# Patient Record
Sex: FEMALE | Race: WHITE | NOT HISPANIC OR LATINO | Employment: OTHER | URBAN - METROPOLITAN AREA
[De-identification: names, ages, dates, MRNs, and addresses within clinical notes are randomized per-mention and may not be internally consistent; named-entity substitution may affect disease eponyms.]

---

## 2021-03-08 ENCOUNTER — HOSPITAL ENCOUNTER (INPATIENT)
Facility: HOSPITAL | Age: 76
LOS: 4 days | Discharge: NURSING, REHAB OR LONG-TERM CARE | DRG: 813 | End: 2021-03-12
Attending: INTERNAL MEDICINE | Admitting: HOSPITALIST
Payer: MEDICARE

## 2021-03-08 ENCOUNTER — APPOINTMENT (EMERGENCY)
Dept: RADIOLOGY | Facility: HOSPITAL | Age: 76
End: 2021-03-08
Payer: MEDICARE

## 2021-03-08 ENCOUNTER — HOSPITAL ENCOUNTER (EMERGENCY)
Facility: HOSPITAL | Age: 76
End: 2021-03-08
Payer: MEDICARE

## 2021-03-08 VITALS
DIASTOLIC BLOOD PRESSURE: 58 MMHG | TEMPERATURE: 98.5 F | WEIGHT: 250 LBS | SYSTOLIC BLOOD PRESSURE: 98 MMHG | HEART RATE: 68 BPM | RESPIRATION RATE: 18 BRPM | OXYGEN SATURATION: 100 % | BODY MASS INDEX: 42.68 KG/M2 | HEIGHT: 64 IN

## 2021-03-08 DIAGNOSIS — K92.2 UPPER GI BLEED: Primary | ICD-10-CM

## 2021-03-08 DIAGNOSIS — K92.2 GI BLEED: Primary | ICD-10-CM

## 2021-03-08 DIAGNOSIS — D68.9 COAGULOPATHY (HCC): ICD-10-CM

## 2021-03-08 DIAGNOSIS — D64.9 ANEMIA: ICD-10-CM

## 2021-03-08 LAB
ABO GROUP BLD: NORMAL
ABO GROUP BLD: NORMAL
ALBUMIN SERPL BCP-MCNC: 2.9 G/DL (ref 3.4–4.8)
ALP SERPL-CCNC: 58.1 U/L (ref 35–140)
ALT SERPL W P-5'-P-CCNC: 6 U/L (ref 5–54)
ANION GAP SERPL CALCULATED.3IONS-SCNC: 7 MMOL/L (ref 4–13)
APTT PPP: 66 SECONDS (ref 23–31)
AST SERPL W P-5'-P-CCNC: 13 U/L (ref 15–41)
BASOPHILS # BLD AUTO: 0.02 THOUSANDS/ΜL (ref 0–0.1)
BASOPHILS NFR BLD AUTO: 0 % (ref 0–1)
BILIRUB SERPL-MCNC: 0.4 MG/DL (ref 0.3–1.2)
BLD GP AB SCN SERPL QL: NEGATIVE
BNP SERPL-MCNC: 428.9 PG/ML (ref 1–100)
BUN SERPL-MCNC: 49 MG/DL (ref 6–20)
CALCIUM ALBUM COR SERPL-MCNC: 9.6 MG/DL (ref 8.3–10.1)
CALCIUM SERPL-MCNC: 8.7 MG/DL (ref 8.4–10.2)
CHLORIDE SERPL-SCNC: 95 MMOL/L (ref 96–108)
CO2 SERPL-SCNC: 34 MMOL/L (ref 22–33)
CREAT SERPL-MCNC: 1.09 MG/DL (ref 0.4–1.1)
DIGOXIN SERPL-MCNC: 2 NG/ML (ref 0.8–2)
EOSINOPHIL # BLD AUTO: 0.11 THOUSAND/ΜL (ref 0–0.61)
EOSINOPHIL NFR BLD AUTO: 2 % (ref 0–6)
ERYTHROCYTE [DISTWIDTH] IN BLOOD BY AUTOMATED COUNT: 18.9 % (ref 11.6–15.1)
GFR SERPL CREATININE-BSD FRML MDRD: 50 ML/MIN/1.73SQ M
GLUCOSE SERPL-MCNC: 95 MG/DL (ref 65–140)
HCT VFR BLD AUTO: 22.5 % (ref 34.8–46.1)
HEMOCCULT STL QL IA: POSITIVE
HGB BLD-MCNC: 6.5 G/DL (ref 11.5–15.4)
IMM GRANULOCYTES # BLD AUTO: 0.02 THOUSAND/UL (ref 0–0.2)
IMM GRANULOCYTES NFR BLD AUTO: 0 % (ref 0–2)
INR PPP: 7.13 (ref 0.9–1.1)
LIPASE SERPL-CCNC: 17 U/L (ref 13–60)
LYMPHOCYTES # BLD AUTO: 1 THOUSANDS/ΜL (ref 0.6–4.47)
LYMPHOCYTES NFR BLD AUTO: 16 % (ref 14–44)
MCH RBC QN AUTO: 26.1 PG (ref 26.8–34.3)
MCHC RBC AUTO-ENTMCNC: 28.9 G/DL (ref 31.4–37.4)
MCV RBC AUTO: 90 FL (ref 82–98)
MONOCYTES # BLD AUTO: 0.42 THOUSAND/ΜL (ref 0.17–1.22)
MONOCYTES NFR BLD AUTO: 7 % (ref 4–12)
NEUTROPHILS # BLD AUTO: 4.68 THOUSANDS/ΜL (ref 1.85–7.62)
NEUTS SEG NFR BLD AUTO: 75 % (ref 43–75)
PLATELET # BLD AUTO: 405 THOUSANDS/UL (ref 149–390)
PMV BLD AUTO: 9.8 FL (ref 8.9–12.7)
POTASSIUM SERPL-SCNC: 3.4 MMOL/L (ref 3.5–5)
PROT SERPL-MCNC: 6 G/DL (ref 6.4–8.3)
PROTHROMBIN TIME: 73.4 SECONDS (ref 9.5–12.1)
RBC # BLD AUTO: 2.49 MILLION/UL (ref 3.81–5.12)
RH BLD: NEGATIVE
RH BLD: NEGATIVE
SODIUM SERPL-SCNC: 136 MMOL/L (ref 133–145)
SPECIMEN EXPIRATION DATE: NORMAL
TROPONIN I SERPL-MCNC: 0.03 NG/ML (ref 0–0.07)
WBC # BLD AUTO: 6.25 THOUSAND/UL (ref 4.31–10.16)

## 2021-03-08 PROCEDURE — 86900 BLOOD TYPING SEROLOGIC ABO: CPT

## 2021-03-08 PROCEDURE — 36430 TRANSFUSION BLD/BLD COMPNT: CPT

## 2021-03-08 PROCEDURE — 93005 ELECTROCARDIOGRAM TRACING: CPT

## 2021-03-08 PROCEDURE — 80162 ASSAY OF DIGOXIN TOTAL: CPT

## 2021-03-08 PROCEDURE — 86901 BLOOD TYPING SEROLOGIC RH(D): CPT

## 2021-03-08 PROCEDURE — 85025 COMPLETE CBC W/AUTO DIFF WBC: CPT

## 2021-03-08 PROCEDURE — 96365 THER/PROPH/DIAG IV INF INIT: CPT

## 2021-03-08 PROCEDURE — 96367 TX/PROPH/DG ADDL SEQ IV INF: CPT

## 2021-03-08 PROCEDURE — C9113 INJ PANTOPRAZOLE SODIUM, VIA: HCPCS | Performed by: EMERGENCY MEDICINE

## 2021-03-08 PROCEDURE — 96366 THER/PROPH/DIAG IV INF ADDON: CPT

## 2021-03-08 PROCEDURE — 36415 COLL VENOUS BLD VENIPUNCTURE: CPT

## 2021-03-08 PROCEDURE — 86920 COMPATIBILITY TEST SPIN: CPT

## 2021-03-08 PROCEDURE — 80053 COMPREHEN METABOLIC PANEL: CPT

## 2021-03-08 PROCEDURE — 86850 RBC ANTIBODY SCREEN: CPT

## 2021-03-08 PROCEDURE — 83880 ASSAY OF NATRIURETIC PEPTIDE: CPT

## 2021-03-08 PROCEDURE — 96375 TX/PRO/DX INJ NEW DRUG ADDON: CPT

## 2021-03-08 PROCEDURE — 99285 EMERGENCY DEPT VISIT HI MDM: CPT

## 2021-03-08 PROCEDURE — 71045 X-RAY EXAM CHEST 1 VIEW: CPT

## 2021-03-08 PROCEDURE — G0328 FECAL BLOOD SCRN IMMUNOASSAY: HCPCS

## 2021-03-08 PROCEDURE — 83690 ASSAY OF LIPASE: CPT

## 2021-03-08 PROCEDURE — 84484 ASSAY OF TROPONIN QUANT: CPT

## 2021-03-08 PROCEDURE — 85610 PROTHROMBIN TIME: CPT

## 2021-03-08 PROCEDURE — 96361 HYDRATE IV INFUSION ADD-ON: CPT

## 2021-03-08 PROCEDURE — 85730 THROMBOPLASTIN TIME PARTIAL: CPT

## 2021-03-08 PROCEDURE — P9016 RBC LEUKOCYTES REDUCED: HCPCS

## 2021-03-08 RX ORDER — FUROSEMIDE 10 MG/ML
20 INJECTION INTRAMUSCULAR; INTRAVENOUS ONCE
Status: COMPLETED | OUTPATIENT
Start: 2021-03-08 | End: 2021-03-08

## 2021-03-08 RX ORDER — FERROUS SULFATE 325(65) MG
TABLET ORAL
Status: ON HOLD | COMMUNITY
End: 2021-03-12 | Stop reason: SDUPTHER

## 2021-03-08 RX ORDER — DIGOXIN 125 MCG
0.12 TABLET ORAL DAILY
COMMUNITY
Start: 2021-02-24 | End: 2022-03-04

## 2021-03-08 RX ORDER — PANTOPRAZOLE SODIUM 40 MG/1
40 INJECTION, POWDER, FOR SOLUTION INTRAVENOUS EVERY 12 HOURS SCHEDULED
Status: DISCONTINUED | OUTPATIENT
Start: 2021-03-08 | End: 2021-03-08

## 2021-03-08 RX ORDER — ALBUTEROL SULFATE 90 UG/1
2 AEROSOL, METERED RESPIRATORY (INHALATION) EVERY 6 HOURS PRN
COMMUNITY
Start: 2020-10-01 | End: 2021-10-01

## 2021-03-08 RX ORDER — SODIUM CHLORIDE 9 MG/ML
250 INJECTION, SOLUTION INTRAVENOUS ONCE
Status: COMPLETED | OUTPATIENT
Start: 2021-03-08 | End: 2021-03-08

## 2021-03-08 RX ORDER — FUROSEMIDE 40 MG/1
40 TABLET ORAL 2 TIMES DAILY
COMMUNITY
End: 2021-07-05 | Stop reason: HOSPADM

## 2021-03-08 RX ORDER — MONTELUKAST SODIUM 10 MG/1
1 TABLET ORAL DAILY
COMMUNITY
Start: 2012-06-27

## 2021-03-08 RX ORDER — AMOXICILLIN 250 MG
1 CAPSULE ORAL DAILY PRN
COMMUNITY
Start: 2021-02-23 | End: 2022-02-23

## 2021-03-08 RX ORDER — LANOLIN ALCOHOL/MO/W.PET/CERES
3 CREAM (GRAM) TOPICAL
COMMUNITY
End: 2021-03-12 | Stop reason: HOSPADM

## 2021-03-08 RX ADMIN — SODIUM CHLORIDE 8 MG/HR: 9 INJECTION, SOLUTION INTRAVENOUS at 20:00

## 2021-03-08 RX ADMIN — PHYTONADIONE 10 MG: 10 INJECTION, EMULSION INTRAMUSCULAR; INTRAVENOUS; SUBCUTANEOUS at 18:23

## 2021-03-08 RX ADMIN — SODIUM CHLORIDE 250 ML/HR: 0.9 INJECTION, SOLUTION INTRAVENOUS at 17:20

## 2021-03-08 RX ADMIN — FUROSEMIDE 20 MG: 10 INJECTION, SOLUTION INTRAMUSCULAR; INTRAVENOUS at 19:03

## 2021-03-08 NOTE — ED PROVIDER NOTES
History  Chief Complaint   Patient presents with    Abnormal Lab     81-year-old female history of atrial fibrillation COPD diabetes hypertension comes from local nursing facility secondary to low hemoglobin  Patient apparently has a history of anemia she is on iron her stools have been black  Patient states she has had no appetite denies any nausea or vomiting  Patient denies any fever chills no cough or congestion patient states her in p o  Intake has not been great  She denies any localizing abdominal pain  One arrival patient's blood pressure was low however her heart rate was 60-70 she is awake alert oriented not diaphoretic and in no distress whatsoever  Prior to Admission Medications   Prescriptions Last Dose Informant Patient Reported? Taking?    albuterol (PROVENTIL HFA,VENTOLIN HFA) 90 mcg/act inhaler 3/8/2021 at Unknown time  Yes Yes   Sig: Inhale 2 puffs every 6 (six) hours as needed   digoxin (LANOXIN) 0 125 mg tablet 3/8/2021 at Unknown time  Yes Yes   Sig: Take 0 125 mg by mouth daily   ferrous sulfate (EQL Iron Supplement Therapy) 325 (65 Fe) mg tablet 3/8/2021 at Unknown time  Yes Yes   Sig: Take by mouth   fluticasone-salmeterol (Advair Diskus) 100-50 mcg/dose inhaler 3/8/2021 at Unknown time  Yes Yes   Sig: Inhale 1 puff 2 (two) times a day   furosemide (LASIX) 40 mg tablet   Yes Yes   Sig: Take 40 mg by mouth 2 (two) times a day   melatonin 3 mg 3/8/2021 at Unknown time  Yes Yes   Sig: Take 3 mg by mouth   montelukast (SINGULAIR) 10 mg tablet 3/8/2021 at Unknown time  Yes Yes   Sig: Take 1 tablet by mouth daily   senna-docusate sodium (Senokot S) 8 6-50 mg per tablet 3/8/2021 at Unknown time  Yes Yes   Sig: Take 1 tablet by mouth daily as needed      Facility-Administered Medications: None       Past Medical History:   Diagnosis Date    Atrial fibrillation (HCC)     [facility]     Diabetes mellitus ([facility] )     Hypertension     Respiratory failure [facility])        History reviewed  No pertinent surgical history  History reviewed  No pertinent family history  I have reviewed and agree with the history as documented  E-Cigarette/Vaping    E-Cigarette Use Former User      E-Cigarette/Vaping Substances     Social History     Tobacco Use    Smoking status: Former Smoker    Smokeless tobacco: Never Used   Substance Use Topics    Alcohol use: Not Currently    Drug use: Not Currently       Review of Systems   Constitutional: Positive for fatigue  Negative for chills and fever  HENT: Negative for congestion  Eyes: Negative for visual disturbance  Respiratory: Positive for shortness of breath  Cardiovascular: Negative for chest pain  Gastrointestinal: Negative for abdominal pain  Endocrine: Negative for cold intolerance  Genitourinary: Negative for frequency  Musculoskeletal: Negative for gait problem  Skin: Negative for rash  Neurological: Positive for weakness  Negative for dizziness  Psychiatric/Behavioral: Negative for behavioral problems and confusion  Physical Exam  Physical Exam  Vitals signs and nursing note reviewed  Constitutional:       Appearance: She is well-developed  HENT:      Head: Normocephalic and atraumatic  Nose: Nose normal       Mouth/Throat:      Mouth: Mucous membranes are moist    Eyes:      Conjunctiva/sclera: Conjunctivae normal       Pupils: Pupils are equal, round, and reactive to light  Neck:      Musculoskeletal: Normal range of motion and neck supple  Cardiovascular:      Rate and Rhythm: Normal rate  Rhythm irregular  Heart sounds: Normal heart sounds  Pulmonary:      Effort: Pulmonary effort is normal       Breath sounds: Normal breath sounds  Abdominal:      General: Bowel sounds are normal       Palpations: Abdomen is soft  Genitourinary:     Rectum: Normal       Comments: Stool is black no signs of bright red blood  Musculoskeletal: Normal range of motion  Skin:     General: Skin is warm and dry  Capillary Refill: Capillary refill takes less than 2 seconds  Neurological:      Mental Status: She is alert and oriented to person, place, and time     Psychiatric:         Behavior: Behavior normal          Vital Signs  ED Triage Vitals   Temperature Pulse Respirations Blood Pressure SpO2   03/08/21 1500 03/08/21 1500 03/08/21 1500 03/08/21 1655 03/08/21 1500   98 1 °F (36 7 °C) 79 18 (!) 78/45 96 %      Temp src Heart Rate Source Patient Position - Orthostatic VS BP Location FiO2 (%)   -- -- -- -- --             Pain Score       --                  Vitals:    03/08/21 1500 03/08/21 1655 03/08/21 1700 03/08/21 1754   BP:  (!) 78/45 (!) 81/51 95/61   Pulse: 79  77 74         Visual Acuity      ED Medications  Medications   pantoprazole (PROTONIX) injection 40 mg (has no administration in time range)   sodium chloride 0 9 % infusion (0 mL/hr Intravenous Stopped 3/8/21 1753)   phytonadione (AQUA-MEPHYTON) 10 mg/mL 10 mg in sodium chloride 0 9 % 50 mL IVPB (0 mg Intravenous Stopped 3/8/21 1853)   furosemide (LASIX) injection 20 mg (20 mg Intravenous Given 3/8/21 1903)       Diagnostic Studies  Results Reviewed     Procedure Component Value Units Date/Time    B-Type Natriuretic Peptide ([address]) [[id number]]  (Abnormal) Collected: 03/08/21 1719    Lab Status: Final result Specimen: Blood from Arm, Left Updated: 03/08/21 1809      9 pg/mL     Protime-INR [[id number]]  (Abnormal) Collected: 03/08/21 1719    Lab Status: Final result Specimen: Blood from Arm, Left Updated: 03/08/21 1800     Protime 73 4 seconds      INR 7 13    Narrative:      INR Reference Ranges:  No Anticoagulant, Normal:           0 9-1 1  Standard Dose, Oral Anticoagulant:  2 0-3 0  High Dose, Oral Anticoagulant:      2 5-3 5    APTT [[id number]]  (Abnormal) Collected: 03/08/21 1719    Lab Status: Final result Specimen: Blood from Arm, Left Updated: 03/08/21 1800     PTT 66 seconds Comprehensive metabolic panel [[id number]]  (Abnormal) Collected: 03/08/21 1719    Lab Status: Final result Specimen: Blood from Arm, Left Updated: 03/08/21 1756     Sodium 136 mmol/L      Potassium 3 4 mmol/L      Chloride 95 mmol/L      CO2 34 mmol/L      ANION GAP 7 mmol/L      BUN 49 mg/dL      Creatinine 1 09 mg/dL      Glucose 95 mg/dL      Calcium 8 7 mg/dL      Corrected Calcium 9 6 mg/dL      AST 13 U/L      ALT 6 U/L      Alkaline Phosphatase 58 1 U/L      Total Protein 6 0 g/dL      Albumin 2 9 g/dL      Total Bilirubin 0 40 mg/dL      eGFR 50 ml/min/1 73sq m     Narrative:      [place] guidelines for Chronic Kidney Disease (CKD):     Stage 1 with normal or high GFR (GFR > 90 mL/min/1 73 square meters)    Stage 2 Mild CKD (GFR = 60-89 mL/min/1 73 square meters)    Stage 3A Moderate CKD (GFR = 45-59 mL/min/1 73 square meters)    Stage 3B Moderate CKD (GFR = 30-44 mL/min/1 73 square meters)    Stage 4 Severe CKD (GFR = 15-29 mL/min/1 73 square meters)    Stage 5 End Stage CKD (GFR <15 mL/min/1 73 square meters)  Note: GFR calculation is accurate only with a steady state creatinine    Lipase [[id number]]  (Normal) Collected: 03/08/21 1719    Lab Status: Final result Specimen: Blood from Arm, Left Updated: 03/08/21 1756     Lipase 17 u/L     Digoxin level [[id number]]  (Normal) Collected: 03/08/21 1719    Lab Status: Final result Specimen: Blood from Arm, Left Updated: 03/08/21 1755     Digoxin Lvl 2 0 ng/mL     Troponin I [[id number]]  (Normal) Collected: 03/08/21 1719    Lab Status: Final result Specimen: Blood from Arm, Left Updated: 03/08/21 1746     Troponin I 0 03 ng/mL     CBC and differential [[id number]]  (Abnormal) Collected: 03/08/21 1719    Lab Status: Final result Specimen: Blood from Arm, Left Updated: 03/08/21 1746     WBC 6 25 Thousand/uL      RBC 2 49 Million/uL      Hemoglobin 6 5 g/dL      Hematocrit 22 5 %      MCV 90 fL      MCH 26 1 pg      MCHC 28 9 g/dL RDW 18 9 %      MPV 9 8 fL      Platelets 067 Thousands/uL      Neutrophils Relative 75 %      Immat GRANS % 0 %      Lymphocytes Relative 16 %      Monocytes Relative 7 %      Eosinophils Relative 2 %      Basophils Relative 0 %      Neutrophils Absolute 4 68 Thousands/µL      Immature Grans Absolute 0 02 Thousand/uL      Lymphocytes Absolute 1 00 Thousands/µL      Monocytes Absolute 0 42 Thousand/µL      Eosinophils Absolute 0 11 Thousand/µL      Basophils Absolute 0 02 Thousands/µL     Occult Bloood,Fecal Immunochemical [[id number]]  (Abnormal) Collected: 03/08/21 1719    Lab Status: Final result Specimen: Stool from Per Rectum Updated: 03/08/21 1729     OCCULT BLD, FECAL IMMUNOLOGICAL Positive    Narrative:        Performed by Fecal Immunochemical Test                  XR chest 1 view portable    (Results Pending)              Procedures  Procedures         ED Course                             SBIRT 20yo+      Most Recent Value   SBIRT (22 yo +)   In order to provide better care to our patients, we are screening all of our patients for alcohol and drug use  Would it be okay to ask you these screening questions? Yes Filed at: 03/08/2021 1656   Initial Alcohol Screen: US AUDIT-C    1  How often do you have a drink containing alcohol?  0 Filed at: 03/08/2021 1656   2  How many drinks containing alcohol do you have on a typical day you are drinking? 0 Filed at: 03/08/2021 1656   3a  Male UNDER 65: How often do you have five or more drinks on one occasion? 0 Filed at: 03/08/2021 1656   3b  FEMALE Any Age, or MALE 65+: How often do you have 4 or more drinks on one occassion? 0 Filed at: 03/08/2021 1656   Audit-C Score  0 Filed at: 03/08/2021 1656   FRANTZ: How many times in the past year have you    Used an illegal drug or used a prescription medication for non-medical reasons?   Never Filed at: 03/08/2021 1656                    MDM  Number of Diagnoses or Management Options  Diagnosis management comments: Patient had stool evaluation guaiac positive black stool patient is on iron  Patient's hemoglobin was at 6 5 which is the same as it was earlier today at the nursing facility and down from 6 92 days ago  Patient's INR is at 7 5 which is up from 5 72 days ago she is taking Coumadin  Patient had EKG demonstrating a a rate of 65 atrial fib rhythm patient had chest x-ray demonstrated no acute findings  Patient's blood pressure initially was between 80 and 90 systolic patient however with 250 cc bolus normal saline came up to [phone] systolic plan is to transfuse patient with 2 units packed red blood cells patient is also received vitamin K 10 units IV case is discussed with hospitalist here who consult to GI on-call who felt that patient was at higher risk for potential need for acute intervention and felt uncomfortable because that would not be possible here at [facility] due to lack of capability to do endoscopies on a stat basis  Plan will be to transfer patient to other facility in the system where acute GI evaluation and consultation is possible  Patient access center has been notified and is doing a bed search  Disposition  Final diagnoses:   GI bleed   [facility]agulopathy ([facility] )   Anemia     Time reflects when diagnosis was documented in both MDM as applicable and the Disposition within this note     Time User Action Codes Description Comment    3/8/2021  7:07 PM Andria Petties Add [K92 2] GI bleed     3/8/2021  7:07 PM Andria Petties Add [D68 9] Coagulopathy ([facility] )     3/8/2021  7:07 PM Andria Petties Add [D64 9] Anemia       ED Disposition     None      Follow-up Information    None         Patient's Medications   Discharge Prescriptions    No medications on file     No discharge procedures on file      PDMP Review     None          ED Provider  Electronically Signed by           Vida Fung MD  03/08/21 1807       Vida Fung MD  03/08/21 Blue Mcdonald

## 2021-03-08 NOTE — ED NOTES
BP low  MD made aware  Pt aaox3  states she normally runs low  Aaox3        Rachel Davidson, RN  03/08/21 7126

## 2021-03-08 NOTE — ED NOTES
Patient provided water from Dr Angi Hutchins per request  EKG being performed at bedside        Suzanna Alvarez RN  03/08/21 1330

## 2021-03-09 ENCOUNTER — ANESTHESIA EVENT (INPATIENT)
Dept: GASTROENTEROLOGY | Facility: HOSPITAL | Age: 76
DRG: 813 | End: 2021-03-09
Payer: MEDICARE

## 2021-03-09 PROBLEM — I48.20 CHRONIC ATRIAL FIBRILLATION (HCC): Status: ACTIVE | Noted: 2021-03-09

## 2021-03-09 PROBLEM — I50.32 CHRONIC DIASTOLIC CONGESTIVE HEART FAILURE (HCC): Status: ACTIVE | Noted: 2021-03-09

## 2021-03-09 PROBLEM — K92.2 GI BLEED: Status: ACTIVE | Noted: 2021-03-09

## 2021-03-09 PROBLEM — E11.9 TYPE 2 DIABETES MELLITUS (HCC): Status: ACTIVE | Noted: 2021-03-09

## 2021-03-09 PROBLEM — E66.01 MORBID OBESITY (HCC): Status: ACTIVE | Noted: 2021-03-09

## 2021-03-09 PROBLEM — J44.9 COPD (CHRONIC OBSTRUCTIVE PULMONARY DISEASE) (HCC): Status: ACTIVE | Noted: 2021-03-09

## 2021-03-09 LAB
ABO GROUP BLD: NORMAL
ANION GAP SERPL CALCULATED.3IONS-SCNC: 9 MMOL/L (ref 4–13)
ATRIAL RATE: 0 BPM
BASOPHILS # BLD AUTO: 0.04 THOUSANDS/ΜL (ref 0–0.1)
BASOPHILS NFR BLD AUTO: 1 % (ref 0–1)
BLD GP AB SCN SERPL QL: NEGATIVE
BUN SERPL-MCNC: 40 MG/DL (ref 5–25)
CALCIUM SERPL-MCNC: 8.5 MG/DL (ref 8.3–10.1)
CHLORIDE SERPL-SCNC: 103 MMOL/L (ref 100–108)
CO2 SERPL-SCNC: 30 MMOL/L (ref 21–32)
CREAT SERPL-MCNC: 1.02 MG/DL (ref 0.6–1.3)
EOSINOPHIL # BLD AUTO: 0.16 THOUSAND/ΜL (ref 0–0.61)
EOSINOPHIL NFR BLD AUTO: 2 % (ref 0–6)
ERYTHROCYTE [DISTWIDTH] IN BLOOD BY AUTOMATED COUNT: 19.1 % (ref 11.6–15.1)
FERRITIN SERPL-MCNC: 144 NG/ML (ref 8–388)
GFR SERPL CREATININE-BSD FRML MDRD: 55 ML/MIN/1.73SQ M
GLUCOSE SERPL-MCNC: 75 MG/DL (ref 65–140)
GLUCOSE SERPL-MCNC: 78 MG/DL (ref 65–140)
GLUCOSE SERPL-MCNC: 80 MG/DL (ref 65–140)
GLUCOSE SERPL-MCNC: 82 MG/DL (ref 65–140)
GLUCOSE SERPL-MCNC: 84 MG/DL (ref 65–140)
HCT VFR BLD AUTO: 21.5 % (ref 34.8–46.1)
HGB BLD-MCNC: 6.4 G/DL (ref 11.5–15.4)
HGB BLD-MCNC: 6.6 G/DL (ref 11.5–15.4)
HGB BLD-MCNC: 6.6 G/DL (ref 11.5–15.4)
IMM GRANULOCYTES # BLD AUTO: 0.04 THOUSAND/UL (ref 0–0.2)
IMM GRANULOCYTES NFR BLD AUTO: 1 % (ref 0–2)
INR PPP: 1.9 (ref 0.84–1.19)
IRON SATN MFR SERPL: 24 %
IRON SERPL-MCNC: 55 UG/DL (ref 50–170)
LYMPHOCYTES # BLD AUTO: 1.21 THOUSANDS/ΜL (ref 0.6–4.47)
LYMPHOCYTES NFR BLD AUTO: 18 % (ref 14–44)
MCH RBC QN AUTO: 27.1 PG (ref 26.8–34.3)
MCHC RBC AUTO-ENTMCNC: 29.8 G/DL (ref 31.4–37.4)
MCV RBC AUTO: 91 FL (ref 82–98)
MONOCYTES # BLD AUTO: 0.59 THOUSAND/ΜL (ref 0.17–1.22)
MONOCYTES NFR BLD AUTO: 9 % (ref 4–12)
NEUTROPHILS # BLD AUTO: 4.56 THOUSANDS/ΜL (ref 1.85–7.62)
NEUTS SEG NFR BLD AUTO: 69 % (ref 43–75)
NRBC BLD AUTO-RTO: 1 /100 WBCS
PLATELET # BLD AUTO: 353 THOUSANDS/UL (ref 149–390)
PMV BLD AUTO: 9.6 FL (ref 8.9–12.7)
POTASSIUM SERPL-SCNC: 3.1 MMOL/L (ref 3.5–5.3)
PR INTERVAL: 158 MS
PROTHROMBIN TIME: 21.4 SECONDS (ref 11.6–14.5)
QRS AXIS: 34 DEGREES
QRSD INTERVAL: 96 MS
QT INTERVAL: 403 MS
QTC INTERVAL: 419 MS
RBC # BLD AUTO: 2.36 MILLION/UL (ref 3.81–5.12)
RH BLD: NEGATIVE
SODIUM SERPL-SCNC: 142 MMOL/L (ref 136–145)
SPECIMEN EXPIRATION DATE: NORMAL
T WAVE AXIS: 215 DEGREES
TIBC SERPL-MCNC: 230 UG/DL (ref 250–450)
VENTRICULAR RATE: 65 BPM
WBC # BLD AUTO: 6.6 THOUSAND/UL (ref 4.31–10.16)

## 2021-03-09 PROCEDURE — P9040 RBC LEUKOREDUCED IRRADIATED: HCPCS

## 2021-03-09 PROCEDURE — 83540 ASSAY OF IRON: CPT | Performed by: PHYSICIAN ASSISTANT

## 2021-03-09 PROCEDURE — 86920 COMPATIBILITY TEST SPIN: CPT

## 2021-03-09 PROCEDURE — P9016 RBC LEUKOCYTES REDUCED: HCPCS

## 2021-03-09 PROCEDURE — 85025 COMPLETE CBC W/AUTO DIFF WBC: CPT | Performed by: PHYSICIAN ASSISTANT

## 2021-03-09 PROCEDURE — 99222 1ST HOSP IP/OBS MODERATE 55: CPT | Performed by: PHYSICIAN ASSISTANT

## 2021-03-09 PROCEDURE — 85610 PROTHROMBIN TIME: CPT | Performed by: PHYSICIAN ASSISTANT

## 2021-03-09 PROCEDURE — 86901 BLOOD TYPING SEROLOGIC RH(D): CPT | Performed by: PHYSICIAN ASSISTANT

## 2021-03-09 PROCEDURE — 85018 HEMOGLOBIN: CPT | Performed by: PHYSICIAN ASSISTANT

## 2021-03-09 PROCEDURE — 86850 RBC ANTIBODY SCREEN: CPT | Performed by: PHYSICIAN ASSISTANT

## 2021-03-09 PROCEDURE — 99223 1ST HOSP IP/OBS HIGH 75: CPT | Performed by: HOSPITALIST

## 2021-03-09 PROCEDURE — 82728 ASSAY OF FERRITIN: CPT | Performed by: PHYSICIAN ASSISTANT

## 2021-03-09 PROCEDURE — 82948 REAGENT STRIP/BLOOD GLUCOSE: CPT

## 2021-03-09 PROCEDURE — 93010 ELECTROCARDIOGRAM REPORT: CPT | Performed by: INTERNAL MEDICINE

## 2021-03-09 PROCEDURE — 83550 IRON BINDING TEST: CPT | Performed by: PHYSICIAN ASSISTANT

## 2021-03-09 PROCEDURE — 30233N1 TRANSFUSION OF NONAUTOLOGOUS RED BLOOD CELLS INTO PERIPHERAL VEIN, PERCUTANEOUS APPROACH: ICD-10-PCS | Performed by: HOSPITALIST

## 2021-03-09 PROCEDURE — C9113 INJ PANTOPRAZOLE SODIUM, VIA: HCPCS | Performed by: PHYSICIAN ASSISTANT

## 2021-03-09 PROCEDURE — 80048 BASIC METABOLIC PNL TOTAL CA: CPT | Performed by: PHYSICIAN ASSISTANT

## 2021-03-09 PROCEDURE — 86900 BLOOD TYPING SEROLOGIC ABO: CPT | Performed by: PHYSICIAN ASSISTANT

## 2021-03-09 RX ORDER — MAGNESIUM HYDROXIDE/ALUMINUM HYDROXICE/SIMETHICONE 120; 1200; 1200 MG/30ML; MG/30ML; MG/30ML
30 SUSPENSION ORAL EVERY 6 HOURS PRN
Status: DISCONTINUED | OUTPATIENT
Start: 2021-03-09 | End: 2021-03-12 | Stop reason: HOSPADM

## 2021-03-09 RX ORDER — SODIUM CHLORIDE 9 MG/ML
125 INJECTION, SOLUTION INTRAVENOUS CONTINUOUS
Status: DISCONTINUED | OUTPATIENT
Start: 2021-03-09 | End: 2021-03-09

## 2021-03-09 RX ORDER — ALBUTEROL SULFATE 90 UG/1
2 AEROSOL, METERED RESPIRATORY (INHALATION) EVERY 6 HOURS PRN
Status: DISCONTINUED | OUTPATIENT
Start: 2021-03-09 | End: 2021-03-12 | Stop reason: HOSPADM

## 2021-03-09 RX ORDER — OXYMETAZOLINE HYDROCHLORIDE 0.05 G/100ML
1 SPRAY NASAL EVERY 12 HOURS PRN
Status: DISPENSED | OUTPATIENT
Start: 2021-03-09 | End: 2021-03-10

## 2021-03-09 RX ORDER — MONTELUKAST SODIUM 10 MG/1
10 TABLET ORAL DAILY
Status: DISCONTINUED | OUTPATIENT
Start: 2021-03-09 | End: 2021-03-12 | Stop reason: HOSPADM

## 2021-03-09 RX ORDER — DIGOXIN 125 MCG
125 TABLET ORAL DAILY
Status: DISCONTINUED | OUTPATIENT
Start: 2021-03-09 | End: 2021-03-12 | Stop reason: HOSPADM

## 2021-03-09 RX ORDER — FERROUS SULFATE 325(65) MG
325 TABLET ORAL
Status: DISCONTINUED | OUTPATIENT
Start: 2021-03-09 | End: 2021-03-12 | Stop reason: HOSPADM

## 2021-03-09 RX ORDER — PANTOPRAZOLE SODIUM 40 MG/1
40 INJECTION, POWDER, FOR SOLUTION INTRAVENOUS EVERY 12 HOURS SCHEDULED
Status: DISCONTINUED | OUTPATIENT
Start: 2021-03-09 | End: 2021-03-10

## 2021-03-09 RX ORDER — DEXTROSE AND SODIUM CHLORIDE 5; .9 G/100ML; G/100ML
125 INJECTION, SOLUTION INTRAVENOUS CONTINUOUS
Status: DISPENSED | OUTPATIENT
Start: 2021-03-09 | End: 2021-03-09

## 2021-03-09 RX ORDER — FLUTICASONE PROPIONATE 50 MCG
1 SPRAY, SUSPENSION (ML) NASAL 2 TIMES DAILY PRN
Status: DISCONTINUED | OUTPATIENT
Start: 2021-03-09 | End: 2021-03-12 | Stop reason: HOSPADM

## 2021-03-09 RX ORDER — FLUTICASONE FUROATE AND VILANTEROL 100; 25 UG/1; UG/1
1 POWDER RESPIRATORY (INHALATION)
Status: DISCONTINUED | OUTPATIENT
Start: 2021-03-09 | End: 2021-03-12 | Stop reason: HOSPADM

## 2021-03-09 RX ORDER — LANOLIN ALCOHOL/MO/W.PET/CERES
3 CREAM (GRAM) TOPICAL
Status: DISCONTINUED | OUTPATIENT
Start: 2021-03-09 | End: 2021-03-12 | Stop reason: HOSPADM

## 2021-03-09 RX ORDER — INSULIN GLARGINE 100 [IU]/ML
5 INJECTION, SOLUTION SUBCUTANEOUS
Status: DISCONTINUED | OUTPATIENT
Start: 2021-03-09 | End: 2021-03-12 | Stop reason: HOSPADM

## 2021-03-09 RX ORDER — ONDANSETRON 2 MG/ML
4 INJECTION INTRAMUSCULAR; INTRAVENOUS EVERY 6 HOURS PRN
Status: DISCONTINUED | OUTPATIENT
Start: 2021-03-09 | End: 2021-03-12 | Stop reason: HOSPADM

## 2021-03-09 RX ADMIN — DEXTROSE AND SODIUM CHLORIDE 125 ML/HR: 5; .9 INJECTION, SOLUTION INTRAVENOUS at 05:18

## 2021-03-09 RX ADMIN — OXYMETAZOLINE HYDROCHLORIDE 1 SPRAY: 0.05 SPRAY NASAL at 21:22

## 2021-03-09 RX ADMIN — OXYMETAZOLINE HYDROCHLORIDE 1 SPRAY: 0.05 SPRAY NASAL at 06:11

## 2021-03-09 RX ADMIN — SODIUM CHLORIDE 500 ML: 0.9 INJECTION, SOLUTION INTRAVENOUS at 14:06

## 2021-03-09 RX ADMIN — PANTOPRAZOLE SODIUM 40 MG: 40 INJECTION, POWDER, FOR SOLUTION INTRAVENOUS at 21:17

## 2021-03-09 RX ADMIN — FLUTICASONE FUROATE AND VILANTEROL TRIFENATATE 1 PUFF: 100; 25 POWDER RESPIRATORY (INHALATION) at 09:25

## 2021-03-09 RX ADMIN — PANTOPRAZOLE SODIUM 40 MG: 40 INJECTION, POWDER, FOR SOLUTION INTRAVENOUS at 09:25

## 2021-03-09 RX ADMIN — FLUTICASONE PROPIONATE 1 SPRAY: 50 SPRAY, METERED NASAL at 03:59

## 2021-03-09 RX ADMIN — DIGOXIN 125 MCG: 125 TABLET ORAL at 09:25

## 2021-03-09 RX ADMIN — MELATONIN 3 MG: at 21:17

## 2021-03-09 NOTE — H&P
H&P- Shannon Dang 1945, 70 y o  female MRN: [id number]    Unit/Bed#: E5 -01 Encounter: [id number]    Primary Care Provider: No primary care provider on file  Date and time admitted to hospital: 3/8/2021 10:40 PM        * GI bleed  Assessment & Plan  Patient sent to [facility] ED from the [place] at least in for hemoglobin of 6 2  Patient likely had melena, staff reported dark tarry stools although patient is on iron supplementation  Repeat hemoglobin 6 5  Patient asymptomatic  Receive 1 unit of blood at [facility]:   - Protonix 40mg IV BID   - type and screen   - H/H 2hrs after blood tx, then q6h  Will transfuse hgb < 7 0   - NPO   - GI consult  Appreciate input  Chronic diastolic congestive heart failure [facility])  Assessment & Plan  Wt Readings from Last 3 Encounters:   03/08/21 113 kg (250 lb)   07/30/13 (!) 155 kg (342 lb 2 oz)   09/04/12 (!) 153 kg (336 lb 8 1 oz)     Not in acute exacerbation  On PO Lasix 40 mg b i d  Will hold setting of hypotension  Echocardiogram (2020) - EF 50-55%  Diastolic dysfunction with inability to calculate filling pressures due to bioprosthetic MVR  Severe tricuspid regurgitation  At least moderate pulmonary hypertension assuming an RA pressure of 10mmHg  Daily weights        Type 2 diabetes [facility] )  Assessment & Plan  Lab Results   Component Value Date    HGBA1C 4 6 03/04/2021       No results for input(s): POCGLU in the last 72 hours  Blood Sugar Average: Last 72 hrs:   on metformin 500 b i d     Will hold while inpatient  Accu-Cheks and SSI q  6h while patient is NPO  Lantus 5 units q h s  Chronic atrial fibrillation (HCC)  Assessment & Plan  On digoxin 125 mcg daily  Current still in AFib  Rate controlled  Telemetry    COPD (chronic obstructive pulmonary disease) ([facility] )  Assessment & Plan  Not in active exacerbation  No wheezing  Continue Breo Ellipta and Singulair  Albuterol p r n        VTE Prophylaxis: Pharmacologic VTE Prophylaxis contraindicated due to GI bleed  / sequential compression device   Code Status:  Level 1-full code  Discussion with family:  Discussed with patient    Anticipated Length of Stay:  Patient will be admitted on an Inpatient basis with an anticipated length of stay of  greater than 2 midnights  Justification for Hospital Stay:  GI bleed requiring GI workup    Total Time for Visit, including Counseling / Coordination of Care: 30 minutes  Greater than 50% of this total time spent on direct patient counseling and coordination of care  Chief Complaint:  Low hemoglobin    History of Present Illness:    Lamine Finney is a 70 y o  female with PMH chronic Afib on warfarin, COPD, diastolic CHF, GERD, [id number] who presented to [facility] in the ED for low hemoglobin of 6 2 at rehab facility  Patient states that she has had dark tarry bowel movements, although she is on iron supplements and was told that was normal   She states she feels well  Asymptomatic without shortness of breath, lightheadedness, fatigue, tachycardia  She denies fevers, chills, nausea, vomiting, constipation, numbness, chest pain, shortness of breath  Review of Systems:    Review of Systems   Constitutional: Negative for appetite change, chills, fatigue and fever  HENT: Negative for ear pain, sore throat and trouble swallowing  Eyes: Negative for visual disturbance  Respiratory: Negative for cough, chest tightness, shortness of breath and wheezing  Cardiovascular: Negative for chest pain, palpitations and leg swelling  Gastrointestinal: Negative for abdominal distention, abdominal pain, diarrhea, nausea and vomiting  Endocrine: Negative  Genitourinary: Negative for dysuria  Musculoskeletal: Negative for gait problem and myalgias  Skin: Negative for pallor  Allergic/Immunologic: Negative for immunocompromised state  Neurological: Negative for dizziness, syncope, light-headedness, numbness and headaches         Past Medical and Surgical History:     Past Medical History:   Diagnosis Date    Atrial fibrillation ([facility] )     COPD (chronic obstructive pulmonary disease) ([facility] )     Diabetes mellitus ([facility] )     Hypertension     Respiratory failure ([place] [address] )        History reviewed  No pertinent surgical history  Meds/Allergies:    Prior to Admission medications    Medication Sig Start Date End Date Taking? Authorizing Provider   albuterol (PROVENTIL HFA,VENTOLIN HFA) 90 mcg/act inhaler Inhale 2 puffs every 6 (six) hours as needed 10/1/20 10/1/21 Yes Historical Provider, MD   digoxin (LANOXIN) 0 125 mg tablet Take 0 125 mg by mouth daily 2/24/21 2/24/22 Yes Historical Provider, MD   fluticasone-salmeterol (Advair Diskus) 100-50 mcg/dose inhaler Inhale 1 puff 2 (two) times a day 4/19/12  Yes Historical Provider, MD   furosemide (LASIX) 40 mg tablet Take 40 mg by mouth 2 (two) times a day   Yes Historical Provider, MD   senna-docusate sodium (Senokot S) 8 6-50 mg per tablet Take 1 tablet by mouth daily as needed 2/23/21 2/23/22 Yes Historical Provider, MD   ferrous sulfate (EQL Iron Supplement Therapy) 325 (65 Fe) mg tablet Take by mouth    Historical Provider, MD   melatonin 3 mg Take 3 mg by mouth    Historical Provider, MD   montelukast (SINGULAIR) 10 mg tablet Take 1 tablet by mouth daily 6/27/12   Historical Provider, MD     I have reveiwed home medications using records provided by [facility]  Allergies:    Allergies   Allergen Reactions    Augmentin [Amoxicillin-Pot Clavulanate] Anaphylaxis    Levaquin [Levofloxacin] Anaphylaxis       Social History:     Marital Status: /Civil Union   Occupation:  Noncontributory  Patient Pre-hospital Living Situation:  Currently at the [facility]  Patient Pre-hospital Level of Mobility:  Bedbound  Patient Pre-hospital Diet Restrictions:  Cardiac diabetic  Substance Use History:   Social History     Substance and Sexual Activity   Alcohol Use Not Currently     Social History     Tobacco Use Smoking Status Former Smoker    Quit date: 2010    Years since quittin 1   Smokeless Tobacco Never Used     Social History     Substance and Sexual Activity   Drug Use Not Currently       Family History:    non-contributory    Physical Exam:     Vitals:   Blood Pressure: (!) 94/49 (21)  Pulse: 72 (21)  Temperature: 98 2 °F (36 8 °C) (21)  Temp Source: Temporal (21)  Respirations: 16 (21)  SpO2: 94 % (21)    Physical Exam  Vitals signs and nursing note reviewed  Constitutional:       Appearance: Normal appearance  HENT:      Head: Normocephalic and atraumatic  Mouth/Throat:      Mouth: Mucous membranes are moist       Pharynx: Oropharynx is clear  No oropharyngeal exudate  Eyes:      Extraocular Movements: Extraocular movements intact  Cardiovascular:      Rate and Rhythm: Normal rate  Rhythm irregular  Pulses: Normal pulses  Heart sounds: Normal heart sounds  No murmur  No friction rub  No gallop  Comments: afib  Pulmonary:      Effort: Pulmonary effort is normal  No respiratory distress  Breath sounds: Normal breath sounds  No stridor  No wheezing or rales  Abdominal:      General: Abdomen is flat  Bowel sounds are normal  There is no distension  Palpations: Abdomen is soft  Tenderness: There is no abdominal tenderness  Musculoskeletal:      Right lower leg: No edema  Left lower leg: No edema  Skin:     General: Skin is warm and dry  Neurological:      General: No focal deficit present  Mental Status: She is alert and oriented to person, place, and time  Additional Data:     Lab Results: I have personally reviewed pertinent reports        Results from last 7 days   Lab Units 21  1719   WBC Thousand/uL 6 25   HEMOGLOBIN g/dL 6 5*   HEMATOCRIT % 22 5*   PLATELETS Thousands/uL 405*   NEUTROS PCT % 75   LYMPHS PCT % 16   MONOS PCT % 7   EOS PCT % 2     Results from last 7 days   Lab Units 03/08/21  1719   SODIUM mmol/L 136   POTASSIUM mmol/L 3 4*   CHLORIDE mmol/L 95*   CO2 mmol/L 34*   BUN mg/dL 49*   CREATININE mg/dL 1 09   ANION GAP mmol/L 7   CALCIUM mg/dL 8 7   ALBUMIN g/dL 2 9*   TOTAL BILIRUBIN mg/dL 0 40   ALK PHOS U/L 58 1   ALT U/L 6   AST U/L 13*   GLUCOSE RANDOM mg/dL 95     Results from last 7 days   Lab Units 03/08/21  1719   INR  7 13*         Results from last 7 days   Lab Units 03/04/21  0405   HEMOGLOBIN A1C % 4 6           Imaging: I have personally reviewed pertinent reports  No orders to display       EKG, Pathology, and Other Studies Reviewed on Admission:   · EKG  · Chest x-ray    Allscripts / Epic Records Reviewed: Yes     ** Please Note: This note has been constructed using a voice recognition system   **

## 2021-03-09 NOTE — ASSESSMENT & PLAN NOTE
On digoxin 125 mcg daily  Anticoagulated on Warfarin  Hold warfarin in setting of GI bleed  Current still in AFib  Rate controlled    Telemetry

## 2021-03-09 NOTE — ED NOTES
Pt AA&Ox3,  Blood consent signed  Aware of being transferred to [facility]    Patient VSS resting quietly     Karl Piper RN  03/08/21 2014

## 2021-03-09 NOTE — EMTALA/ACUTE CARE TRANSFER
[facility] EMERGENCY DEPARTMENT  [address] [facility] [address]  Dept: [phone]      EMTALA TRANSFER CONSENT    NAME Darrell Faust                                         1945                              MRN [id number]    I have been informed of my rights regarding examination, treatment, and transfer   by Dr Claria Boast, DO    Benefits: Specialized equipment and/or services available at the receiving facility (Include comment)________________________    Risks: Potential for delay in receiving treatment, Potential deterioration of medical condition, Loss of IV      Consent for Transfer:  I acknowledge that my medical condition has been evaluated and explained to me by the emergency department physician or other qualified medical person and/or my attending physician, who has recommended that I be transferred to the service of  Accepting Physician: Dr Светлана Ferreira at [address] [place] [address] Name, [address] : [address]  The above potential benefits of such transfer, the potential risks associated with such transfer, and the probable risks of not being transferred have been explained to me, and I fully understand them  The doctor has explained that, in my case, the benefits of transfer outweigh the risks  I agree to be transferred  I authorize the performance of emergency medical procedures and treatments upon me in both transit and upon arrival at the receiving facility  Additionally, I authorize the release of any and all medical records to the receiving facility and request they be transported with me, if possible  I understand that the safest mode of transportation during a medical emergency is an ambulance and that the Hospital advocates the use of this mode of transport   Risks of traveling to the receiving facility by car, including absence of medical control, life sustaining equipment, such as oxygen, and medical personnel has been explained to me and I fully understand them  (SHEBA CORRECT BOX BELOW)  [  ]  I consent to the stated transfer and to be transported by ambulance/helicopter  [  ]  I consent to the stated transfer, but refuse transportation by ambulance and accept full responsibility for my transportation by car  I understand the risks of non-ambulance transfers and I exonerate the Hospital and its staff from any deterioration in my condition that results from this refusal     X___________________________________________    DATE  21  TIME________  Signature of patient or legally responsible individual signing on patient behalf           RELATIONSHIP TO PATIENT_________________________          Provider Certification    NAME Danisha Bailey                                         1945                              MRN [id number]    A medical screening exam was performed on the above named patient  Based on the examination:    Condition Necessitating Transfer The primary encounter diagnosis was Upper GI bleed  Diagnoses of Coagulopathy ([facility] [address] ) and Anemia were also pertinent to this visit      Patient Condition: The patient has been stabilized such that within reasonable medical probability, no material deterioration of the patient condition or the condition of the unborn child(taurus) is likely to result from the transfer, An emergency transfer is being made prior to stabilization due to the need for definitive care and the benefit of transfer outweighs the risk    Reason for Transfer: Level of Care needed not available at this facility, Patient/Family request    Transfer Requirements: [address] [facility]   · Space available and qualified personnel available for treatment as acknowledged by    · Agreed to accept transfer and to provide appropriate medical treatment as acknowledged by       Dr Aron Gilbert  · Appropriate medical records of the examination and treatment of the patient are provided at the time of transfer STAFF INITIAL WHEN COMPLETED _______  · Transfer will be performed by qualified personnel from    and appropriate transfer equipment as required, including the use of necessary and appropriate life support measures  Provider Certification: I have examined the patient and explained the following risks and benefits of being transferred/refusing transfer to the patient/family:  General risk, such as traffic hazards, adverse weather conditions, rough terrain or turbulence, possible failure of equipment (including vehicle or aircraft), or consequences of actions of persons outside the control of the transport personnel      Based on these reasonable risks and benefits to the patient and/or the unborn child(taurus), and based upon the information available at the time of the patients examination, I certify that the medical benefits reasonably to be expected from the provision of appropriate medical treatments at another medical facility outweigh the increasing risks, if any, to the individuals medical condition, and in the case of labor to the unborn child, from effecting the transfer      X____________________________________________ DATE 03/08/21        TIME_______      ORIGINAL - SEND TO MEDICAL RECORDS   COPY - SEND WITH PATIENT DURING TRANSFER

## 2021-03-09 NOTE — ASSESSMENT & PLAN NOTE
Lab Results   Component Value Date    HGBA1C 4 6 03/04/2021       No results for input(s): POCGLU in the last 72 hours  Blood Sugar Average: Last 72 hrs:   on metformin 500 b i d     Will hold while inpatient  Accu-Cheks and SSI q  6h while patient is NPO  Lantus 5 units q h s

## 2021-03-09 NOTE — PLAN OF CARE
Problem: Potential for Falls  Goal: Patient will remain free of falls  Description: INTERVENTIONS:  - Assess patient frequently for physical needs  -  Identify cognitive and physical deficits and behaviors that affect risk of falls    -  [place] fall precautions as indicated by assessment   - Educate patient/family on patient safety including physical limitations  - Instruct patient to call for assistance with activity based on assessment  - Modify environment to reduce risk of injury  - Consider OT/PT consult to assist with strengthening/mobility  Outcome: Progressing

## 2021-03-09 NOTE — ED CARE HANDOFF
Emergency Department Sign Out Note        Sign out and transfer of care from Dr Jessica Ervin  See Separate Emergency Department note  The patient, Ramesh Schroeder, was evaluated by the previous provider for anemia, Gi bleed  Workup Completed:  Labs, xray    ED Course / Workup Pending (followup):  Pt found to be anemic due to slow upper Gi bleed, having melena  Anemic at 6 5  2 units of PRBCs ordered, vit K already given  Pending transfer out  protonix bolus given, will add protonix drip  ED Course as of Mar 09 0018   Kristin Heerik Mar 08, 2021   2651 Patient signed out to me, blood transfusions ordered, pending callback from [facility] from transfer out for GI consult and endoscopy  2023 D/w MARION Reese accepts for transfer to [facility] under Dr Cherie Camarillo  Procedures  MDM    Disposition  Final diagnoses:   Coagulopathy ([facility] [address] )   Anemia   Upper GI bleed     Time reflects when diagnosis was documented in both MDM as applicable and the Disposition within this note     Time User Action Codes Description Comment    3/8/2021  7:07 PM Jeaneen Gums Add [K92 2] GI bleed     3/8/2021  7:07 PM Jeaneen Gums Add [D68 9] Coagulopathy ([facility] [address] )     3/8/2021  7:07 PM Delcie Darryl [D64 9] Anemia     3/8/2021  8:21 PM Antonio Gallardo Add [K92 2] Upper GI bleed     3/8/2021  8:21 PM Audrea Issa Modify [D68 9] Coagulopathy ([facility] [address] )     3/8/2021  8:21 PM Audrea Issa Remove [K92 2] GI bleed     3/8/2021  8:21 PM Audrea Issa Modify [D68 9] Coagulopathy ([facility] [address] )     3/8/2021  8:21 PM Audrea Issa Modify [K92 2] Upper GI bleed       ED Disposition     ED Disposition Condition Date/Time Comment    Transfer to Another Facility-In Network  Mon Mar 8, 2021  8:21 PM Ramesh Schroeder should be transferred out to Leonard Rudd MD Documentation      Most Recent Value   Patient Condition  The patient has been stabilized such that within reasonable medical probability, no material deterioration of the patient condition or the condition of the unborn child(taurus) is likely to result from the transfer, An emergency transfer is being made prior to stabilization due to the need for definitive care and the benefit of transfer outweighs the risk   Reason for Transfer  Level of Care needed not available at this facility, Patient/Family request   Benefits of Transfer  Specialized equipment and/or services available at the receiving facility (Include comment)________________________   Risks of Transfer  Potential for delay in receiving treatment, Potential deterioration of medical condition, Loss of IV   Accepting Physician  Dr Uche Brito Name, [facility]   Sending MD Dr Tracey Burris   Provider Certification  General risk, such as traffic hazards, adverse weather conditions, rough terrain or turbulence, possible failure of equipment (including vehicle or aircraft), or consequences of actions of persons outside the control of the transport personnel      RN Documentation      Most Recent Value   Accepting Facility Name, [facility]      Follow-up Information    None       Discharge Medication List as of 3/8/2021 10:37 PM      CONTINUE these medications which have NOT CHANGED    Details   albuterol (PROVENTIL HFA,VENTOLIN HFA) 90 mcg/act inhaler Inhale 2 puffs every 6 (six) hours as needed, Starting Thu 10/1/2020, Until Fri 10/1/2021, Historical Med      digoxin (LANOXIN) 0 125 mg tablet Take 0 125 mg by mouth daily, Starting Wed 2/24/2021, Until Thu 2/24/2022, Historical Med      fluticasone-salmeterol (Advair Diskus) 100-50 mcg/dose inhaler Inhale 1 puff 2 (two) times a day, Starting Thu 4/19/2012, Historical Med      furosemide (LASIX) 40 mg tablet Take 40 mg by mouth 2 (two) times a day, Historical Med      senna-docusate sodium (Senokot S) 8 6-50 mg per tablet Take 1 tablet by mouth daily as needed, Starting Tue 2/23/2021, Until Wed 2/23/2022, Historical Med      ferrous sulfate (EQL Iron Supplement Therapy) 325 (65 Fe) mg tablet Take by mouth, Historical Med      melatonin 3 mg Take 3 mg by mouth, Historical Med      montelukast (SINGULAIR) 10 mg tablet Take 1 tablet by mouth daily, Starting Wed 6/27/2012, Historical Med           No discharge procedures on file         ED Provider  Electronically Signed by     Domi Muniz DO  03/09/21 7486

## 2021-03-09 NOTE — ED NOTES
Please let patient know that her labs came out fairly acceptable except for potassium came out elevated at 5.8.   However I think this is more of a test tube problem rather than what is going on in her body since the same thing happened in 2014 and 2015 whe TRANSFER INFORMATION      TIME:   2115       TRANSFER DESTINATION:  E5 Room 536       TRANSFER CREW:   ROSENDA       ACCEPTING DOCTOR:   Edmond Elliott       PHONE NUMBER TO CALL REPORT:  [phone] [address], RN  03/08/21 0163

## 2021-03-09 NOTE — ASSESSMENT & PLAN NOTE
Wt Readings from Last 3 Encounters:   03/08/21 113 kg (250 lb)   07/30/13 (!) 155 kg (342 lb 2 oz)   09/04/12 (!) 153 kg (336 lb 8 1 oz)     Not in acute exacerbation  On PO Lasix 40 mg b i d  Will hold setting of hypotension  Echocardiogram (2020) - EF 50-55%  Diastolic dysfunction with inability to calculate filling pressures due to bioprosthetic MVR  Severe tricuspid regurgitation  At least moderate pulmonary hypertension assuming an RA pressure of 10mmHg     CXR - Cardiomegaly with very mild pulmonary congestion, otherwise unremarkable  Daily weights

## 2021-03-09 NOTE — ED NOTES
Patient offers no complaints,  Blood infusing without any difficulty or reactions    Patient aware of being transferred to [facility] and aware of the plan of care     Kaleigh Langston RN  03/08/21 2031

## 2021-03-09 NOTE — CONSULTS
Patient MRN: [id number]  Date of Service: 3/9/2021  Referring Physician: Karina Hernandez PA-C  Provider Creating Note: Manolo Seo PA-C  PCP: No primary care provider on file  Reason for Consult: Anemia with melena  MAKAYLA Loza is a 70 y o  female who was admitted with GI bleed  She has a history of atrial fibrillation, on chronic warfarin, CHF and bovine mitral valve replacement 2012  She has a history of anemia with a hemoglobin that fluctuated between 6 8 and 9 8 at least since September  She has been on oral iron and her stools have been dark  She had routine blood work on 03/04, her hemoglobin was 7 6  Repeat on 03/08 shows a hemoglobin of 6 2 and she was referred for admission  She was transfused as an outpatient at  [facility] in mid February  Her hemoglobin was 6 2 at that time  After transfusion it was 8 2 on 02/21  She denies any abdominal pain, heartburn, nausea, hematochezia or change in bowels  She had a colonoscopy in 2010 and an upper endoscopy at that time also  She reports a significant weight loss that she attributes to being on Lasix for her congestive heart failure  Past Medical History:   Diagnosis Date    Atrial fibrillation ([facility] )     COPD (chronic obstructive pulmonary disease) ([facility] )     Diabetes mellitus ([facility] )     Hypertension     Respiratory failure ([facility] )      Past Surgical History:   Procedure Laterality Date    MITRAL VALVE REPLACEMENT  2012    Bovine     Medications  Home Medications:   Prior to Admission medications    Medication Sig Start Date End Date Taking?  Authorizing Provider   albuterol (PROVENTIL HFA,VENTOLIN HFA) 90 mcg/act inhaler Inhale 2 puffs every 6 (six) hours as needed 10/1/20 10/1/21 Yes Historical Provider, MD   digoxin (LANOXIN) 0 125 mg tablet Take 0 125 mg by mouth daily 2/24/21 2/24/22 Yes Historical Provider, MD   fluticasone-salmeterol (Advair Diskus) 100-50 mcg/dose inhaler Inhale 1 puff 2 (two) times a day 4/19/12 Yes Historical Provider, MD   furosemide (LASIX) 40 mg tablet Take 40 mg by mouth 2 (two) times a day   Yes Historical Provider, MD   senna-docusate sodium (Senokot S) 8 6-50 mg per tablet Take 1 tablet by mouth daily as needed 2/23/21 2/23/22 Yes Historical Provider, MD   ferrous sulfate (EQL Iron Supplement Therapy) 325 (65 Fe) mg tablet Take by mouth    Historical Provider, MD   melatonin 3 mg Take 3 mg by mouth    Historical Provider, MD   montelukast (SINGULAIR) 10 mg tablet Take 1 tablet by mouth daily 6/27/12   Historical Provider, MD       Inhouse Medications    Current Facility-Administered Medications:     albuterol (PROVENTIL HFA,VENTOLIN HFA) inhaler 2 puff, 2 puff, Inhalation, Q6H PRN    aluminum-magnesium hydroxide-simethicone (MYLANTA) oral suspension 30 mL, 30 mL, Oral, Q6H PRN    digoxin (LANOXIN) tablet 125 mcg, 125 mcg, Oral, Daily    ferrous sulfate tablet 325 mg, 325 mg, Oral, Daily With Breakfast    fluticasone (FLONASE) 50 mcg/act nasal spray 1 spray, 1 spray, Each Nare, BID PRN, 1 spray at 03/09/21 0359    fluticasone-vilanterol (BREO ELLIPTA) 100-25 mcg/inh inhaler 1 puff, 1 puff, Inhalation, Daily    insulin glargine (LANTUS) subcutaneous injection 5 Units 0 05 mL, 5 Units, Subcutaneous, HS, Stopped at 03/09/21 0229    insulin lispro (HumaLOG) 100 units/mL subcutaneous injection 2-12 Units, 2-12 Units, Subcutaneous, Q6H [address], Stopped at 03/09/21 0229 **AND** Fingerstick Glucose (POCT), , , Q6H    melatonin tablet 3 mg, 3 mg, Oral, HS, Stopped at 03/09/21 0215    montelukast (SINGULAIR) tablet 10 mg, 10 mg, Oral, Daily    ondansetron (ZOFRAN) injection 4 mg, 4 mg, Intravenous, Q6H PRN    oxymetazoline (AFRIN) 0 05 % nasal spray 1 spray, 1 spray, Each Nare, Q12H PRN, 1 spray at 03/09/21 0611    pantoprazole (PROTONIX) injection 40 mg, 40 mg, Intravenous, Q12H [address], Stopped at 03/09/21 0214    Allergies  Allergies   Allergen Reactions    Augmentin [Amoxicillin-Pot Clavulanate] Anaphylaxis    Levaquin [Levofloxacin] Anaphylaxis     Social History   reports that she quit smoking about 11 years ago  She has never used smokeless tobacco  She reports previous alcohol use  She reports previous drug use  Family History  History reviewed  No pertinent family history  Family history of breast cancer  No family history of GI malignancy    ROS  ROS: Reports:  Black stools, weight loss  Denies chest pain, dyspnea, abdominal pain  All others negative except as noted in HPI  Objective   Vitals  /59 (BP Location: Right arm)   Pulse 65   Temp (!) 97 4 °F (36 3 °C) (Temporal)   Resp 16   SpO2 99%   General: Alert, no apparent distress  Eyes: No scleral icterus  ENT:  Mucous membranes dry  Card:  Irregular  Lungs:  Decreased bilaterally  Abdomen:  Soft  Obese  Nontender  Bowel sounds normoactive  Skin:  No jaundice  Extremities: 1+ edema b/l LE  Neuro: Alert and oriented x3    Laboratory Studies  Lab Results   Component Value Date    WBC 6 60 03/09/2021    HGB 6 4 (LL) 03/09/2021    HCT 21 5 (L) 03/09/2021     03/09/2021    MCV 91 03/09/2021     Lab Results   Component Value Date    HGB 6 4 (LL) 03/09/2021    HGB 6 6 (LL) 03/09/2021    HGB 6 5 (LL) 03/08/2021       Lab Results   Component Value Date    CREATININE 1 02 03/09/2021    BUN 40 (H) 03/09/2021    SODIUM 142 03/09/2021    K 3 1 (L) 03/09/2021     03/09/2021    CO2 30 03/09/2021    GLUC 82 03/09/2021    CALCIUM 8 5 03/09/2021    ALKPHOS 58 1 03/08/2021    ALB 2 9 (L) 03/08/2021    TBILI 0 40 03/08/2021    AST 13 (L) 03/08/2021    ALT 6 03/08/2021     Lab Results   Component Value Date    PROTIME 21 4 (H) 03/09/2021    INR 1 90 (H) 03/09/2021         Assessment and Plan:  1  Anemia with melenic stool on warfarin  INR 7 13 at admission, 1 90 today  With elevated BUN, upper GI bleed is possible  Continue pantoprazole 40 mg b i d  Will discuss timing of  EGD with attending     2  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

## 2021-03-09 NOTE — PLAN OF CARE
Problem: Potential for Falls  Goal: Patient will remain free of falls  Description: INTERVENTIONS:  - Assess patient frequently for physical needs  -  Identify cognitive and physical deficits and behaviors that affect risk of falls  -  [place] fall precautions as indicated by assessment   - Educate patient/family on patient safety including physical limitations  - Instruct patient to call for assistance with activity based on assessment  - Modify environment to reduce risk of injury  - Consider OT/PT consult to assist with strengthening/mobility  3/9/2021 1019 by Ned Walden RN  Outcome: Progressing  3/9/2021 1018 by Ned Walden RN  Outcome: Progressing     Problem: Prexisting or High Potential for Compromised Skin Integrity  Goal: Skin integrity is maintained or improved  Description: INTERVENTIONS:  - Identify patients at risk for skin breakdown  - Assess and monitor skin integrity  - Assess and monitor nutrition and hydration status  - Monitor labs   - Assess for incontinence   - Turn and reposition patient  - Assist with mobility/ambulation  - Relieve pressure over bony prominences  - Avoid friction and shearing  - Provide appropriate hygiene as needed including keeping skin clean and dry  - Evaluate need for skin moisturizer/barrier cream  - Collaborate with interdisciplinary team   - Patient/family teaching  - Consider wound care consult   3/9/2021 1019 by Ned Walden RN  Outcome: Progressing  3/9/2021 1018 by Ned Walden RN  Outcome: Progressing     Problem: Nutrition/Hydration-ADULT  Goal: Nutrient/Hydration intake appropriate for improving, restoring or maintaining nutritional needs  Description: Monitor and assess patient's nutrition/hydration status for malnutrition  Collaborate with interdisciplinary team and initiate plan and interventions as ordered  Monitor patient's weight and dietary intake as ordered or per policy  Utilize nutrition screening tool and intervene as necessary  Determine patient's food preferences and provide high-protein, high-caloric foods as appropriate       INTERVENTIONS:  - Monitor oral intake, urinary output, labs, and treatment plans  - Assess nutrition and hydration status and recommend course of action  - Evaluate amount of meals eaten  - Assist patient with eating if necessary   - Allow adequate time for meals  - Recommend/ encourage appropriate diets, oral nutritional supplements, and vitamin/mineral supplements  - Order, calculate, and assess calorie counts as needed  - Recommend, monitor, and adjust tube feedings and TPN/PPN based on assessed needs  - Assess need for intravenous fluids  - Provide specific nutrition/hydration education as appropriate  - Include patient/family/caregiver in decisions related to nutrition  3/9/2021 1019 by Yousuf Sher RN  Outcome: Progressing  3/9/2021 1018 by Yousuf Sher RN  Outcome: Progressing     Problem: GASTROINTESTINAL - ADULT  Goal: Minimal or absence of nausea and/or vomiting  Description: INTERVENTIONS:  - Administer IV fluids if ordered to ensure adequate hydration  - Maintain NPO status until nausea and vomiting are resolved  - Nasogastric tube if ordered  - Administer ordered antiemetic medications as needed  - Provide nonpharmacologic comfort measures as appropriate  - Advance diet as tolerated, if ordered  - Consider nutrition services referral to assist patient with adequate nutrition and appropriate food choices  Outcome: Progressing     Problem: METABOLIC, FLUID AND ELECTROLYTES - ADULT  Goal: Electrolytes maintained within normal limits  Description: INTERVENTIONS:  - Monitor labs and assess patient for signs and symptoms of electrolyte imbalances  - Administer electrolyte replacement as ordered  - Monitor response to electrolyte replacements, including repeat lab results as appropriate  - Instruct patient on fluid and nutrition as appropriate  Outcome: Progressing  Goal: Fluid balance maintained  Description: INTERVENTIONS:  - Monitor labs   - Monitor I/O and WT  - Instruct patient on fluid and nutrition as appropriate  - Assess for signs & symptoms of volume excess or deficit  Outcome: Progressing     Problem: SKIN/TISSUE INTEGRITY - ADULT  Goal: Incision(s), wounds(s) or drain site(s) healing without S/S of infection  Description: INTERVENTIONS  - Assess and document risk factors for skin impairment   - Assess and document dressing, incision, wound bed, drain sites and surrounding tissue  - Consider nutrition services referral as needed  - Oral mucous membranes remain intact  - Provide patient/ family education  Outcome: Progressing     Problem: HEMATOLOGIC - ADULT  Goal: Maintains hematologic stability  Description: INTERVENTIONS  - Assess for signs and symptoms of bleeding or hemorrhage  - Monitor labs  - Administer supportive blood products/factors as ordered and appropriate  Outcome: Progressing     Problem: MUSCULOSKELETAL - ADULT  Goal: Maintain or return mobility to safest level of function  Description: INTERVENTIONS:  - Assess patient's ability to carry out ADLs; assess patient's baseline for ADL function and identify physical deficits which impact ability to perform ADLs (bathing, care of mouth/teeth, toileting, grooming, dressing, etc )  - Assess/evaluate cause of self-care deficits   - Assess range of motion  - Assess patient's mobility  - Assess patient's need for assistive devices and provide as appropriate  - Encourage maximum independence but intervene and supervise when necessary  - Involve family in performance of ADLs  - Assess for home care needs following discharge   - Consider OT consult to assist with ADL evaluation and planning for discharge  - Provide patient education as appropriate  Outcome: Progressing

## 2021-03-09 NOTE — ASSESSMENT & PLAN NOTE
Patient sent to [facility] ED from the [place] at least in for hemoglobin of 6 2  Patient likely had melena, staff reported dark tarry stools although patient is on iron supplementation  Repeat hemoglobin 6 5  Patient asymptomatic  Receive 1 unit of blood at [facility]  INR 7, received Vit K at [facility]:   - Protonix 40mg IV BID   - type and screen   - H/H 2hrs after blood tx, then q6h  Will transfuse hgb < 7 0   - Repeat INR, will consider FFP if pt has active bleeding   - NPO   - GI consult  Appreciate input

## 2021-03-10 ENCOUNTER — APPOINTMENT (OUTPATIENT)
Dept: GASTROENTEROLOGY | Facility: HOSPITAL | Age: 76
DRG: 813 | End: 2021-03-10
Payer: MEDICARE

## 2021-03-10 ENCOUNTER — ANESTHESIA (INPATIENT)
Dept: GASTROENTEROLOGY | Facility: HOSPITAL | Age: 76
DRG: 813 | End: 2021-03-10
Payer: MEDICARE

## 2021-03-10 LAB
ABO GROUP BLD BPU: NORMAL
ANION GAP SERPL CALCULATED.3IONS-SCNC: 8 MMOL/L (ref 4–13)
ATRIAL RATE: 56 BPM
BPU ID: NORMAL
BUN SERPL-MCNC: 28 MG/DL (ref 5–25)
CALCIUM SERPL-MCNC: 8.5 MG/DL (ref 8.3–10.1)
CHLORIDE SERPL-SCNC: 103 MMOL/L (ref 100–108)
CO2 SERPL-SCNC: 30 MMOL/L (ref 21–32)
CREAT SERPL-MCNC: 0.93 MG/DL (ref 0.6–1.3)
CROSSMATCH: NORMAL
ERYTHROCYTE [DISTWIDTH] IN BLOOD BY AUTOMATED COUNT: 18 % (ref 11.6–15.1)
GFR SERPL CREATININE-BSD FRML MDRD: 61 ML/MIN/1.73SQ M
GLUCOSE SERPL-MCNC: 114 MG/DL (ref 65–140)
GLUCOSE SERPL-MCNC: 76 MG/DL (ref 65–140)
GLUCOSE SERPL-MCNC: 78 MG/DL (ref 65–140)
GLUCOSE SERPL-MCNC: 80 MG/DL (ref 65–140)
GLUCOSE SERPL-MCNC: 94 MG/DL (ref 65–140)
GLUCOSE SERPL-MCNC: 98 MG/DL (ref 65–140)
HCT VFR BLD AUTO: 25.4 % (ref 34.8–46.1)
HGB BLD-MCNC: 7.8 G/DL (ref 11.5–15.4)
HGB BLD-MCNC: 7.9 G/DL (ref 11.5–15.4)
HGB BLD-MCNC: 8.2 G/DL (ref 11.5–15.4)
HGB BLD-MCNC: 8.6 G/DL (ref 11.5–15.4)
INR PPP: 1.32 (ref 0.84–1.19)
INR PPP: 1.35 (ref 0.84–1.19)
MCH RBC QN AUTO: 27.9 PG (ref 26.8–34.3)
MCHC RBC AUTO-ENTMCNC: 30.7 G/DL (ref 31.4–37.4)
MCV RBC AUTO: 91 FL (ref 82–98)
PLATELET # BLD AUTO: 340 THOUSANDS/UL (ref 149–390)
PMV BLD AUTO: 9.6 FL (ref 8.9–12.7)
POTASSIUM SERPL-SCNC: 2.8 MMOL/L (ref 3.5–5.3)
PROTHROMBIN TIME: 16.1 SECONDS (ref 11.6–14.5)
PROTHROMBIN TIME: 16.4 SECONDS (ref 11.6–14.5)
QRS AXIS: 63 DEGREES
QRSD INTERVAL: 94 MS
QT INTERVAL: 358 MS
QTC INTERVAL: 348 MS
RBC # BLD AUTO: 2.8 MILLION/UL (ref 3.81–5.12)
SODIUM SERPL-SCNC: 141 MMOL/L (ref 136–145)
T WAVE AXIS: 164 DEGREES
UNIT DISPENSE STATUS: NORMAL
UNIT PRODUCT CODE: NORMAL
UNIT RH: NORMAL
VENTRICULAR RATE: 57 BPM
WBC # BLD AUTO: 5.09 THOUSAND/UL (ref 4.31–10.16)

## 2021-03-10 PROCEDURE — 85018 HEMOGLOBIN: CPT | Performed by: PHYSICIAN ASSISTANT

## 2021-03-10 PROCEDURE — 88305 TISSUE EXAM BY PATHOLOGIST: CPT | Performed by: PATHOLOGY

## 2021-03-10 PROCEDURE — 0DB68ZX EXCISION OF STOMACH, VIA NATURAL OR ARTIFICIAL OPENING ENDOSCOPIC, DIAGNOSTIC: ICD-10-PCS | Performed by: INTERNAL MEDICINE

## 2021-03-10 PROCEDURE — C9113 INJ PANTOPRAZOLE SODIUM, VIA: HCPCS | Performed by: PHYSICIAN ASSISTANT

## 2021-03-10 PROCEDURE — 85610 PROTHROMBIN TIME: CPT | Performed by: HOSPITALIST

## 2021-03-10 PROCEDURE — 85027 COMPLETE CBC AUTOMATED: CPT | Performed by: HOSPITALIST

## 2021-03-10 PROCEDURE — 82948 REAGENT STRIP/BLOOD GLUCOSE: CPT

## 2021-03-10 PROCEDURE — 99232 SBSQ HOSP IP/OBS MODERATE 35: CPT | Performed by: HOSPITALIST

## 2021-03-10 PROCEDURE — 80048 BASIC METABOLIC PNL TOTAL CA: CPT | Performed by: HOSPITALIST

## 2021-03-10 PROCEDURE — 93010 ELECTROCARDIOGRAM REPORT: CPT | Performed by: INTERNAL MEDICINE

## 2021-03-10 PROCEDURE — 88313 SPECIAL STAINS GROUP 2: CPT | Performed by: PATHOLOGY

## 2021-03-10 PROCEDURE — 43239 EGD BIOPSY SINGLE/MULTIPLE: CPT | Performed by: INTERNAL MEDICINE

## 2021-03-10 PROCEDURE — 93005 ELECTROCARDIOGRAM TRACING: CPT

## 2021-03-10 PROCEDURE — 85610 PROTHROMBIN TIME: CPT | Performed by: PHYSICIAN ASSISTANT

## 2021-03-10 RX ORDER — DEXTROSE AND SODIUM CHLORIDE 5; .45 G/100ML; G/100ML
75 INJECTION, SOLUTION INTRAVENOUS CONTINUOUS
Status: DISCONTINUED | OUTPATIENT
Start: 2021-03-10 | End: 2021-03-10

## 2021-03-10 RX ORDER — POTASSIUM CHLORIDE 20 MEQ/1
40 TABLET, EXTENDED RELEASE ORAL 2 TIMES DAILY
Status: COMPLETED | OUTPATIENT
Start: 2021-03-10 | End: 2021-03-11

## 2021-03-10 RX ORDER — LIDOCAINE HYDROCHLORIDE 20 MG/ML
INJECTION, SOLUTION EPIDURAL; INFILTRATION; INTRACAUDAL; PERINEURAL AS NEEDED
Status: DISCONTINUED | OUTPATIENT
Start: 2021-03-10 | End: 2021-03-10

## 2021-03-10 RX ORDER — POTASSIUM CHLORIDE 14.9 MG/ML
20 INJECTION INTRAVENOUS
Status: DISCONTINUED | OUTPATIENT
Start: 2021-03-10 | End: 2021-03-10

## 2021-03-10 RX ORDER — SODIUM CHLORIDE 9 MG/ML
125 INJECTION, SOLUTION INTRAVENOUS CONTINUOUS
Status: DISCONTINUED | OUTPATIENT
Start: 2021-03-10 | End: 2021-03-11

## 2021-03-10 RX ORDER — PANTOPRAZOLE SODIUM 40 MG/1
40 TABLET, DELAYED RELEASE ORAL
Status: DISCONTINUED | OUTPATIENT
Start: 2021-03-11 | End: 2021-03-11

## 2021-03-10 RX ORDER — PROPOFOL 10 MG/ML
INJECTION, EMULSION INTRAVENOUS AS NEEDED
Status: DISCONTINUED | OUTPATIENT
Start: 2021-03-10 | End: 2021-03-10

## 2021-03-10 RX ORDER — NYSTATIN 100000 [USP'U]/G
POWDER TOPICAL 2 TIMES DAILY
Status: DISCONTINUED | OUTPATIENT
Start: 2021-03-10 | End: 2021-03-12 | Stop reason: HOSPADM

## 2021-03-10 RX ADMIN — FERROUS SULFATE TAB 325 MG (65 MG ELEMENTAL FE) 325 MG: 325 (65 FE) TAB at 08:24

## 2021-03-10 RX ADMIN — FLUTICASONE FUROATE AND VILANTEROL TRIFENATATE 1 PUFF: 100; 25 POWDER RESPIRATORY (INHALATION) at 08:32

## 2021-03-10 RX ADMIN — DIGOXIN 125 MCG: 125 TABLET ORAL at 08:24

## 2021-03-10 RX ADMIN — NYSTATIN: 100000 POWDER TOPICAL at 10:51

## 2021-03-10 RX ADMIN — DEXTROSE AND SODIUM CHLORIDE 75 ML/HR: 5; .45 INJECTION, SOLUTION INTRAVENOUS at 08:23

## 2021-03-10 RX ADMIN — POTASSIUM CHLORIDE 40 MEQ: 1500 TABLET, EXTENDED RELEASE ORAL at 18:15

## 2021-03-10 RX ADMIN — SODIUM CHLORIDE 125 ML/HR: 0.9 INJECTION, SOLUTION INTRAVENOUS at 12:30

## 2021-03-10 RX ADMIN — POTASSIUM CHLORIDE 20 MEQ: 14.9 INJECTION, SOLUTION INTRAVENOUS at 15:11

## 2021-03-10 RX ADMIN — LIDOCAINE HYDROCHLORIDE 50 MG: 20 INJECTION, SOLUTION EPIDURAL; INFILTRATION; INTRACAUDAL; PERINEURAL at 13:06

## 2021-03-10 RX ADMIN — MONTELUKAST 10 MG: 10 TABLET, FILM COATED ORAL at 08:24

## 2021-03-10 RX ADMIN — PANTOPRAZOLE SODIUM 40 MG: 40 INJECTION, POWDER, FOR SOLUTION INTRAVENOUS at 08:24

## 2021-03-10 RX ADMIN — MELATONIN 3 MG: at 22:00

## 2021-03-10 RX ADMIN — FLUTICASONE PROPIONATE 1 SPRAY: 50 SPRAY, METERED NASAL at 06:04

## 2021-03-10 RX ADMIN — NYSTATIN: 100000 POWDER TOPICAL at 17:09

## 2021-03-10 RX ADMIN — PROPOFOL 100 MG: 10 INJECTION, EMULSION INTRAVENOUS at 13:06

## 2021-03-10 NOTE — WOUND OSTOMY CARE
Consult Note - Wound   Nila Cheney 70 y o  female MRN: [id number]  Unit/Bed#: E5 -01 Encounter: [id number]      History and Present Illness:  70year old female presented to the hospital with low hemoglobin and dark stools from [facility]  Patient's history significant for DM, past smoker, COPD, mitral valve replacement  Assessment Findings:   Patient agreeable to assessment and photography  She requires assist x 2 to turn in bed  She is incontinent of bladder and occasionally bowel  Her skin is generally pale and fragile  She has hyperpigmentation and blanchable erythema to buttocks and sacrum with dry desquamation  Bilateral heels are intact  1  MASD/intertriginous dermatitis to breast, abdomen, and groin folds with mild candidiasis--no open areas at this time  Moist, pink satellite lesions, maceration  2   Present on admission pressure injury to coccyx (presenting as stage 3 at this time due to depth)--very small wound opening, the small amount of wound bed that is visible is pink  There is no drainage, fluctuance, or induration  3   Present on admission partial thickness open areas to right buttock (mixed etiology of pressure and moisture)--pink, round areas measured together as one wound  No induration or drainage  Patient reports pain to medial thighs--skin appears within normal limits (no redness or open areas)  However, there are some areas of firm, indurated tissue  See flowsheet and media for wound details  Wound Care Plan:   1-Apply Allevyn Life foam dressing to bilateral heels for prevention  Nicholas with P   Peel back at least daily for skin assessment and re-apply  Change dressing every 3 days and PRN  2-Elevate heels off of bed/chair surface to offload pressure  3-Bariatric offloading air cushion in chair when out of bed    4-Moisturize skin daily with skin nourishing cream   5-Turn/reposition every 2 hours while in bed, or when medically stable, using positioning wedges; and weight shift frequently while in chair for pressure re-distribution on skin  6-Nystatin powder to breast, abdominal, groin folds BID  7-Apply Calazime paste to buttocks and sacrum TID and PRN with incontinence care  8-Low air-loss mattress  Wound care team to follow  Plan of care reviewed with primary RN  Dr Vishal Yu made aware of present on admission wounds and patient's complaint of medial thigh pain and firm areas on palpation        Wound 03/09/21 Pressure Injury Coccyx (Active)   Wound Image   03/10/21 0847   Wound Description Pink 03/10/21 0847   Pressure Injury Stage 3 03/10/21 0847   Nancy-wound Assessment Hyperpigmented;Fragile 03/10/21 0847   Wound Length (cm) 0 2 cm 03/10/21 0847   Wound Width (cm) 0 3 cm 03/10/21 0847   Wound Depth (cm) 0 4 cm 03/10/21 0847   Wound Surface Area (cm^2) 0 06 cm^2 03/10/21 0847   Wound Volume (cm^3) 0 02 cm^3 03/10/21 0847   Calculated Wound Volume (cm^3) 0 02 cm^3 03/10/21 0847   Wound Site Closure Open to air 03/09/21 2028   Drainage Amount None 03/10/21 0847   Non-staged Wound Description Full thickness 03/10/21 0847   Treatments Cleansed 03/10/21 0847   Dressing Protective barrier 03/10/21 0847   Patient Tolerance Tolerated well 03/10/21 0847       Wound 03/09/21 Pressure Injury Buttocks Right (Active)   Wound Image   03/10/21 0847   Wound Description Pink 03/10/21 0847   Pressure Injury Stage 2 03/10/21 0847   Nancy-wound Assessment Hyperpigmented;Fragile 03/10/21 0847   Wound Length (cm) 2 cm 03/10/21 0847   Wound Width (cm) 2 7 cm 03/10/21 0847   Wound Depth (cm) 0 1 cm 03/10/21 0847   Wound Surface Area (cm^2) 5 4 cm^2 03/10/21 0847   Wound Volume (cm^3) 0 54 cm^3 03/10/21 0847   Calculated Wound Volume (cm^3) 0 54 cm^3 03/10/21 0847   Drainage Amount None 03/10/21 0847   Non-staged Wound Description Partial thickness 03/10/21 0847   Treatments Cleansed 03/10/21 0847   Dressing Protective barrier 03/10/21 0847   Patient Tolerance Tolerated well 03/10/21 0847   Dressing Status Dry; Intact 03/09/21 2028     Chris MENDOZAN, RN, [employer]

## 2021-03-10 NOTE — CASE MANAGEMENT
Patient is here with GI bleed pending EGD today  Patient is a bed hold at [address]  CM will continue to follow

## 2021-03-10 NOTE — QUICK NOTE
Pt to get potassium  egd done and showing erosive gastritis; ok to resume diet  Iv PPI changed to PO  Ok to resume coumadin if desired

## 2021-03-10 NOTE — PROGRESS NOTES
[address] [facility]  Progress Note - Danisha Mediate 1945, 70 y o  female MRN: [id number]  Unit/Bed#: E5 -01 Encounter: [id number]  Primary Care Provider: No primary care provider on file  Date and time admitted to hospital: 3/8/2021 10:40 PM    * GI bleed  Assessment & Plan  EGD today  Continue PPI  GI is ok with restarting coumadin  Will restart at discharge    Morbid obesity ([address] )  Assessment & Plan  Encouraged weight loss    Chronic atrial fibrillation (HCC)  Assessment & Plan  Rate controlled  Holding coumadin with GI bleed          Subjective:   Complains that her IV is burning with potassium  No bloody nor black stools  Objective:     Vitals:   Temp (24hrs), Av 1 °F (36 7 °C), Min:97 7 °F (36 5 °C), Max:98 4 °F (36 9 °C)    Temp:  [97 7 °F (36 5 °C)-98 4 °F (36 9 °C)] 98 2 °F (36 8 °C)  HR:  [51-71] 65  Resp:  [16-19] 18  BP: ()/(50-72) 93/50  SpO2:  [96 %-100 %] 96 %  There is no height or weight on file to calculate BMI  Input and Output Summary (last 24 hours): Intake/Output Summary (Last 24 hours) at 3/10/2021 1720  Last data filed at 3/10/2021 1335  Gross per 24 hour   Intake 650 ml   Output --   Net 650 ml       Physical Exam:     Physical Exam  Vitals signs and nursing note reviewed  HENT:      Head: Normocephalic and atraumatic  Eyes:      Pupils: Pupils are equal, round, and reactive to light  Cardiovascular:      Rate and Rhythm: Normal rate and regular rhythm  Heart sounds: No murmur  No friction rub  No gallop  Pulmonary:      Effort: Pulmonary effort is normal       Breath sounds: Normal breath sounds  No wheezing or rales  Abdominal:      General: Bowel sounds are normal       Palpations: Abdomen is soft  Tenderness: There is no abdominal tenderness  Musculoskeletal:      Right lower leg: No edema  Left lower leg: No edema                 Additional Data:     Labs:    Results from last 7 days   Lab Units 03/10/21  1134 03/10/21  0439  03/09/21  0522   WBC Thousand/uL  --  5 09  --  6 60   HEMOGLOBIN g/dL 8 2* 7 8*   < > 6 4*   HEMATOCRIT %  --  25 4*  --  21 5*   PLATELETS Thousands/uL  --  340  --  353   NEUTROS PCT %  --   --   --  69   LYMPHS PCT %  --   --   --  18   MONOS PCT %  --   --   --  9   EOS PCT %  --   --   --  2    < > = values in this interval not displayed  Results from last 7 days   Lab Units 03/10/21  0439  03/08/21  1719   POTASSIUM mmol/L 2 8*   < > 3 4*   CHLORIDE mmol/L 103   < > 95*   CO2 mmol/L 30   < > 34*   BUN mg/dL 28*   < > 49*   CREATININE mg/dL 0 93   < > 1 09   CALCIUM mg/dL 8 5   < > 8 7   ALK PHOS U/L  --   --  58 1   ALT U/L  --   --  6   AST U/L  --   --  13*    < > = values in this interval not displayed       Results from last 7 days   Lab Units 03/10/21  0439   INR  1 32*     Results from last 7 days   Lab Units 03/10/21  1143 03/10/21  0600 03/10/21  0120 03/09/21  2059 03/09/21  1626 03/09/21  1146 03/09/21  0225   POC GLUCOSE mg/dl 94 80 78 84 75 80 78     Results from last 7 days   Lab Units 03/04/21  0405   HEMOGLOBIN A1C % 4 6           * I Have Reviewed All Lab Data     Recent Cultures (last 7 days):             Last 24 Hours Medication List:   Current Facility-Administered Medications   Medication Dose Route Frequency Provider Last Rate    albuterol  2 puff Inhalation Q6H PRN Arti Come, PA-C      aluminum-magnesium hydroxide-simethicone  30 mL Oral Q6H PRN Arti Come, PA-C      dextrose 5 % and sodium chloride 0 45 %  75 mL/hr Intravenous Continuous Fabio Garcia DO Stopped (03/10/21 1708)    digoxin  125 mcg Oral Daily Arti Come, PA-C      ferrous sulfate  325 mg Oral Daily With Breakfast Arti Come, PA-C      fluticasone  1 spray Each Nare BID PRN Arti Come, PA-C      fluticasone-vilanterol  1 puff Inhalation Daily Ricky Jose      insulin glargine  5 Units Subcutaneous HS Arti Parikh PA-C      insulin lispro  2-12 Units Subcutaneous Q6H [address] [place], [place]      melatonin  3 mg Oral HS Jose Weldon PA-C      montelukast  10 mg Oral Daily [facility] [place]      nystatin   Topical BID Jt Garcia DO      ondansetron  4 mg Intravenous Q6H PRN [place], [place]      [START ON 3/11/2021] pantoprazole  40 mg Oral Early Morning Luke Ashley MD      potassium chloride  40 mEq Oral BID Jt Garcia DO      sodium chloride  125 mL/hr Intravenous Continuous Valaria DO Kg Stopped (03/10/21 1335)         VTE Pharmacologic Prophylaxis:   Pharmacologic: none with GI bleed      Current Length of Stay: 2 day(s)    Current Patient Status: Inpatient       Discharge Plan:   Code Status: Level 1 - Full Code           Today, Patient Was Seen By: Amauri Downey DO    ** Please Note: Dictation voice to text software may have been used in the creation of this document   **

## 2021-03-10 NOTE — DISCHARGE INSTR - OTHER ORDERS
Wound Care Plan:   1-Apply Hydraguard lotion to bilateral heels twice daily for skin protection  2-Elevate heels off of bed/chair surface to offload pressure  3-Offloading air cushion in chair when out of bed  4-Moisturize skin daily with skin nourishing cream   5-Turn/reposition every 2 hours while in bed, and weight shift frequently while in chair for pressure re-distribution on skin  6-Nystatin powder to breast, abdominal, groin folds twice daily  7-Apply Calazime paste to buttocks and sacrum three times daily and as needed with incontinence care

## 2021-03-10 NOTE — ANESTHESIA PREPROCEDURE EVALUATION
Procedure:  EGD    Relevant Problems   ANESTHESIA (within normal limits)      CARDIO   (+) Chronic atrial fibrillation (HCC)   (+) Chronic diastolic congestive heart failure (HCC)      ENDO   (+) Type 2 diabetes mellitus (HCC)      GI/HEPATIC   (+) GI bleed      PULMONARY   (+) COPD (chronic obstructive pulmonary disease) (HCC)      Other   (+) Morbid obesity (HCC)        Physical Exam    Airway    Mallampati score: II  TM Distance: >3 FB  Neck ROM: full     Dental   upper dentures and lower dentures,     Cardiovascular  Cardiovascular exam normal    Pulmonary  Pulmonary exam normal     Other Findings        Anesthesia Plan  ASA Score- 4     Anesthesia Type- IV sedation with anesthesia with ASA Monitors  Additional Monitors:   Airway Plan:     Comment: INR 7 on presentation, improving  Plan Factors-    Chart reviewed  Induction-     Postoperative Plan-     Informed Consent- Anesthetic plan and risks discussed with patient

## 2021-03-10 NOTE — PROGRESS NOTES
Patient Name: Chante Holley  Patient MRN: [id number]  Date: 03/10/21  Service: [facility]    Subjective   Chante Holley is a 70 y o  female who was admitted with anemia   She is sleeping soundly but is easily arousable  No bowel movements today  Patient admits:  Fatigue  Denies:  Abdominal pain, heartburn, nausea  All others negative except as noted in HPI  Objective     Vitals  BP 99/54 (BP Location: Right arm)   Pulse 58   Temp 98 1 °F (36 7 °C) (Temporal)   Resp 18   SpO2 99%   General:  Sleeping but arousable  Eyes:  No scleral icterus  ENT:  Mucous membranes moist  Card:  Irregular  Lungs: Clear to ascultation b/l  No wheezes, rales, rhonchi  Abdomen:  Obese  Nontender    Bowel sounds normoactive  Skin:  No jaundice  Extremities:  No edema  Neuro: Alert and oriented x3    Laboratory Studies  Lab Results   Component Value Date    CREATININE 0 93 03/10/2021    BUN 28 (H) 03/10/2021    SODIUM 141 03/10/2021    K 2 8 (L) 03/10/2021     03/10/2021    CO2 30 03/10/2021    CALCIUM 8 5 03/10/2021    ALKPHOS 58 1 03/08/2021    ALB 2 9 (L) 03/08/2021    TBILI 0 40 03/08/2021    AST 13 (L) 03/08/2021    ALT 6 03/08/2021     Lab Results   Component Value Date    WBC 5 09 03/10/2021    HGB 7 8 (L) 03/10/2021    HCT 25 4 (L) 03/10/2021     03/10/2021    MCV 91 03/10/2021     Lab Results   Component Value Date    HGB 7 8 (L) 03/10/2021    HGB 7 9 (L) 03/10/2021    HGB 6 6 (LL) 03/09/2021       Lab Results   Component Value Date    PROTIME 16 1 (H) 03/10/2021    INR 1 32 (H) 03/10/2021       Inhouse Medications       Current Facility-Administered Medications:     albuterol (PROVENTIL HFA,VENTOLIN HFA) inhaler 2 puff, 2 puff, Inhalation, Q6H PRN    aluminum-magnesium hydroxide-simethicone (MYLANTA) oral suspension 30 mL, 30 mL, Oral, Q6H PRN    dextrose 5 % and sodium chloride 0 45 % infusion, 75 mL/hr, Intravenous, Continuous, 75 mL/hr at 03/10/21 0823    digoxin (LANOXIN) tablet 125 mcg, 125 mcg, Oral, Daily, 125 mcg at 03/10/21 0126    ferrous sulfate tablet 325 mg, 325 mg, Oral, Daily With Breakfast, 325 mg at 03/10/21 0824    fluticasone (FLONASE) 50 mcg/act nasal spray 1 spray, 1 spray, Each Nare, BID PRN, 1 spray at 03/10/21 0604    fluticasone-vilanterol (BREO ELLIPTA) 100-25 mcg/inh inhaler 1 puff, 1 puff, Inhalation, Daily, 1 puff at 03/10/21 0832    insulin glargine (LANTUS) subcutaneous injection 5 Units 0 05 mL, 5 Units, Subcutaneous, HS, Stopped at 03/09/21 0229    insulin lispro (HumaLOG) 100 units/mL subcutaneous injection 2-12 Units, 2-12 Units, Subcutaneous, Q6H [address], Stopped at 03/09/21 0229 **AND** Fingerstick Glucose (POCT), , , Q6H    melatonin tablet 3 mg, 3 mg, Oral, HS, 3 mg at 03/09/21 2117    montelukast (SINGULAIR) tablet 10 mg, 10 mg, Oral, Daily, 10 mg at 03/10/21 0824    nystatin (MYCOSTATIN) powder, , Topical, BID    ondansetron (ZOFRAN) injection 4 mg, 4 mg, Intravenous, Q6H PRN    pantoprazole (PROTONIX) injection 40 mg, 40 mg, Intravenous, Q12H ZOHAIB, 40 mg at 03/10/21 0824      Assessment/Plan:  1  Anemia with melenic stools in the setting of supratherapeutic INR  Plan EGD today  Continue Protonix  INR 1 32 today  Hemoglobin 7 8 after 3 units  2  Atrial fibrillation, on warfarin  3   Status post mitral valve replacement with bovine valve    Principal Problem:    GI bleed  Active Problems:    COPD (chronic obstructive pulmonary disease) (HCC)    Chronic atrial fibrillation (HCC)    Type 2 diabetes mellitus (HCC)    Chronic diastolic congestive heart failure (HCC)    Morbid obesity (HCC)    Ghislaine Seo PA-C

## 2021-03-11 LAB
ANION GAP SERPL CALCULATED.3IONS-SCNC: 8 MMOL/L (ref 4–13)
BUN SERPL-MCNC: 24 MG/DL (ref 5–25)
CALCIUM SERPL-MCNC: 8.7 MG/DL (ref 8.3–10.1)
CHLORIDE SERPL-SCNC: 104 MMOL/L (ref 100–108)
CO2 SERPL-SCNC: 28 MMOL/L (ref 21–32)
CREAT SERPL-MCNC: 0.88 MG/DL (ref 0.6–1.3)
ERYTHROCYTE [DISTWIDTH] IN BLOOD BY AUTOMATED COUNT: 18.8 % (ref 11.6–15.1)
GFR SERPL CREATININE-BSD FRML MDRD: 65 ML/MIN/1.73SQ M
GLUCOSE SERPL-MCNC: 100 MG/DL (ref 65–140)
GLUCOSE SERPL-MCNC: 105 MG/DL (ref 65–140)
GLUCOSE SERPL-MCNC: 111 MG/DL (ref 65–140)
GLUCOSE SERPL-MCNC: 111 MG/DL (ref 65–140)
GLUCOSE SERPL-MCNC: 77 MG/DL (ref 65–140)
GLUCOSE SERPL-MCNC: 85 MG/DL (ref 65–140)
GLUCOSE SERPL-MCNC: 92 MG/DL (ref 65–140)
HCT VFR BLD AUTO: 26.9 % (ref 34.8–46.1)
HGB BLD-MCNC: 8.1 G/DL (ref 11.5–15.4)
HGB BLD-MCNC: 8.3 G/DL (ref 11.5–15.4)
HGB BLD-MCNC: 8.3 G/DL (ref 11.5–15.4)
HGB BLD-MCNC: 8.4 G/DL (ref 11.5–15.4)
MAGNESIUM SERPL-MCNC: 1.4 MG/DL (ref 1.6–2.6)
MCH RBC QN AUTO: 27.6 PG (ref 26.8–34.3)
MCHC RBC AUTO-ENTMCNC: 30.1 G/DL (ref 31.4–37.4)
MCV RBC AUTO: 92 FL (ref 82–98)
PLATELET # BLD AUTO: 370 THOUSANDS/UL (ref 149–390)
PMV BLD AUTO: 9.2 FL (ref 8.9–12.7)
POTASSIUM SERPL-SCNC: 3.6 MMOL/L (ref 3.5–5.3)
RBC # BLD AUTO: 2.93 MILLION/UL (ref 3.81–5.12)
SODIUM SERPL-SCNC: 140 MMOL/L (ref 136–145)
WBC # BLD AUTO: 6.48 THOUSAND/UL (ref 4.31–10.16)

## 2021-03-11 PROCEDURE — 83735 ASSAY OF MAGNESIUM: CPT | Performed by: HOSPITALIST

## 2021-03-11 PROCEDURE — 85018 HEMOGLOBIN: CPT | Performed by: PHYSICIAN ASSISTANT

## 2021-03-11 PROCEDURE — 82948 REAGENT STRIP/BLOOD GLUCOSE: CPT

## 2021-03-11 PROCEDURE — 99232 SBSQ HOSP IP/OBS MODERATE 35: CPT | Performed by: HOSPITALIST

## 2021-03-11 PROCEDURE — 99231 SBSQ HOSP IP/OBS SF/LOW 25: CPT | Performed by: NURSE PRACTITIONER

## 2021-03-11 PROCEDURE — 85027 COMPLETE CBC AUTOMATED: CPT | Performed by: HOSPITALIST

## 2021-03-11 PROCEDURE — 80048 BASIC METABOLIC PNL TOTAL CA: CPT | Performed by: HOSPITALIST

## 2021-03-11 RX ORDER — LIDOCAINE 40 MG/G
CREAM TOPICAL 3 TIMES DAILY PRN
Status: DISCONTINUED | OUTPATIENT
Start: 2021-03-11 | End: 2021-03-12 | Stop reason: HOSPADM

## 2021-03-11 RX ORDER — ECHINACEA PURPUREA EXTRACT 125 MG
1 TABLET ORAL
Status: DISCONTINUED | OUTPATIENT
Start: 2021-03-11 | End: 2021-03-12 | Stop reason: HOSPADM

## 2021-03-11 RX ORDER — PANTOPRAZOLE SODIUM 40 MG/1
40 TABLET, DELAYED RELEASE ORAL
Status: DISCONTINUED | OUTPATIENT
Start: 2021-03-11 | End: 2021-03-12 | Stop reason: HOSPADM

## 2021-03-11 RX ORDER — ACETAMINOPHEN 325 MG/1
650 TABLET ORAL EVERY 6 HOURS PRN
Status: DISCONTINUED | OUTPATIENT
Start: 2021-03-11 | End: 2021-03-12 | Stop reason: HOSPADM

## 2021-03-11 RX ORDER — LIDOCAINE 40 MG/G
CREAM TOPICAL 3 TIMES DAILY PRN
Status: DISCONTINUED | OUTPATIENT
Start: 2021-03-11 | End: 2021-03-11

## 2021-03-11 RX ORDER — ACETAMINOPHEN 325 MG/1
975 TABLET ORAL ONCE
Status: COMPLETED | OUTPATIENT
Start: 2021-03-11 | End: 2021-03-11

## 2021-03-11 RX ORDER — TRAMADOL HYDROCHLORIDE 50 MG/1
25 TABLET ORAL ONCE
Status: COMPLETED | OUTPATIENT
Start: 2021-03-11 | End: 2021-03-11

## 2021-03-11 RX ADMIN — SALINE NASAL SPRAY 1 SPRAY: 1.5 SOLUTION NASAL at 21:20

## 2021-03-11 RX ADMIN — DIGOXIN 125 MCG: 125 TABLET ORAL at 08:47

## 2021-03-11 RX ADMIN — POTASSIUM CHLORIDE 40 MEQ: 1500 TABLET, EXTENDED RELEASE ORAL at 08:48

## 2021-03-11 RX ADMIN — FLUTICASONE PROPIONATE 1 SPRAY: 50 SPRAY, METERED NASAL at 08:50

## 2021-03-11 RX ADMIN — TRAMADOL HYDROCHLORIDE 25 MG: 50 TABLET, FILM COATED ORAL at 23:45

## 2021-03-11 RX ADMIN — NYSTATIN: 100000 POWDER TOPICAL at 08:48

## 2021-03-11 RX ADMIN — MELATONIN 3 MG: at 21:21

## 2021-03-11 RX ADMIN — POTASSIUM CHLORIDE 40 MEQ: 1500 TABLET, EXTENDED RELEASE ORAL at 17:26

## 2021-03-11 RX ADMIN — NYSTATIN: 100000 POWDER TOPICAL at 17:27

## 2021-03-11 RX ADMIN — FERROUS SULFATE TAB 325 MG (65 MG ELEMENTAL FE) 325 MG: 325 (65 FE) TAB at 08:47

## 2021-03-11 RX ADMIN — PANTOPRAZOLE SODIUM 40 MG: 40 TABLET, DELAYED RELEASE ORAL at 05:45

## 2021-03-11 RX ADMIN — FLUTICASONE PROPIONATE 1 SPRAY: 50 SPRAY, METERED NASAL at 02:12

## 2021-03-11 RX ADMIN — LIDOCAINE 4% 1 APPLICATION: 4 CREAM TOPICAL at 23:47

## 2021-03-11 RX ADMIN — ACETAMINOPHEN 650 MG: 325 TABLET, COATED ORAL at 08:48

## 2021-03-11 RX ADMIN — ACETAMINOPHEN 975 MG: 325 TABLET, COATED ORAL at 02:10

## 2021-03-11 RX ADMIN — PANTOPRAZOLE SODIUM 40 MG: 40 TABLET, DELAYED RELEASE ORAL at 17:27

## 2021-03-11 RX ADMIN — ACETAMINOPHEN 650 MG: 325 TABLET, COATED ORAL at 20:45

## 2021-03-11 RX ADMIN — MONTELUKAST 10 MG: 10 TABLET, FILM COATED ORAL at 08:48

## 2021-03-11 RX ADMIN — FLUTICASONE FUROATE AND VILANTEROL TRIFENATATE 1 PUFF: 100; 25 POWDER RESPIRATORY (INHALATION) at 08:49

## 2021-03-11 NOTE — PROGRESS NOTES
Patient Name: Benny Loza  Patient MRN: [id number]  Date: 03/11/21  Service: [facility]    Subjective   Benny Loza is a 70 y o  female who was admitted with GI bleed  She had an EGD yesterday which showed erosive gastritis with no active bleeding  The area was very erythematous  Patient has no complaints of any epigastric pain presently  Hemoglobin is currently 8 1  She has had no bowel movements  She is tolerating her diet well  Objective     Vitals  Blood pressure 98/64, pulse 70, temperature 98 2 °F (36 8 °C), temperature source Temporal, resp  rate 18, SpO2 97 %  General: Alert, no apparent distress  Eyes: No scleral icterus  ENT: MMM  Card:  Irregular irregular  Lungs: Clear to ascultation b/l  No wheezes, rales, rhonchi  Abdomen: Soft  Nontender  Nondistended  Bowel sounds present and normoactive     Skin: No jaundice  Neuro: Alert and oriented x3        Laboratory Studies  Lab Results   Component Value Date    CREATININE 0 88 03/11/2021    BUN 24 03/11/2021    SODIUM 140 03/11/2021    K 3 6 03/11/2021     03/11/2021    CO2 28 03/11/2021    CALCIUM 8 7 03/11/2021    ALKPHOS 58 1 03/08/2021    AST 13 (L) 03/08/2021    ALT 6 03/08/2021     Lab Results   Component Value Date    WBC 6 48 03/11/2021    HGB 8 1 (L) 03/11/2021    HCT 26 9 (L) 03/11/2021     03/11/2021    MCV 92 03/11/2021     Lab Results   Component Value Date    PROTIME 16 1 (H) 03/10/2021    INR 1 32 (H) 03/10/2021       Imaging and Other Studies    Inhouse Medications       Current Facility-Administered Medications:     acetaminophen (TYLENOL) tablet 650 mg, 650 mg, Oral, Q6H PRN    albuterol (PROVENTIL HFA,VENTOLIN HFA) inhaler 2 puff, 2 puff, Inhalation, Q6H PRN    aluminum-magnesium hydroxide-simethicone (MYLANTA) oral suspension 30 mL, 30 mL, Oral, Q6H PRN    digoxin (LANOXIN) tablet 125 mcg, 125 mcg, Oral, Daily, 125 mcg at 03/10/21 0824    ferrous sulfate tablet 325 mg, 325 mg, Oral, Daily With Breakfast, 325 mg at 03/10/21 0824    fluticasone (FLONASE) 50 mcg/act nasal spray 1 spray, 1 spray, Each Nare, BID PRN, 1 spray at 03/11/21 0212    fluticasone-vilanterol (BREO ELLIPTA) 100-25 mcg/inh inhaler 1 puff, 1 puff, Inhalation, Daily, 1 puff at 03/10/21 0832    insulin glargine (LANTUS) subcutaneous injection 5 Units 0 05 mL, 5 Units, Subcutaneous, HS, Stopped at 03/09/21 0229    insulin lispro (HumaLOG) 100 units/mL subcutaneous injection 2-12 Units, 2-12 Units, Subcutaneous, Q6H [address], Stopped at 03/09/21 0229 **AND** Fingerstick Glucose (POCT), , , Q6H    lidocaine (LMX) 4 % cream, , Topical, TID PRN    melatonin tablet 3 mg, 3 mg, Oral, HS, 3 mg at 03/10/21 2200    montelukast (SINGULAIR) tablet 10 mg, 10 mg, Oral, Daily, 10 mg at 03/10/21 0824    nystatin (MYCOSTATIN) powder, , Topical, BID, Given at 03/10/21 1709    ondansetron (ZOFRAN) injection 4 mg, 4 mg, Intravenous, Q6H PRN    pantoprazole (PROTONIX) EC tablet 40 mg, 40 mg, Oral, Early Morning, 40 mg at 03/11/21 0545    potassium chloride (K-DUR,KLOR-CON) CR tablet 40 mEq, 40 mEq, Oral, BID, 40 mEq at 03/10/21 1815    sodium chloride 0 9 % infusion, 125 mL/hr, Intravenous, Continuous, Stopped at 03/10/21 1335      Assessment/Plan:  1  Anemia with melena in the setting of supratherapeutic INR  EGD showed erosive gastritis  Patient will continue with PPI b i d   Okay to resume Coumadin  Keep INR at therapeutic levels  Continue to monitor hemoglobin  If anemia should worsen may need colonoscopy      DAVID Vazquez

## 2021-03-11 NOTE — CASE MANAGEMENT
Patient is here with GI bleed s/p EGD, monitoring H&H then restart coumadin  Patient is a bed hold at [address]  Patient will need a COVID test prior to discharge  CM will continue to follow

## 2021-03-11 NOTE — ASSESSMENT & PLAN NOTE
This was exacerbated by the coumadin coagulopathy  EGD done  Continue PPI  GI is ok with restarting coumadin    Will restart at discharge

## 2021-03-11 NOTE — PROGRESS NOTES
[address] [place] [address]  Progress Note - John Ndiaye 1945, 70 y o  female MRN: [id number]  Unit/Bed#: E5 -01 Encounter: [id number]  Primary Care Provider: No primary care provider on file  Date and time admitted to hospital: 3/8/2021 10:40 PM    * GI bleed  Assessment & Plan  This was exacerbated by the coumadin coagulopathy  EGD done  Continue PPI  GI is ok with restarting coumadin  Will restart at discharge    Chronic atrial fibrillation [facility])  Assessment & Plan  Rate controlled  Holding coumadin with GI bleed          Subjective:   Feels better  No epigastric pain  No bloody nor black stools  Objective:     Vitals:   Temp (24hrs), Av 1 °F (36 7 °C), Min:98 °F (36 7 °C), Max:98 2 °F (36 8 °C)    Temp:  [98 °F (36 7 °C)-98 2 °F (36 8 °C)] 98 2 °F (36 8 °C)  HR:  [50-70] 70  Resp:  [18] 18  BP: ()/(50-64) 98/64  SpO2:  [96 %-100 %] 97 %  There is no height or weight on file to calculate BMI  Input and Output Summary (last 24 hours): Intake/Output Summary (Last 24 hours) at 3/11/2021 1530  Last data filed at 3/10/2021 1801  Gross per 24 hour   Intake 900 ml   Output --   Net 900 ml       Physical Exam:     Physical Exam  Vitals signs and nursing note reviewed  HENT:      Head: Normocephalic and atraumatic  Eyes:      Pupils: Pupils are equal, round, and reactive to light  Cardiovascular:      Rate and Rhythm: Normal rate and regular rhythm  Heart sounds: No murmur  No friction rub  No gallop  Pulmonary:      Effort: Pulmonary effort is normal       Breath sounds: Normal breath sounds  No wheezing or rales  Abdominal:      General: Bowel sounds are normal       Palpations: Abdomen is soft  Tenderness: There is no abdominal tenderness  Musculoskeletal:      Right lower leg: No edema  Left lower leg: No edema                 Additional Data:     Labs:    Results from last 7 days   Lab Units 21  1155 21  0534  21  0522 WBC Thousand/uL  --  6 48   < > 6 60   HEMOGLOBIN g/dL 8 3* 8 1*   < > 6 4*   HEMATOCRIT %  --  26 9*   < > 21 5*   PLATELETS Thousands/uL  --  370   < > 353   NEUTROS PCT %  --   --   --  69   LYMPHS PCT %  --   --   --  18   MONOS PCT %  --   --   --  9   EOS PCT %  --   --   --  2    < > = values in this interval not displayed  Results from last 7 days   Lab Units 03/11/21  0508  03/08/21  1719   POTASSIUM mmol/L 3 6   < > 3 4*   CHLORIDE mmol/L 104   < > 95*   CO2 mmol/L 28   < > 34*   BUN mg/dL 24   < > 49*   CREATININE mg/dL 0 88   < > 1 09   CALCIUM mg/dL 8 7   < > 8 7   ALK PHOS U/L  --   --  58 1   ALT U/L  --   --  6   AST U/L  --   --  13*    < > = values in this interval not displayed       Results from last 7 days   Lab Units 03/10/21  0439   INR  1 32*     Results from last 7 days   Lab Units 03/11/21  1115 03/11/21  0544 03/11/21  0046 03/10/21  2153 03/10/21  1648 03/10/21  1143 03/10/21  0600 03/10/21  0120 03/09/21  2059 03/09/21  1626 03/09/21  1146 03/09/21  0440   POC GLUCOSE mg/dl 100 85 105 114 98 94 80 78 84 75 80 77               * I Have Reviewed All Lab Data     Recent Cultures (last 7 days):             Last 24 Hours Medication List:   Current Facility-Administered Medications   Medication Dose Route Frequency Provider Last Rate    acetaminophen  650 mg Oral Q6H PRN Yossi Karimi PA-C      albuterol  2 puff Inhalation Q6H PRN Regine Richardson PA-C      aluminum-magnesium hydroxide-simethicone  30 mL Oral Q6H PRN Regine Richardson PA-C      digoxin  125 mcg Oral Daily Ricky Vegas      ferrous sulfate  325 mg Oral Daily With [address] [place] [address]Z sanchez      fluticasone  1 spray Each Nare BID PRN Regine Richardson PA-C      fluticasone-vilanterol  1 puff Inhalation Daily Ricky Vegas      insulin glargine  5 Units Subcutaneous HS Regine Richardson, [place]      insulin lispro  2-12 Units Subcutaneous Q6H [address] Regine Richardson [place]      lidocaine Topical TID PRN Gennaro Garcia, DO      melatonin  3 mg Oral HS Sonia Olsen PA-C      montelukast  10 mg Oral Daily Ricky Guerra      nystatin   Topical BID Gennaro Garcia, DO      ondansetron  4 mg Intravenous Q6H PRN Sonia Olsen PA-C      pantoprazole  40 mg Oral BID AC DAVID Stephens      potassium chloride  40 mEq Oral BID Gennaro Garcia, DO      sodium chloride  125 mL/hr Intravenous Continuous Queenie DO Todd Stopped (03/10/21 1335)         VTE Pharmacologic Prophylaxis:   Pharmacologic: none with GI bleed      Current Length of Stay: 3 day(s)    Current Patient Status: Inpatient       Discharge Plan: SNF tomorrow    Code Status: Level 1 - Full Code           Today, Patient Was Seen By: Leeanna Godinez DO    ** Please Note: Dictation voice to text software may have been used in the creation of this document   **

## 2021-03-12 VITALS
RESPIRATION RATE: 20 BRPM | SYSTOLIC BLOOD PRESSURE: 108 MMHG | OXYGEN SATURATION: 99 % | DIASTOLIC BLOOD PRESSURE: 64 MMHG | TEMPERATURE: 97 F | HEART RATE: 64 BPM

## 2021-03-12 LAB
ERYTHROCYTE [DISTWIDTH] IN BLOOD BY AUTOMATED COUNT: 19.2 % (ref 11.6–15.1)
GLUCOSE SERPL-MCNC: 104 MG/DL (ref 65–140)
GLUCOSE SERPL-MCNC: 89 MG/DL (ref 65–140)
GLUCOSE SERPL-MCNC: 90 MG/DL (ref 65–140)
HCT VFR BLD AUTO: 27.5 % (ref 34.8–46.1)
HGB BLD-MCNC: 8.3 G/DL (ref 11.5–15.4)
HGB BLD-MCNC: 8.3 G/DL (ref 11.5–15.4)
MCH RBC QN AUTO: 28.3 PG (ref 26.8–34.3)
MCHC RBC AUTO-ENTMCNC: 30.2 G/DL (ref 31.4–37.4)
MCV RBC AUTO: 94 FL (ref 82–98)
PLATELET # BLD AUTO: 407 THOUSANDS/UL (ref 149–390)
PMV BLD AUTO: 9.6 FL (ref 8.9–12.7)
RBC # BLD AUTO: 2.93 MILLION/UL (ref 3.81–5.12)
WBC # BLD AUTO: 6.12 THOUSAND/UL (ref 4.31–10.16)

## 2021-03-12 PROCEDURE — 99239 HOSP IP/OBS DSCHRG MGMT >30: CPT | Performed by: HOSPITALIST

## 2021-03-12 PROCEDURE — 85027 COMPLETE CBC AUTOMATED: CPT | Performed by: HOSPITALIST

## 2021-03-12 PROCEDURE — 85018 HEMOGLOBIN: CPT | Performed by: PHYSICIAN ASSISTANT

## 2021-03-12 PROCEDURE — 82948 REAGENT STRIP/BLOOD GLUCOSE: CPT

## 2021-03-12 RX ORDER — FERROUS SULFATE 325(65) MG
325 TABLET ORAL
Qty: 30 TABLET | Refills: 0
Start: 2021-03-12

## 2021-03-12 RX ORDER — LANOLIN ALCOHOL/MO/W.PET/CERES
3 CREAM (GRAM) TOPICAL
Qty: 30 TABLET | Refills: 0
Start: 2021-03-12

## 2021-03-12 RX ORDER — HYDROCODONE BITARTRATE AND ACETAMINOPHEN 5; 325 MG/1; MG/1
1 TABLET ORAL ONCE
Status: COMPLETED | OUTPATIENT
Start: 2021-03-12 | End: 2021-03-12

## 2021-03-12 RX ORDER — NYSTATIN 100000 [USP'U]/G
POWDER TOPICAL 2 TIMES DAILY
Qty: 15 G | Refills: 0
Start: 2021-03-12

## 2021-03-12 RX ORDER — PANTOPRAZOLE SODIUM 40 MG/1
40 TABLET, DELAYED RELEASE ORAL
Qty: 60 TABLET | Refills: 1
Start: 2021-03-12 | End: 2021-04-16 | Stop reason: ALTCHOICE

## 2021-03-12 RX ADMIN — DIGOXIN 125 MCG: 125 TABLET ORAL at 08:44

## 2021-03-12 RX ADMIN — FLUTICASONE FUROATE AND VILANTEROL TRIFENATATE 1 PUFF: 100; 25 POWDER RESPIRATORY (INHALATION) at 08:45

## 2021-03-12 RX ADMIN — SALINE NASAL SPRAY 1 SPRAY: 1.5 SOLUTION NASAL at 06:14

## 2021-03-12 RX ADMIN — PANTOPRAZOLE SODIUM 40 MG: 40 TABLET, DELAYED RELEASE ORAL at 15:59

## 2021-03-12 RX ADMIN — NYSTATIN: 100000 POWDER TOPICAL at 17:22

## 2021-03-12 RX ADMIN — HYDROCODONE BITARTRATE AND ACETAMINOPHEN 1 TABLET: 5; 325 TABLET ORAL at 11:29

## 2021-03-12 RX ADMIN — NYSTATIN: 100000 POWDER TOPICAL at 08:45

## 2021-03-12 RX ADMIN — FERROUS SULFATE TAB 325 MG (65 MG ELEMENTAL FE) 325 MG: 325 (65 FE) TAB at 08:44

## 2021-03-12 RX ADMIN — MONTELUKAST 10 MG: 10 TABLET, FILM COATED ORAL at 08:44

## 2021-03-12 RX ADMIN — PANTOPRAZOLE SODIUM 40 MG: 40 TABLET, DELAYED RELEASE ORAL at 06:11

## 2021-03-12 NOTE — CASE MANAGEMENT
Patient is discharging back to [address] [place] [address]  No Covid test required per admissions  Facility contacts entered in Last  CMN transport sheet in patient's chart  [facility] ext [phone] will call RN at bedside or charge phone w/ pickup time  Pickup time: 8:30 pm w/ [address] [place] [address] Kwasi    IMM reviewed with patient   patient agree with discharge determination

## 2021-03-12 NOTE — DISCHARGE SUMMARY
[address]  Discharge- Benny Loza 1945, 70 y o  female MRN: [id number]  Unit/Bed#: E5 -01 Encounter: [id number]  Primary Care Provider: No primary care provider on file  Date and time admitted to hospital: 3/8/2021 10:40 PM    * GI bleed  Assessment & Plan  EGD showed erosive gastritis  GI believes the bleeding was due to coumadin coagulopathy  Discharge on BID PPI  I will have her PCP see her in a week and decide if patient can be restarted on coumadin  It is ok with GI  I would like to see her hemoglobin a little higher    Type 2 diabetes mellitus [facility])  Assessment & Plan  Lab Results   Component Value Date    HGBA1C 4 6 03/04/2021       Recent Labs     03/11/21  2146 03/12/21  0744 03/12/21  1129 03/12/21  1603   POCGLU 111 89 90 104       Blood Sugar Average: Last 72 hrs:  (P) 39 [id number] on metformin 500 b i d     Will hold while inpatient  Resume home medications    Chronic atrial fibrillation [facility])  Assessment & Plan  Rate controlled  Holding coumadin at discharge  May be able to restart in a week  Will have the patient see her PCP      Discharging Physician / Practitioner: William Paget, DO  PCP: No primary care provider on file  Admission Date:   Admission Orders (From admission, onward)     Ordered        03/08/21 2346  Inpatient Admission  Once                   Discharge Date: 03/12/21    Resolved Problems  Date Reviewed: 3/9/2021    None            Consultations During Hospital Stay:  · GI      Procedures Performed:     · EGD      Reason for Admission: black stools  Hospital Course:     Benny Loza is a 70 y o  female patient who originally presented to the hospital on 3/8/2021 due to blacks stools  She was transferred from [facility] for GI bleed  She required blood transfusion and vitamin K reversal of a supratherapeutic INR  EGD showed erosive gastritis  She is going home on BID PPI     GI is ok with restarting coumadin  I would recommend holding the coumadin until her hemoglobin is above 10  I will have her see her PCP in one week and see if coumadin can be restarted at that time  Please see above list of diagnoses and related plan for additional information  Condition at Discharge: fair       Discharge Day Visit / Exam:     Subjective:  Feels well  No black stools  Eating well  Vitals: Blood Pressure: 108/64 (03/12/21 1531)  Pulse: 64 (03/12/21 1531)  Temperature: (!) 97 °F (36 1 °C) (03/12/21 1531)  Temp Source: Temporal (03/12/21 1531)  Respirations: 20 (03/12/21 1531)  SpO2: 99 % (03/12/21 1531)    Exam:     Physical Exam  Vitals signs and nursing note reviewed  HENT:      Head: Normocephalic and atraumatic  Eyes:      Pupils: Pupils are equal, round, and reactive to light  Cardiovascular:      Rate and Rhythm: Normal rate and regular rhythm  Heart sounds: No murmur  No friction rub  No gallop  Pulmonary:      Effort: Pulmonary effort is normal       Breath sounds: Normal breath sounds  No wheezing or rales  Abdominal:      General: Bowel sounds are normal       Palpations: Abdomen is soft  Tenderness: There is no abdominal tenderness  Comments: obese   Musculoskeletal:      Right lower leg: No edema  Left lower leg: No edema            Discharge instructions/Information to patient and family:   See after visit summary for information provided to patient and family  Provisions for Follow-Up Care:  See after visit summary for information related to follow-up care and any pertinent home health orders  Disposition:     Other: [address]       Discharge Statement:  I spent 41 minutes discharging the patient  This time was spent on the day of discharge  I had direct contact with the patient on the day of discharge   Greater than 50% of the total time was spent examining patient, answering all patient questions, arranging and discussing plan of care with patient as well as directly providing post-discharge instructions  Additional time then spent on discharge activities  Discharge Medications:  See after visit summary for reconciled discharge medications provided to patient and family        ** Please Note: This note has been constructed using a voice recognition system **

## 2021-03-12 NOTE — ASSESSMENT & PLAN NOTE
EGD showed erosive gastritis  GI believes the bleeding was due to coumadin coagulopathy  Discharge on BID PPI  I will have her PCP see her in a week and decide if patient can be restarted on coumadin  It is ok with GI      I would like to see her hemoglobin a little higher

## 2021-03-12 NOTE — ASSESSMENT & PLAN NOTE
Rate controlled  Holding coumadin at discharge  May be able to restart in a week    Will have the patient see her PCP

## 2021-03-12 NOTE — NURSING NOTE
Report called to receiving facility  Receiving nurse verbalized understanding  PIV removed  Pt and facility aware of  time of 2030

## 2021-03-12 NOTE — TRANSPORTATION MEDICAL NECESSITY
Section I - General Information    Name of Patient: Kelsey Escalante                 : 1945    Medicare #: [id number]  Transport Date: 21 (PCS is valid for round trips on this date and for all repetitive trips in the 60-day range as noted below )  Origin: [address]                                                         Destination: [address]   Is the pt's stay covered under Medicare Part A (PPS/DRG)   [x]     Closest appropriate facility? If no, why is transport to more distant facility required? Yes  If hospice pt, is this transport related to pt's terminal illness? NA       Section II - Medical Necessity Questionnaire  Ambulance transportation is medically necessary only if other means of transport are contraindicated or would be potentially harmful to the patient  To meet this requirement, the patient must either be "bed confined" or suffer from a condition such that transport by means other than ambulance is contraindicated by the patient's condition  The following questions must be answered by the medical professional signing below for this form to be valid:    1)  Describe the MEDICAL CONDITION (physical and/or mental) of this patient AT [address] [place] [address] that requires the patient to be transported in an ambulance and why transport by other means is contraindicated by the patient's condition: Oxygen 4-6 liters, pain, stage II pressure injuries    2) Is the patient "bed confined" as defined below? Yes  To be "be confined" the patient must satisfy all three of the following conditions: (1) unable to get up from bed without Assistance; AND (2) unable to ambulate; AND (3) unable to sit in a chair or wheelchair  3) Can this patient safely be transported by car or wheelchair van (i e , seated during transport without a medical attendant or monitoring)?    No    4) In addition to completing questions 1-3 above, please check any of the following conditions that apply*:   *Note: supporting documentation for any boxes checked must be maintained in the patient's medical records  If hosp-hosp transfer, describe services needed at 2nd facility not available at 1st facility? Moderate/severe pain on movement   Medical attendant required   Requires oxygen-unable to self administer  Unable to tolerate seated position for time needed to transport   Unable to sit in a chair or wheelchair due to decubitus ulcers or other wounds       Section III - Signature of Physician or Healthcare Professional  I certify that the above information is true and correct based on my evaluation of this patient, and represent that the patient requires transport by ambulance and that other forms of transport are contraindicated  I understand that this information will be used by the Centers for Medicare and Medicaid Services (CMS) to support the determination of medical necessity for ambulance services, and I represent that I have personal knowledge of the patient's condition at time of transport  []  If this box is checked, I also certify that the patient is physically or mentally incapable of signing the ambulance service's claim and that the institution with which I am affiliated has furnished care, services, or assistance to the patient  My signature below is made on behalf of the patient pursuant to 42 CFR §424 36(b)(4)  In accordance with 42 CFR §424 37, the specific reason(s) that the patient is physically or mentally incapable of signing the claim form is as follows:  Sal Estrada Physician* or [address] MURPHY _____________________________________________  Signature Date 03/12/21 (For scheduled repetitive transports, this form is not valid for transports performed more than 60 days after this date)    Printed Name & Credentials of Physician or Healthcare Professional (MD, DO, RN, etc )_Micyennifer [address] Love MURPHY CM___________________________  *Form must be signed by patient's attending physician for scheduled, repetitive transports   For non-repetitive, unscheduled ambulance transports, if unable to obtain the signature of the attending physician, any of the following may sign (choose appropriate option below)  [] Physician Assistant []  Clinical Nurse Specialist [x]  Registered Nurse  []  Nurse Practitioner  [x] Discharge Planner

## 2021-03-12 NOTE — ASSESSMENT & PLAN NOTE
Lab Results   Component Value Date    HGBA1C 4 6 03/04/2021       Recent Labs     03/11/21  2146 03/12/21  0744 03/12/21  1129 03/12/21  1603   POCGLU 111 89 90 104       Blood Sugar Average: Last 72 hrs:  (P) 60 [id number] on metformin 500 b i d     Will hold while inpatient    Resume home medications

## 2021-03-13 NOTE — NURSING NOTE
Pt d/c'd to the [facility] via stretcher w/ transporter team  All IVF stopped, & peripheral IV removed

## 2021-04-15 ENCOUNTER — TRANSCRIBE ORDERS (OUTPATIENT)
Dept: ADMINISTRATIVE | Facility: HOSPITAL | Age: 76
End: 2021-04-15

## 2021-04-15 DIAGNOSIS — Z87.898: Primary | ICD-10-CM

## 2021-04-16 ENCOUNTER — NURSING HOME VISIT (OUTPATIENT)
Dept: GERIATRICS | Facility: OTHER | Age: 76
End: 2021-04-16
Payer: MEDICARE

## 2021-04-16 VITALS
TEMPERATURE: 97.6 F | WEIGHT: 255 LBS | SYSTOLIC BLOOD PRESSURE: 122 MMHG | HEIGHT: 64 IN | HEART RATE: 74 BPM | DIASTOLIC BLOOD PRESSURE: 68 MMHG | BODY MASS INDEX: 43.54 KG/M2

## 2021-04-16 DIAGNOSIS — I87.303 STASIS EDEMA OF BOTH LOWER EXTREMITIES: Primary | ICD-10-CM

## 2021-04-16 PROCEDURE — 99305 1ST NF CARE MODERATE MDM 35: CPT | Performed by: SURGERY

## 2021-04-16 RX ORDER — WARFARIN SODIUM 3 MG/1
3 TABLET ORAL
Status: ON HOLD | COMMUNITY
End: 2021-07-05 | Stop reason: SDUPTHER

## 2021-04-16 RX ORDER — OMEPRAZOLE 20 MG/1
20 CAPSULE, DELAYED RELEASE ORAL DAILY
COMMUNITY

## 2021-04-16 RX ORDER — POTASSIUM CHLORIDE 750 MG/1
20 TABLET, EXTENDED RELEASE ORAL 2 TIMES DAILY
COMMUNITY
End: 2021-08-10 | Stop reason: SDUPTHER

## 2021-04-16 RX ORDER — BUDESONIDE AND FORMOTEROL FUMARATE DIHYDRATE 80; 4.5 UG/1; UG/1
2 AEROSOL RESPIRATORY (INHALATION) 2 TIMES DAILY
COMMUNITY

## 2021-04-16 NOTE — PROGRESS NOTES
Assessment/Plan:     Diagnoses and all orders for this visit:    Stasis edema of both lower extremities    Other orders  -     warfarin (COUMADIN) 3 mg tablet; Take 3 mg by mouth daily  -     omeprazole (PriLOSEC) 20 mg delayed release capsule; Take 20 mg by mouth daily  -     potassium chloride (K-DUR,KLOR-CON) 10 mEq tablet; Take 20 mEq by mouth 2 (two) times a day  -     budesonide-formoterol (SYMBICORT) 80-4 5 MCG/ACT inhaler; Inhale 2 puffs 2 (two) times a day Rinse mouth after use  The bilateral thighs swellings are due to stasis edema of the skin folds of the thigh and are not surgically treatable conditions  Subjective:      Patient ID: Ramírez Garcia is a 70 y o  female  Resident of [facility]  HPI   Patient is a 79-year-old female who is a nursing home resident and is bed-bound  I was asked to see her for bilateral painful thighs swellings  The following portions of the patient's history were reviewed and updated as appropriate: allergies, current medications, past family history, past medical history, past social history, past surgical history and problem list     Review of Systems    Patient suffers with mitral valve disease and is status post a mitral valve replacement  She has atrial fibrillation and is on Coumadin  Patient has congestive heart failure  Patient has COPD with respiratory failure  Patient has diabetes mellitus    Objective:      /68   Pulse 74   Temp 97 6 °F (36 4 °C)   Ht 5' 4" (1 626 m)   Wt 116 kg (255 lb)   BMI 43 77 kg/m²      Physical Exam      Patient is bed-bound due to severe respiratory failure and she is on oxygen  Patient has obesity with large swollen thighs and loose skin folds  The medial skin folds on both the thighs have dependent edema which is the cause of the swelling  This is not a surgically treatable condition

## 2021-05-07 ENCOUNTER — HOSPITAL ENCOUNTER (OUTPATIENT)
Dept: CT IMAGING | Facility: HOSPITAL | Age: 76
Discharge: HOME | End: 2021-05-07
Payer: MEDICARE

## 2021-05-07 DIAGNOSIS — Z87.898: ICD-10-CM

## 2021-05-07 PROCEDURE — 73700 CT LOWER EXTREMITY W/O DYE: CPT

## 2021-06-17 ENCOUNTER — APPOINTMENT (OUTPATIENT)
Dept: CT IMAGING | Facility: HOSPITAL | Age: 76
DRG: 291 | End: 2021-06-17
Payer: MEDICARE

## 2021-06-17 ENCOUNTER — HOSPITAL ENCOUNTER (INPATIENT)
Facility: HOSPITAL | Age: 76
LOS: 17 days | Discharge: NURSING, REHAB OR LONG-TERM CARE | DRG: 291 | End: 2021-07-05
Attending: EMERGENCY MEDICINE | Admitting: INTERNAL MEDICINE
Payer: MEDICARE

## 2021-06-17 ENCOUNTER — APPOINTMENT (EMERGENCY)
Dept: CT IMAGING | Facility: HOSPITAL | Age: 76
DRG: 291 | End: 2021-06-17
Payer: MEDICARE

## 2021-06-17 DIAGNOSIS — R06.00 DYSPNEA, UNSPECIFIED TYPE: ICD-10-CM

## 2021-06-17 DIAGNOSIS — R06.02 SHORTNESS OF BREATH: Primary | ICD-10-CM

## 2021-06-17 DIAGNOSIS — E66.01 MORBID OBESITY (HCC): ICD-10-CM

## 2021-06-17 DIAGNOSIS — R91.8 MASS OF MIDDLE LOBE OF RIGHT LUNG: ICD-10-CM

## 2021-06-17 DIAGNOSIS — I48.20 CHRONIC ATRIAL FIBRILLATION (HCC): ICD-10-CM

## 2021-06-17 DIAGNOSIS — J90 PLEURAL EFFUSION ON RIGHT: ICD-10-CM

## 2021-06-17 LAB
ALBUMIN SERPL BCP-MCNC: 2.7 G/DL (ref 3.5–5)
ALP SERPL-CCNC: 73 U/L (ref 46–116)
ALT SERPL W P-5'-P-CCNC: 17 U/L (ref 12–78)
ANION GAP SERPL CALCULATED.3IONS-SCNC: 9 MMOL/L (ref 4–13)
AST SERPL W P-5'-P-CCNC: 21 U/L (ref 5–45)
BASE EX.OXY STD BLDV CALC-SCNC: 54.5 % (ref 60–80)
BASE EXCESS BLDV CALC-SCNC: 6.9 MMOL/L
BASOPHILS # BLD AUTO: 0.06 THOUSANDS/ΜL (ref 0–0.1)
BASOPHILS NFR BLD AUTO: 1 % (ref 0–1)
BILIRUB SERPL-MCNC: 0.48 MG/DL (ref 0.2–1)
BUN SERPL-MCNC: 22 MG/DL (ref 5–25)
CALCIUM ALBUM COR SERPL-MCNC: 10 MG/DL (ref 8.3–10.1)
CALCIUM SERPL-MCNC: 9 MG/DL (ref 8.3–10.1)
CHLORIDE SERPL-SCNC: 101 MMOL/L (ref 100–108)
CO2 SERPL-SCNC: 32 MMOL/L (ref 21–32)
CREAT SERPL-MCNC: 1.03 MG/DL (ref 0.6–1.3)
DIGOXIN SERPL-MCNC: 1.4 NG/ML (ref 0.8–2)
EOSINOPHIL # BLD AUTO: 0.18 THOUSAND/ΜL (ref 0–0.61)
EOSINOPHIL NFR BLD AUTO: 3 % (ref 0–6)
ERYTHROCYTE [DISTWIDTH] IN BLOOD BY AUTOMATED COUNT: 17.6 % (ref 11.6–15.1)
GFR SERPL CREATININE-BSD FRML MDRD: 54 ML/MIN/1.73SQ M
GLUCOSE SERPL-MCNC: 94 MG/DL (ref 65–140)
GLUCOSE SERPL-MCNC: 94 MG/DL (ref 65–140)
HCO3 BLDV-SCNC: 31.8 MMOL/L (ref 24–30)
HCT VFR BLD AUTO: 29.7 % (ref 34.8–46.1)
HGB BLD-MCNC: 8.8 G/DL (ref 11.5–15.4)
IMM GRANULOCYTES # BLD AUTO: 0.05 THOUSAND/UL (ref 0–0.2)
IMM GRANULOCYTES NFR BLD AUTO: 1 % (ref 0–2)
INR PPP: 2.6 (ref 0.84–1.19)
LYMPHOCYTES # BLD AUTO: 1.28 THOUSANDS/ΜL (ref 0.6–4.47)
LYMPHOCYTES NFR BLD AUTO: 18 % (ref 14–44)
MCH RBC QN AUTO: 26 PG (ref 26.8–34.3)
MCHC RBC AUTO-ENTMCNC: 29.6 G/DL (ref 31.4–37.4)
MCV RBC AUTO: 88 FL (ref 82–98)
MONOCYTES # BLD AUTO: 0.58 THOUSAND/ΜL (ref 0.17–1.22)
MONOCYTES NFR BLD AUTO: 8 % (ref 4–12)
NEUTROPHILS # BLD AUTO: 5.05 THOUSANDS/ΜL (ref 1.85–7.62)
NEUTS SEG NFR BLD AUTO: 69 % (ref 43–75)
NRBC BLD AUTO-RTO: 0 /100 WBCS
NT-PROBNP SERPL-MCNC: 4339 PG/ML
NT-PROBNP SERPL-MCNC: 4538 PG/ML
O2 CT BLDV-SCNC: 7.4 ML/DL
PCO2 BLDV: 47.5 MM HG (ref 42–50)
PH BLDV: 7.44 [PH] (ref 7.3–7.4)
PLATELET # BLD AUTO: 428 THOUSANDS/UL (ref 149–390)
PMV BLD AUTO: 9.8 FL (ref 8.9–12.7)
PO2 BLDV: 32.3 MM HG (ref 35–45)
POTASSIUM SERPL-SCNC: 3.8 MMOL/L (ref 3.5–5.3)
PROT SERPL-MCNC: 7.2 G/DL (ref 6.4–8.2)
PROTHROMBIN TIME: 27.9 SECONDS (ref 11.6–14.5)
RBC # BLD AUTO: 3.39 MILLION/UL (ref 3.81–5.12)
SODIUM SERPL-SCNC: 142 MMOL/L (ref 136–145)
TROPONIN I SERPL-MCNC: <0.02 NG/ML
WBC # BLD AUTO: 7.2 THOUSAND/UL (ref 4.31–10.16)

## 2021-06-17 PROCEDURE — 85610 PROTHROMBIN TIME: CPT | Performed by: EMERGENCY MEDICINE

## 2021-06-17 PROCEDURE — 82948 REAGENT STRIP/BLOOD GLUCOSE: CPT

## 2021-06-17 PROCEDURE — 99220 PR INITIAL OBSERVATION CARE/DAY 70 MINUTES: CPT | Performed by: PHYSICIAN ASSISTANT

## 2021-06-17 PROCEDURE — 36415 COLL VENOUS BLD VENIPUNCTURE: CPT | Performed by: EMERGENCY MEDICINE

## 2021-06-17 PROCEDURE — 84145 PROCALCITONIN (PCT): CPT | Performed by: PHYSICIAN ASSISTANT

## 2021-06-17 PROCEDURE — 83880 ASSAY OF NATRIURETIC PEPTIDE: CPT | Performed by: PHYSICIAN ASSISTANT

## 2021-06-17 PROCEDURE — 85025 COMPLETE CBC W/AUTO DIFF WBC: CPT | Performed by: EMERGENCY MEDICINE

## 2021-06-17 PROCEDURE — 80162 ASSAY OF DIGOXIN TOTAL: CPT | Performed by: EMERGENCY MEDICINE

## 2021-06-17 PROCEDURE — 84484 ASSAY OF TROPONIN QUANT: CPT | Performed by: EMERGENCY MEDICINE

## 2021-06-17 PROCEDURE — G1004 CDSM NDSC: HCPCS

## 2021-06-17 PROCEDURE — 99285 EMERGENCY DEPT VISIT HI MDM: CPT

## 2021-06-17 PROCEDURE — 99285 EMERGENCY DEPT VISIT HI MDM: CPT | Performed by: EMERGENCY MEDICINE

## 2021-06-17 PROCEDURE — 93005 ELECTROCARDIOGRAM TRACING: CPT

## 2021-06-17 PROCEDURE — 80053 COMPREHEN METABOLIC PANEL: CPT | Performed by: EMERGENCY MEDICINE

## 2021-06-17 PROCEDURE — 82805 BLOOD GASES W/O2 SATURATION: CPT | Performed by: EMERGENCY MEDICINE

## 2021-06-17 PROCEDURE — 71250 CT THORAX DX C-: CPT

## 2021-06-17 RX ORDER — DIGOXIN 125 MCG
125 TABLET ORAL DAILY
Status: DISCONTINUED | OUTPATIENT
Start: 2021-06-18 | End: 2021-07-05 | Stop reason: HOSPADM

## 2021-06-17 RX ORDER — ACETAMINOPHEN 325 MG/1
650 TABLET ORAL EVERY 6 HOURS PRN
Status: DISCONTINUED | OUTPATIENT
Start: 2021-06-17 | End: 2021-06-21

## 2021-06-17 RX ORDER — FUROSEMIDE 10 MG/ML
40 INJECTION INTRAMUSCULAR; INTRAVENOUS
Status: DISCONTINUED | OUTPATIENT
Start: 2021-06-18 | End: 2021-06-18

## 2021-06-17 RX ORDER — WARFARIN SODIUM 4 MG/1
4 TABLET ORAL
Status: DISCONTINUED | OUTPATIENT
Start: 2021-06-17 | End: 2021-06-18

## 2021-06-17 RX ORDER — BUDESONIDE AND FORMOTEROL FUMARATE DIHYDRATE 80; 4.5 UG/1; UG/1
2 AEROSOL RESPIRATORY (INHALATION) 2 TIMES DAILY
Status: DISCONTINUED | OUTPATIENT
Start: 2021-06-17 | End: 2021-06-18

## 2021-06-17 RX ORDER — FUROSEMIDE 10 MG/ML
40 INJECTION INTRAMUSCULAR; INTRAVENOUS ONCE
Status: CANCELLED | OUTPATIENT
Start: 2021-06-17

## 2021-06-17 RX ORDER — FERROUS SULFATE 325(65) MG
325 TABLET ORAL
Status: DISCONTINUED | OUTPATIENT
Start: 2021-06-18 | End: 2021-07-05 | Stop reason: HOSPADM

## 2021-06-17 RX ORDER — PANTOPRAZOLE SODIUM 40 MG/1
40 TABLET, DELAYED RELEASE ORAL
Status: DISCONTINUED | OUTPATIENT
Start: 2021-06-18 | End: 2021-07-05 | Stop reason: HOSPADM

## 2021-06-17 RX ORDER — AMOXICILLIN 250 MG
1 CAPSULE ORAL DAILY PRN
Status: DISCONTINUED | OUTPATIENT
Start: 2021-06-17 | End: 2021-06-28

## 2021-06-17 RX ORDER — MONTELUKAST SODIUM 10 MG/1
10 TABLET ORAL DAILY
Status: DISCONTINUED | OUTPATIENT
Start: 2021-06-18 | End: 2021-07-05 | Stop reason: HOSPADM

## 2021-06-17 RX ORDER — ALBUTEROL SULFATE 90 UG/1
2 AEROSOL, METERED RESPIRATORY (INHALATION) EVERY 6 HOURS PRN
Status: DISCONTINUED | OUTPATIENT
Start: 2021-06-17 | End: 2021-07-05 | Stop reason: HOSPADM

## 2021-06-17 RX ORDER — LORAZEPAM 0.5 MG/1
0.5 TABLET ORAL ONCE
Status: COMPLETED | OUTPATIENT
Start: 2021-06-17 | End: 2021-06-17

## 2021-06-17 RX ORDER — POTASSIUM CHLORIDE 20 MEQ/1
20 TABLET, EXTENDED RELEASE ORAL 2 TIMES DAILY
Status: DISCONTINUED | OUTPATIENT
Start: 2021-06-17 | End: 2021-07-05 | Stop reason: HOSPADM

## 2021-06-17 RX ORDER — FUROSEMIDE 40 MG/1
40 TABLET ORAL 2 TIMES DAILY
Status: CANCELLED | OUTPATIENT
Start: 2021-06-17

## 2021-06-17 RX ADMIN — BUDESONIDE AND FORMOTEROL FUMARATE DIHYDRATE 2 PUFF: 80; 4.5 AEROSOL RESPIRATORY (INHALATION) at 23:34

## 2021-06-17 RX ADMIN — WARFARIN SODIUM 4 MG: 4 TABLET ORAL at 23:34

## 2021-06-17 RX ADMIN — POTASSIUM CHLORIDE 20 MEQ: 1500 TABLET, EXTENDED RELEASE ORAL at 23:34

## 2021-06-17 RX ADMIN — LORAZEPAM 0.5 MG: 0.5 TABLET ORAL at 23:34

## 2021-06-18 ENCOUNTER — APPOINTMENT (OUTPATIENT)
Dept: NON INVASIVE DIAGNOSTICS | Facility: HOSPITAL | Age: 76
DRG: 291 | End: 2021-06-18
Payer: MEDICARE

## 2021-06-18 ENCOUNTER — APPOINTMENT (OUTPATIENT)
Dept: CT IMAGING | Facility: HOSPITAL | Age: 76
DRG: 291 | End: 2021-06-18
Payer: MEDICARE

## 2021-06-18 LAB
ANION GAP SERPL CALCULATED.3IONS-SCNC: 8 MMOL/L (ref 4–13)
ATRIAL RATE: 340 BPM
BASOPHILS # BLD AUTO: 0.06 THOUSANDS/ΜL (ref 0–0.1)
BASOPHILS NFR BLD AUTO: 1 % (ref 0–1)
BUN SERPL-MCNC: 21 MG/DL (ref 5–25)
CALCIUM SERPL-MCNC: 9.3 MG/DL (ref 8.3–10.1)
CHLORIDE SERPL-SCNC: 101 MMOL/L (ref 100–108)
CO2 SERPL-SCNC: 32 MMOL/L (ref 21–32)
CREAT SERPL-MCNC: 1.01 MG/DL (ref 0.6–1.3)
EOSINOPHIL # BLD AUTO: 0.28 THOUSAND/ΜL (ref 0–0.61)
EOSINOPHIL NFR BLD AUTO: 4 % (ref 0–6)
ERYTHROCYTE [DISTWIDTH] IN BLOOD BY AUTOMATED COUNT: 18 % (ref 11.6–15.1)
GFR SERPL CREATININE-BSD FRML MDRD: 55 ML/MIN/1.73SQ M
GLUCOSE P FAST SERPL-MCNC: 79 MG/DL (ref 65–99)
GLUCOSE SERPL-MCNC: 100 MG/DL (ref 65–140)
GLUCOSE SERPL-MCNC: 102 MG/DL (ref 65–140)
GLUCOSE SERPL-MCNC: 129 MG/DL (ref 65–140)
GLUCOSE SERPL-MCNC: 144 MG/DL (ref 65–140)
GLUCOSE SERPL-MCNC: 79 MG/DL (ref 65–140)
HCT VFR BLD AUTO: 30.9 % (ref 34.8–46.1)
HGB BLD-MCNC: 8.9 G/DL (ref 11.5–15.4)
IMM GRANULOCYTES # BLD AUTO: 0.05 THOUSAND/UL (ref 0–0.2)
IMM GRANULOCYTES NFR BLD AUTO: 1 % (ref 0–2)
INR PPP: 2.69 (ref 0.84–1.19)
LYMPHOCYTES # BLD AUTO: 1.34 THOUSANDS/ΜL (ref 0.6–4.47)
LYMPHOCYTES NFR BLD AUTO: 18 % (ref 14–44)
MCH RBC QN AUTO: 26.3 PG (ref 26.8–34.3)
MCHC RBC AUTO-ENTMCNC: 28.8 G/DL (ref 31.4–37.4)
MCV RBC AUTO: 91 FL (ref 82–98)
MONOCYTES # BLD AUTO: 0.53 THOUSAND/ΜL (ref 0.17–1.22)
MONOCYTES NFR BLD AUTO: 7 % (ref 4–12)
NEUTROPHILS # BLD AUTO: 5.04 THOUSANDS/ΜL (ref 1.85–7.62)
NEUTS SEG NFR BLD AUTO: 69 % (ref 43–75)
NRBC BLD AUTO-RTO: 0 /100 WBCS
PLATELET # BLD AUTO: 412 THOUSANDS/UL (ref 149–390)
PMV BLD AUTO: 10.2 FL (ref 8.9–12.7)
POTASSIUM SERPL-SCNC: 3.5 MMOL/L (ref 3.5–5.3)
PROCALCITONIN SERPL-MCNC: <0.05 NG/ML
PROTHROMBIN TIME: 28.7 SECONDS (ref 11.6–14.5)
QRS AXIS: 87 DEGREES
QRSD INTERVAL: 84 MS
QT INTERVAL: 394 MS
QTC INTERVAL: 428 MS
RBC # BLD AUTO: 3.38 MILLION/UL (ref 3.81–5.12)
SODIUM SERPL-SCNC: 141 MMOL/L (ref 136–145)
T WAVE AXIS: 124 DEGREES
VENTRICULAR RATE: 71 BPM
WBC # BLD AUTO: 7.3 THOUSAND/UL (ref 4.31–10.16)

## 2021-06-18 PROCEDURE — 99222 1ST HOSP IP/OBS MODERATE 55: CPT | Performed by: INTERNAL MEDICINE

## 2021-06-18 PROCEDURE — 82948 REAGENT STRIP/BLOOD GLUCOSE: CPT

## 2021-06-18 PROCEDURE — 80048 BASIC METABOLIC PNL TOTAL CA: CPT | Performed by: PHYSICIAN ASSISTANT

## 2021-06-18 PROCEDURE — 99223 1ST HOSP IP/OBS HIGH 75: CPT | Performed by: INTERNAL MEDICINE

## 2021-06-18 PROCEDURE — 99232 SBSQ HOSP IP/OBS MODERATE 35: CPT | Performed by: INTERNAL MEDICINE

## 2021-06-18 PROCEDURE — 85025 COMPLETE CBC W/AUTO DIFF WBC: CPT | Performed by: PHYSICIAN ASSISTANT

## 2021-06-18 PROCEDURE — 71260 CT THORAX DX C+: CPT

## 2021-06-18 PROCEDURE — G1004 CDSM NDSC: HCPCS

## 2021-06-18 PROCEDURE — 85610 PROTHROMBIN TIME: CPT | Performed by: PHYSICIAN ASSISTANT

## 2021-06-18 PROCEDURE — 93306 TTE W/DOPPLER COMPLETE: CPT

## 2021-06-18 PROCEDURE — 93010 ELECTROCARDIOGRAM REPORT: CPT | Performed by: INTERNAL MEDICINE

## 2021-06-18 RX ORDER — BUDESONIDE AND FORMOTEROL FUMARATE DIHYDRATE 80; 4.5 UG/1; UG/1
2 AEROSOL RESPIRATORY (INHALATION) 2 TIMES DAILY
Status: COMPLETED | OUTPATIENT
Start: 2021-06-18 | End: 2021-06-18

## 2021-06-18 RX ORDER — LIDOCAINE 50 MG/G
1 PATCH TOPICAL DAILY
Status: DISCONTINUED | OUTPATIENT
Start: 2021-06-19 | End: 2021-06-18

## 2021-06-18 RX ORDER — FLUTICASONE FUROATE AND VILANTEROL 100; 25 UG/1; UG/1
1 POWDER RESPIRATORY (INHALATION) DAILY
Status: DISCONTINUED | OUTPATIENT
Start: 2021-06-19 | End: 2021-07-05 | Stop reason: HOSPADM

## 2021-06-18 RX ORDER — BUMETANIDE 0.25 MG/ML
2 INJECTION, SOLUTION INTRAMUSCULAR; INTRAVENOUS
Status: DISCONTINUED | OUTPATIENT
Start: 2021-06-19 | End: 2021-06-20

## 2021-06-18 RX ORDER — LIDOCAINE 50 MG/G
1 PATCH TOPICAL DAILY
Status: DISCONTINUED | OUTPATIENT
Start: 2021-06-18 | End: 2021-07-05 | Stop reason: HOSPADM

## 2021-06-18 RX ORDER — IPRATROPIUM BROMIDE AND ALBUTEROL SULFATE 2.5; .5 MG/3ML; MG/3ML
3 SOLUTION RESPIRATORY (INHALATION) 4 TIMES DAILY PRN
Status: DISCONTINUED | OUTPATIENT
Start: 2021-06-18 | End: 2021-07-05 | Stop reason: HOSPADM

## 2021-06-18 RX ADMIN — FERROUS SULFATE TAB 325 MG (65 MG ELEMENTAL FE) 325 MG: 325 (65 FE) TAB at 08:45

## 2021-06-18 RX ADMIN — FUROSEMIDE 40 MG: 10 INJECTION, SOLUTION INTRAMUSCULAR; INTRAVENOUS at 13:00

## 2021-06-18 RX ADMIN — PANTOPRAZOLE SODIUM 40 MG: 40 TABLET, DELAYED RELEASE ORAL at 06:32

## 2021-06-18 RX ADMIN — ACETAMINOPHEN 650 MG: 325 TABLET, FILM COATED ORAL at 17:57

## 2021-06-18 RX ADMIN — DICLOFENAC SODIUM 2 G: 10 GEL TOPICAL at 17:58

## 2021-06-18 RX ADMIN — FUROSEMIDE 40 MG: 10 INJECTION, SOLUTION INTRAMUSCULAR; INTRAVENOUS at 06:32

## 2021-06-18 RX ADMIN — BUDESONIDE AND FORMOTEROL FUMARATE DIHYDRATE 2 PUFF: 80; 4.5 AEROSOL RESPIRATORY (INHALATION) at 17:57

## 2021-06-18 RX ADMIN — IOHEXOL 85 ML: 350 INJECTION, SOLUTION INTRAVENOUS at 05:05

## 2021-06-18 RX ADMIN — DICLOFENAC SODIUM 2 G: 10 GEL TOPICAL at 21:28

## 2021-06-18 RX ADMIN — POTASSIUM CHLORIDE 20 MEQ: 1500 TABLET, EXTENDED RELEASE ORAL at 17:57

## 2021-06-18 RX ADMIN — POTASSIUM CHLORIDE 20 MEQ: 1500 TABLET, EXTENDED RELEASE ORAL at 08:42

## 2021-06-18 RX ADMIN — BUDESONIDE AND FORMOTEROL FUMARATE DIHYDRATE 2 PUFF: 80; 4.5 AEROSOL RESPIRATORY (INHALATION) at 08:46

## 2021-06-18 RX ADMIN — DICLOFENAC SODIUM 2 G: 10 GEL TOPICAL at 08:45

## 2021-06-18 RX ADMIN — LIDOCAINE 5% 1 PATCH: 700 PATCH TOPICAL at 19:51

## 2021-06-18 RX ADMIN — DICLOFENAC SODIUM 2 G: 10 GEL TOPICAL at 06:32

## 2021-06-18 RX ADMIN — MONTELUKAST 10 MG: 10 TABLET, FILM COATED ORAL at 08:42

## 2021-06-18 RX ADMIN — DIGOXIN 125 MCG: 125 TABLET ORAL at 08:42

## 2021-06-18 RX ADMIN — FUROSEMIDE 40 MG: 10 INJECTION, SOLUTION INTRAMUSCULAR; INTRAVENOUS at 17:57

## 2021-06-18 NOTE — CASE MANAGEMENT
LOS: 1  Readmission: none   Bundle: none  Unplanned Readmission Risk: 15 (green)    Patient admitted due to SOB  Met with patient at bedside to complete assessment  Patient presents as alert, oriented x4  Introduced self and explained role of case management  Patient states that she lives in an apartment with her friend, Denver Colander, but she's currently been at the [facility] [place] for [facility]  Relays that she has been in rehab for "a few months" but is not able to ambulate at all  Freedom of choice discussed and patient requesting to return to the [facility] [place] once medically stable  In agreement for a referral to be made to the facility in anticipation of her return  Patient reports that her friend/emergency contact, Nina Pinon, has POA  States that secondary contact would be her nephew, Rosa Mathew  Patient reports that she's on o2 at baseline; has o2 arrangements at the [facility] and also at her apartment  Patient states that she has received one of her vaccines while at [facility], but does not believe she ever got the second shot  States that [facility] will have her vaccination records  Referral sent in [place] to [facility] [place]; also spoke with liaison, Tierra Crespo, who relayed that patient is a bedhold with them  Will need COVID test prior to return  Anticipate patient to return to the [facility] [place] once medically stable  Will continue to follow to assist with same       BRANDIN Orr  6/18/2021  4:40 PM

## 2021-06-18 NOTE — CONSULTS
Consultation - Pulmonary Medicine   Jordyn Price 70 y o  female MRN: [id number]  Unit/Bed#: S -01 Encounter: [id number]      Assessment & Plan:  · Shortness of breath  · Chronic respiratory failure with hypoxia  · Acute CHF exacerbation  · COPD without acute exacerbation  · Abnormal CT of the chest  · Morbid obesity  · Former smoker, 50 + pack year history  · History of COVID-19    Patient is saturating well on her baseline oxygen requirement of 4 L by nasal cannula  Reviewed CT chest in PACS with 3 cm right upper lobe noncalcified lung nodule, diffuse interstitial prominence, scattered ground-glass infiltrates bilaterally likely related to CHF, stable moderate-sized right pleural effusion  Acute symptoms likely related to CHF  Patient receiving IV Lasix  Cardiology following  Echo pending  Will replace Symbicort w patient's home inhaler Breo Ellipta  PATO PRN  Patient's lung nodule is highly suspicious for malignancy given her significant smoking history  Bedside ultrasound does not demonstrate significant fluid present for thoracentesis  Would need tissue sampling for definitive diagnosis  EBUS would be ideal if thora unable to be performed as this would also assist with staging  Warfarin needs to be held, discussed with SLIM  Discussed with Dr Tucker Gerardo & Dr Kelly Moran    History of Present Illness   Physician Requesting Consult: Charity Jay MD  Reason for Consult / Principal Problem: lung mass  Hx and PE limited by: none  HPI: Jordyn Price is a 70y o  year old female with past medical history including COPD unknown severity, CHF, COVID-19 infection, lung nodule, morbid obesity, diabetes mellitus, AFib on Coumadin, chronic respiratory failure on 4 L of oxygen at baseline who presents with shortness of breath  This started a few days prior to admission, associated with lower extremity edema, cough, and orthopnea    Patient is visibly volume overloaded and seems to be feeling better following diuresis  She has not been having any fevers, chills, infectious symptoms  CT of the chest demonstrates 3 cm lung nodule, right-sided pleural effusion, among other findings as described in more detail above  She is aware that the lung nodule is suspicious for lung cancer given her significant smoking history  She would like a diagnosis but states that she would be probably be agreeable to radiation but does not want any chemotherapy  From a pulmonary standpoint, she is not regularly following with pulmonologist   She is currently residing in a rehab facility since she had COVID-19 in December 2020  She has been on 4 L of oxygen for her COPD for approximately 10 years and typically uses Breo Ellipta and Combivent  She quit smoking about 9 years ago and has at least a 50 pack year history  Inpatient consult to Pulmonology  Consult performed by: Josselyn Block PA-C  Consult ordered by: Patience Fink PA-C          Review of Systems   Constitutional: Negative for chills and fever  Respiratory: Positive for cough and shortness of breath  Cardiovascular: Positive for leg swelling  All other systems reviewed and are negative        Historical Information   Past Medical History:   Diagnosis Date    [facility]     [facility]     Diabetes mellitus [facility]     Hypertension     Respiratory failure ([facility] )      Past Surgical History:   Procedure Laterality Date    COLONOSCOPY      MITRAL VALVE REPLACEMENT  2012    Bovine     Social History   Social History     Substance and Sexual Activity   Alcohol Use Not Currently     Social History     Substance and Sexual Activity   Drug Use Not Currently     E-Cigarette/Vaping    E-Cigarette Use Former User      E-Cigarette/Vaping Substances    Nicotine No     THC No     CBD No     Flavoring No     Other No     Unknown No      Social History     Tobacco Use   Smoking Status Former Smoker    Quit date: 2010    Years since quittin 4   Smokeless Tobacco Never Used     Occupational History:     Family History: History reviewed  No pertinent family history  Meds/Allergies   all current active meds have been reviewed    Allergies   Allergen Reactions    Augmentin [Amoxicillin-Pot Clavulanate] Anaphylaxis    Levaquin [Levofloxacin] Anaphylaxis       Objective   Vitals: Blood pressure 98/52, pulse 96, temperature 97 7 °F (36 5 °C), temperature source Oral, resp  rate 15, height 5' 3" (1 6 m), weight 112 kg (246 lb 14 6 oz), SpO2 97 %  ,Body mass index is 43 74 kg/m²  Intake/Output Summary (Last 24 hours) at 2021 1324  Last data filed at 2021 0650  Gross per 24 hour   Intake 0 ml   Output 600 ml   Net -600 ml     Invasive Devices     Peripheral Intravenous Line            Peripheral IV 21 Left Antecubital <1 day                Physical Exam  Vitals and nursing note reviewed  Constitutional:       General: She is not in acute distress  Appearance: She is well-developed  She is not diaphoretic  HENT:      Head: Normocephalic and atraumatic  Right Ear: External ear normal       Left Ear: External ear normal       Nose: Nose normal    Eyes:      General: No scleral icterus  Right eye: No discharge  Left eye: No discharge  Conjunctiva/sclera: Conjunctivae normal    Neck:      Trachea: No tracheal deviation  Cardiovascular:      Rate and Rhythm: Normal rate and regular rhythm  Heart sounds: Normal heart sounds  No murmur heard  No friction rub  No gallop  Pulmonary:      Effort: Pulmonary effort is normal  No respiratory distress  Breath sounds: No stridor  Rales present  No wheezing  Abdominal:      General: There is no distension  Tenderness: There is no guarding  Musculoskeletal:         General: No tenderness or deformity  Normal range of motion  Cervical back: Normal range of motion and neck supple        Right lower leg: Edema present  Left lower leg: Edema present  Skin:     General: Skin is warm and dry  Coloration: Skin is not pale  Findings: No erythema or rash  Neurological:      Mental Status: She is alert and oriented to person, place, and time  Cranial Nerves: No cranial nerve deficit  Psychiatric:         Behavior: Behavior normal          Thought Content: Thought content normal          Judgment: Judgment normal          Lab Results: I have personally reviewed pertinent lab results  Imaging Studies: I have personally reviewed pertinent reports  and I have personally reviewed pertinent films in PACS  EKG, Pathology, and Other Studies: I have personally reviewed pertinent reports      VTE Prophylaxis: Warfarin (Coumadin) - to be held    Code Status: Level 1 - Full Code  Advance Directive and Living Will:      Power of :    POLST:

## 2021-06-18 NOTE — ASSESSMENT & PLAN NOTE
· Does not appear to be in acute exacerbation at this time  · Continue home inhalers    · This could be contributing to patient's shortness of breath

## 2021-06-18 NOTE — ED PROCEDURE NOTE
PROCEDURE  ECG 12 Lead Documentation Only    Date/Time: 6/17/2021 8:28 PM  Performed by: Ta Grossman MD  Authorized by: Ta Grossman MD     Indications / Diagnosis:  SOB  ECG reviewed by me, the ED Provider: yes    Patient location:  ED and bedside  Previous ECG:     Previous ECG:  Compared to current    Comparison ECG info:  3/10/21    Similarity:  No change    Comparison to cardiac monitor: Yes    Rate:     ECG rate:  71    ECG rate assessment: normal    Rhythm:     Rhythm: atrial fibrillation    Ectopy:     Ectopy: none    QRS:     QRS axis:  Normal    QRS intervals:  Normal  Conduction:     Conduction: normal    ST segments:     ST segments:  Normal  T waves:     T waves: flattening      Flattening:  I, aVL, III, aVF, V1, V4, V5 and V6  Q waves:     Q waves:  V1  Other findings:     Other findings: poor R wave progression    Comments:      LOW VOLTAGE / ? DIG EFFECT          Ta Grossman MD  06/17/21 2032

## 2021-06-18 NOTE — ASSESSMENT & PLAN NOTE
· Diet lifestyle modifications  · Counseled on weight loss  · Noted pulmonary hypertension on prior echo    · This is likely contributing to patient's symptomatology

## 2021-06-18 NOTE — RESPIRATORY THERAPY NOTE
RT Protocol Note  Sarah Beth Greenwood 70 y o  female MRN: [id number]  Unit/Bed#: S -01 Encounter: [id number]    Assessment    Principal Problem:    Shortness of breath  Active Problems:    COPD (chronic obstructive pulmonary disease) (HCC)    Chronic atrial fibrillation (HCC)    Type 2 diabetes mellitus (HCC)    Morbid obesity (HCC)      Home Pulmonary Medications:  Breo/ Combivent  Home Devices/Therapy: (P) BiPAP/CPAP    Past Medical History:   Diagnosis Date    Atrial fibrillation (HCC)     COPD (chronic obstructive pulmonary disease) (HCC)     Diabetes mellitus ([address] )     Hypertension     Respiratory failure (HCC)      Social History     Socioeconomic History    Marital status: Single     Spouse name: None    Number of children: None    Years of education: None    Highest education level: None   Occupational History    None   Tobacco Use    Smoking status: Former Smoker     Quit date: 2010     Years since quittin 4    Smokeless tobacco: Never Used   Vaping Use    Vaping Use: Former   Substance and Sexual Activity    Alcohol use: Not Currently    Drug use: Not Currently    Sexual activity: Not Currently   Other Topics Concern    None   Social History Narrative    None     Social Determinants of Health     Financial Resource Strain:     Difficulty of Paying Living Expenses:    Food Insecurity:     Worried About Running Out of Food in the Last Year:     Ran Out of Food in the Last Year:    Transportation Needs:     Lack of Transportation (Medical):      Lack of Transportation (Non-Medical):    Physical Activity:     Days of Exercise per Week:     Minutes of Exercise per Session:    Stress:     Feeling of Stress :    Social Connections:     Frequency of Communication with Friends and Family:     Frequency of Social Gatherings with Friends and Family:     Attends Restoration Services:     Active Member of Clubs or Organizations:     Attends Club or Organization Meetings:    Telma Estrada Marital Status:    Intimate Partner Violence:     Fear of Current or Ex-Partner:     Emotionally Abused:     Physically Abused:     Sexually Abused:        Subjective         Objective    Physical Exam:   Assessment Type: (P) Assess only  General Appearance: (P) Alert, Awake  Respiratory Pattern: (P) Normal  Chest Assessment: (P) Chest expansion symmetrical    Vitals:  Blood pressure 113/61, pulse (P) 61, temperature 98 4 °F (36 9 °C), temperature source Oral, resp  rate (P) 18, height 5' 3" (1 6 m), weight 112 kg (246 lb 14 6 oz), SpO2 (P) 96 %  Imaging and other studies: I have personally reviewed pertinent reports              Plan    Respiratory Plan: (P) Mild Distress pathway

## 2021-06-18 NOTE — ASSESSMENT & PLAN NOTE
Lab Results   Component Value Date    HGBA1C 4 5 03/15/2021       No results for input(s): POCGLU in the last 72 hours  Blood Sugar Average: Last 72 hrs:     · Hold oral antihyperglycemics    · Start sliding scale insulin  · accuchecks

## 2021-06-18 NOTE — CONSULTS
Consultation - Cardiology Team One  Ton Ovalle 70 y o  female MRN: [id number]  Unit/Bed#: S -41 Encounter: [id number]    Inpatient consult to Cardiology  Consult performed by: DAVID Moss  Consult ordered by: Mariusz Acuna PA-C      Physician Requesting Consult: Cory Rousseau MD  Reason for Consult / Principal Problem:  CHF      Assessment/ Plan    1  SOB multifactorial: acute on chronic diastolic heart failure, valvular heart disease, COPD without exacerbation, ? Lung mass   Echocardiogram pending   Pro BNP 4,339  CT of chest showed moderate sized right pleural effusion and trace left effusion  On furosemide 40 mg IV t i d  Patient takes furosemide 40 mg p o  B i d  At home  Monitor I&Os  Daily weights    2  Chronic atrial fibrillation   On Coumadin for anticoagulation  INR 2 69  On digoxin 25 mcg p o  Daily    3  S/p MVR    Echocardiogram pending    4  Hypertension  Average blood pressure 116/62    5  COPD without acute exacerbation  On Symbicort    6  Hypokalemia  K 3 5  Replete      History of Present Illness   HPI: Ton Ovalle is a 70y o  year old female who has chronic diastolic heart failure, bioprosthetic MVR 2012, severe TR, COPD, former tobacco abuse, type II diabetes, hypertension and chronic atrial fibrillation  She follows with cardiologist Dr Malcolm Greene from [address] [place] [address]  She presents to [facility] ER from [facility] via EMS 06/17/2021 with complaint of shortness of breath and lower extremity edema  Patient reports for the past week having shortness of breath that has been worsening over the past couple days and increased lower extremity edema  She denies chest pain or palpitations    CT of chest on admission showed scattered ground-glass infiltrates throughout the lungs bilaterally, a 3 2 x 2 1 cm and noncalcified soft tissue mass in the upper right lobe, 6 mm noncalcified pleural based nodule left upper lobe, 5 mm nodule right upper lobe, moderate size right pleural effusion and trace left pleural effusion  Cardiology was consulted due to symptoms of shortness of breath  Patient reports history of chronic diastolic heart failure and takes furosemide 40 mg p o  B i d  At nursing facility  She reports she does not follow a low-salt diet  She was started on furosemide 40 mg IV t i d  Echocardiogram 09/21/2020 showed EF 55-60%, possible basal to mid inferior lateral wall hypokinesis, left and right atrium dilated, bioprosthetic mitral valve, mild AI and moderate pulmonary hypertension  Repeat echocardiogram pending    EKG reviewed personally:  Atrial fibrillation  Ventricular rate 71 beats per minute  QRS D 84 milliseconds  QT interval 394 milliseconds  QTC interval 428 milliseconds    Telemetry reviewed personally:   No telemetry    Review of Systems   Constitutional: Negative for chills, fever and malaise/fatigue  HENT: Negative for congestion  Cardiovascular: Positive for dyspnea on exertion and leg swelling  Negative for chest pain, orthopnea and palpitations  Respiratory: Positive for shortness of breath  Musculoskeletal: Negative for falls  Gastrointestinal: Positive for bloating  Negative for nausea and vomiting  Neurological: Negative for dizziness and light-headedness  Psychiatric/Behavioral: Negative for altered mental status  All other systems reviewed and are negative      Historical Information   Past Medical History:   Diagnosis Date    Atrial fibrillation ([facility] )     [facility]ch[facility]     Diabetes mellitus ([facility] )     Hypertension     Respiratory failure ([facility] )      Past Surgical History:   Procedure Laterality Date    COLONOSCOPY      MITRAL VALVE REPLACEMENT  2012    Bovine     Social History     Substance and Sexual Activity   Alcohol Use Not Currently     Social History     Substance and Sexual Activity   Drug Use Not Currently     Social History     Tobacco Use   Smoking Status Former Smoker    Quit date: 2010    Years since quittin 4   Smokeless Tobacco Never Used     Family History: History reviewed  No pertinent family history  Meds/Allergies   all current active meds have been reviewed and current meds:   Current Facility-Administered Medications   Medication Dose Route Frequency    acetaminophen (TYLENOL) tablet 650 mg  650 mg Oral Q6H PRN    albuterol (PROVENTIL HFA,VENTOLIN HFA) inhaler 2 puff  2 puff Inhalation Q6H PRN    budesonide-formoterol (SYMBICORT) 80-4 5 MCG/ACT inhaler 2 puff  2 puff Inhalation BID    Diclofenac Sodium (VOLTAREN) 1 % topical gel 2 g  2 g Topical 4x Daily    digoxin (LANOXIN) tablet 125 mcg  125 mcg Oral Daily    ferrous sulfate tablet 325 mg  325 mg Oral Daily With Breakfast    furosemide (LASIX) injection 40 mg  40 mg Intravenous TID (diuretic)    insulin lispro (HumaLOG) 100 units/mL subcutaneous injection 1-5 Units  1-5 Units Subcutaneous TID AC    insulin lispro (HumaLOG) 100 units/mL subcutaneous injection 1-5 Units  1-5 Units Subcutaneous HS    montelukast (SINGULAIR) tablet 10 mg  10 mg Oral Daily    pantoprazole (PROTONIX) EC tablet 40 mg  40 mg Oral Early Morning    potassium chloride (K-DUR,KLOR-CON) CR tablet 20 mEq  20 mEq Oral BID    senna-docusate sodium (SENOKOT S) 8 6-50 mg per tablet 1 tablet  1 tablet Oral Daily PRN    warfarin (COUMADIN) tablet 4 mg  4 mg Oral Daily (warfarin)          Allergies   Allergen Reactions    Augmentin [Amoxicillin-Pot Clavulanate] Anaphylaxis    Levaquin [Levofloxacin] Anaphylaxis       Objective   Vitals: Blood pressure 98/52, pulse 96, temperature 97 7 °F (36 5 °C), temperature source Oral, resp  rate 15, height 5' 3" (1 6 m), weight 112 kg (246 lb 14 6 oz), SpO2 97 %  ,     Body mass index is 43 74 kg/m²  ,     Systolic (86SLI), MYU:457 , Min:98 , GEENA:869     Diastolic (41ZUB), TBT:91, Min:52, Max:68      Intake/Output Summary (Last 24 hours) at 2021 1006  Last data filed at 6/18/2021 0650  Gross per 24 hour   Intake 0 ml   Output 600 ml   Net -600 ml     Weight (last 2 days)     Date/Time   Weight    06/18/21 0543   112 (246 92)    06/17/21 2229   112 (246 92)    06/17/21 1803   112 (246 03)            Invasive Devices     Peripheral Intravenous Line            Peripheral IV 06/17/21 Left Antecubital <1 day              Physical Exam  Constitutional:       General: She is not in acute distress  Appearance: She is obese  HENT:      Head: Normocephalic  Mouth/Throat:      Mouth: Mucous membranes are moist    Cardiovascular:      Rate and Rhythm: Normal rate  Rhythm irregular  Pulses: Normal pulses  Heart sounds: No murmur heard  Pulmonary:      Effort: Pulmonary effort is normal  No respiratory distress  Comments: Decreased in bases  4 L NC (chronic)   Abdominal:      General: Bowel sounds are normal       Palpations: Abdomen is soft  Musculoskeletal:         General: Swelling present  Normal range of motion  Cervical back: Neck supple  Comments: + 1 edema bilateral LE    Skin:     General: Skin is warm and dry  Capillary Refill: Capillary refill takes less than 2 seconds  Neurological:      General: No focal deficit present  Mental Status: She is alert and oriented to person, place, and time     Psychiatric:         Mood and Affect: Mood normal            LABORATORY RESULTS:  Results from last 7 days   Lab Units 06/17/21  1842   TROPONIN I ng/mL <0 02     CBC with diff:   Results from last 7 days   Lab Units 06/18/21  0539 06/17/21  1842   WBC Thousand/uL 7 30 7 20   HEMOGLOBIN g/dL 8 9* 8 8*   HEMATOCRIT % 30 9* 29 7*   MCV fL 91 88   PLATELETS Thousands/uL 412* 428*   MCH pg 26 3* 26 0*   MCHC g/dL 28 8* 29 6*   RDW % 18 0* 17 6*   MPV fL 10 2 9 8   NRBC AUTO /100 WBCs 0 0       CMP:  Results from last 7 days   Lab Units 06/18/21  0539 06/17/21  1842   POTASSIUM mmol/L 3 5 3 8   CHLORIDE mmol/L 101 101   CO2 mmol/L 32 32   BUN mg/dL 21 22   CREATININE mg/dL 1 01 1 03   CALCIUM mg/dL 9 3 9 0   AST U/L  --  21   ALT U/L  --  17   ALK PHOS U/L  --  73   EGFR ml/min/1 73sq m 55 54       BMP:  Results from last 7 days   Lab Units 06/18/21  0539 06/17/21  1842   POTASSIUM mmol/L 3 5 3 8   CHLORIDE mmol/L 101 101   CO2 mmol/L 32 32   BUN mg/dL 21 22   CREATININE mg/dL 1 01 1 03   CALCIUM mg/dL 9 3 9 0          Lab Results   Component Value Date    NTBNP 4,339 (H) 06/17/2021    NTBNP 4,538 (H) 06/17/2021       Results from last 7 days   Lab Units 06/18/21  0539 06/17/21  1842   INR  2 69* 2 60*     Lipid Profile:   No results found for: CHOL  No results found for: HDL  No results found for: LDLCALC  No results found for: TRIG      Cardiac testing:   No results found for this or any previous visit  No results found for this or any previous visit  No valid procedures specified  No results found for this or any previous visit  Imaging:   CT chest without contrast    Result Date: 6/17/2021  Narrative: CT CHEST WITHOUT IV CONTRAST INDICATION:   Pneumonia versus CHF COMPARISON:  Chest radiograph 3/8/2021 TECHNIQUE: CT examination of the chest was performed without intravenous contrast   Axial, sagittal, and coronal 2D reformatted images were created from the source data and submitted for interpretation  Radiation dose length product (DLP) for this visit:  354 mGy-cm   This examination, like all CT scans performed in the [facility], was performed utilizing techniques to minimize radiation dose exposure, including the use of iterative reconstruction and automated exposure control  FINDINGS: LUNGS, PLEURA:  Mild upper lobe predominant centrilobular emphysema  Juxtapleural masslike opacity in the right middle lobe measuring 2 6 x 4 0 cm (series 3 image 51)  9 mm irregular nodular opacity in the right lower lobe abutting the major fissure  Moderate right pleural effusion    Adjacent consolidative opacity in the right lower lobe at the lung base, may be due to atelectasis and/or pneumonia  Upper lobe septal thickening may be related to pulmonary edema  Multifocal bilateral groundglass opacities with considerations including pulmonary edema, infectious, or inflammatory etiology  Peripheral groundglass opacities noted particularly in the right lower lobe, cannot exclude COVID-19 pneumonia  Central airways are patent  HEART, GREAT VESSELS:  Cardiomegaly with marked left atrial dilatation  Prosthetic mitral valve  Dense mitral annular calcifications  Atherosclerotic changes including moderate coronary artery calcifications  Dilated pulmonary artery measuring 4 3 cm, which may be due to pulmonary hypertension  Consider correlation with echocardiogram  MEDIASTINUM, STEPHAN:  Mediastinal and subcarinal lymphadenopathy, for example precarinal lymph ambrocio/ambrocio conglomerate measuring 3 6 x 1 8 cm (series 2 image 20)  Possible hilar lymphadenopathy although evaluation is limited due to lack of intravenous contrast  CHEST WALL, VISUALIZED LOWER NECK:  Prior median sternotomy  VISUALIZED UPPER ABDOMEN:  Bilateral adrenal nodules measuring 4 5 cm on the right and 2 8 x 1 9 cm on the left measuring less than 10 Hounsfield units in attenuation in keeping with adrenal adenomas  OSSEOUS STRUCTURES:  No acute fracture or destructive osseous lesion  Impression: 1  Masslike opacity in the right middle lobe measuring 2 6 x 4 0 cm   9 mm irregular nodular opacity in the right lower lobe abutting the major fissure  Mediastinal /subcarinal lymphadenopathy  Recommend contrast-enhanced chest CT for further evaluation, and consider PET/CT and/or tissue sampling  2   Moderate right pleural effusion  Adjacent consolidative opacity in the right lower lobe at the lung base, may be due to atelectasis and/or pneumonia  3   Bilateral upper lobe septal thickening may be related to pulmonary edema    Multifocal bilateral groundglass opacities with considerations including pulmonary edema, infectious, or inflammatory etiology  Peripheral groundglass opacities noted particularly in the right lower lobe, cannot exclude COVID-19 pneumonia  4   Cardiomegaly with marked left atrial dilatation  Consider correlation with echocardiogram  I personally discussed this study with Edi Cantu on 6/17/2021 at 8:28 PM  Workstation performed: [id number]     CT chest w contrast    Result Date: 6/18/2021  Narrative: CT CHEST WITH IV CONTRAST INDICATION:   Shortness of breath;  mass in lung  Patient initially presented from nursing facility with subjective worsening of shortness of breath  History of COPD and atrial fibrillation  No additional history was afforded in Epic at time of dictation  Entire past medical history is obtained from the admitting history and physical  COMPARISON:  Noncontrast CT chest abdomen pelvis June 17, 2021  TECHNIQUE: CT examination of the chest was performed  Axial, sagittal, and coronal 2D reformatted images were created from the source data and submitted for interpretation  Radiation dose length product (DLP) for this visit:  500 mGy-cm   This examination, like all CT scans performed in the [facility], was performed utilizing techniques to minimize radiation dose exposure, including the use of iterative reconstruction and automated exposure control  IV Contrast:  85 mL of iohexol (OMNIPAQUE) FINDINGS: LUNGS:  The lungs are fairly well aerated  Mild emphysematous changes  There is diffuse interstitial prominence throughout the lungs bilaterally, most pronounced in the upper lobes, right side larger than left  Mild honeycombing in the upper lobes bilaterally, right side greater than left  Scattered groundglass infiltrates throughout the lungs bilaterally  No significant change in 3 2 x 2 1 cm noncalcified soft tissue mass in the posterolateral aspect of the right upper lobe (series 2, image 27)   No significant change in 6 mm noncalcified pleural-based nodule left upper lobe (series 3, image 20)  No significant change in 5 mm pleural-based nodule along the posterior aspect of the right upper lobe (series 3, image 31)  Compressive atelectasis in the lower lobes bilaterally, right side greater than left  PLEURA:  No significant change in moderate-sized right pleural effusion or trace left pleural effusion  HEART/GREAT VESSELS:  The heart is enlarged  Left atrium severely dilated  Coronary artery calcifications  Mitral valve replacement  Median sternotomy  Pulmonary arteries are prominent, suggesting underlying pulmonary artery hypertension  No large emboli in the 1st and 2nd order branches  MEDIASTINUM AND STEPHAN:  Enlarged mediastinal, paratracheal, subcarinal and bilateral hilar lymph nodes  CHEST WALL AND LOWER NECK: Slight heterogeneous enhancement of the thyroid gland  7 mm noncalcified nodule left lower lobe (series 2, image 6)  Incidental discovery of one or more thyroid nodule(s) measuring less than 1 5 cm and without suspicious features is noted in this patient who is above 28years old; according to guidelines published in the February 2015 white paper on incidental thyroid nodules in the Journal of the [employer] of Radiology [place]), no further evaluation is recommended  VISUALIZED STRUCTURES IN THE UPPER ABDOMEN:  Bilateral adrenal lesions, most compatible with adenomas based on the noncontrast CT chest  Fatty infiltrative changes in the liver  OSSEOUS STRUCTURES:  Spinal degenerative changes are noted  No acute fracture or destructive osseous lesion  Impression: 1  No significant change in 3 cm noncalcified nodule posterolateral aspect right upper lobe  Based on current Fleischner Society 2017 Guidelines on incidental pulmonary nodule, either PET/CT scan evaluation, tissue sampling or short term interval followup non-contrast CT followup (initially in 3 months) may be considered appropriate   2   Diffuse interstitial prominence in the upper lobes bilaterally, right side greater than left  Although the findings may be cardiogenic in nature, lymphangitic spread of neoplasm not excluded, given the presence of right upper lobe pulmonary nodule  3   Additional noncalcified pleural-based bilateral upper lobe soft tissue masses as described above  4   Scattered groundglass infiltrates throughout the lungs bilaterally, likely cardiogenic in nature  These do not have the typical appearance for Covid type pneumonia  5   Cardiomegaly with severe left atrial enlargement  Recommend follow-up cardiology consultation  The study was marked in [facility] for immediate notification  Workstation performed: [id number]     Thank you for allowing us to participate in this patient's care  Counseling / Coordination of Care  Total floor / unit time spent today 45 minutes  Greater than 50% of total time was spent with the patient and / or family counseling and / or coordination of care  A description of the counseling / coordination of care: Review of history, current assessment, development of a plan  Code Status: Level 1 - Full Code    ** Please Note: Dragon 360 Dictation voice to text software may have been used in the creation of this document   **

## 2021-06-18 NOTE — ASSESSMENT & PLAN NOTE
Diet lifestyle modifications  Counseled on weight loss  Noted pulmonary hypertension on prior echo    This is likely contributing to patient's symptomatology

## 2021-06-18 NOTE — ASSESSMENT & PLAN NOTE
Does not appear to be in acute exacerbation at this time  Continue home inhalers    This could be contributing to patient's shortness of breath

## 2021-06-18 NOTE — ASSESSMENT & PLAN NOTE
Presents from nursing facility with subjective worsening of shortness of breath  CT scans finding significant for potential mass verses volume overload versus infection  Chronically wears 4L   Echo in 2020 - EF 50-55%  Diastolic dysfunction with inability to calculate filling pressures due to bioprosthetic MVR  Severe tricuspid regurgitation  At least moderate pulmonary hypertension assuming an RA pressure of 10mmHg  Unclear etiology at this time but likely secondary to volume overload as patient is having orthopnea and she does states she is having increased lower extremity swelling  On lasix 40 mg IV TID - cardiology are consulted  Monitor I/O  Also lung nodule noted  Will discuss with IR if biopsy is feasible

## 2021-06-18 NOTE — ASSESSMENT & PLAN NOTE
· Anticoagulated on warfarin  · INR therapeutic at 2 6  · Monitor PT INR daily    · Continue digoxin

## 2021-06-18 NOTE — ASSESSMENT & PLAN NOTE
Lab Results   Component Value Date    HGBA1C 4 5 03/15/2021       Recent Labs     06/17/21  2324 06/18/21  0738 06/18/21  1150   POCGLU 94 100 129       Blood Sugar Average: Last 72 hrs:  (P) 672 [id number]   · Hold oral antihyperglycemics    · Start sliding scale insulin  · accuchecks

## 2021-06-18 NOTE — PLAN OF CARE
Problem: PAIN - ADULT  Goal: Verbalizes/displays adequate comfort level or baseline comfort level  Description: Interventions:  - Encourage patient to monitor pain and request assistance  - Assess pain using appropriate pain scale  - Administer analgesics based on type and severity of pain and evaluate response  - Implement non-pharmacological measures as appropriate and evaluate response  - Consider cultural and social influences on pain and pain management  - Notify physician/advanced practitioner if interventions unsuccessful or patient reports new pain  Outcome: Progressing     Problem: INFECTION - ADULT  Goal: Absence or prevention of progression during hospitalization  Description: INTERVENTIONS:  - Assess and monitor for signs and symptoms of infection  - Monitor lab/diagnostic results  - Monitor all insertion sites, i e  indwelling lines, tubes, and drains  - Monitor endotracheal if appropriate and nasal secretions for changes in amount and color  - [place] appropriate cooling/warming therapies per order  - Administer medications as ordered  - Instruct and encourage patient and family to use good hand hygiene technique  - Identify and instruct in appropriate isolation precautions for identified infection/condition  Outcome: Progressing  Goal: Absence of fever/infection during neutropenic period  Description: INTERVENTIONS:  - Monitor WBC    Outcome: Progressing     Problem: SAFETY ADULT  Goal: Patient will remain free of falls  Description: INTERVENTIONS:  - Educate patient/family on patient safety including physical limitations  - Instruct patient to call for assistance with activity   - Consult OT/PT to assist with strengthening/mobility   - Keep Call bell within reach  - Keep bed low and locked with side rails adjusted as appropriate  - Keep care items and personal belongings within reach  - Initiate and maintain comfort rounds  - Make Fall Risk Sign visible to staff  -Apply yellow socks and bracelet for high fall risk patients  - Consider moving patient to room near nurses station  Outcome: Progressing  Goal: Maintain or return to baseline ADL function  Description: INTERVENTIONS:  -  Assess patient's ability to carry out ADLs; assess patient's baseline for ADL function and identify physical deficits which impact ability to perform ADLs (bathing, care of mouth/teeth, toileting, grooming, dressing, etc )  - Assess/evaluate cause of self-care deficits   - Assess range of motion  - Assess patient's mobility; develop plan if impaired  - Assess patient's need for assistive devices and provide as appropriate  - Encourage maximum independence but intervene and supervise when necessary  - Involve family in performance of ADLs  - Assess for home care needs following discharge   - Consider OT consult to assist with ADL evaluation and planning for discharge  - Provide patient education as appropriate  Outcome: Progressing  Goal: Maintains/Returns to pre admission functional level  Description: INTERVENTIONS:  - Perform BMAT or MOVE assessment daily    - Set and communicate daily mobility goal to care team and patient/family/caregiver     - Collaborate with rehabilitation services on mobility goals if consulted  -Out of bed for toileting  - Record patient progress and toleration of activity level   Outcome: Progressing     Problem: DISCHARGE PLANNING  Goal: Discharge to home or other facility with appropriate resources  Description: INTERVENTIONS:  - Identify barriers to discharge w/patient and caregiver  - Arrange for needed discharge resources and transportation as appropriate  - Identify discharge learning needs (meds, wound care, etc )  - Arrange for interpretive services to assist at discharge as needed  - Refer to Case Management Department for coordinating discharge planning if the patient needs post-hospital services based on physician/advanced practitioner order or complex needs related to functional status, cognitive ability, or social support system  Outcome: Progressing     Problem: Knowledge Deficit  Goal: Patient/family/caregiver demonstrates understanding of disease process, treatment plan, medications, and discharge instructions  Description: Complete learning assessment and assess knowledge base    Interventions:  - Provide teaching at level of understanding  - Provide teaching via preferred learning methods  Outcome: Progressing     Problem: RESPIRATORY - ADULT  Goal: Achieves optimal ventilation and oxygenation  Description: INTERVENTIONS:  - Assess for changes in respiratory status  - Assess for changes in mentation and behavior  - Position to facilitate oxygenation and minimize respiratory effort  - Oxygen administered by appropriate delivery if ordered  - Initiate smoking cessation education as indicated  - Encourage broncho-pulmonary hygiene including cough, deep breathe, Incentive Spirometry  - Assess the need for suctioning and aspirate as needed  - Assess and instruct to report SOB or any respiratory difficulty  - Respiratory Therapy support as indicated  Outcome: Progressing     Problem: METABOLIC, FLUID AND ELECTROLYTES - ADULT  Goal: Fluid balance maintained  Description: INTERVENTIONS:  - Monitor labs   - Monitor I/O and WT  - Instruct patient on fluid and nutrition as appropriate  - Assess for signs & symptoms of volume excess or deficit  Outcome: Progressing  Goal: Glucose maintained within target range  Description: INTERVENTIONS:  - Monitor Blood Glucose as ordered  - Assess for signs and symptoms of hyperglycemia and hypoglycemia  - Administer ordered medications to maintain glucose within target range  - Assess nutritional intake and initiate nutrition service referral as needed  Outcome: Progressing     Problem: SKIN/TISSUE INTEGRITY - ADULT  Goal: Skin Integrity remains intact(Skin Breakdown Prevention)  Description: Assess:  -inspect skin when repositioning, toileting, and assisting with ADLS  -Assess extremities for adequate circulation and sensation     Bed Management:  -Have minimal linens on bed & keep smooth, unwrinkled  -Change linens as needed when moist or perspiring  -      Toileting:  -Offer bedside commode      Activity:  -Encourage activity and walks on unit  -Encourage or provide ROM exercises   -Use appropriate equipment to lift or move patient in bed    Skin Care:  -Avoid use of baby powder, tape, friction and shearing, hot water or constrictive clothing  -Do not massage red bony areas    Outcome: Progressing  Goal: Incision(s), wounds(s) or drain site(s) healing without S/S of infection  Description: INTERVENTIONS  - Assess and document dressing, incision, wound bed, drain sites and surrounding tissue  - Provide patient and family education    Outcome: Progressing  Goal: Pressure injury heals and does not worsen  Description: Interventions:  - Implement low air loss mattress or specialty surface (Criteria met)  - Apply silicone foam dressing  -Apply fecal or urinary incontinence containment device   -Utilize friction reducing device or surface for transfers   -Consider nutrition services referral as needed  Outcome: Progressing

## 2021-06-18 NOTE — PROGRESS NOTES
[facility]  Progress Note - Charly Filler 1945, 70 y o  female MRN: [id number]  Unit/Bed#: S -01 Encounter: [id number]  Primary Care Provider: Dayana Townsend MD   Date and time admitted to hospital: 6/17/2021  5:52 PM    Morbid obesity ([facility] )  Assessment & Plan  Diet lifestyle modifications  Counseled on weight loss  Noted pulmonary hypertension on prior echo  This is likely contributing to patient's symptomatology    Type 2 diabetes mellitus [facility])  Assessment & Plan  Lab Results   Component Value Date    HGBA1C 4 5 03/15/2021       Recent Labs     06/17/21  2324 06/18/21  0738 06/18/21  1150   POCGLU 94 100 129       Blood Sugar Average: Last 72 hrs:  (P) 152 [id number]   · Hold oral antihyperglycemics  · Start sliding scale insulin  · accuchecks     Chronic atrial fibrillation ([place] [address] )  Assessment & Plan  Anticoagulated on warfarin  INR therapeutic at 2 6  Monitor PT INR daily  Continue digoxin    COPD (chronic obstructive pulmonary disease) ([place] [address] )  Assessment & Plan  Does not appear to be in acute exacerbation at this time  Continue home inhalers  This could be contributing to patient's shortness of breath    * Shortness of breath  Assessment & Plan  Presents from nursing facility with subjective worsening of shortness of breath  CT scans finding significant for potential mass verses volume overload versus infection  Chronically wears 4L   Echo in 2020 - EF 50-55%  Diastolic dysfunction with inability to calculate filling pressures due to bioprosthetic MVR  Severe tricuspid regurgitation  At least moderate pulmonary hypertension assuming an RA pressure of 10mmHg  Unclear etiology at this time but likely secondary to volume overload as patient is having orthopnea and she does states she is having increased lower extremity swelling  On lasix 40 mg IV TID - cardiology are consulted  Monitor I/O  Also lung nodule noted   Will discuss with IR if biopsy is feasible  VTE Pharmacologic Prophylaxis:   Pharmacologic: Heparin  Mechanical VTE Prophylaxis in Place: Yes    Patient Centered Rounds: I have performed bedside rounds with nursing staff today  Discussions with Specialists or Other Care Team Provider: Discussed with patient called nephew - left message  Education and Discussions with Family / Patient: Discussed with patient     Time Spent for Care: 30 minutes  More than 50% of total time spent on counseling and coordination of care as described above  Current Length of Stay: 0 day(s)    Current Patient Status: Observation   Certification Statement: The patient will continue to require additional inpatient hospital stay due to IV diuresis  work up on lung mass  Discharge Plan: Not medically stable for DC  Code Status: Level 1 - Full Code      Subjective:   Patient seen examined  Feeling weak, tired  Objective:     Vitals:   Temp (24hrs), Av °F (36 7 °C), Min:97 7 °F (36 5 °C), Max:98 4 °F (36 9 °C)    Temp:  [97 7 °F (36 5 °C)-98 4 °F (36 9 °C)] 97 7 °F (36 5 °C)  HR:  [58-96] 96  Resp:  [15-18] 15  BP: ()/(52-68) 98/52  SpO2:  [96 %-98 %] 97 %  Body mass index is 43 74 kg/m²  Input and Output Summary (last 24 hours): Intake/Output Summary (Last 24 hours) at 2021 1244  Last data filed at 2021 0650  Gross per 24 hour   Intake 0 ml   Output 600 ml   Net -600 ml       Physical Exam:     Physical Exam  Constitutional:       General: She is not in acute distress  Appearance: She is not ill-appearing, toxic-appearing or diaphoretic  HENT:      Head: Normocephalic  Nose: Nose normal       Mouth/Throat:      Mouth: Mucous membranes are moist    Eyes:      General: No scleral icterus  Right eye: No discharge  Left eye: No discharge  Pupils: Pupils are equal, round, and reactive to light  Cardiovascular:      Rate and Rhythm: Normal rate  Heart sounds: No murmur heard     No friction rub  No gallop  Pulmonary:      Effort: No respiratory distress  Breath sounds: No stridor  No wheezing, rhonchi or rales  Chest:      Chest wall: No tenderness  Abdominal:      General: There is no distension  Palpations: There is no mass  Tenderness: There is no abdominal tenderness  There is no right CVA tenderness, left CVA tenderness, guarding or rebound  Hernia: No hernia is present  Musculoskeletal:         General: No swelling, tenderness, deformity or signs of injury  Cervical back: Normal range of motion  Right lower leg: No edema  Skin:     Capillary Refill: Capillary refill takes less than 2 seconds  Coloration: Skin is pale  Skin is not jaundiced  Findings: No bruising, erythema, lesion or rash  Neurological:      General: No focal deficit present  Mental Status: She is alert  Cranial Nerves: No cranial nerve deficit  Sensory: No sensory deficit  Motor: No weakness        Coordination: Coordination normal       Gait: Gait normal    Psychiatric:         Mood and Affect: Mood normal            Additional Data:     Labs:    Results from last 7 days   Lab Units 06/18/21  0539   WBC Thousand/uL 7 30   HEMOGLOBIN g/dL 8 9*   HEMATOCRIT % 30 9*   PLATELETS Thousands/uL 412*   NEUTROS PCT % 69   LYMPHS PCT % 18   MONOS PCT % 7   EOS PCT % 4     Results from last 7 days   Lab Units 06/18/21  0539 06/17/21  1842   SODIUM mmol/L 141 142   POTASSIUM mmol/L 3 5 3 8   CHLORIDE mmol/L 101 101   CO2 mmol/L 32 32   BUN mg/dL 21 22   CREATININE mg/dL 1 01 1 03   ANION GAP mmol/L 8 9   CALCIUM mg/dL 9 3 9 0   ALBUMIN g/dL  --  2 7*   TOTAL BILIRUBIN mg/dL  --  0 48   ALK PHOS U/L  --  73   ALT U/L  --  17   AST U/L  --  21   GLUCOSE RANDOM mg/dL 79 94     Results from last 7 days   Lab Units 06/18/21  0539   INR  2 69*     Results from last 7 days   Lab Units 06/18/21  1150 06/18/21  0738 06/17/21  2324   POC GLUCOSE mg/dl 129 100 94 Results from last 7 days   Lab Units 06/17/21  2327   PROCALCITONIN ng/ml <0 05           * I Have Reviewed All Lab Data Listed Above  * Additional Pertinent Lab Tests Reviewed: [place] 68 Admission Reviewed    Imaging:    Imaging Reports Reviewed Today Include:   1   No significant change in 3 cm noncalcified nodule posterolateral aspect right upper lobe  Based on current Fleischner Society 2017 Guidelines on incidental pulmonary nodule, either PET/CT scan evaluation, tissue sampling or short term interval   followup non-contrast CT followup (initially in 3 months) may be considered appropriate  2   Diffuse interstitial prominence in the upper lobes bilaterally, right side greater than left   Although the findings may be cardiogenic in nature, lymphangitic spread of neoplasm not excluded, given the presence of right upper lobe pulmonary nodule  3   Additional noncalcified pleural-based bilateral upper lobe soft tissue masses as described above  4   Scattered groundglass infiltrates throughout the lungs bilaterally, likely cardiogenic in nature   These do not have the typical appearance for Covid type pneumonia  5   Cardiomegaly with severe left atrial enlargement   Recommend follow-up cardiology consultation  Imaging Personally Reviewed by Myself Includes:  As above      Recent Cultures (last 7 days):           Last 24 Hours Medication List:   Current Facility-Administered Medications   Medication Dose Route Frequency Provider Last Rate    acetaminophen  650 mg Oral Q6H PRN Gabo Bauman PA-C      albuterol  2 puff Inhalation Q6H PRN Manolo Navarrete PA-C      budesonide-formoterol  2 puff Inhalation BID Valentine Castro PA-C      Diclofenac Sodium  2 g Topical 4x Daily Hemalatha Puls, CRNP      digoxin  125 mcg Oral Daily Manolo Navarrete PA-C      ferrous sulfate  325 mg Oral Daily With Breakfast Manolo Navarrete PA-C      [START ON 6/19/2021] fluticasone-vilanterol 1 puff Inhalation Daily Justin Barfield PA-C      furosemide  40 mg Intravenous TID (diuretic) Manolo Navarrete PA-C      insulin lispro  1-5 Units Subcutaneous TID AC Manolo Navarrete PA-C      insulin lispro  1-5 Units Subcutaneous HS Manolo Navarrete PA-C      ipratropium-albuterol  3 mL Nebulization 4x Daily PRN Justin Barfield PA-C      montelukast  10 mg Oral Daily Manolo Navarrete PA-C      pantoprazole  40 mg Oral Early Morning Manolo Navarrete PA-C      potassium chloride  20 mEq Oral BID Manolo Navarrete PA-C      senna-docusate sodium  1 tablet Oral Daily PRN Manolo Navarrete PA-C      warfarin  4 mg Oral Daily (warfarin) Lamberto Ash PA-C          Today, Patient Was Seen By: Marin Chávez MD    ** Please Note: Dictation voice to text software may have been used in the creation of this document   **

## 2021-06-18 NOTE — ASSESSMENT & PLAN NOTE
Wt Readings from Last 3 Encounters:   06/17/21 112 kg (246 lb 0 5 oz)   04/16/21 116 kg (255 lb)   03/08/21 113 kg (250 lb)     · Noted with last echocardiogram showing severe mitral regurgitation with EF of 50-55%  · Patient does not examine out rate fluid overloaded with mild lower extremity edema  · Currently taking Lasix b i d  Federico Glass · Cardiac diet  · Daily weights, I's and o's   · Patient's weight in the emergency department 246 which was her discharge weight from prior admission  · This is bed weight so is unreliable, will wait patient when she reaches the floor    · Will give 1 time dose of 40 mg IV Lasix to assess response to diuresis

## 2021-06-18 NOTE — H&P
[facility]  H&P- Ashley Burciaga 1945, 70 y o  female MRN: [id number]  Unit/Bed#: ED 14 Encounter: [id number]  Primary Care Provider: Belinda Bain MD   Date and time admitted to hospital: 6/17/2021  5:52 PM    * Shortness of breath  Assessment & Plan  · Presents from nursing facility with subjective worsening of shortness of breath  CT scans finding significant for potential mass verses volume overload versus infection  Chronically wears 4L   · Echo in 2020 - EF 50-55%  Diastolic dysfunction with inability to calculate filling pressures due to bioprosthetic MVR  Severe tricuspid regurgitation  At least moderate pulmonary hypertension assuming an RA pressure of 10mmHg  · Unclear etiology at this time but likely secondary to volume overload as patient is having orthopnea and she does states she is having increased lower extremity swelling  · Increased dose of Lasix to 40 mg t i d   · Consult cardiology for help with diuretic   · Patient did have recent echo approximately 6 months ago, will hold off on new echo  · Hold on antibiotics for now and check procalcitonin  · Will obtain CT of the chest with contrast to better quantify mass  · Hold on palm consultation until CT of the chest has been completed  · Respiratory protocol with incentive spirometry  · Monitor respiratory status    COPD (chronic obstructive pulmonary disease) ([facility] [address] )  Assessment & Plan  · Does not appear to be in acute exacerbation at this time  · Continue home inhalers  · This could be contributing to patient's shortness of breath    Chronic atrial fibrillation (HCC)  Assessment & Plan  · Anticoagulated on warfarin  · INR therapeutic at 2 6  · Monitor PT INR daily  · Continue digoxin      Type 2 diabetes mellitus [facility])  Assessment & Plan  Lab Results   Component Value Date    HGBA1C 4 5 03/15/2021       No results for input(s): POCGLU in the last 72 hours      Blood Sugar Average: Last 72 hrs:     · Hold oral antihyperglycemics  · Start sliding scale insulin  · accuchecks     Morbid obesity ([address] )  Assessment & Plan  · Diet lifestyle modifications  · Counseled on weight loss  · Noted pulmonary hypertension on prior echo  · This is likely contributing to patient's symptomatology  · She does have a small sacral ulcer stage one which was being treated at her nursing facility  VTE Pharmacologic Prophylaxis: VTE Score: 4 Moderate Risk (Score 3-4) - Pharmacological DVT Prophylaxis Ordered: warfarin (Coumadin)  Code Status: Prior full   Discussion with family: Patient declined call to   Anticipated Length of Stay: Patient will be admitted on an observation basis with an anticipated length of stay of less than 2 midnights secondary to shortness of breath   Total Time for Visit, including Counseling / Coordination of Care: 70 minutes Greater than 50% of this total time spent on direct patient counseling and coordination of care  Chief Complaint: shortness of breath     History of Present Illness:  Mackenzie Loya is a 70 y o  female with a PMH of COPD, type 2 diabetes, hypertension, chronic atrial fibrillation anticoagulated on Coumadin who presents with shortness of breath  Patient has had increasing shortness of breath over the last couple of days according to her  She has had associated symptoms such as orthopnea  Patient does state that she has had some increased lower extremity edema as well which is above her baseline  She does wear 4 L chronically at home for hypoxia  Currently she is saturating well on her home oxygen  CT scan notes masslike object with associated pleural effusion  She denies any cough, congestion, fevers, chills, or other infectious symptoms at this time  Patient does have noted stage one sacral ulcer which was present on admission     Review of Systems:  Review of Systems   Constitutional: Positive for fatigue   Negative for chills, fever and unexpected weight change  Respiratory: Positive for shortness of breath  Negative for cough, chest tightness and wheezing  Cardiovascular: Positive for leg swelling  Negative for chest pain and palpitations  Gastrointestinal: Negative for abdominal pain, diarrhea, nausea and vomiting  Genitourinary: Negative for decreased urine volume, dysuria, frequency and urgency  Musculoskeletal: Negative for arthralgias  Skin: Positive for rash  Neurological: Negative for dizziness, syncope, light-headedness and headaches  All other systems reviewed and are negative  Past Medical and Surgical History:   Past Medical History:   Diagnosis Date    Atrial fibrillation ([place] [address] )     COPD (chronic obstructive pulmonary disease) ([place] [address] )     Diabetes mellitus ([place] [address] )     Hypertension     Respiratory failure ([place] [address] )        Past Surgical History:   Procedure Laterality Date    COLONOSCOPY      MITRAL VALVE REPLACEMENT  2012    Bovine       Meds/Allergies:  Prior to Admission medications    Medication Sig Start Date End Date Taking? Authorizing Provider   albuterol (PROVENTIL HFA,VENTOLIN HFA) 90 mcg/act inhaler Inhale 2 puffs every 6 (six) hours as needed 10/1/20 10/1/21  Historical Provider, MD   budesonide-formoterol (SYMBICORT) 80-4 5 MCG/ACT inhaler Inhale 2 puffs 2 (two) times a day Rinse mouth after use      Historical Provider, MD   digoxin (LANOXIN) 0 125 mg tablet Take 0 125 mg by mouth daily 2/24/21 2/24/22  Historical Provider, MD   ferrous sulfate (EQL Iron Supplement Therapy) 325 (65 Fe) mg tablet Take 1 tablet (325 mg total) by mouth daily with breakfast 3/12/21   Barrie Garcia DO   furosemide (LASIX) 40 mg tablet Take 40 mg by mouth 2 (two) times a day    Historical Provider, MD   melatonin 3 mg Take 1 tablet (3 mg total) by mouth daily at bedtime 3/12/21   Barrie Garcia DO   montelukast (SINGULAIR) 10 mg tablet Take 1 tablet by mouth daily 6/27/12   Historical Provider, MD   nystatin (MYCOSTATIN) powder Apply topically 2 (two) times a day Apply to skin folds 3/12/21   Anitra Garcia,    omeprazole (PriLOSEC) 20 mg delayed release capsule Take 20 mg by mouth daily    Historical Provider, MD   potassium chloride (K-DUR,KLOR-CON) 10 mEq tablet Take 20 mEq by mouth 2 (two) times a day    Historical Provider, MD   senna-docusate sodium (Senokot S) 8 6-50 mg per tablet Take 1 tablet by mouth daily as needed 21  Historical Provider, MD   warfarin (COUMADIN) 3 mg tablet Take 3 mg by mouth daily    Historical Provider, MD     I have reveiwed home medications using records provided by [facility]  Allergies: Allergies   Allergen Reactions    Augmentin [Amoxicillin-Pot Clavulanate] Anaphylaxis    Levaquin [Levofloxacin] Anaphylaxis       Social History:  Marital Status: Single   Occupation: unknown   Patient Pre-hospital Living Situation: Home  Patient Pre-hospital Level of Mobility: walks with walker  Patient Pre-hospital Diet Restrictions: cardiac   Substance Use History:   Social History     Substance and Sexual Activity   Alcohol Use Not Currently     Social History     Tobacco Use   Smoking Status Former Smoker    Quit date: 2010    Years since quittin 4   Smokeless Tobacco Never Used     Social History     Substance and Sexual Activity   Drug Use Not Currently       Family History:  History reviewed  No pertinent family history  Physical Exam:     Vitals:   Blood Pressure: 118/65 (21)  Pulse: 58 (21)  Respirations: 18 (21)  Height: 5' 3" (160 cm) (21)  Weight - Scale: 112 kg (246 lb 0 5 oz) (21)  SpO2: 98 % (21)    Physical Exam  Constitutional:       General: She is not in acute distress  Appearance: She is obese  She is not diaphoretic  HENT:      Head: Normocephalic and atraumatic  Mouth/Throat:      Mouth: Mucous membranes are moist       Pharynx: Oropharynx is clear  No oropharyngeal exudate     Eyes:      General: Right eye: No discharge  Left eye: No discharge  Conjunctiva/sclera: Conjunctivae normal       Pupils: Pupils are equal, round, and reactive to light  Cardiovascular:      Rate and Rhythm: Normal rate  Rhythm irregular  Pulses: Normal pulses  Heart sounds: Murmur heard  Pulmonary:      Effort: Pulmonary effort is normal  No respiratory distress  Breath sounds: Rales (bibasilar rales worse on left ) present  No wheezing  Abdominal:      General: Abdomen is flat  There is no distension  Palpations: Abdomen is soft  Tenderness: There is no abdominal tenderness  There is no right CVA tenderness, left CVA tenderness, guarding or rebound  Musculoskeletal:         General: Swelling (trace) present  Normal range of motion  Cervical back: Neck supple  No muscular tenderness  Right lower leg: Edema present  Left lower leg: Edema present  Skin:     General: Skin is warm and dry  Capillary Refill: Capillary refill takes less than 2 seconds  Coloration: Skin is not jaundiced  Findings: Rash present  No erythema  Comments: small stage one sacral ulcer  Obvious signs of infection noted    Neurological:      General: No focal deficit present  Mental Status: She is alert and oriented to person, place, and time  Cranial Nerves: No cranial nerve deficit     Psychiatric:         Mood and Affect: Mood normal           Additional Data:     Lab Results:  Results from last 7 days   Lab Units 06/17/21  1842   WBC Thousand/uL 7 20   HEMOGLOBIN g/dL 8 8*   HEMATOCRIT % 29 7*   PLATELETS Thousands/uL 428*   NEUTROS PCT % 69   LYMPHS PCT % 18   MONOS PCT % 8   EOS PCT % 3     Results from last 7 days   Lab Units 06/17/21  1842   SODIUM mmol/L 142   POTASSIUM mmol/L 3 8   CHLORIDE mmol/L 101   CO2 mmol/L 32   BUN mg/dL 22   CREATININE mg/dL 1 03   ANION GAP mmol/L 9   CALCIUM mg/dL 9 0   ALBUMIN g/dL 2 7*   TOTAL BILIRUBIN mg/dL 0 48 ALK PHOS U/L 73   ALT U/L 17   AST U/L 21   GLUCOSE RANDOM mg/dL 94     Results from last 7 days   Lab Units 06/17/21  1842   INR  2 60*                   Imaging: Reviewed radiology reports from this admission including: chest CT scan  CT chest without contrast   Final Result by Nyla Christie MD (06/17 2033)      1  Masslike opacity in the right middle lobe measuring 2 6 x 4 0 cm   9 mm irregular nodular opacity in the right lower lobe abutting the major fissure  Mediastinal /subcarinal lymphadenopathy  Recommend contrast-enhanced chest CT for further    evaluation, and consider PET/CT and/or tissue sampling  2   Moderate right pleural effusion  Adjacent consolidative opacity in the right lower lobe at the lung base, may be due to atelectasis and/or pneumonia  3   Bilateral upper lobe septal thickening may be related to pulmonary edema  Multifocal bilateral groundglass opacities with considerations including pulmonary edema, infectious, or inflammatory etiology  Peripheral groundglass opacities noted    particularly in the right lower lobe, cannot exclude COVID-19 pneumonia  4   Cardiomegaly with marked left atrial dilatation  Consider correlation with echocardiogram          I personally discussed this study with Tobin Holland on 6/17/2021 at 8:28 PM                Workstation performed: [id number]             EKG and Other Studies Reviewed on Admission:   · EKG: Atrial fibrillation  HR 70's  ** Please Note: This note has been constructed using a voice recognition system   **

## 2021-06-19 PROBLEM — N18.9 CKD (CHRONIC KIDNEY DISEASE): Status: ACTIVE | Noted: 2021-06-19

## 2021-06-19 LAB
ANION GAP SERPL CALCULATED.3IONS-SCNC: 7 MMOL/L (ref 4–13)
BUN SERPL-MCNC: 22 MG/DL (ref 5–25)
CALCIUM SERPL-MCNC: 8.8 MG/DL (ref 8.3–10.1)
CHLORIDE SERPL-SCNC: 101 MMOL/L (ref 100–108)
CO2 SERPL-SCNC: 32 MMOL/L (ref 21–32)
CREAT SERPL-MCNC: 1.18 MG/DL (ref 0.6–1.3)
GFR SERPL CREATININE-BSD FRML MDRD: 46 ML/MIN/1.73SQ M
GLUCOSE SERPL-MCNC: 127 MG/DL (ref 65–140)
GLUCOSE SERPL-MCNC: 131 MG/DL (ref 65–140)
GLUCOSE SERPL-MCNC: 175 MG/DL (ref 65–140)
GLUCOSE SERPL-MCNC: 88 MG/DL (ref 65–140)
GLUCOSE SERPL-MCNC: 91 MG/DL (ref 65–140)
POTASSIUM SERPL-SCNC: 3.9 MMOL/L (ref 3.5–5.3)
PROCALCITONIN SERPL-MCNC: <0.05 NG/ML
SODIUM SERPL-SCNC: 140 MMOL/L (ref 136–145)

## 2021-06-19 PROCEDURE — 99232 SBSQ HOSP IP/OBS MODERATE 35: CPT | Performed by: INTERNAL MEDICINE

## 2021-06-19 PROCEDURE — NC001 PR NO CHARGE: Performed by: RADIOLOGY

## 2021-06-19 PROCEDURE — 84145 PROCALCITONIN (PCT): CPT | Performed by: PHYSICIAN ASSISTANT

## 2021-06-19 PROCEDURE — 93306 TTE W/DOPPLER COMPLETE: CPT | Performed by: INTERNAL MEDICINE

## 2021-06-19 PROCEDURE — 80048 BASIC METABOLIC PNL TOTAL CA: CPT | Performed by: NURSE PRACTITIONER

## 2021-06-19 PROCEDURE — 82948 REAGENT STRIP/BLOOD GLUCOSE: CPT

## 2021-06-19 PROCEDURE — 99233 SBSQ HOSP IP/OBS HIGH 50: CPT | Performed by: INTERNAL MEDICINE

## 2021-06-19 RX ORDER — ECHINACEA PURPUREA EXTRACT 125 MG
1 TABLET ORAL
Status: DISCONTINUED | OUTPATIENT
Start: 2021-06-19 | End: 2021-07-05 | Stop reason: HOSPADM

## 2021-06-19 RX ADMIN — FERROUS SULFATE TAB 325 MG (65 MG ELEMENTAL FE) 325 MG: 325 (65 FE) TAB at 07:30

## 2021-06-19 RX ADMIN — FLUTICASONE FUROATE AND VILANTEROL TRIFENATATE 1 PUFF: 100; 25 POWDER RESPIRATORY (INHALATION) at 09:22

## 2021-06-19 RX ADMIN — DICLOFENAC SODIUM 2 G: 10 GEL TOPICAL at 21:19

## 2021-06-19 RX ADMIN — LIDOCAINE 5% 1 PATCH: 700 PATCH TOPICAL at 09:22

## 2021-06-19 RX ADMIN — INSULIN LISPRO 1 UNITS: 100 INJECTION, SOLUTION INTRAVENOUS; SUBCUTANEOUS at 18:32

## 2021-06-19 RX ADMIN — DICLOFENAC SODIUM 2 G: 10 GEL TOPICAL at 18:32

## 2021-06-19 RX ADMIN — ACETAMINOPHEN 650 MG: 325 TABLET, FILM COATED ORAL at 21:49

## 2021-06-19 RX ADMIN — BUMETANIDE 2 MG: 0.25 INJECTION INTRAMUSCULAR; INTRAVENOUS at 06:02

## 2021-06-19 RX ADMIN — DIGOXIN 125 MCG: 125 TABLET ORAL at 09:21

## 2021-06-19 RX ADMIN — POTASSIUM CHLORIDE 20 MEQ: 1500 TABLET, EXTENDED RELEASE ORAL at 09:21

## 2021-06-19 RX ADMIN — MONTELUKAST 10 MG: 10 TABLET, FILM COATED ORAL at 09:21

## 2021-06-19 RX ADMIN — DICLOFENAC SODIUM 2 G: 10 GEL TOPICAL at 11:40

## 2021-06-19 RX ADMIN — SALINE NASAL SPRAY 1 SPRAY: 1.5 SOLUTION NASAL at 21:49

## 2021-06-19 RX ADMIN — DICLOFENAC SODIUM 2 G: 10 GEL TOPICAL at 09:24

## 2021-06-19 RX ADMIN — PANTOPRAZOLE SODIUM 40 MG: 40 TABLET, DELAYED RELEASE ORAL at 06:02

## 2021-06-19 NOTE — PROGRESS NOTES
Progress Note - Pulmonary   [address]ley [address] 70 y o  female MRN: [id number]  Unit/Bed#: S -01 Encounter: [id number]    Assessment:  1  Shortness of breath  2  Chronic hypoxemic respiratory failure  3  Acute CHF  4  COPD without acute exacerbation  5  Abnormal CT scan of the chest  6  Morbid obesity    Plan:  Patient with pleural based soft tissue mass as well as mediastinal/hilar lymphadenopathy and right pleural effusion concerning for primary lung malignancy given extensive smoking history  She remains on her baseline 4 L nasal cannula  IR does not recommend CT-guided biopsy at this time given the mediastinal/hilar lymphadenopathy, would be more beneficial to undergo EBUS  Also given her acute respiratory complaints, IR biopsy would be too risky  Cardiology is following for acute CHF, diuretics were switched from Lasix to Bumex this morning  They recommend evaluating her response to the change in diuretics, otherwise they would recommend palliative care  Will follow her response over the weekend  Would ultimately require EBUS if staging were required  Given tenuous cardiac status per cardiology note today, would hold off on any immediate plan for EBUS, would be high risk for anesthesia at this point  Would favor IR guided biopsy for tissue diagnosis once cardiac/respiratory status improves  If cardiac status does not improve/unable to be corrected, patient unsure if she would even want to pursue any treatment for underlying malignancy  Will continue to follow  Subjective:   Patient resting in bed  Reports slight improvement in her breathing today  She is feeling discouraged after her conversation with the cardiologist      Objective:     Vitals: Blood pressure 109/60, pulse (!) 53, temperature 98 °F (36 7 °C), temperature source Oral, resp  rate 18, height 5' 3" (1 6 m), weight 107 kg (236 lb 1 8 oz), SpO2 100 %  ,Body mass index is 41 83 kg/m²        Intake/Output Summary (Last 24 hours) at 6/19/2021 1305  Last data filed at 6/18/2021 2201  Gross per 24 hour   Intake 220 ml   Output 200 ml   Net 20 ml       Invasive Devices     Peripheral Intravenous Line            Peripheral IV 06/17/21 Left Antecubital 1 day                Physical Exam: /60 (BP Location: Left arm)   Pulse (!) 53   Temp 98 °F (36 7 °C) (Oral)   Resp 18   Ht 5' 3" (1 6 m)   Wt 107 kg (236 lb 1 8 oz)   SpO2 100%   BMI 41 83 kg/m²   General appearance: alert and oriented, in no acute distress  Head: Normocephalic, without obvious abnormality, atraumatic  Eyes: negative findings: conjunctivae and sclerae normal  Lungs: Bilateral crackles  Heart: irregularly irregular rhythm  Abdomen: normal findings: soft, non-tender  Extremities: Trace bilateral LE edema  Skin: Warm and dry  Neurologic: Mental status: Alert, oriented, thought content appropriate     Labs: I have personally reviewed pertinent lab results  , CBC: No results found for: WBC, HGB, HCT, MCV, PLT, ADJUSTEDWBC, MCH, MCHC, RDW, MPV, NRBC, CMP:   Lab Results   Component Value Date    SODIUM 140 06/19/2021    K 3 9 06/19/2021     06/19/2021    CO2 32 06/19/2021    BUN 22 06/19/2021    CREATININE 1 18 06/19/2021    CALCIUM 8 8 06/19/2021    EGFR 46 06/19/2021     Imaging and other studies: I have personally reviewed pertinent reports     and I have personally reviewed pertinent films in PACS

## 2021-06-19 NOTE — PLAN OF CARE
Problem: PAIN - ADULT  Goal: Verbalizes/displays adequate comfort level or baseline comfort level  Description: Interventions:  - Encourage patient to monitor pain and request assistance  - Assess pain using appropriate pain scale  - Administer analgesics based on type and severity of pain and evaluate response  - Implement non-pharmacological measures as appropriate and evaluate response  - Consider cultural and social influences on pain and pain management  - Notify physician/advanced practitioner if interventions unsuccessful or patient reports new pain  Outcome: Progressing     Problem: INFECTION - ADULT  Goal: Absence or prevention of progression during hospitalization  Description: INTERVENTIONS:  - Assess and monitor for signs and symptoms of infection  - Monitor lab/diagnostic results  - Monitor all insertion sites, i e  indwelling lines, tubes, and drains  - Monitor endotracheal if appropriate and nasal secretions for changes in amount and color  - Magnet appropriate cooling/warming therapies per order  - Administer medications as ordered  - Instruct and encourage patient and family to use good hand hygiene technique  - Identify and instruct in appropriate isolation precautions for identified infection/condition  Outcome: Progressing  Goal: Absence of fever/infection during neutropenic period  Description: INTERVENTIONS:  - Monitor WBC    Outcome: Progressing     Problem: SAFETY ADULT  Goal: Patient will remain free of falls  Description: INTERVENTIONS:  - Educate patient/family on patient safety including physical limitations  - Instruct patient to call for assistance with activity   - Consult OT/PT to assist with strengthening/mobility   - Keep Call bell within reach  - Keep bed low and locked with side rails adjusted as appropriate  - Keep care items and personal belongings within reach  - Initiate and maintain comfort rounds  - Make Fall Risk Sign visible to staff  - - Initiate/Maintain bed alarm    - Apply yellow socks and bracelet for high fall risk patients  - Consider moving patient to room near nurses station  Outcome: Progressing  Goal: Maintain or return to baseline ADL function  Description: INTERVENTIONS:  -  Assess patient's ability to carry out ADLs; assess patient's baseline for ADL function and identify physical deficits which impact ability to perform ADLs (bathing, care of mouth/teeth, toileting, grooming, dressing, etc )  - Assess/evaluate cause of self-care deficits   - Assess range of motion  - Assess patient's mobility; develop plan if impaired  - Assess patient's need for assistive devices and provide as appropriate  - Encourage maximum independence but intervene and supervise when necessary  - Involve family in performance of ADLs  - Assess for home care needs following discharge   - Consider OT consult to assist with ADL evaluation and planning for discharge  - Provide patient education as appropriate  Outcome: Progressing  Goal: Maintains/Returns to pre admission functional level  Description: INTERVENTIONS:  - Perform BMAT or MOVE assessment daily    - Set and communicate daily mobility goal to care team and patient/family/caregiver     -   Problem: DISCHARGE PLANNING  Goal: Discharge to home or other facility with appropriate resources  Description: INTERVENTIONS:  - Identify barriers to discharge w/patient and caregiver  - Arrange for needed discharge resources and transportation as appropriate  - Identify discharge learning needs (meds, wound care, etc )  - Arrange for interpretive services to assist at discharge as needed  - Refer to Case Management Department for coordinating discharge planning if the patient needs post-hospital services based on physician/advanced practitioner order or complex needs related to functional status, cognitive ability, or social support system  Outcome: Progressing     Problem: Knowledge Deficit  Goal: Patient/family/caregiver demonstrates understanding of disease process, treatment plan, medications, and discharge instructions  Description: Complete learning assessment and assess knowledge base    Interventions:  - Provide teaching at level of understanding  - Provide teaching via preferred learning methods  Outcome: Progressing     Problem: RESPIRATORY - ADULT  Goal: Achieves optimal ventilation and oxygenation  Description: INTERVENTIONS:  - Assess for changes in respiratory status  - Assess for changes in mentation and behavior  - Position to facilitate oxygenation and minimize respiratory effort  - Oxygen administered by appropriate delivery if ordered  - Initiate smoking cessation education as indicated  - Encourage broncho-pulmonary hygiene including cough, deep breathe, Incentive Spirometry  - Assess the need for suctioning and aspirate as needed  - Assess and instruct to report SOB or any respiratory difficulty  - Respiratory Therapy support as indicated  Outcome: Progressing     Problem: METABOLIC, FLUID AND ELECTROLYTES - ADULT  Goal: Fluid balance maintained  Description: INTERVENTIONS:  - Monitor labs   - Monitor I/O and WT  - Instruct patient on fluid and nutrition as appropriate  - Assess for signs & symptoms of volume excess or deficit  Outcome: Progressing  Goal: Glucose maintained within target range  Description: INTERVENTIONS:  - Monitor Blood Glucose as ordered  - Assess for signs and symptoms of hyperglycemia and hypoglycemia  - Administer ordered medications to maintain glucose within target range  - Assess nutritional intake and initiate nutrition service referral as needed  Outcome: Progressing     Problem: SKIN/TISSUE INTEGRITY - ADULT  Goal: Skin Integrity remains intact(Skin Breakdown Prevention)  Description: Assess:  -Bed Management:  -Have minimal linens on bed & keep smooth, unwrinkled  -Change linens as needed when moist or perspiring  -Avoid sitting or lying in one position for more than 2 hours while in bed  -Keep [facility] at 30 degrees     Toileting:  -Offer bedside commode  -Assess for incontinence every 2      Activity:  --Encourage or provide ROM exercises   -Turn and reposition patient every 2 Hours  -Use appropriate equipment to lift or move patient in bed  -  -Avoid use of baby powder, tape, friction and shearing, hot water or constrictive clothing  -Relieve pressure over bony prominences using foam   -Do not massage red bony areas    Next Steps:    Outcome: Progressing  Goal: Incision(s), wounds(s) or drain site(s) healing without S/S of infection  Description: INTERVENTIONS  - Assess and document dressing, incision, wound bed, drain sites and surrounding tissue    Outcome: Progressing  Goal: Pressure injury heals and does not worsen  Description: Interventions:  - Implement low air loss mattress or specialty surface (Criteria met)  - Apply silicone foam dressing    - Apply fecal or urinary incontinence containment device     - Turn and reposition patient & offload bony prominences every 2 hours   - Utilize friction reducing device or surface for transfers   -Outcome: Progressing     Problem: MOBILITY - ADULT  Goal: Maintain or return to baseline ADL function  Description: INTERVENTIONS:  -  Assess patient's ability to carry out ADLs; assess patient's baseline for ADL function and identify physical deficits which impact ability to perform ADLs (bathing, care of mouth/teeth, toileting, grooming, dressing, etc )  - Assess/evaluate cause of self-care deficits   - Assess range of motion  - Assess patient's mobility; develop plan if impaired  - Assess patient's need for assistive devices and provide as appropriate  - Encourage maximum independence but intervene and supervise when necessary  - Involve family in performance of ADLs  - Assess for home care needs following discharge   - Consider OT consult to assist with ADL evaluation and planning for discharge  - Provide patient education as appropriate  Outcome: Progressing  Goal: Maintains/Returns to pre admission functional level  Description: INTERVENTIONS:  - Perform BMAT or MOVE assessment daily    - Set and communicate daily mobility goal to care team and patient/family/caregiver  - Collaborate with rehabilitation services on mobility goals if consulted  - Perform Range of Motion 2 times a day  - Reposition patient every 2 hours  - Record patient progress and toleration of activity level   Outcome: Progressing     Problem: Nutrition/Hydration-ADULT  Goal: Nutrient/Hydration intake appropriate for improving, restoring or maintaining nutritional needs  Description: Monitor and assess patient's nutrition/hydration status for malnutrition  Collaborate with interdisciplinary team and initiate plan and interventions as ordered  Monitor patient's weight and dietary intake as ordered or per policy  Utilize nutrition screening tool and intervene as necessary  Determine patient's food preferences and provide high-protein, high-caloric foods as appropriate       INTERVENTIONS:  - Monitor oral intake, urinary output, labs, and treatment plans  - Assess nutrition and hydration status and recommend course of action  - Evaluate amount of meals eaten  - Assist patient with eating if necessary   - Allow adequate time for meals  - Recommend/ encourage appropriate diets, oral nutritional supplements, and vitamin/mineral supplements  - Order, calculate, and assess calorie counts as needed  - Recommend, monitor, and adjust tube feedings and TPN/PPN based on assessed needs  - Assess need for intravenous fluids  - Provide specific nutrition/hydration education as appropriate  - Include patient/family/caregiver in decisions related to nutrition  Outcome: Progressing     Problem: Prexisting or High Potential for Compromised Skin Integrity  Goal: Skin integrity is maintained or improved  Description: INTERVENTIONS:  - Identify patients at risk for skin breakdown  - Assess and monitor skin integrity  - Assess and monitor nutrition and hydration status  - Monitor labs   - Assess for incontinence   - Turn and reposition patient  - Assist with mobility/ambulation  - Relieve pressure over bony prominences  - Avoid friction and shearing  - Provide appropriate hygiene as needed including keeping skin clean and dry  - Evaluate need for skin moisturizer/barrier cream  - Collaborate with interdisciplinary team   - Patient/family teaching  - Consider wound care consult   Outcome: Progressing

## 2021-06-19 NOTE — ASSESSMENT & PLAN NOTE
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

## 2021-06-19 NOTE — TELEMEDICINE
e-Consult (IPC)  - Interventional Radiology  Itzel Oliveira 70 y o  female MRN: [id number]  Unit/Bed#: S -01 Encounter: [id number]    IR has been consulted to evaluate the patient, determine the appropriate procedure, and whether or not a procedure can and should be performed regarding the care of Itzel Oliveira  We were consulted by SLIM concerning a right lung mass, and to possibly perform a lung biopsy if medically appropriate for the patient  The procedure was requested for this admission  IP Consult to IR  Consult performed by: Mana Zapata MD  Consult ordered by: Abimael Calzada MD        06/19/21      Assessment/Recommendation:   68-year-old female admitted to the hospital with worsening shortness of breath  Patient has a history of COPD and atrial fibrillation  CT scan demonstrates a peripheral lung mass measuring 3 cm in the right upper lobe  The patient also has mediastinal, paratracheal, subcarinal, and bilateral hilar lymphadenopathy  Biopsy of this lesion will not aid in staging since it appears that there is likely metastasis to the hilar and mediastinal lymph nodes  A mediastinoscopy with biopsy of 1 of these lymph nodes would be more definitive for staging purposes  Additionally, biopsy of a peripheral lung mass in a patient who is having acute exacerbation of shortness of breath is contraindicated, as it may cause complications of pneumothorax or pulmonary hemorrhage which may make her shortness of breath further exacerbated  If biopsy of this lesion becomes necessary, it should wait until the patient is stable relative to her underlying lung issues  Total time spent in review of data, discussion with requesting provider and rendering advice was 15 minutes  Patient or appropriate family member was verbally informed by SLIM of this consultative service on their behalf to provide more timely access to specialty care in lieu of an in person consultation   Verbal consent was obtained  Thank you for allowing Interventional Radiology to participate in the care of Lorri Cooley  Please don't hesitate to call or TigerText us with any questions       Lakeshia Westbrook MD

## 2021-06-19 NOTE — PROGRESS NOTES
Progress Note - Cardiology   Ton Logan 70 y o  female MRN: [id number]  Unit/Bed#: S -01 Encounter: [id number]    Assessment:  Complicated scenario  The patient has multifactorial hypoxic respiratory failure  Certainly has a component of acute on chronic diastolic heart failure secondary to her bioprosthetic mitral valve stenosis  She is also found to have lung mass with evidence for metastasis  She has prior documentation of left atrial appendage thrombus for which she is on Coumadin  She has chronic atrial fibrillation  [facility] which [facility] she also had further evaluation by Cardiology and then was referred to Cardiac surgery but that admission was deferred by the surgeon until she is out of rehab  I was able to review the initial operative note by Dr Belgica Cloud  She had a complicated surgery even at that time  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 making the surgery difficult  He was unable to do a Maze procedure  Additionally, her left atrial appendage thrombus makes any percutaneous procedure more complex and given her current functional status that I am seeing, I am not sure she would ever really be a candidate for a redo sternotomy  While she may be overwhelmed, she tells me that she would necessarily want chemotherapy even if she had malignancy identified because of poor experiences the family members had  Plan:  Ultimately, the 1st step here is continued diuresis  She was changed from Lasix to Bumex today and we can see her response to this  She has rate controlled atrial fibrillation  Her warfarin is currently held with plans to initiate anticoagulation when INR is below 2 5 (corrected 6/20 to avoid confusion)  I would watch this very carefully as she does have history of left atrial appendage thrombus  This is being done in case procedure needs to be done with biopsy for her lung mass      As noted above, I am not sure what her overall likelihood would be for definitive treatment of the mitral valve bioprosthetic stenosis which is repeat surgery  I am not optimistic  I do not think there is utility in transferring her acutely to [facility] for a CT surgical evaluation during this admission  Previously, this was planned as an outpatient but she has not been out of rehab to meet the surgeons at [facility]     We can give her chance with diuresis see her symptoms improve, otherwise would recommend palliative care evaluation  Subjective/Objective     Subjective:  Patient still reports symptoms of dyspnea  I reviewed prior history with her extensively  A review of the documentation and prior hospitalizations  She was admitted to [facility] in December with COVID  At that time, she was also seen by cardiology consultation there  She has not followed regularly with her primary cardiologist Dr Chery Foster at the [address] [place] [address] Drive  There was a referral made to Cardiac surgery after her most recent hospitalization  However, review of that documentation shows that the surgeon recommended she be discharged from rehab and also have a repeat echo before scheduling the visit  She is now found to have lung mass with evidence concerning for metastasis  She told me quite frankly that she is not sure even if this was malignancy if she would want chemotherapy  She was a bit overwhelmed with a lot of our discussion, was tearful  However she did say that if she was not going to be able to have issues fixed, she would prefer to be home to die naturally      Objective:    Vitals: /60 (BP Location: Left arm)   Pulse (!) 53   Temp 98 °F (36 7 °C) (Oral)   Resp 18   Ht 5' 3" (1 6 m)   Wt 107 kg (236 lb 1 8 oz)   SpO2 100%   BMI 41 83 kg/m²   Vitals:    06/18/21 1341 06/19/21 0600   Weight: 112 kg (246 lb 14 6 oz) 107 kg (236 lb 1 8 oz)     Orthostatic Blood Pressures      Most Recent Value   Blood Pressure  109/60 filed at 06/19/2021 0700   Patient Position - Orthostatic VS  Lying filed at 06/19/2021 0700          Intake/Output Summary (Last 24 hours) at 6/19/2021 1159  Last data filed at 6/18/2021 2201  Gross per 24 hour   Intake 220 ml   Output 200 ml   Net 20 ml     Physical Exam:   General appearance: chronically ill appearing woman  Sitting in bed  Supplemental O2  Head: Normocephalic, without obvious abnormality, atraumatic  Neck: elevated JVP  Lungs: rales heard  Heart: irregular  + systolic and diastolic murmur  Abdomen: Obese ,soft, non-tender; bowel sounds normal; no masses,  no organomegaly  Extremities: Mild LE edema  Pulses: symmetric bilaterally  Skin: Skin color, texture, turgor normal  No rashes or lesions  Neurologic: Grossly normal  Alert and oriented      Medications:    Current Facility-Administered Medications:     acetaminophen (TYLENOL) tablet 650 mg, 650 mg, Oral, Q6H PRN, Manolo Navarrete PA-C, 650 mg at 06/18/21 1757    albuterol (PROVENTIL HFA,VENTOLIN HFA) inhaler 2 puff, 2 puff, Inhalation, Q6H PRN, Manolo Navarrete PA-C    bumetanide (BUMEX) injection 2 mg, 2 mg, Intravenous, TID (diuretic), Charlie Crowley MD, 2 mg at 06/19/21 0602    Diclofenac Sodium (VOLTAREN) 1 % topical gel 2 g, 2 g, Topical, 4x Daily, DAVID Bland, 2 g at 06/19/21 1140    digoxin (LANOXIN) tablet 125 mcg, 125 mcg, Oral, Daily, Manolo Navarrete PA-C, 125 mcg at 06/19/21 0276    ferrous sulfate tablet 325 mg, 325 mg, Oral, Daily With Breakfast, Manolo Navarrete PA-C, 325 mg at 06/19/21 0730    fluticasone-vilanterol (BREO ELLIPTA) 100-25 mcg/inh inhaler 1 puff, 1 puff, Inhalation, Daily, Alexa King PA-C, 1 puff at 06/19/21 0922    insulin lispro (HumaLOG) 100 units/mL subcutaneous injection 1-5 Units, 1-5 Units, Subcutaneous, TID AC **AND** Fingerstick Glucose (POCT), , , HILL GIRARD, Manolo Navarrete PA-C    insulin lispro (HumaLOG) 100 units/mL subcutaneous injection 1-5 Units, 1-5 Units, Subcutaneous, HS, Manolo Navarrete PA-C    ipratropium-albuterol (DUO-NEB) 0 5-2 5 mg/3 mL inhalation solution 3 mL, 3 mL, Nebulization, 4x Daily PRN, Rosalba Lim PA-C    lidocaine (LIDODERM) 5 % patch 1 patch, 1 patch, Topical, Daily, Esdras De Guzman PA-C, 1 patch at 06/19/21 0922    montelukast (SINGULAIR) tablet 10 mg, 10 mg, Oral, Daily, Manolo Navarrete PA-C, 10 mg at 06/19/21 3648    pantoprazole (PROTONIX) EC tablet 40 mg, 40 mg, Oral, Early Morning, Manolo Navarrete PA-C, 40 mg at 06/19/21 0602    potassium chloride (K-DUR,KLOR-CON) CR tablet 20 mEq, 20 mEq, Oral, BID, Manolo Navarrete PA-C, 20 mEq at 06/19/21 7556    senna-docusate sodium (SENOKOT S) 8 6-50 mg per tablet 1 tablet, 1 tablet, Oral, Daily PRN, Roque Funes PA-C    Lab Results:  Results from last 7 days   Lab Units 06/17/21  1842   TROPONIN I ng/mL <0 02     Results from last 7 days   Lab Units 06/18/21  0539 06/17/21  1842   WBC Thousand/uL 7 30 7 20   HEMOGLOBIN g/dL 8 9* 8 8*   HEMATOCRIT % 30 9* 29 7*   PLATELETS Thousands/uL 412* 428*         Results from last 7 days   Lab Units 06/19/21  0551 06/18/21  0539 06/17/21  1842   SODIUM mmol/L 140 141 142   POTASSIUM mmol/L 3 9 3 5 3 8   CHLORIDE mmol/L 101 101 101   CO2 mmol/L 32 32 32   BUN mg/dL 22 21 22   CREATININE mg/dL 1 18 1 01 1 03   CALCIUM mg/dL 8 8 9 3 9 0   ALK PHOS U/L  --   --  73   ALT U/L  --   --  17   AST U/L  --   --  21     Results from last 7 days   Lab Units 06/18/21  0539 06/17/21  1842   INR  2 69* 2 60*     Telemetry: not on telemetry  Echo: 6/18/21:  Personally reviewed  LV function overall preserved  LA massively dilated, evidence of appendage thrombus  Bioprosthetic MVR which appeared calcified, increased gradient across the valve which is similar to prior reports  Orientation of the valve is directed anteriorly in the LVOT  Moderate TR, pulmonary hypertension present

## 2021-06-19 NOTE — PLAN OF CARE
Problem: PAIN - ADULT  Goal: Verbalizes/displays adequate comfort level or baseline comfort level  Description: Interventions:  - Encourage patient to monitor pain and request assistance  - Assess pain using appropriate pain scale  - Administer analgesics based on type and severity of pain and evaluate response  - Implement non-pharmacological measures as appropriate and evaluate response  - Consider cultural and social influences on pain and pain management  - Notify physician/advanced practitioner if interventions unsuccessful or patient reports new pain  Outcome: Progressing     Problem: INFECTION - ADULT  Goal: Absence or prevention of progression during hospitalization  Description: INTERVENTIONS:  - Assess and monitor for signs and symptoms of infection  - Monitor lab/diagnostic results  - Monitor all insertion sites, i e  indwelling lines, tubes, and drains  - Monitor endotracheal if appropriate and nasal secretions for changes in amount and color  - [place] appropriate cooling/warming therapies per order  - Administer medications as ordered  - Instruct and encourage patient and family to use good hand hygiene technique  - Identify and instruct in appropriate isolation precautions for identified infection/condition  Outcome: Progressing  Goal: Absence of fever/infection during neutropenic period  Description: INTERVENTIONS:  - Monitor WBC    Outcome: Progressing     Problem: SAFETY ADULT  Goal: Patient will remain free of falls  Description: INTERVENTIONS:  - Educate patient/family on patient safety including physical limitations  - Instruct patient to call for assistance with activity   - Consult OT/PT to assist with strengthening/mobility   - Keep Call bell within reach  - Keep bed low and locked with side rails adjusted as appropriate  - Keep care items and personal belongings within reach  - Initiate and maintain comfort rounds  - Make Fall Risk Sign visible to staff  - Offer Toileting every  Hours, in advance of need  - Initiate/Maintain alarm  - Obtain necessary fall risk management equipment:   - Apply yellow socks and bracelet for high fall risk patients  - Consider moving patient to room near nurses station  Outcome: Progressing  Goal: Maintain or return to baseline ADL function  Description: INTERVENTIONS:  -  Assess patient's ability to carry out ADLs; assess patient's baseline for ADL function and identify physical deficits which impact ability to perform ADLs (bathing, care of mouth/teeth, toileting, grooming, dressing, etc )  - Assess/evaluate cause of self-care deficits   - Assess range of motion  - Assess patient's mobility; develop plan if impaired  - Assess patient's need for assistive devices and provide as appropriate  - Encourage maximum independence but intervene and supervise when necessary  - Involve family in performance of ADLs  - Assess for home care needs following discharge   - Consider OT consult to assist with ADL evaluation and planning for discharge  - Provide patient education as appropriate  Outcome: Progressing  Goal: Maintains/Returns to pre admission functional level  Description: INTERVENTIONS:  - Perform BMAT or MOVE assessment daily    - Set and communicate daily mobility goal to care team and patient/family/caregiver  - Collaborate with rehabilitation services on mobility goals if consulted  - Perform Range of Motion  times a day  - Reposition patient every  hours    - Dangle patient  times a day  - Stand patient  times a day  - Ambulate patient  times a day  - Out of bed to chair  times a day   - Out of bed for meals  times a day  - Out of bed for toileting  - Record patient progress and toleration of activity level   Outcome: Progressing     Problem: DISCHARGE PLANNING  Goal: Discharge to home or other facility with appropriate resources  Description: INTERVENTIONS:  - Identify barriers to discharge w/patient and caregiver  - Arrange for needed discharge resources and transportation as appropriate  - Identify discharge learning needs (meds, wound care, etc )  - Arrange for interpretive services to assist at discharge as needed  - Refer to Case Management Department for coordinating discharge planning if the patient needs post-hospital services based on physician/advanced practitioner order or complex needs related to functional status, cognitive ability, or social support system  Outcome: Progressing     Problem: Knowledge Deficit  Goal: Patient/family/caregiver demonstrates understanding of disease process, treatment plan, medications, and discharge instructions  Description: Complete learning assessment and assess knowledge base    Interventions:  - Provide teaching at level of understanding  - Provide teaching via preferred learning methods  Outcome: Progressing     Problem: RESPIRATORY - ADULT  Goal: Achieves optimal ventilation and oxygenation  Description: INTERVENTIONS:  - Assess for changes in respiratory status  - Assess for changes in mentation and behavior  - Position to facilitate oxygenation and minimize respiratory effort  - Oxygen administered by appropriate delivery if ordered  - Initiate smoking cessation education as indicated  - Encourage broncho-pulmonary hygiene including cough, deep breathe, Incentive Spirometry  - Assess the need for suctioning and aspirate as needed  - Assess and instruct to report SOB or any respiratory difficulty  - Respiratory Therapy support as indicated  Outcome: Progressing     Problem: METABOLIC, FLUID AND ELECTROLYTES - ADULT  Goal: Fluid balance maintained  Description: INTERVENTIONS:  - Monitor labs   - Monitor I/O and WT  - Instruct patient on fluid and nutrition as appropriate  - Assess for signs & symptoms of volume excess or deficit  Outcome: Progressing  Goal: Glucose maintained within target range  Description: INTERVENTIONS:  - Monitor Blood Glucose as ordered  - Assess for signs and symptoms of hyperglycemia and hypoglycemia  - Administer ordered medications to maintain glucose within target range  - Assess nutritional intake and initiate nutrition service referral as needed  Outcome: Progressing     Problem: SKIN/TISSUE INTEGRITY - ADULT  Goal: Skin Integrity remains intact(Skin Breakdown Prevention)  Description: Assess:  -Perform Jevon assessment every   -Clean and moisturize skin every   -Inspect skin when repositioning, toileting, and assisting with ADLS  -Assess under medical devices such as  every   -Assess extremities for adequate circulation and sensation     Bed Management:  -Have minimal linens on bed & keep smooth, unwrinkled  -Change linens as needed when moist or perspiring  -Avoid sitting or lying in one position for more than  hours while in bed  -Keep HOB at degrees     Toileting:  -Offer bedside commode  -Assess for incontinence every   -Use incontinent care products after each incontinent episode such as     Activity:  -Mobilize patient  times a day  -Encourage activity and walks on unit  -Encourage or provide ROM exercises   -Turn and reposition patient every  Hours  -Use appropriate equipment to lift or move patient in bed  -Instruct/ Assist with weight shifting every  when out of bed in chair  -Consider limitation of chair time  hour intervals    Skin Care:  -Avoid use of baby powder, tape, friction and shearing, hot water or constrictive clothing  -Relieve pressure over bony prominences using   -Do not massage red bony areas    Next Steps:  -Teach patient strategies to minimize risks such as    -Consider consults to  interdisciplinary teams such as   Outcome: Progressing  Goal: Incision(s), wounds(s) or drain site(s) healing without S/S of infection  Description: INTERVENTIONS  - Assess and document dressing, incision, wound bed, drain sites and surrounding tissue  - Provide patient and family education  - Perform skin care/dressing changes every   Outcome: Progressing  Goal: Pressure injury heals and does not worsen  Description: Interventions:  - Implement low air loss mattress or specialty surface (Criteria met)  - Apply silicone foam dressing  - Instruct/assist with weight shifting every  minutes when in chair   - Limit chair time to  hour intervals  - Use special pressure reducing interventions such as  when in chair   - Apply fecal or urinary incontinence containment device   - Perform passive or active ROM every   - Turn and reposition patient & offload bony prominences every  hours   - Utilize friction reducing device or surface for transfers   - Consider consults to  interdisciplinary teams such as   - Use incontinent care products after each incontinent episode such as   - Consider nutrition services referral as needed  Outcome: Progressing     Problem: MOBILITY - ADULT  Goal: Maintain or return to baseline ADL function  Description: INTERVENTIONS:  -  Assess patient's ability to carry out ADLs; assess patient's baseline for ADL function and identify physical deficits which impact ability to perform ADLs (bathing, care of mouth/teeth, toileting, grooming, dressing, etc )  - Assess/evaluate cause of self-care deficits   - Assess range of motion  - Assess patient's mobility; develop plan if impaired  - Assess patient's need for assistive devices and provide as appropriate  - Encourage maximum independence but intervene and supervise when necessary  - Involve family in performance of ADLs  - Assess for home care needs following discharge   - Consider OT consult to assist with ADL evaluation and planning for discharge  - Provide patient education as appropriate  Outcome: Progressing  Goal: Maintains/Returns to pre admission functional level  Description: INTERVENTIONS:  - Perform BMAT or MOVE assessment daily    - Set and communicate daily mobility goal to care team and patient/family/caregiver     - Collaborate with rehabilitation services on mobility goals if consulted  - Perform Range of Motion  times a day   - Reposition patient every  hours  - Dangle patient  times a day  - Stand patient  times a day  - Ambulate patient  times a day  - Out of bed to chair  times a day   - Out of bed for meals  times a day  - Out of bed for toileting  - Record patient progress and toleration of activity level   Outcome: Progressing     Problem: Nutrition/Hydration-ADULT  Goal: Nutrient/Hydration intake appropriate for improving, restoring or maintaining nutritional needs  Description: Monitor and assess patient's nutrition/hydration status for malnutrition  Collaborate with interdisciplinary team and initiate plan and interventions as ordered  Monitor patient's weight and dietary intake as ordered or per policy  Utilize nutrition screening tool and intervene as necessary  Determine patient's food preferences and provide high-protein, high-caloric foods as appropriate       INTERVENTIONS:  - Monitor oral intake, urinary output, labs, and treatment plans  - Assess nutrition and hydration status and recommend course of action  - Evaluate amount of meals eaten  - Assist patient with eating if necessary   - Allow adequate time for meals  - Recommend/ encourage appropriate diets, oral nutritional supplements, and vitamin/mineral supplements  - Order, calculate, and assess calorie counts as needed  - Recommend, monitor, and adjust tube feedings and TPN/PPN based on assessed needs  - Assess need for intravenous fluids  - Provide specific nutrition/hydration education as appropriate  - Include patient/family/caregiver in decisions related to nutrition  Outcome: Progressing

## 2021-06-19 NOTE — ASSESSMENT & PLAN NOTE
Lab Results   Component Value Date    HGBA1C 4 5 03/15/2021       Recent Labs     06/18/21  2109 06/19/21  0821 06/19/21  1122 06/19/21  1533   POCGLU 144* 88 127 175*       Blood Sugar Average: Last 72 hrs:  (P) 119 875   · Hold oral antihyperglycemics    · Start sliding scale insulin  · accuchecks

## 2021-06-19 NOTE — PROGRESS NOTES
[facility]  Progress Note - Tin Mace 1945, 70 y o  female MRN: [id number]  Unit/Bed#: S -01 Encounter: [id number]  Primary Care Provider: Valente Buchanan MD   Date and time admitted to hospital: 6/17/2021  5:52 PM    CKD (chronic kidney disease)  Assessment & Plan  Lab Results   Component Value Date    EGFR 46 06/19/2021    EGFR 55 06/18/2021    EGFR 54 06/17/2021    CREATININE 1 18 06/19/2021    CREATININE 1 01 06/18/2021    CREATININE 1 03 06/17/2021   CKD stage 2-3 in the setting of HTN and T2DM requiring frequent bmp checks  Morbid obesity ([facility] )  Assessment & Plan  Diet lifestyle modifications  Counseled on weight loss  Noted pulmonary hypertension on prior echo  This is likely contributing to patient's symptomatology    Type 2 diabetes mellitus [facility])  Assessment & Plan  Lab Results   Component Value Date    HGBA1C 4 5 03/15/2021       Recent Labs     06/18/21  2109 06/19/21  0821 06/19/21  1122 06/19/21  1533   POCGLU 144* 88 127 175*       Blood Sugar Average: Last 72 hrs:  (P) 119 875   · Hold oral antihyperglycemics  · Start sliding scale insulin  · accuchecks     Chronic [facility] fibrillation ([facility] )  Assessment & Plan  Anticoagulated on warfarin  INR therapeutic at 2 69  Monitor PT INR daily  Continue digoxin  Will hold warfarin       COPD (chronic obstructive pulmonary disease) ([facility] )  Assessment & Plan  Does not appear to be in acute exacerbation at this time  Continue home inhalers  This could be contributing to patient's shortness of breath    * Shortness of breath  Assessment & Plan  Presents from nursing facility with subjective worsening of shortness of breath  CT scans finding significant for potential mass verses volume overload versus infection  Chronically wears 4L   Echo in 2020 - EF 50-55%  Diastolic dysfunction with inability to calculate filling pressures due to bioprosthetic MVR  Severe tricuspid regurgitation   At least moderate pulmonary hypertension assuming an RA pressure of 10mmHg  Unclear etiology at this time but likely secondary to volume overload as patient is having orthopnea and she does states she is having increased lower extremity swelling  Lasix 40 mg BID   Monitor I/O  Also lung nodule noted  Will discuss with IR if biopsy is feasible  VTE Pharmacologic Prophylaxis:   Pharmacologic: Heparin  Mechanical VTE Prophylaxis in Place: Yes    Patient Centered Rounds: I have performed bedside rounds with nursing staff today  Discussions with Specialists or Other Care Team Provider: Discussed with Pulmonology  Education and Discussions with Family / Patient: Discussed with patient  Pulmonology discussed with family  Time Spent for Care: 30 minutes  More than 50% of total time spent on counseling and coordination of care as described above  Current Length of Stay: 1 day(s)    Current Patient Status: Inpatient   Certification Statement: The patient will continue to require additional inpatient hospital stay due to awaiting further work up of abnormalities seen on CT scan     Discharge Plan: Unclear  Code Status: Level 1 - Full Code      Subjective:   Patient seen and examined  Feeling "good"    Objective:     Vitals:   Temp (24hrs), Av 9 °F (36 6 °C), Min:97 9 °F (36 6 °C), Max:98 °F (36 7 °C)    Temp:  [97 9 °F (36 6 °C)-98 °F (36 7 °C)] 97 9 °F (36 6 °C)  HR:  [53-69] 69  Resp:  [16-18] 18  BP: ()/(56-61) 94/61  SpO2:  [97 %-100 %] 100 %  Body mass index is 41 83 kg/m²  Input and Output Summary (last 24 hours): Intake/Output Summary (Last 24 hours) at 2021 1638  Last data filed at 2021 1325  Gross per 24 hour   Intake 700 ml   Output 800 ml   Net -100 ml       Physical Exam:     Physical Exam  Constitutional:       General: She is not in acute distress  Appearance: She is not ill-appearing, toxic-appearing or diaphoretic  Comments: Elderly, frail        HENT:      Head: Normocephalic  Nose: Nose normal       Mouth/Throat:      Mouth: Mucous membranes are moist    Eyes:      General: No scleral icterus  Right eye: No discharge  Left eye: No discharge  Pupils: Pupils are equal, round, and reactive to light  Cardiovascular:      Rate and Rhythm: Normal rate  Pulmonary:      Effort: No respiratory distress  Breath sounds: No stridor  No wheezing, rhonchi or rales  Chest:      Chest wall: No tenderness  Abdominal:      General: Abdomen is flat  There is no distension  Palpations: There is no mass  Tenderness: There is no abdominal tenderness  There is no right CVA tenderness, left CVA tenderness, guarding or rebound  Hernia: No hernia is present  Skin:     Capillary Refill: Capillary refill takes less than 2 seconds  Coloration: Skin is pale  Skin is not jaundiced  Findings: No bruising or lesion  Neurological:      General: No focal deficit present  Mental Status: She is alert  Cranial Nerves: No cranial nerve deficit  Sensory: No sensory deficit  Motor: No weakness        Coordination: Coordination normal       Gait: Gait normal            Additional Data:     Labs:    Results from last 7 days   Lab Units 06/18/21  0539   WBC Thousand/uL 7 30   HEMOGLOBIN g/dL 8 9*   HEMATOCRIT % 30 9*   PLATELETS Thousands/uL 412*   NEUTROS PCT % 69   LYMPHS PCT % 18   MONOS PCT % 7   EOS PCT % 4     Results from last 7 days   Lab Units 06/19/21  0551 06/17/21  1842   SODIUM mmol/L 140 142   POTASSIUM mmol/L 3 9 3 8   CHLORIDE mmol/L 101 101   CO2 mmol/L 32 32   BUN mg/dL 22 22   CREATININE mg/dL 1 18 1 03   ANION GAP mmol/L 7 9   CALCIUM mg/dL 8 8 9 0   ALBUMIN g/dL  --  2 7*   TOTAL BILIRUBIN mg/dL  --  0 48   ALK PHOS U/L  --  73   ALT U/L  --  17   AST U/L  --  21   GLUCOSE RANDOM mg/dL 91 94     Results from last 7 days   Lab Units 06/18/21  0539   INR  2 69*     Results from last 7 days   Lab Units 06/19/21  1533 06/19/21  1122 06/19/21  0821 06/18/21  2109 06/18/21  1559 06/18/21  1150 06/18/21  0738 06/17/21  2324   POC GLUCOSE mg/dl 175* 127 88 144* 102 129 100 94         Results from last 7 days   Lab Units 06/17/21  2327   PROCALCITONIN ng/ml <0 05           * I Have Reviewed All Lab Data Listed Above  * Additional Pertinent Lab Tests Reviewed: All Labs For Current Hospital Admission Reviewed    Imaging:    Imaging Reports Reviewed Today Include:   None pertinent to this encounter   Imaging Personally Reviewed by Myself Includes:   As above  Recent Cultures (last 7 days):           Last 24 Hours Medication List:   Current Facility-Administered Medications   Medication Dose Route Frequency Provider Last Rate    acetaminophen  650 mg Oral Q6H PRN Chika Cross PA-C      albuterol  2 puff Inhalation Q6H PRN Manolo Navarrete PA-C      bumetanide  2 mg Intravenous TID (diuretic) Hai Virk MD      Diclofenac Sodium  2 g Topical 4x Daily DAVID Pinzon      digoxin  125 mcg Oral Daily Manolo Navarrete PA-C      ferrous sulfate  325 mg Oral Daily With Breakfast Manolo Navarrete PA-C      fluticasone-vilanterol  1 puff Inhalation Daily [place], [place]      insulin lispro  1-5 Units Subcutaneous TID AC Manolo Navarrete PA-C      insulin lispro  1-5 Units Subcutaneous HS Manolo Navarrete PA-C      ipratropium-albuterol  3 mL Nebulization 4x Daily PRN LOPEZ Jo      lidocaine  1 patch Topical Daily Esdras Humphrey PA-C      montelukast  10 mg Oral Daily Manolo Navarrete PA-C      pantoprazole  40 mg Oral Early Morning Manolo Navarrete PA-C      potassium chloride  20 mEq Oral BID Chika Cross PA-C      senna-docusate sodium  1 tablet Oral Daily PRN Chika Cross PA-C          Today, Patient Was Seen By: Natalie Doshi MD    ** Please Note: Dictation voice to text software may have been used in the creation of this document   **

## 2021-06-19 NOTE — ASSESSMENT & PLAN NOTE
Anticoagulated on warfarin  INR therapeutic at 2 69  Monitor PT INR daily    Continue digoxin  Will hold warfarin

## 2021-06-20 LAB
ALBUMIN SERPL BCP-MCNC: 2.6 G/DL (ref 3.5–5)
ALP SERPL-CCNC: 64 U/L (ref 46–116)
ALT SERPL W P-5'-P-CCNC: 13 U/L (ref 12–78)
ANION GAP SERPL CALCULATED.3IONS-SCNC: 5 MMOL/L (ref 4–13)
APTT PPP: 41 SECONDS (ref 23–37)
AST SERPL W P-5'-P-CCNC: 16 U/L (ref 5–45)
BASOPHILS # BLD AUTO: 0.06 THOUSANDS/ΜL (ref 0–0.1)
BASOPHILS NFR BLD AUTO: 1 % (ref 0–1)
BILIRUB SERPL-MCNC: 0.38 MG/DL (ref 0.2–1)
BUN SERPL-MCNC: 27 MG/DL (ref 5–25)
CALCIUM ALBUM COR SERPL-MCNC: 10 MG/DL (ref 8.3–10.1)
CALCIUM SERPL-MCNC: 8.9 MG/DL (ref 8.3–10.1)
CHLORIDE SERPL-SCNC: 101 MMOL/L (ref 100–108)
CO2 SERPL-SCNC: 33 MMOL/L (ref 21–32)
CREAT SERPL-MCNC: 1.28 MG/DL (ref 0.6–1.3)
EOSINOPHIL # BLD AUTO: 0.38 THOUSAND/ΜL (ref 0–0.61)
EOSINOPHIL NFR BLD AUTO: 6 % (ref 0–6)
ERYTHROCYTE [DISTWIDTH] IN BLOOD BY AUTOMATED COUNT: 18 % (ref 11.6–15.1)
GFR SERPL CREATININE-BSD FRML MDRD: 42 ML/MIN/1.73SQ M
GLUCOSE SERPL-MCNC: 103 MG/DL (ref 65–140)
GLUCOSE SERPL-MCNC: 119 MG/DL (ref 65–140)
GLUCOSE SERPL-MCNC: 133 MG/DL (ref 65–140)
GLUCOSE SERPL-MCNC: 96 MG/DL (ref 65–140)
HCT VFR BLD AUTO: 28.6 % (ref 34.8–46.1)
HGB BLD-MCNC: 8.5 G/DL (ref 11.5–15.4)
IMM GRANULOCYTES # BLD AUTO: 0.04 THOUSAND/UL (ref 0–0.2)
IMM GRANULOCYTES NFR BLD AUTO: 1 % (ref 0–2)
INR PPP: 2.91 (ref 0.84–1.19)
LYMPHOCYTES # BLD AUTO: 0.99 THOUSANDS/ΜL (ref 0.6–4.47)
LYMPHOCYTES NFR BLD AUTO: 14 % (ref 14–44)
MCH RBC QN AUTO: 26.5 PG (ref 26.8–34.3)
MCHC RBC AUTO-ENTMCNC: 29.7 G/DL (ref 31.4–37.4)
MCV RBC AUTO: 89 FL (ref 82–98)
MONOCYTES # BLD AUTO: 0.52 THOUSAND/ΜL (ref 0.17–1.22)
MONOCYTES NFR BLD AUTO: 8 % (ref 4–12)
NEUTROPHILS # BLD AUTO: 4.91 THOUSANDS/ΜL (ref 1.85–7.62)
NEUTS SEG NFR BLD AUTO: 70 % (ref 43–75)
NRBC BLD AUTO-RTO: 0 /100 WBCS
PLATELET # BLD AUTO: 393 THOUSANDS/UL (ref 149–390)
PMV BLD AUTO: 9.7 FL (ref 8.9–12.7)
POTASSIUM SERPL-SCNC: 4 MMOL/L (ref 3.5–5.3)
PROT SERPL-MCNC: 6.8 G/DL (ref 6.4–8.2)
PROTHROMBIN TIME: 30.4 SECONDS (ref 11.6–14.5)
RBC # BLD AUTO: 3.21 MILLION/UL (ref 3.81–5.12)
SODIUM SERPL-SCNC: 139 MMOL/L (ref 136–145)
WBC # BLD AUTO: 6.9 THOUSAND/UL (ref 4.31–10.16)

## 2021-06-20 PROCEDURE — 80053 COMPREHEN METABOLIC PANEL: CPT | Performed by: INTERNAL MEDICINE

## 2021-06-20 PROCEDURE — 82948 REAGENT STRIP/BLOOD GLUCOSE: CPT

## 2021-06-20 PROCEDURE — 85730 THROMBOPLASTIN TIME PARTIAL: CPT | Performed by: INTERNAL MEDICINE

## 2021-06-20 PROCEDURE — 99232 SBSQ HOSP IP/OBS MODERATE 35: CPT | Performed by: INTERNAL MEDICINE

## 2021-06-20 PROCEDURE — 85610 PROTHROMBIN TIME: CPT | Performed by: INTERNAL MEDICINE

## 2021-06-20 PROCEDURE — 85025 COMPLETE CBC W/AUTO DIFF WBC: CPT | Performed by: INTERNAL MEDICINE

## 2021-06-20 RX ORDER — HEPARIN SODIUM 10000 [USP'U]/100ML
3-30 INJECTION, SOLUTION INTRAVENOUS
Status: DISCONTINUED | OUTPATIENT
Start: 2021-06-20 | End: 2021-06-20

## 2021-06-20 RX ORDER — BUMETANIDE 0.25 MG/ML
2 INJECTION, SOLUTION INTRAMUSCULAR; INTRAVENOUS
Status: DISCONTINUED | OUTPATIENT
Start: 2021-06-20 | End: 2021-06-22

## 2021-06-20 RX ORDER — HEPARIN SODIUM 1000 [USP'U]/ML
8400 INJECTION, SOLUTION INTRAVENOUS; SUBCUTANEOUS
Status: DISCONTINUED | OUTPATIENT
Start: 2021-06-20 | End: 2021-06-20

## 2021-06-20 RX ORDER — HEPARIN SODIUM 1000 [USP'U]/ML
4200 INJECTION, SOLUTION INTRAVENOUS; SUBCUTANEOUS
Status: DISCONTINUED | OUTPATIENT
Start: 2021-06-20 | End: 2021-06-20

## 2021-06-20 RX ORDER — BUMETANIDE 0.25 MG/ML
2 INJECTION, SOLUTION INTRAMUSCULAR; INTRAVENOUS
Status: DISCONTINUED | OUTPATIENT
Start: 2021-06-20 | End: 2021-06-20

## 2021-06-20 RX ADMIN — FERROUS SULFATE TAB 325 MG (65 MG ELEMENTAL FE) 325 MG: 325 (65 FE) TAB at 08:56

## 2021-06-20 RX ADMIN — POTASSIUM CHLORIDE 20 MEQ: 1500 TABLET, EXTENDED RELEASE ORAL at 08:53

## 2021-06-20 RX ADMIN — DIGOXIN 125 MCG: 125 TABLET ORAL at 08:53

## 2021-06-20 RX ADMIN — DICLOFENAC SODIUM 2 G: 10 GEL TOPICAL at 08:54

## 2021-06-20 RX ADMIN — POTASSIUM CHLORIDE 20 MEQ: 1500 TABLET, EXTENDED RELEASE ORAL at 18:14

## 2021-06-20 RX ADMIN — MONTELUKAST 10 MG: 10 TABLET, FILM COATED ORAL at 08:53

## 2021-06-20 RX ADMIN — ACETAMINOPHEN 650 MG: 325 TABLET, FILM COATED ORAL at 23:41

## 2021-06-20 RX ADMIN — DICLOFENAC SODIUM 2 G: 10 GEL TOPICAL at 13:15

## 2021-06-20 RX ADMIN — LIDOCAINE 5% 1 PATCH: 700 PATCH TOPICAL at 08:55

## 2021-06-20 RX ADMIN — PANTOPRAZOLE SODIUM 40 MG: 40 TABLET, DELAYED RELEASE ORAL at 05:41

## 2021-06-20 RX ADMIN — DICLOFENAC SODIUM 2 G: 10 GEL TOPICAL at 18:16

## 2021-06-20 RX ADMIN — FLUTICASONE FUROATE AND VILANTEROL TRIFENATATE 1 PUFF: 100; 25 POWDER RESPIRATORY (INHALATION) at 08:55

## 2021-06-20 RX ADMIN — DICLOFENAC SODIUM 2 G: 10 GEL TOPICAL at 23:41

## 2021-06-20 NOTE — ASSESSMENT & PLAN NOTE
Lab Results   Component Value Date    HGBA1C 4 5 03/15/2021       Recent Labs     06/19/21  1533 06/19/21  2111 06/20/21  0751 06/20/21  1109   POCGLU 175* 131 96 119       Blood Sugar Average: Last 72 hrs:  (P) 670 [id number]   · Hold oral antihyperglycemics    · Start sliding scale insulin  · accuchecks

## 2021-06-20 NOTE — PROGRESS NOTES
[facility]  Progress Note - Lannie Mortimer 1945, 70 y o  female MRN: [id number]  Unit/Bed#: S -01 Encounter: [id number]  Primary Care Provider: Dayron Quevedo MD   Date and time admitted to hospital: 6/17/2021  5:52 PM    CKD (chronic kidney disease)  Assessment & Plan  Lab Results   Component Value Date    EGFR 42 06/20/2021    EGFR 46 06/19/2021    EGFR 55 06/18/2021    CREATININE 1 28 06/20/2021    CREATININE 1 18 06/19/2021    CREATININE 1 01 06/18/2021   CKD stage 2-3 in the setting of HTN and T2DM requiring frequent bmp checks  Morbid obesity ([facility] )  Assessment & Plan  Diet lifestyle modifications  Counseled on weight loss  Noted pulmonary hypertension on prior echo  This is likely contributing to patient's symptomatology    Type 2 diabetes mellitus [facility])  Assessment & Plan  Lab Results   Component Value Date    HGBA1C 4 5 03/15/2021       Recent Labs     06/19/21  1533 06/19/21  2111 06/20/21  0751 06/20/21  1109   POCGLU 175* 131 96 119       Blood Sugar Average: Last 72 hrs:  (P) 477 [id number]   · Hold oral antihyperglycemics  · Start sliding scale insulin  · accuchecks     Chronic atrial fibrillation ([facility] )  Assessment & Plan  Anticoagulated on warfarin  INR is pending - start heparin gtt  Monitor PT INR daily  Continue digoxin    Given no planned procedure - will initiate gtt         COPD (chronic obstructive pulmonary disease) ([facility] )  Assessment & Plan  Does not appear to be in acute exacerbation at this time  Continue home inhalers  This could be contributing to patient's shortness of breath    * Shortness of breath  Assessment & Plan  Presents from nursing facility with subjective worsening of shortness of breath  CT scans finding significant for potential mass verses volume overload versus infection  Chronically wears 4L   Echo in 2020 - EF 50-55%  Diastolic dysfunction with inability to calculate filling pressures due to bioprosthetic MVR  Severe tricuspid regurgitation  At least moderate pulmonary hypertension assuming an RA pressure of 10mmHg  Unclear etiology at this time but likely secondary to volume overload as patient is having orthopnea and she does states she is having increased lower extremity swelling  bumex 2 mg TID - change hold parameteres given that doses were held yesterday  Monitor I/O              VTE Pharmacologic Prophylaxis:   Pharmacologic: Heparin  Mechanical VTE Prophylaxis in Place: Yes    Patient Centered Rounds: I have performed bedside rounds with nursing staff today  Discussions with Specialists or Other Care Team Provider: Discussed with cardiology and with pulmonology  Education and Discussions with Family / Patient: Discussed with POA  Time Spent for Care: 30 minutes  More than 50% of total time spent on counseling and coordination of care as described above  Current Length of Stay: 2 day(s)    Current Patient Status: Inpatient   Certification Statement: The patient will continue to require additional inpatient hospital stay due to heparin gtt  Discharge Plan: Not medically stable for DC  Code Status: Level 1 - Full Code      Subjective:   Patient seen and examined  Feeling "okay"      Objective:     Vitals:   Temp (24hrs), Av °F (36 7 °C), Min:97 9 °F (36 6 °C), Max:98 1 °F (36 7 °C)    Temp:  [97 9 °F (36 6 °C)-98 1 °F (36 7 °C)] 98 1 °F (36 7 °C)  HR:  [55-69] 57  Resp:  [18] 18  BP: ()/(53-70) 120/70  SpO2:  [97 %-100 %] 97 %  Body mass index is 41 83 kg/m²  Input and Output Summary (last 24 hours): Intake/Output Summary (Last 24 hours) at 2021 1249  Last data filed at 2021 0900  Gross per 24 hour   Intake 890 ml   Output 1100 ml   Net -210 ml       Physical Exam:     Physical Exam  Constitutional:       General: She is not in acute distress  Appearance: She is not ill-appearing, toxic-appearing or diaphoretic  Comments: Elderly, frail        HENT: Head: Normocephalic  Nose: Nose normal       Mouth/Throat:      Mouth: Mucous membranes are moist    Eyes:      General:         Right eye: No discharge  Left eye: No discharge  Pupils: Pupils are equal, round, and reactive to light  Cardiovascular:      Rate and Rhythm: Normal rate  Heart sounds: No murmur heard  No friction rub  No gallop  Pulmonary:      Effort: Pulmonary effort is normal  No respiratory distress  Breath sounds: No stridor  No wheezing, rhonchi or rales  Chest:      Chest wall: No tenderness  Abdominal:      General: There is no distension  Palpations: There is no mass  Tenderness: There is no abdominal tenderness  There is no right CVA tenderness, left CVA tenderness, guarding or rebound  Musculoskeletal:         General: No swelling, tenderness or deformity  Right lower leg: No edema  Left lower leg: No edema  Skin:     Capillary Refill: Capillary refill takes less than 2 seconds  Coloration: Skin is pale  Skin is not jaundiced  Findings: No bruising, erythema, lesion or rash  Neurological:      General: No focal deficit present  Mental Status: She is alert  Cranial Nerves: No cranial nerve deficit  Sensory: No sensory deficit  Motor: No weakness        Coordination: Coordination normal       Gait: Gait normal    Psychiatric:         Mood and Affect: Mood normal            Additional Data:     Labs:    Results from last 7 days   Lab Units 06/20/21  1013   WBC Thousand/uL 6 90   HEMOGLOBIN g/dL 8 5*   HEMATOCRIT % 28 6*   PLATELETS Thousands/uL 393*   NEUTROS PCT % 70   LYMPHS PCT % 14   MONOS PCT % 8   EOS PCT % 6     Results from last 7 days   Lab Units 06/20/21  1013   SODIUM mmol/L 139   POTASSIUM mmol/L 4 0   CHLORIDE mmol/L 101   CO2 mmol/L 33*   BUN mg/dL 27*   CREATININE mg/dL 1 28   ANION GAP mmol/L 5   CALCIUM mg/dL 8 9   ALBUMIN g/dL 2 6*   TOTAL BILIRUBIN mg/dL 0 38   ALK PHOS U/L 64 ALT U/L 13   AST U/L 16   GLUCOSE RANDOM mg/dL 103     Results from last 7 days   Lab Units 06/20/21  1013   INR  2 91*     Results from last 7 days   Lab Units 06/20/21  1109 06/20/21  0751 06/19/21  2111 06/19/21  1533 06/19/21  1122 06/19/21  0821 06/18/21  2109 06/18/21  1559 06/18/21  1150 06/18/21  0738 06/17/21  2324   POC GLUCOSE mg/dl 119 96 131 175* 127 88 144* 102 129 100 94         Results from last 7 days   Lab Units 06/19/21  0551 06/17/21  2327   PROCALCITONIN ng/ml <0 05 <0 05           * I Have Reviewed All Lab Data Listed Above  * Additional Pertinent Lab Tests Reviewed: All Labs For Current Hospital Admission Reviewed    Imaging:    Imaging Reports Reviewed Today Include: none pertinent to this encounter       Imaging Personally Reviewed by Myself Includes:   As above    Recent Cultures (last 7 days):           Last 24 Hours Medication List:   Current Facility-Administered Medications   Medication Dose Route Frequency Provider Last Rate    acetaminophen  650 mg Oral Q6H PRN Chesttigre Adams PA-C      albuterol  2 puff Inhalation Q6H PRN Manolo Navarrete PA-C      bumetanide  2 mg Intravenous TID (diuretic) Vasile Rea MD      Diclofenac Sodium  2 g Topical 4x Daily DAVID Bright      digoxin  125 mcg Oral Daily Manolo Navarrete PA-C      ferrous sulfate  325 mg Oral Daily With Breakfast Manolo Navarrete PA-C      fluticasone-vilanterol  1 puff Inhalation Daily [place], [place]      insulin lispro  1-5 Units Subcutaneous TID AC Manolo Navarrete PA-C      insulin lispro  1-5 Units Subcutaneous HS Manolo Navarrete PA-C      ipratropium-albuterol  3 mL Nebulization 4x Daily PRN LOPEZ Jo      lidocaine  1 patch Topical Daily Esdras Cruz PA-C      montelukast  10 mg Oral Daily Manolo Navarrete PA-C      pantoprazole  40 mg Oral Early Morning Manolo Navarrete PA-C      potassium chloride  20 mEq Oral BID Manolo Navarrete PA-C      senna-docusate sodium  1 tablet Oral Daily PRN Roberto Win PA-C      sodium chloride  1 spray Each Nare Q1H PRN DAVID Earl          Today, Patient Was Seen By: Reyna Oates MD    ** Please Note: Dictation voice to text software may have been used in the creation of this document   **

## 2021-06-20 NOTE — PROGRESS NOTES
Progress Note - Cardiology   Stuart Hoang 70 y o  female MRN: [id number]  Unit/Bed#: S -01 Encounter: [id number]    Assessment:  Complicated scenario  The patient has multifactorial hypoxic respiratory failure  Certainly has a component of acute on chronic diastolic heart failure secondary to her bioprosthetic mitral valve stenosis  She is also found to have lung mass with concern for metastasis  She has prior documentation of left atrial appendage thrombus for which she is on Coumadin  She has chronic atrial fibrillation  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 making the surgery difficult  He was unable to do a Maze procedure  Additionally, her left atrial appendage thrombus makes any percutaneous procedure more complex and given her current functional status that I am seeing, I am not sure she would ever really be a candidate for a redo sternotomy  Today, it appears she is amenable to proceeding with biopsy and evaluation for malignancy  Plan:  I adjusted the hold parameters of the IV bumex so she gets this more regularly  Atrial fibrillation is rate controlled with digoxin  On warfarin  Goal INR is 2 5-3 5 given AAMIR thrombus, permanent afib  Valvular MS, not candidate for DOAC  Was initially thought to need heparin drip because INR was 2 69 yesterday, but this has actually gone up despite holding the warfarin, and she remains therapeutic at 2 9   Does not need heparin today, start when INR < 2 5    I am not sure what her overall likelihood would be for definitive treatment of the mitral valve bioprosthetic stenosis which is repeat surgery  I am not optimistic  I do not think there is utility in transferring her acutely to [place] for a CT surgical evaluation during this admission  Previously, this was planned as an outpatient but she has not been out of rehab to meet the surgeons at [facility]     We can give her chance with diuresis see her symptoms improve, otherwise would recommend palliative care evaluation  I see that code status was confirmed as level 1 with patient and POA today  Subjective/Objective     Subjective:  Reports no significant changes  Because BP was below the parameters, she only got 1 dose of Bumex yesterday  BP stable this AM       Objective:    Vitals: /70 (BP Location: Left arm)   Pulse 57   Temp 98 1 °F (36 7 °C) (Oral)   Resp 18   Ht 5' 3" (1 6 m)   Wt 107 kg (236 lb 1 8 oz)   SpO2 97%   BMI 41 83 kg/m²   Vitals:    06/18/21 1341 06/19/21 0600   Weight: 112 kg (246 lb 14 6 oz) 107 kg (236 lb 1 8 oz)     Orthostatic Blood Pressures      Most Recent Value   Blood Pressure  120/70 filed at 06/20/2021 0700   Patient Position - Orthostatic VS  Lying filed at 06/20/2021 0700          Intake/Output Summary (Last 24 hours) at 6/20/2021 1056  Last data filed at 6/20/2021 0501  Gross per 24 hour   Intake 650 ml   Output 1100 ml   Net -450 ml     Physical Exam:  GEN: Gris Greenwood appears well, alert and oriented x 3, pleasant and cooperative   HEENT: pupils equal, round, and reactive to light; extraocular muscles intact  NECK: supple, no carotid bruits   HEART: irregularly irregular  Systolic and diastolic murmur  LUNGS: decreased   Rales  ABDOMEN: Morbidly obese, normal bowel sounds, soft, no tenderness, no distention  EXTREMITIES: no pitting edema  NEURO: no focal findings   SKIN: normal without suspicious lesions on exposed skin    Medications:    Current Facility-Administered Medications:     acetaminophen (TYLENOL) tablet 650 mg, 650 mg, Oral, Q6H PRN, Kaitlynn Carrero, LOPEZ, 650 mg at 06/19/21 2149    albuterol (PROVENTIL HFA,VENTOLIN HFA) inhaler 2 puff, 2 puff, Inhalation, Q6H PRN, Manolo Navarrete PA-C    bumetanide (BUMEX) injection 2 mg, 2 mg, Intravenous, TID (diuretic), Ayan Morales MD    Diclofenac Sodium (VOLTAREN) 1 % topical gel 2 g, 2 g, Topical, 4x Daily, DAVID Bland, 2 g at 06/20/21 0854    digoxin (LANOXIN) tablet 125 mcg, 125 mcg, Oral, Daily, Manolo Navarrete PA-C, 125 mcg at 06/20/21 1352    ferrous sulfate tablet 325 mg, 325 mg, Oral, Daily With Breakfast, Manolo Navarrete PA-C, 325 mg at 06/20/21 0856    fluticasone-vilanterol (BREO ELLIPTA) 100-25 mcg/inh inhaler 1 puff, 1 puff, Inhalation, Daily, Arden Sue PA-C, 1 puff at 06/20/21 0855    insulin lispro (HumaLOG) 100 units/mL subcutaneous injection 1-5 Units, 1-5 Units, Subcutaneous, TID AC, 1 Units at 06/19/21 1832 **AND** Fingerstick Glucose (POCT), , , TID AC, Manolo Navarrete PA-C    insulin lispro (HumaLOG) 100 units/mL subcutaneous injection 1-5 Units, 1-5 Units, Subcutaneous, HS, Manolo Navarrete PA-C    ipratropium-albuterol (DUO-NEB) 0 5-2 5 mg/3 mL inhalation solution 3 mL, 3 mL, Nebulization, 4x Daily PRN, Arden Sue PA-C    lidocaine (LIDODERM) 5 % patch 1 patch, 1 patch, Topical, Daily, Esdras De Guzman PA-C, 1 patch at 06/20/21 0855    montelukast (SINGULAIR) tablet 10 mg, 10 mg, Oral, Daily, Manolo Navarrete PA-C, 10 mg at 06/20/21 0853    pantoprazole (PROTONIX) EC tablet 40 mg, 40 mg, Oral, Early Morning, Manolo Navarrete PA-C, 40 mg at 06/20/21 0541    potassium chloride (K-DUR,KLOR-CON) CR tablet 20 mEq, 20 mEq, Oral, BID, Manolo Navarrete PA-C, 20 mEq at 06/20/21 0853    senna-docusate sodium (SENOKOT S) 8 6-50 mg per tablet 1 tablet, 1 tablet, Oral, Daily PRN, Manolo Navarrete PA-C    sodium chloride (OCEAN) 0 65 % nasal spray 1 spray, 1 spray, Each Nare, Q1H PRN, DAVID Tavarez, 1 spray at 06/19/21 1450    Lab Results:  Results from last 7 days   Lab Units 06/17/21  1842   TROPONIN I ng/mL <0 02     Results from last 7 days   Lab Units 06/20/21  1013 06/18/21  0539 06/17/21  1842   WBC Thousand/uL 6 90 7 30 7 20   HEMOGLOBIN g/dL 8 5* 8 9* 8 8*   HEMATOCRIT % 28 6* 30 9* 29 7*   PLATELETS Thousands/uL 393* 412* 428*         Results from last 7 days   Lab Units 06/20/21  1013 06/19/21  0551 06/18/21  0539 06/17/21  1842   SODIUM mmol/L 139 140 141 142   POTASSIUM mmol/L 4 0 3 9 3 5 3 8   CHLORIDE mmol/L 101 101 101 101   CO2 mmol/L 33* 32 32 32   BUN mg/dL 27* 22 21 22   CREATININE mg/dL 1 28 1 18 1 01 1 03   CALCIUM mg/dL 8 9 8 8 9 3 9 0   ALK PHOS U/L 64  --   --  73   ALT U/L 13  --   --  17   AST U/L 16  --   --  21     Results from last 7 days   Lab Units 06/20/21  1013 06/18/21  0539 06/17/21  1842   INR  2 91* 2 69* 2 60*   PTT seconds 41*  --   --      Telemetry: not on telemetry  Echo: 6/18/21:  LEFT VENTRICLE:  The ventricle was moderately dilated  Systolic function was normal  Ejection fraction was estimated to be 60 %  There were no regional wall motion abnormalities      VENTRICULAR SEPTUM:  There was dyssynergic motion  These changes are consistent with RV volume and pressure overload      LEFT ATRIUM:  The atrium was massively dilated      MITRAL VALVE:  A 27 mm (Gordon Bovine) pericardial bioprosthesis was present  There was possible sewing ring dehiscence, with mitral inflow being directed towards the LVOT  Transmitral velocity and gradient were increased due to stenosis, as well as concomitant increased transaortic flow  There was moderate stenosis  There was trace regurgitation      AORTIC VALVE:  Transaortic velocity was increased due to valvular stenosis  There was mild stenosis  There was mild regurgitation      TRICUSPID VALVE:  There was moderate to severe regurgitation  The findings suggest severe pulmonary hypertension      IVC, HEPATIC VEINS:  The inferior vena cava was markedly dilated    Respirophasic changes were blunted (less than 50% variation)

## 2021-06-20 NOTE — PROGRESS NOTES
Progress Note - Pulmonary   Eduardo Litten 70 y o  female MRN: [id number]  Unit/Bed#: S -01 Encounter: [id number]    Assessment:  1  Shortness of breath  2  Chronic hypoxemic respiratory failure  3  Acute CHF  4  COPD without acute exacerbation  5  Abnormal CT scan of the chest  6  Morbid obesity    Plan:  Patient with pleural based soft tissue mass as well as mediastinal/hilar lymphadenopathy and right pleural effusion concerning for primary lung cancer given extensive smoking history  She remains on her baseline 4 L nasal cannula with sats in the high 90s  Cardiology is following for diuresis, was changed to Bumex yesterday and will evaluate her response to the change in diuretics  She does have stenosis of her bioprosthetic mitral valve, on clear if this will be able to be surgically corrected  Patient would like to pursue a tissue diagnosis even if she would not proceed with any aggressive treatment for a malignancy  Would plan on an IR guided biopsy once stable from a respiratory/cardiac standpoint  EBUS would be more appropriate for a staging though she would be high risk for any anesthesia given her mitral valve stenosis  May benefit from palliative care consult  Will continue to follow  Subjective:   Patient resting in bed, overall feeling well  Has noted improvement overall since admission  Objective:     Vitals: Blood pressure 120/70, pulse 57, temperature 98 1 °F (36 7 °C), temperature source Oral, resp  rate 18, height 5' 3" (1 6 m), weight 107 kg (236 lb 1 8 oz), SpO2 97 %  ,Body mass index is 41 83 kg/m²        Intake/Output Summary (Last 24 hours) at 6/20/2021 1239  Last data filed at 6/20/2021 0900  Gross per 24 hour   Intake 890 ml   Output 1100 ml   Net -210 ml       Invasive Devices     Peripheral Intravenous Line            Peripheral IV 06/17/21 Left Antecubital 2 days                Physical Exam: /70 (BP Location: Left arm)   Pulse 57   Temp 98 1 °F (36 7 °C) (Oral)   Resp 18   Ht 5' 3" (1 6 m)   Wt 107 kg (236 lb 1 8 oz)   SpO2 97%   BMI 41 83 kg/m²   General appearance: alert and oriented, in no acute distress  Head: Normocephalic, without obvious abnormality, atraumatic  Eyes: negative findings: conjunctivae and sclerae normal  Lungs: clear to auscultation bilaterally  Heart: regular rate and rhythm and S1, S2 normal  Abdomen: normal findings: soft, non-tender  Extremities: No edema  Skin: Warm and dry  Neurologic: Mental status: Alert, oriented, thought content appropriate     Labs: I have personally reviewed pertinent lab results  , CBC:   Lab Results   Component Value Date    WBC 6 90 06/20/2021    HGB 8 5 (L) 06/20/2021    HCT 28 6 (L) 06/20/2021    MCV 89 06/20/2021     (H) 06/20/2021    MCH 26 5 (L) 06/20/2021    MCHC 29 7 (L) 06/20/2021    RDW 18 0 (H) 06/20/2021    MPV 9 7 06/20/2021    NRBC 0 06/20/2021   , CMP:   Lab Results   Component Value Date    SODIUM 139 06/20/2021    K 4 0 06/20/2021     06/20/2021    CO2 33 (H) 06/20/2021    BUN 27 (H) 06/20/2021    CREATININE 1 28 06/20/2021    CALCIUM 8 9 06/20/2021    AST 16 06/20/2021    ALT 13 06/20/2021    ALKPHOS 64 06/20/2021    EGFR 42 06/20/2021     Imaging and other studies: I have personally reviewed pertinent reports     and I have personally reviewed pertinent films in PACS

## 2021-06-20 NOTE — QUICK NOTE
Discussed with POA- patient to remain full code  Discussed with patient as well  In addition discussed with both patient and POA, and she would like to discuss with Pulmonology regarding getting a biopsy

## 2021-06-20 NOTE — ASSESSMENT & PLAN NOTE
Presents from nursing facility with subjective worsening of shortness of breath  CT scans finding significant for potential mass verses volume overload versus infection  Chronically wears 4L   Echo in 2020 - EF 50-55%  Diastolic dysfunction with inability to calculate filling pressures due to bioprosthetic MVR  Severe tricuspid regurgitation  At least moderate pulmonary hypertension assuming an RA pressure of 10mmHg  Unclear etiology at this time but likely secondary to volume overload as patient is having orthopnea and she does states she is having increased lower extremity swelling  bumex 2 mg TID - change hold parameteres given that doses were held yesterday  Monitor I/O

## 2021-06-20 NOTE — ASSESSMENT & PLAN NOTE
Lab Results   Component Value Date    EGFR 42 06/20/2021    EGFR 46 06/19/2021    EGFR 55 06/18/2021    CREATININE 1 28 06/20/2021    CREATININE 1 18 06/19/2021    CREATININE 1 01 06/18/2021   CKD stage 2-3 in the setting of HTN and T2DM requiring frequent bmp checks

## 2021-06-20 NOTE — ASSESSMENT & PLAN NOTE
Anticoagulated on warfarin  INR is pending - start heparin gtt  Monitor PT INR daily    Continue digoxin    Given no planned procedure - will initiate gtt

## 2021-06-20 NOTE — PLAN OF CARE
Problem: PAIN - ADULT  Goal: Verbalizes/displays adequate comfort level or baseline comfort level  Description: Interventions:  - Encourage patient to monitor pain and request assistance  - Assess pain using appropriate pain scale  - Administer analgesics based on type and severity of pain and evaluate response  - Implement non-pharmacological measures as appropriate and evaluate response  - Consider cultural and social influences on pain and pain management  - Notify physician/advanced practitioner if interventions unsuccessful or patient reports new pain  Outcome: Progressing     Problem: INFECTION - ADULT  Goal: Absence or prevention of progression during hospitalization  Description: INTERVENTIONS:  - Assess and monitor for signs and symptoms of infection  - Monitor lab/diagnostic results  - Monitor all insertion sites, i e  indwelling lines, tubes, and drains  - Monitor endotracheal if appropriate and nasal secretions for changes in amount and color  - [place] appropriate cooling/warming therapies per order  - Administer medications as ordered  - Instruct and encourage patient and family to use good hand hygiene technique  - Identify and instruct in appropriate isolation precautions for identified infection/condition  Outcome: Progressing  Goal: Absence of fever/infection during neutropenic period  Description: INTERVENTIONS:  - Monitor WBC    Outcome: Progressing     Problem: SAFETY ADULT  Goal: Patient will remain free of falls  Description: INTERVENTIONS:  - Educate patient/family on patient safety including physical limitations  - Instruct patient to call for assistance with activity   - Consult OT/PT to assist with strengthening/mobility   - Keep Call bell within reach  - Keep bed low and locked with side rails adjusted as appropriate  - Keep care items and personal belongings within reach  - Initiate and maintain comfort rounds  - Make Fall Risk Sign visible to staff  - Offer Toileting every  Hours, in advance of need  - Initiate/Maintain alarm  - Obtain necessary fall risk management equipment:   - Apply yellow socks and bracelet for high fall risk patients  - Consider moving patient to room near nurses station  Outcome: Progressing  Goal: Maintain or return to baseline ADL function  Description: INTERVENTIONS:  -  Assess patient's ability to carry out ADLs; assess patient's baseline for ADL function and identify physical deficits which impact ability to perform ADLs (bathing, care of mouth/teeth, toileting, grooming, dressing, etc )  - Assess/evaluate cause of self-care deficits   - Assess range of motion  - Assess patient's mobility; develop plan if impaired  - Assess patient's need for assistive devices and provide as appropriate  - Encourage maximum independence but intervene and supervise when necessary  - Involve family in performance of ADLs  - Assess for home care needs following discharge   - Consider OT consult to assist with ADL evaluation and planning for discharge  - Provide patient education as appropriate  Outcome: Progressing  Goal: Maintains/Returns to pre admission functional level  Description: INTERVENTIONS:  - Perform BMAT or MOVE assessment daily    - Set and communicate daily mobility goal to care team and patient/family/caregiver  - Collaborate with rehabilitation services on mobility goals if consulted  - Perform Range of Motion  times a day  - Reposition patient every  hours    - Dangle patient  times a day  - Stand patient  times a day  - Ambulate patient  times a day  - Out of bed to chair  times a day   - Out of bed for meals  times a day  - Out of bed for toileting  - Record patient progress and toleration of activity level   Outcome: Progressing     Problem: DISCHARGE PLANNING  Goal: Discharge to home or other facility with appropriate resources  Description: INTERVENTIONS:  - Identify barriers to discharge w/patient and caregiver  - Arrange for needed discharge resources and transportation as appropriate  - Identify discharge learning needs (meds, wound care, etc )  - Arrange for interpretive services to assist at discharge as needed  - Refer to Case Management Department for coordinating discharge planning if the patient needs post-hospital services based on physician/advanced practitioner order or complex needs related to functional status, cognitive ability, or social support system  Outcome: Progressing     Problem: Knowledge Deficit  Goal: Patient/family/caregiver demonstrates understanding of disease process, treatment plan, medications, and discharge instructions  Description: Complete learning assessment and assess knowledge base    Interventions:  - Provide teaching at level of understanding  - Provide teaching via preferred learning methods  Outcome: Progressing     Problem: RESPIRATORY - ADULT  Goal: Achieves optimal ventilation and oxygenation  Description: INTERVENTIONS:  - Assess for changes in respiratory status  - Assess for changes in mentation and behavior  - Position to facilitate oxygenation and minimize respiratory effort  - Oxygen administered by appropriate delivery if ordered  - Initiate smoking cessation education as indicated  - Encourage broncho-pulmonary hygiene including cough, deep breathe, Incentive Spirometry  - Assess the need for suctioning and aspirate as needed  - Assess and instruct to report SOB or any respiratory difficulty  - Respiratory Therapy support as indicated  Outcome: Progressing     Problem: METABOLIC, FLUID AND ELECTROLYTES - ADULT  Goal: Fluid balance maintained  Description: INTERVENTIONS:  - Monitor labs   - Monitor I/O and WT  - Instruct patient on fluid and nutrition as appropriate  - Assess for signs & symptoms of volume excess or deficit  Outcome: Progressing  Goal: Glucose maintained within target range  Description: INTERVENTIONS:  - Monitor Blood Glucose as ordered  - Assess for signs and symptoms of hyperglycemia and hypoglycemia  - Administer ordered medications to maintain glucose within target range  - Assess nutritional intake and initiate nutrition service referral as needed  Outcome: Progressing     Problem: SKIN/TISSUE INTEGRITY - ADULT  Goal: Skin Integrity remains intact(Skin Breakdown Prevention)  Description: Assess:  -Perform Jevon assessment every   -Clean and moisturize skin every   -Inspect skin when repositioning, toileting, and assisting with ADLS  -Assess under medical devices such as  every   -Assess extremities for adequate circulation and sensation     Bed Management:  -Have minimal linens on bed & keep smooth, unwrinkled  -Change linens as needed when moist or perspiring  -Avoid sitting or lying in one position for more than  hours while in bed  -Keep HOB at degrees     Toileting:  -Offer bedside commode  -Assess for incontinence every   -Use incontinent care products after each incontinent episode such as     Activity:  -Mobilize patient  times a day  -Encourage activity and walks on unit  -Encourage or provide ROM exercises   -Turn and reposition patient every  Hours  -Use appropriate equipment to lift or move patient in bed  -Instruct/ Assist with weight shifting every  when out of bed in chair  -Consider limitation of chair time  hour intervals    Skin Care:  -Avoid use of baby powder, tape, friction and shearing, hot water or constrictive clothing  -Relieve pressure over bony prominences using   -Do not massage red bony areas    Next Steps:  -Teach patient strategies to minimize risks such as    -Consider consults to  interdisciplinary teams such as   Outcome: Progressing  Goal: Incision(s), wounds(s) or drain site(s) healing without S/S of infection  Description: INTERVENTIONS  - Assess and document dressing, incision, wound bed, drain sites and surrounding tissue  - Provide patient and family education  - Perform skin care/dressing changes every   Outcome: Progressing  Goal: Pressure injury heals and does not worsen  Description: Interventions:  - Implement low air loss mattress or specialty surface (Criteria met)  - Apply silicone foam dressing  - Instruct/assist with weight shifting every  minutes when in chair   - Limit chair time to  hour intervals  - Use special pressure reducing interventions such as  when in chair   - Apply fecal or urinary incontinence containment device   - Perform passive or active ROM every   - Turn and reposition patient & offload bony prominences every  hours   - Utilize friction reducing device or surface for transfers   - Consider consults to  interdisciplinary teams such as   - Use incontinent care products after each incontinent episode such as   - Consider nutrition services referral as needed  Outcome: Progressing     Problem: MOBILITY - ADULT  Goal: Maintain or return to baseline ADL function  Description: INTERVENTIONS:  -  Assess patient's ability to carry out ADLs; assess patient's baseline for ADL function and identify physical deficits which impact ability to perform ADLs (bathing, care of mouth/teeth, toileting, grooming, dressing, etc )  - Assess/evaluate cause of self-care deficits   - Assess range of motion  - Assess patient's mobility; develop plan if impaired  - Assess patient's need for assistive devices and provide as appropriate  - Encourage maximum independence but intervene and supervise when necessary  - Involve family in performance of ADLs  - Assess for home care needs following discharge   - Consider OT consult to assist with ADL evaluation and planning for discharge  - Provide patient education as appropriate  Outcome: Progressing  Goal: Maintains/Returns to pre admission functional level  Description: INTERVENTIONS:  - Perform BMAT or MOVE assessment daily    - Set and communicate daily mobility goal to care team and patient/family/caregiver     - Collaborate with rehabilitation services on mobility goals if consulted  - Perform Range of Motion  times a day   - Reposition patient every  hours  - Dangle patient  times a day  - Stand patient  times a day  - Ambulate patient  times a day  - Out of bed to chair  times a day   - Out of bed for meals  times a day  - Out of bed for toileting  - Record patient progress and toleration of activity level   Outcome: Progressing     Problem: Nutrition/Hydration-ADULT  Goal: Nutrient/Hydration intake appropriate for improving, restoring or maintaining nutritional needs  Description: Monitor and assess patient's nutrition/hydration status for malnutrition  Collaborate with interdisciplinary team and initiate plan and interventions as ordered  Monitor patient's weight and dietary intake as ordered or per policy  Utilize nutrition screening tool and intervene as necessary  Determine patient's food preferences and provide high-protein, high-caloric foods as appropriate       INTERVENTIONS:  - Monitor oral intake, urinary output, labs, and treatment plans  - Assess nutrition and hydration status and recommend course of action  - Evaluate amount of meals eaten  - Assist patient with eating if necessary   - Allow adequate time for meals  - Recommend/ encourage appropriate diets, oral nutritional supplements, and vitamin/mineral supplements  - Order, calculate, and assess calorie counts as needed  - Recommend, monitor, and adjust tube feedings and TPN/PPN based on assessed needs  - Assess need for intravenous fluids  - Provide specific nutrition/hydration education as appropriate  - Include patient/family/caregiver in decisions related to nutrition  Outcome: Progressing     Problem: Prexisting or High Potential for Compromised Skin Integrity  Goal: Skin integrity is maintained or improved  Description: INTERVENTIONS:  - Identify patients at risk for skin breakdown  - Assess and monitor skin integrity  - Assess and monitor nutrition and hydration status  - Monitor labs   - Assess for incontinence   - Turn and reposition patient  - Assist with mobility/ambulation  - Relieve pressure over bony prominences  - Avoid friction and shearing  - Provide appropriate hygiene as needed including keeping skin clean and dry  - Evaluate need for skin moisturizer/barrier cream  - Collaborate with interdisciplinary team   - Patient/family teaching  - Consider wound care consult   Outcome: Progressing

## 2021-06-21 LAB
GLUCOSE SERPL-MCNC: 106 MG/DL (ref 65–140)
GLUCOSE SERPL-MCNC: 118 MG/DL (ref 65–140)
GLUCOSE SERPL-MCNC: 129 MG/DL (ref 65–140)
GLUCOSE SERPL-MCNC: 150 MG/DL (ref 65–140)

## 2021-06-21 PROCEDURE — 99232 SBSQ HOSP IP/OBS MODERATE 35: CPT | Performed by: INTERNAL MEDICINE

## 2021-06-21 PROCEDURE — 99222 1ST HOSP IP/OBS MODERATE 55: CPT | Performed by: NURSE PRACTITIONER

## 2021-06-21 PROCEDURE — 82948 REAGENT STRIP/BLOOD GLUCOSE: CPT

## 2021-06-21 RX ORDER — ACETAMINOPHEN 325 MG/1
975 TABLET ORAL EVERY 8 HOURS SCHEDULED
Status: DISCONTINUED | OUTPATIENT
Start: 2021-06-21 | End: 2021-07-05 | Stop reason: HOSPADM

## 2021-06-21 RX ORDER — LORAZEPAM 0.5 MG/1
0.25 TABLET ORAL EVERY 6 HOURS PRN
Status: DISCONTINUED | OUTPATIENT
Start: 2021-06-21 | End: 2021-07-05 | Stop reason: HOSPADM

## 2021-06-21 RX ADMIN — DIGOXIN 125 MCG: 125 TABLET ORAL at 09:40

## 2021-06-21 RX ADMIN — MONTELUKAST 10 MG: 10 TABLET, FILM COATED ORAL at 09:40

## 2021-06-21 RX ADMIN — PANTOPRAZOLE SODIUM 40 MG: 40 TABLET, DELAYED RELEASE ORAL at 05:14

## 2021-06-21 RX ADMIN — POTASSIUM CHLORIDE 20 MEQ: 1500 TABLET, EXTENDED RELEASE ORAL at 09:41

## 2021-06-21 RX ADMIN — BUMETANIDE 2 MG: 0.25 INJECTION INTRAMUSCULAR; INTRAVENOUS at 18:14

## 2021-06-21 RX ADMIN — INSULIN LISPRO 1 UNITS: 100 INJECTION, SOLUTION INTRAVENOUS; SUBCUTANEOUS at 21:02

## 2021-06-21 RX ADMIN — DICLOFENAC SODIUM 2 G: 10 GEL TOPICAL at 11:26

## 2021-06-21 RX ADMIN — FLUTICASONE FUROATE AND VILANTEROL TRIFENATATE 1 PUFF: 100; 25 POWDER RESPIRATORY (INHALATION) at 09:41

## 2021-06-21 RX ADMIN — POTASSIUM CHLORIDE 20 MEQ: 1500 TABLET, EXTENDED RELEASE ORAL at 18:09

## 2021-06-21 RX ADMIN — FERROUS SULFATE TAB 325 MG (65 MG ELEMENTAL FE) 325 MG: 325 (65 FE) TAB at 09:40

## 2021-06-21 RX ADMIN — ACETAMINOPHEN 975 MG: 325 TABLET, FILM COATED ORAL at 13:34

## 2021-06-21 RX ADMIN — DICLOFENAC SODIUM 2 G: 10 GEL TOPICAL at 18:22

## 2021-06-21 RX ADMIN — ACETAMINOPHEN 975 MG: 325 TABLET, FILM COATED ORAL at 21:02

## 2021-06-21 RX ADMIN — LIDOCAINE 5% 1 PATCH: 700 PATCH TOPICAL at 09:41

## 2021-06-21 RX ADMIN — BUMETANIDE 2 MG: 0.25 INJECTION INTRAMUSCULAR; INTRAVENOUS at 11:27

## 2021-06-21 RX ADMIN — DICLOFENAC SODIUM 2 G: 10 GEL TOPICAL at 21:01

## 2021-06-21 NOTE — ASSESSMENT & PLAN NOTE
Lab Results   Component Value Date    EGFR 42 06/20/2021    EGFR 46 06/19/2021    EGFR 55 06/18/2021    CREATININE 1 28 06/20/2021    CREATININE 1 18 06/19/2021    CREATININE 1 01 06/18/2021   CKD stage 2-3 in the setting of HTN and T2DM requiring frequent bmp checks  Check creatinine tomorrow  Yesterday was trending up

## 2021-06-21 NOTE — CONSULTS
Interventional Radiology  Consultation 6/21/2021     Consults  Zachary Ashley   1945   [id number]      Assessment/Plan:  72-year-old female with recently identified right upper lung mass and mediastinal lymphadenopathy  Biopsy consult requested  Patient was earlier consulted for the same mass  Recommendation was made for EBUS to appropriately stage given the mediastinal lymphadenopathy  However due to patient's clinical condition including cardiac status this is not feasible currently   This is a technically feasible biopsy due to the location of the mass in the right upper lung anteriorly  However , patient has concerns for lying flat  during procedure, elevated INR and  concern for overall prognosis per clinical team which need to be addressed prior to performing a biopsy  CT-guided biopsy can be performed once the concerns are addressed and can be scheduled as such  This was discussed with Marivel Butts by [facility]        Medical Problems     Problem List     * (Principal) Shortness of breath    COPD (chronic obstructive pulmonary disease) (HCC)    GI bleed    Chronic atrial fibrillation (HCC)    Type 2 diabetes mellitus (HCC)     Lab Results   Component Value Date    HGBA1C 4 5 03/15/2021       Recent Labs     06/20/21  1109 06/20/21  1502 06/21/21  0733 06/21/21  1059   POCGLU 119 133 106 118       Blood Sugar Average: Last 72 hrs:  (P) 921 [id number]             Chronic diastolic congestive heart failure (HCC)     Wt Readings from Last 3 Encounters:   06/21/21 110 kg (241 lb 6 5 oz)   04/16/21 116 kg (255 lb)   03/08/21 113 kg (250 lb)                      Morbid obesity (HCC)    Stasis edema of both lower extremities    CKD (chronic kidney disease)     Lab Results   Component Value Date    EGFR 42 06/20/2021    EGFR 46 06/19/2021    EGFR 55 06/18/2021    CREATININE 1 28 06/20/2021    CREATININE 1 18 06/19/2021    CREATININE 1 01 06/18/2021 Subjective:     Patient ID: Cecil Batres is a 70 y o  female  History of Present Illness  Right upper lung mass  Mediastinal lymphadenopathy      Review of Systems      Past Medical History:   Diagnosis Date    [facility]rial fibrillation ([facility] )     COPD (chronic obstructive pulmonary disease) ([facility] )     Diabetes mellitus ([facility] )     Hypertension     Respiratory failure ([facility] )         Past Surgical History:   Procedure Laterality Date    COLONOSCOPY      MITRAL VALVE REPLACEMENT  2012    Bovine        Social History     Tobacco Use   Smoking Status Former Smoker    Quit date: 2010    Years since quittin 4   Smokeless Tobacco Never Used        Social History     Substance and Sexual Activity   Alcohol Use Not Currently        Social History     Substance and Sexual Activity   Drug Use Not Currently        Allergies   Allergen Reactions    Augmentin [Amoxicillin-Pot Clavulanate] Anaphylaxis    Levaquin [Levofloxacin] Anaphylaxis       Current Facility-Administered Medications   Medication Dose Route Frequency Provider Last Rate Last Admin    acetaminophen (TYLENOL) tablet 975 mg  975 mg Oral Q8H [address] Jennifer P Bloch, CRNP        albuterol (PROVENTIL HFA,VENTOLIN HFA) inhaler 2 puff  2 puff Inhalation Q6H PRN Manolo Navarrete PA-C        bumetanide (BUMEX) injection 2 mg  2 mg Intravenous TID (diuretic) Jorden Vargas MD   2 mg at 21 1127    Diclofenac Sodium (VOLTAREN) 1 % topical gel 2 g  2 g Topical 4x Daily DAVID Bland   2 g at 21 1126    digoxin (LANOXIN) tablet 125 mcg  125 mcg Oral Daily Manolo Navarrete PA-C   125 mcg at 21 0940    ferrous sulfate tablet 325 mg  325 mg Oral Daily With Breakfast Manolo Navarrete PA-C   325 mg at 21 0940    fluticasone-vilanterol (BREO ELLIPTA) 100-25 mcg/inh inhaler 1 puff  1 puff Inhalation Daily Valentine Castro PA-C   1 puff at 21 0941    insulin lispro (HumaLOG) 100 units/mL subcutaneous injection 1-5 Units 1-5 Units Subcutaneous TID AC Manolo Navarrete PA-C   1 Units at 06/19/21 1832    insulin lispro (HumaLOG) 100 units/mL subcutaneous injection 1-5 Units  1-5 Units Subcutaneous HS Manolo Navarrete PA-C        ipratropium-albuterol (DUO-NEB) 0 5-2 5 mg/3 mL inhalation solution 3 mL  3 mL Nebulization 4x Daily PRN Shavon Urbina PA-C        lidocaine (LIDODERM) 5 % patch 1 patch  1 patch Topical Daily Esdras De Guzman PA-C   1 patch at 06/21/21 0941    LORazepam (ATIVAN) tablet 0 25 mg  0 25 mg Oral Q6H PRN DAVID Fletcher        montelukast (SINGULAIR) tablet 10 mg  10 mg Oral Daily Manolo Navarrete PA-C   10 mg at 06/21/21 0940    pantoprazole (PROTONIX) EC tablet 40 mg  40 mg Oral Early Morning Manolo Navarrete PA-C   40 mg at 06/21/21 0514    potassium chloride (K-DUR,KLOR-CON) CR tablet 20 mEq  20 mEq Oral BID Manolo Navarrete PA-C   20 mEq at 06/21/21 0941    senna-docusate sodium (SENOKOT S) 8 6-50 mg per tablet 1 tablet  1 tablet Oral Daily PRN Manolo Navarrete PA-C        sodium chloride (OCEAN) 0 65 % nasal spray 1 spray  1 spray Each Nare Q1H PRN DAVID Tavarez   1 spray at 06/19/21 2149          Objective:    Vitals:    06/20/21 1800 06/21/21 0600 06/21/21 0731 06/21/21 1128   BP: 92/52 97/57 108/56 109/68   BP Location: Right arm Left arm Left arm    Pulse: 61  69    Resp:   18    Temp:   97 7 °F (36 5 °C)    TempSrc:   Oral    SpO2: 93%  95%    Weight:  110 kg (241 lb 6 5 oz)     Height:            Physical Exam      Lab Results   Component Value Date     9 (H) 03/08/2021      Lab Results   Component Value Date    WBC 6 90 06/20/2021    HGB 8 5 (L) 06/20/2021    HCT 28 6 (L) 06/20/2021    MCV 89 06/20/2021     (H) 06/20/2021     Lab Results   Component Value Date    INR 2 91 (H) 06/20/2021    INR 2 69 (H) 06/18/2021    INR 2 60 (H) 06/17/2021    PROTIME 30 4 (H) 06/20/2021    PROTIME 28 7 (H) 06/18/2021    PROTIME 27 9 (H) 06/17/2021     Lab Results   Component Value Date    PTT 41 (H) 06/20/2021         I have personally reviewed pertinent imaging and laboratory results  Code Status: Level 1 - Full Code  Advance Directive and Living Will:      Power of :    POLST:      IR has been consulted to evaluate the patient, determine the appropriate procedure, and whether or not a procedure can and should be performed  Thank you for allowing me to participate in the care of Ton Ovalle  Please don't hesitate to call, text, email, or TigerText with any questions  This text is generated with voice recognition software  There may be translation, syntax,  or grammatical errors  If you have any questions, please contact the dictating provider

## 2021-06-21 NOTE — ASSESSMENT & PLAN NOTE
Anticoagulated on warfarin  With history of appendage thrombus  INR is 2 9  Goal 2 5-3 5  If below 2 5 needs gtt  Monitor PT INR daily  Continue digoxin    Now IR are considering gtt again - will hold coumadin depending on planned procedure

## 2021-06-21 NOTE — ASSESSMENT & PLAN NOTE
Presents from nursing facility with subjective worsening of shortness of breath  CT scans finding significant for potential mass verses volume overload versus infection  Chronically wears 4L   Echo in 2020 - EF 50-55%  Diastolic dysfunction with inability to calculate filling pressures due to bioprosthetic MVR  Severe tricuspid regurgitation  At least moderate pulmonary hypertension assuming an RA pressure of 10mmHg  On IV bumex TID however does not appear to have diuresed much over last 24 hours  Will follow up on cardiology recs

## 2021-06-21 NOTE — ASSESSMENT & PLAN NOTE
Lab Results   Component Value Date    HGBA1C 4 5 03/15/2021       Recent Labs     06/20/21  1502 06/21/21  0733 06/21/21  1059 06/21/21  1640   POCGLU 133 106 118 129       Blood Sugar Average: Last 72 hrs:  (P) 121 [id number]   · Hold oral antihyperglycemics    · Start sliding scale insulin  · accuchecks

## 2021-06-21 NOTE — PROGRESS NOTES
[facility]  Progress Note - Ton Ovalle 1945, 70 y o  female MRN: [id number]  Unit/Bed#: S -01 Encounter: [id number]  Primary Care Provider: Moses Hoover MD   Date and time admitted to hospital: 6/17/2021  5:52 PM    CKD (chronic kidney disease)  Assessment & Plan  Lab Results   Component Value Date    EGFR 42 06/20/2021    EGFR 46 06/19/2021    EGFR 55 06/18/2021    CREATININE 1 28 06/20/2021    CREATININE 1 18 06/19/2021    CREATININE 1 01 06/18/2021   CKD stage 2-3 in the setting of HTN and T2DM requiring frequent bmp checks  Check creatinine tomorrow  Yesterday was trending up  Morbid obesity ([place] [address] )  Assessment & Plan  Diet lifestyle modifications  Counseled on weight loss  Noted pulmonary hypertension on prior echo  This is likely contributing to patient's symptomatology    Type 2 diabetes mellitus [facility])  Assessment & Plan  Lab Results   Component Value Date    HGBA1C 4 5 03/15/2021       Recent Labs     06/20/21  1502 06/21/21  0733 06/21/21  1059 06/21/21  1640   POCGLU 133 106 118 129       Blood Sugar Average: Last 72 hrs:  (P) 121 [id number]   · Hold oral antihyperglycemics  · Start sliding scale insulin  · accuchecks     Chronic atrial fibrillation ([place] [address] )  Assessment & Plan  Anticoagulated on warfarin  With history of appendage thrombus  INR is 2 9  Goal 2 5-3 5  If below 2 5 needs gtt  Monitor PT INR daily  Continue digoxin    Now IR are considering gtt again - will hold coumadin depending on planned procedure  COPD (chronic obstructive pulmonary disease) ([place] [id number] )  Assessment & Plan  Does not appear to be in acute exacerbation at this time  Continue home inhalers  This could be contributing to patient's shortness of breath    * Shortness of breath  Assessment & Plan  Presents from nursing facility with subjective worsening of shortness of breath    CT scans finding significant for potential mass verses volume overload versus infection  Chronically wears 4L   Echo in 2020 - EF 50-55%  Diastolic dysfunction with inability to calculate filling pressures due to bioprosthetic MVR  Severe tricuspid regurgitation  At least moderate pulmonary hypertension assuming an RA pressure of 10mmHg  On IV bumex TID however does not appear to have diuresed much over last 24 hours  Will follow up on cardiology recs   VTE Pharmacologic Prophylaxis:   Pharmacologic: Heparin  Mechanical VTE Prophylaxis in Place: Yes    Patient Centered Rounds: I have performed bedside rounds with nursing staff today  Discussions with Specialists or Other Care Team Provider:  Discussed with cardiology and pulmonology    Education and Discussions with Family / Patient:  Discussed with the patient    Time Spent for Care: 30 minutes  More than 50% of total time spent on counseling and coordination of care as described above  Current Length of Stay: 3 day(s)    Current Patient Status: Inpatient   Certification Statement: The patient will continue to require additional inpatient hospital stay due to Determination proceedings with regard to biopsy , awaiting palliative Care, attempting to optimize from volume standpoint    Discharge Plan:  Not medically stable for discharge    Code Status: Level 1 - Full Code      Subjective:   Patient seen and examined  No new symptoms  Feeling "okay"    Objective:     Vitals:   Temp (24hrs), Av 8 °F (36 6 °C), Min:97 7 °F (36 5 °C), Max:97 9 °F (36 6 °C)    Temp:  [97 7 °F (36 5 °C)-97 9 °F (36 6 °C)] 97 9 °F (36 6 °C)  HR:  [55-69] 55  Resp:  [18] 18  BP: ()/(52-68) 92/52  SpO2:  [93 %-99 %] 99 %  Body mass index is 42 76 kg/m²  Input and Output Summary (last 24 hours):        Intake/Output Summary (Last 24 hours) at 2021 1715  Last data filed at 2021 1338  Gross per 24 hour   Intake 240 ml   Output 1100 ml   Net -860 ml       Physical Exam:     Physical Exam  Constitutional:       General: She is not in acute distress  Appearance: She is not ill-appearing, toxic-appearing or diaphoretic  HENT:      Head: Normocephalic  Nose: Nose normal       Mouth/Throat:      Mouth: Mucous membranes are moist    Eyes:      General: No scleral icterus  Right eye: No discharge  Left eye: No discharge  Pupils: Pupils are equal, round, and reactive to light  Cardiovascular:      Rate and Rhythm: Normal rate  Heart sounds: No murmur heard  No friction rub  No gallop  Pulmonary:      Effort: Pulmonary effort is normal  No respiratory distress  Breath sounds: No stridor  No wheezing, rhonchi or rales  Chest:      Chest wall: No tenderness  Abdominal:      General: There is no distension  Palpations: There is no mass  Tenderness: There is no abdominal tenderness  There is no left CVA tenderness, guarding or rebound  Musculoskeletal:         General: No swelling, tenderness or deformity  Right lower leg: No edema  Skin:     Capillary Refill: Capillary refill takes less than 2 seconds  Coloration: Skin is pale  Skin is not jaundiced  Findings: No bruising, erythema or lesion  Neurological:      General: No focal deficit present  Cranial Nerves: No cranial nerve deficit  Sensory: No sensory deficit  Motor: No weakness        Coordination: Coordination normal       Gait: Gait normal       Deep Tendon Reflexes: Reflexes normal    Psychiatric:         Mood and Affect: Mood normal            Additional Data:     Labs:    Results from last 7 days   Lab Units 06/20/21  1013   WBC Thousand/uL 6 90   HEMOGLOBIN g/dL 8 5*   HEMATOCRIT % 28 6*   PLATELETS Thousands/uL 393*   NEUTROS PCT % 70   LYMPHS PCT % 14   MONOS PCT % 8   EOS PCT % 6     Results from last 7 days   Lab Units 06/20/21  1013   SODIUM mmol/L 139   POTASSIUM mmol/L 4 0   CHLORIDE mmol/L 101   CO2 mmol/L 33*   BUN mg/dL 27*   CREATININE mg/dL 1 28   ANION GAP mmol/L 5   CALCIUM mg/dL 8  9   ALBUMIN g/dL 2 6*   TOTAL BILIRUBIN mg/dL 0 38   ALK PHOS U/L 64   ALT U/L 13   AST U/L 16   GLUCOSE RANDOM mg/dL 103     Results from last 7 days   Lab Units 06/20/21  1013   INR  2 91*     Results from last 7 days   Lab Units 06/21/21  1640 06/21/21  1059 06/21/21  0733 06/20/21  1502 06/20/21  1109 06/20/21  0751 06/19/21  2111 06/19/21  1533 06/19/21  1122 06/19/21  0821 06/18/21  2109 06/18/21  1559   POC GLUCOSE mg/dl 129 118 106 133 119 96 131 175* 127 88 144* 102         Results from last 7 days   Lab Units 06/19/21  0551 06/17/21  2327   PROCALCITONIN ng/ml <0 05 <0 05           * I Have Reviewed All Lab Data Listed Above  * Additional Pertinent Lab Tests Reviewed:  Zoila Casey Admission Reviewed    Imaging:    Imaging Reports Reviewed Today Include:  None pertinent to this encounter  Imaging Personally Reviewed by Myself Includes:  As above    Recent Cultures (last 7 days):           Last 24 Hours Medication List:   Current Facility-Administered Medications   Medication Dose Route Frequency Provider Last Rate    acetaminophen  975 mg Oral Q8H [address] DAVID Bynum      albuterol  2 puff Inhalation Q6H PRN Manolo Navarrete PA-C      bumetanide  2 mg Intravenous TID (diuretic) Charity Jay MD      Diclofenac Sodium  2 g Topical 4x Daily DAVID Hidalgo      digoxin  125 mcg Oral Daily Manolo Navarrete PA-C      ferrous sulfate  325 mg Oral Daily With Breakfast Manolo Navarrete PA-C      fluticasone-vilanterol  1 puff Inhalation Daily [place], [place]      insulin lispro  1-5 Units Subcutaneous TID AC Manolo Navarrete PA-C      insulin lispro  1-5 Units Subcutaneous HS Manolo Navarrete PA-C      ipratropium-albuterol  3 mL Nebulization 4x Daily PRN LOPEZ Jo      lidocaine  1 patch Topical Daily Esdrasalli Rosa PA-C      LORazepam  0 25 mg Oral Q6H PRN DAVID Bynum      montelukast  10 mg Oral Daily Manolo Navarrete PA-C      pantoprazole 40 mg Oral Early Morning Manolo Navarrete PA-C      potassium chloride  20 mEq Oral BID Zandra Castano PA-C      senna-docusate sodium  1 tablet Oral Daily PRN Zandra Castano PA-C      sodium chloride  1 spray Each Nare Q1H PRN DAVID Hicks          Today, Patient Was Seen By: Jen Yang MD    ** Please Note: Dictation voice to text software may have been used in the creation of this document   **

## 2021-06-21 NOTE — PROGRESS NOTES
Progress Note - Pulmonary   Austin Zaragoza 70 y o  female MRN: [id number]  Unit/Bed#: S -01 Encounter: [id number]    Assessment/Plan:    Abnormal CT chest with 3 cm RUL lung mass and adenopathy   IR consult noted by Dr Jasmina Martinez  D/W IR physician today and formally re-consulted  Respiratory status has improved since admission  EBUS would be high-risk given severe valvular disease  Lesion is amenable to IR biopsy; however, does not lend to staging  INR will need to be below 1 5 and patient will need to lie flat for procedure  Patient refuses PET scan at present  Palliative care consult is pending given new lung mass and multiple comorbidities  Pending this consultation, biopsy plans can subsequently be finalized  Acute on chronic diastolic CHF with bioprosthetic mitral stenosis   Diuresis per Cardiology  Monitor I/Os and daily weights  Permanent atrial fibrillation with left atrial appendage thrombus and Coumadin coagulopathy   Digoxin for rate control per Cardiology  Coumadin is held at present  INR 2 91  Will need to hold with heparin bridge per primary team/Cardiology for biopsy when able to coordinate  Acute pulmonary insufficiency on chronic hypoxic respiratory failure due to acute CHF and lung mass   Baseline oxygen requirement is 4L NC  Titrate oxygen to maintain POX > or = 89%  OOB as tolerated  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   Quit 9 years ago  50+ pack year history  Maintains on Symbicort and albuterol as outpatient  Continue Breo and albuterol PRN as inpatient  Outpatient follow-up pending course  Spoke with primary team and IR  Chief Complaint:    "I feel okay "    Subjective:    Victor Hugo Chinchilla is sitting up in bed  She reports she feels okay  She has no specific complaints at present  Objective:    Vitals: Blood pressure 108/56, pulse 69, temperature 97 7 °F (36 5 °C), temperature source Oral, resp   rate 18, height 5' 3" (1 6 m), weight 110 kg (241 lb 6 5 oz), SpO2 95 %  4L NC,Body mass index is 42 76 kg/m²  Intake/Output Summary (Last 24 hours) at 6/21/2021 1030  Last data filed at 6/20/2021 1911  Gross per 24 hour   Intake 600 ml   Output 300 ml   Net 300 ml       Invasive Devices     Peripheral Intravenous Line            Peripheral IV 06/21/21 Left;Ventral (anterior) Forearm <1 day          Drain            External Urinary Catheter <1 day                Physical Exam:     Physical Exam  Vitals reviewed  Constitutional:       General: She is not in acute distress  Appearance: She is well-developed  She is not toxic-appearing or diaphoretic  Interventions: Nasal cannula in place  HENT:      Head: Normocephalic and atraumatic  Eyes:      General: No scleral icterus  Neck:      Trachea: No tracheal deviation  Cardiovascular:      Rate and Rhythm: Normal rate and regular rhythm  Heart sounds: S1 normal and S2 normal    Pulmonary:      Effort: Pulmonary effort is normal  No tachypnea, accessory muscle usage or respiratory distress  Breath sounds: No stridor  Decreased breath sounds present  No wheezing, rhonchi or rales  Chest:      Chest wall: No tenderness  Abdominal:      General: Bowel sounds are normal  There is no distension  Palpations: Abdomen is soft  Tenderness: There is no abdominal tenderness  There is no guarding or rebound  Musculoskeletal:      Cervical back: Neck supple  Skin:     General: Skin is warm and dry  Findings: No rash  Neurological:      Mental Status: She is alert and oriented to person, place, and time  GCS: GCS eye subscore is 4  GCS verbal subscore is 5  GCS motor subscore is 6  Psychiatric:         Speech: Speech normal          Behavior: Behavior normal  Behavior is cooperative  Labs: None new    Imaging and other studies: CT chest shows RUL 3 cm lesion

## 2021-06-21 NOTE — CONSULTS
Consultation - Palliative and Supportive Care   Keagan Whitman 70 y o  female [id number]    Assessment  · Palliative care encounter  · Goals of care discussion  · Anxiety  · Pain  · Lung mass  · Debility  · CKD  · DMII  · Obesity  · Chronic a-fib  · COPD  · SOB    Plan:  1  Symptom management  Pain   · Start acetaminophen 975 mg PO Q8H scheduled  · voltaren topical gel QID  · lidoderm patch    Anxiety related to health  · Start Lorazepam 0 25mg Q6H PRN    Constipation  · senokot QD PRN     2  Goals - level 1 full code  · Code status confirmed today  · Patient believes she can continue to get better and stronger  · Agreeable to lung biopsy  · Disease focused care  Will continue discussions regarding [address] as patient's clinical presentation evolves  Code Status: full code with no limits on care - Level 1   Decisional apparatus:  Patient is competent on my exam today  If competence is lost, patient's substitute decision maker would default to her roommate Edmond Costa who holds POA status  Advance Directive / Living Will / POLST: none on file however patient has completed advanced directive and will provide a copy to the hospital     3  Social support  "terrified and alone but I have a strong spirit and will get better"  Offered facetime with family however patient declined  Offered spiritual care consult however patient declined  I have reviewed the patient's controlled substance dispensing history in the Prescription Drug Monitoring Program in compliance with the [facility] regulations before prescribing any controlled substances  We appreciate the invitation to be involved in this patient's care  Please do not hesitate to reach our on call provider through our clinic answering service at  should you have acute symptom control concerns  DAVID Wang  Palliative and Supportive Care  Clinic/Answering Service: [phone]  You can find me on [employer]! IDENTIFICATION:  Inpatient consult to Palliative Care  Consult performed by: DAVID Smith  Consult ordered by: David Weinstein MD        Physician Requesting Consult: David Weinstein MD  Reason for Consult / Principal Problem: goals of care  Hx and PE limited by: N/A    HISTORY OF PRESENT ILLNESS:       Mel Burch is a 70 y o  female with COPD (on 4L O2 at baseline), CHF, DM2, HTN, A fib, [facility] with dyspnea progressive over several days and increased LE edema  CT on admission demonstrated RUL mass and pleural effusion  Of note, patient has a 50 year pack year history  Cardiology was consulted and started IV bumex  Pulmonology was consulted and recommended biopsy of lung mass for pathology  Coumadin is being held for this procedure  Of note, patient has  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  Palliative care consulted for assistance with goals of care  Patient was residing at [address] in [place] prior to admission for STR  Prior to that was living with her friend, Lisette Friend, in an apartment  Lisette Friend is also reportedly POA  Struggling with anxiety about her health   "just can't believe I am here, I feel alone and isolated and I am struggling with what will happen " Does want to proceed with work up of lung mass however anxiety is "getting the best of me " Agreeable to trying to ativan  Has right shoulder pain  Pain is not bad until she moves  Using a heating pad and lidocaine gel and patch but only minimally helpful  Discussed code status  Currently a level 1  Explained CPR and intubation in depth and discussed likelihood of survival   At this time patient wishes to remain a level 1 requesting full care with no limits on care  She is optimistic she can get strong and eventually go home "strong as when I left "     Review of Systems   All other systems reviewed and are negative        Past Medical History:   Diagnosis Date    Atrial fibrillation (HCC)     COPD (chronic obstructive pulmonary disease) (HCC)     Diabetes mellitus ([facility] )     Hypertension     Respiratory failure ([facility] )      Past Surgical History:   Procedure Laterality Date    COLONOSCOPY      MITRAL VALVE REPLACEMENT  2012    [place]     Social History     Socioeconomic History    Marital status: Single     Spouse name: Not on file    Number of children: Not on file    Years of education: Not on file    Highest education level: Not on file   Occupational History    Not on file   Tobacco Use    Smoking status: Former Smoker     Quit date: 2010     Years since quittin 4    Smokeless tobacco: Never Used   Vaping Use    Vaping Use: Former   Substance and Sexual Activity    Alcohol use: Not Currently    Drug use: Not Currently    Sexual activity: Not Currently   Other Topics Concern    Not on file   Social History Narrative    Not on file     Social Determinants of Health     Financial Resource Strain:     Difficulty of Paying Living Expenses:    Food Insecurity:     Worried About Running Out of Food in the Last Year:     Ran Out of Food in the Last Year:    Transportation Needs:     Lack of Transportation (Medical):  Lack of Transportation (Non-Medical):    Physical Activity:     Days of Exercise per Week:     Minutes of Exercise per Session:    Stress:     Feeling of Stress :    Social Connections:     Frequency of Communication with Friends and Family:     Frequency of Social Gatherings with Friends and Family:     Attends Synagogue Services:     Active Member of Clubs or Organizations:     Attends Club or Organization Meetings:     Marital Status:    Intimate Partner Violence:     Fear of Current or Ex-Partner:     Emotionally Abused:     Physically Abused:     Sexually Abused:      History reviewed  No pertinent family history      MEDICATIONS / ALLERGIES:    all current active meds have been reviewed    Allergies   Allergen Reactions    Augmentin [Amoxicillin-Pot Clavulanate] Anaphylaxis    Levaquin [Levofloxacin] Anaphylaxis       OBJECTIVE:    Physical Exam  Physical Exam  Constitutional:       Appearance: She is obese  HENT:      Head: Normocephalic and atraumatic  Mouth/Throat:      Mouth: Mucous membranes are moist       Pharynx: Oropharynx is clear  Eyes:      Extraocular Movements: Extraocular movements intact  Conjunctiva/sclera: Conjunctivae normal       Pupils: Pupils are equal, round, and reactive to light  Cardiovascular:      Rate and Rhythm: Normal rate  Pulmonary:      Effort: Pulmonary effort is normal    Abdominal:      Palpations: Abdomen is soft  Musculoskeletal:         General: Normal range of motion  Cervical back: Normal range of motion  Skin:     General: Skin is warm and dry  Psychiatric:      Comments: Anxious, tearful at times         Lab Results:   I have personally reviewed pertinent labs  , CBC:   Lab Results   Component Value Date    WBC 6 90 06/20/2021    HGB 8 5 (L) 06/20/2021    HCT 28 6 (L) 06/20/2021    MCV 89 06/20/2021     (H) 06/20/2021    MCH 26 5 (L) 06/20/2021    MCHC 29 7 (L) 06/20/2021    RDW 18 0 (H) 06/20/2021    MPV 9 7 06/20/2021    NRBC 0 06/20/2021   , CMP:   Lab Results   Component Value Date    SODIUM 139 06/20/2021    K 4 0 06/20/2021     06/20/2021    CO2 33 (H) 06/20/2021    BUN 27 (H) 06/20/2021    CREATININE 1 28 06/20/2021    CALCIUM 8 9 06/20/2021    AST 16 06/20/2021    ALT 13 06/20/2021    ALKPHOS 64 06/20/2021    EGFR 42 06/20/2021     Imaging Studies: reviewed pertinent studies  EKG, Pathology, and Other Studies: reviewed pertinent studies    Counseling / Coordination of Care    Total floor / unit time spent today 90 minutes  Greater than 50% of total time was spent with the patient and / or family counseling and / or coordination of care   A description of the counseling / coordination of care: Introduced role of Palliative Medicine  Reviewed expected disease trajectory, prognosis, code status, and symptom management  Spent time supportive listening regarding frustrations of diagnosis and treatment options available at this time   Updated Pulmonology

## 2021-06-21 NOTE — PROGRESS NOTES
Cardiology Progress Note - Lorri Cooley 70 y o  female MRN: [id number]    Unit/Bed#: S -01 Encounter: [id number]      Assessment/Recommendations:  1  Multifactorial hypoxic respiratory failure: With component of volume overload  2  Acute on chronic diastolic heart failure:  Secondary to bioprosthetic mitral valve stenosis  Continued on IV Bumex and with mildly elevated creatinine today compared to yesterday  Unclear whether I&Os are accurate  Continue diuresis for another day at current dose  3  Bioprosthetic mitral stenosis:  With possible dehiscence  Redo sternotomy would be very difficult at this time considering comorbidities  Continue medical management with diuresis for the time being  4  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  Currently INR is therapeutic over 2 5  If reduces down to less than this, will need heparin drip  Continued on digoxin for rate control  5  Lung mass:  With concern for metastasis  For past continued workup and biopsy as per primary team and pulmonology  Subjective:   Patient seen and examined  No significant events overnight   stable dyspnea on exertion, ; pertinent negatives - chest pain, chest pressure/discomfort, irregular heart beat, lower extremity edema and palpitations  Objective:     Vitals: Blood pressure 108/56, pulse 69, temperature 97 7 °F (36 5 °C), temperature source Oral, resp  rate 18, height 5' 3" (1 6 m), weight 110 kg (241 lb 6 5 oz), SpO2 95 %  , Body mass index is 42 76 kg/m² ,   Orthostatic Blood Pressures      Most Recent Value   Blood Pressure  108/56 filed at 06/21/2021 1708   Patient Position - Orthostatic VS  Lying filed at 06/21/2021 0731            Intake/Output Summary (Last 24 hours) at 6/21/2021 1044  Last data filed at 6/20/2021 1911  Gross per 24 hour   Intake 600 ml   Output 300 ml   Net 300 ml         Physical Exam:    GEN: Lorri Cooley appears well, alert and oriented x 3, pleasant and cooperative HEENT: pupils equal, round, and reactive to light; extraocular muscles intact  NECK: supple, no carotid bruits   HEART: irregular rhythm, normal S1 and S2, +systolic/diastolic murmur, no clicks, gallops or rubs   LUNGS: clear to auscultation bilaterally; no wheezes, rales, or rhonchi   ABDOMEN: normal bowel sounds, soft, no tenderness, no distention  EXTREMITIES: peripheral pulses normal; no clubbing, cyanosis, or edema  NEURO: no focal findings   SKIN: normal without suspicious lesions on exposed skin    Medications:      Current Facility-Administered Medications:     acetaminophen (TYLENOL) tablet 650 mg, 650 mg, Oral, Q6H PRN, Manolo Navarrete PA-C, 650 mg at 06/20/21 2341    albuterol (PROVENTIL HFA,VENTOLIN HFA) inhaler 2 puff, 2 puff, Inhalation, Q6H PRN, Manolo Navarrete PA-C    bumetanide (BUMEX) injection 2 mg, 2 mg, Intravenous, TID (diuretic), Martin Lira MD    Diclofenac Sodium (VOLTAREN) 1 % topical gel 2 g, 2 g, Topical, 4x Daily, Desiree Bliss, DAVID, 2 g at 06/20/21 2341    digoxin (LANOXIN) tablet 125 mcg, 125 mcg, Oral, Daily, Manolo Navarrete PA-C, 125 mcg at 06/21/21 0940    ferrous sulfate tablet 325 mg, 325 mg, Oral, Daily With Breakfast, Manolo Navarrete PA-C, 325 mg at 06/21/21 0940    fluticasone-vilanterol (BREO ELLIPTA) 100-25 mcg/inh inhaler 1 puff, 1 puff, Inhalation, Daily, Trinidad LOPEZ Schwarz, 1 puff at 06/21/21 0941    insulin lispro (HumaLOG) 100 units/mL subcutaneous injection 1-5 Units, 1-5 Units, Subcutaneous, TID AC, 1 Units at 06/19/21 1832 **AND** Fingerstick Glucose (POCT), , , TID AC, Manolo Navarrete PA-C    insulin lispro (HumaLOG) 100 units/mL subcutaneous injection 1-5 Units, 1-5 Units, Subcutaneous, HS, Manolo Pasalides, PA-C    ipratropium-albuterol (DUO-NEB) 0 5-2 5 mg/3 mL inhalation solution 3 mL, 3 mL, Nebulization, 4x Daily PRN, Trinidad Schwarz PA-C    lidocaine (LIDODERM) 5 % patch 1 patch, 1 patch, Topical, Daily, Esdras De Guzman PA-C, 1 patch at 06/21/21 0941    montelukast (SINGULAIR) tablet 10 mg, 10 mg, Oral, Daily, Manolo Navarrete PA-C, 10 mg at 06/21/21 0940    pantoprazole (PROTONIX) EC tablet 40 mg, 40 mg, Oral, Early Morning, Manolo Navarrete PA-C, 40 mg at 06/21/21 0514    potassium chloride (K-DUR,KLOR-CON) CR tablet 20 mEq, 20 mEq, Oral, BID, Manolo Navarrete PA-C, 20 mEq at 06/21/21 0941    senna-docusate sodium (SENOKOT S) 8 6-50 mg per tablet 1 tablet, 1 tablet, Oral, Daily PRN, Manolo Navarrete PA-C    sodium chloride (OCEAN) 0 65 % nasal spray 1 spray, 1 spray, Each Nare, Q1H PRN, DAVID Tavarez, 1 spray at 06/19/21 2149     Labs & Results:    Results from last 7 days   Lab Units 06/17/21  1842   TROPONIN I ng/mL <0 02     Results from last 7 days   Lab Units 06/20/21  1013 06/18/21  0539 06/17/21  1842   WBC Thousand/uL 6 90 7 30 7 20   HEMOGLOBIN g/dL 8 5* 8 9* 8 8*   HEMATOCRIT % 28 6* 30 9* 29 7*   PLATELETS Thousands/uL 393* 412* 428*         Results from last 7 days   Lab Units 06/20/21  1013 06/19/21  0551 06/18/21  0539 06/17/21  1842   POTASSIUM mmol/L 4 0 3 9 3 5 3 8   CHLORIDE mmol/L 101 101 101 101   CO2 mmol/L 33* 32 32 32   BUN mg/dL 27* 22 21 22   CREATININE mg/dL 1 28 1 18 1 01 1 03   CALCIUM mg/dL 8 9 8 8 9 3 9 0   ALK PHOS U/L 64  --   --  73   ALT U/L 13  --   --  17   AST U/L 16  --   --  21     Results from last 7 days   Lab Units 06/20/21  1013 06/18/21  0539 06/17/21  1842   INR  2 91* 2 69* 2 60*   PTT seconds 41*  --   --            Echo: personally reviewed - EF normal, dyssynergy septum due to RV prolonged pressure overload, left atrial enlargement, bio MVR with possible sewing ring dehiscence, moderate mitral stenosis, trace regurgitation    Mild AS, mild AR, moderate to severe TR with severe pulmonary hypertension    EKG personally reviewed by Crista Rojas MD

## 2021-06-22 LAB
ALBUMIN SERPL BCP-MCNC: 2.7 G/DL (ref 3.5–5)
ALP SERPL-CCNC: 62 U/L (ref 46–116)
ALT SERPL W P-5'-P-CCNC: 15 U/L (ref 12–78)
ANION GAP SERPL CALCULATED.3IONS-SCNC: 7 MMOL/L (ref 4–13)
AST SERPL W P-5'-P-CCNC: 17 U/L (ref 5–45)
BASOPHILS # BLD AUTO: 0.06 THOUSANDS/ΜL (ref 0–0.1)
BASOPHILS NFR BLD AUTO: 1 % (ref 0–1)
BILIRUB SERPL-MCNC: 0.43 MG/DL (ref 0.2–1)
BUN SERPL-MCNC: 37 MG/DL (ref 5–25)
CALCIUM ALBUM COR SERPL-MCNC: 10.5 MG/DL (ref 8.3–10.1)
CALCIUM SERPL-MCNC: 9.5 MG/DL (ref 8.3–10.1)
CHLORIDE SERPL-SCNC: 99 MMOL/L (ref 100–108)
CO2 SERPL-SCNC: 33 MMOL/L (ref 21–32)
CREAT SERPL-MCNC: 1.31 MG/DL (ref 0.6–1.3)
EOSINOPHIL # BLD AUTO: 0.37 THOUSAND/ΜL (ref 0–0.61)
EOSINOPHIL NFR BLD AUTO: 5 % (ref 0–6)
ERYTHROCYTE [DISTWIDTH] IN BLOOD BY AUTOMATED COUNT: 18.1 % (ref 11.6–15.1)
GFR SERPL CREATININE-BSD FRML MDRD: 40 ML/MIN/1.73SQ M
GLUCOSE SERPL-MCNC: 100 MG/DL (ref 65–140)
GLUCOSE SERPL-MCNC: 105 MG/DL (ref 65–140)
GLUCOSE SERPL-MCNC: 89 MG/DL (ref 65–140)
GLUCOSE SERPL-MCNC: 92 MG/DL (ref 65–140)
GLUCOSE SERPL-MCNC: 97 MG/DL (ref 65–140)
HCT VFR BLD AUTO: 28.4 % (ref 34.8–46.1)
HGB BLD-MCNC: 8.1 G/DL (ref 11.5–15.4)
IMM GRANULOCYTES # BLD AUTO: 0.06 THOUSAND/UL (ref 0–0.2)
IMM GRANULOCYTES NFR BLD AUTO: 1 % (ref 0–2)
INR PPP: 2.72 (ref 0.84–1.19)
LYMPHOCYTES # BLD AUTO: 1.12 THOUSANDS/ΜL (ref 0.6–4.47)
LYMPHOCYTES NFR BLD AUTO: 14 % (ref 14–44)
MCH RBC QN AUTO: 25.7 PG (ref 26.8–34.3)
MCHC RBC AUTO-ENTMCNC: 28.5 G/DL (ref 31.4–37.4)
MCV RBC AUTO: 90 FL (ref 82–98)
MONOCYTES # BLD AUTO: 0.57 THOUSAND/ΜL (ref 0.17–1.22)
MONOCYTES NFR BLD AUTO: 7 % (ref 4–12)
NEUTROPHILS # BLD AUTO: 5.64 THOUSANDS/ΜL (ref 1.85–7.62)
NEUTS SEG NFR BLD AUTO: 72 % (ref 43–75)
NRBC BLD AUTO-RTO: 0 /100 WBCS
PLATELET # BLD AUTO: 419 THOUSANDS/UL (ref 149–390)
PMV BLD AUTO: 9.9 FL (ref 8.9–12.7)
POTASSIUM SERPL-SCNC: 4.4 MMOL/L (ref 3.5–5.3)
PROT SERPL-MCNC: 6.9 G/DL (ref 6.4–8.2)
PROTHROMBIN TIME: 28.9 SECONDS (ref 11.6–14.5)
RBC # BLD AUTO: 3.15 MILLION/UL (ref 3.81–5.12)
SODIUM SERPL-SCNC: 139 MMOL/L (ref 136–145)
WBC # BLD AUTO: 7.82 THOUSAND/UL (ref 4.31–10.16)

## 2021-06-22 PROCEDURE — 85025 COMPLETE CBC W/AUTO DIFF WBC: CPT | Performed by: INTERNAL MEDICINE

## 2021-06-22 PROCEDURE — 99232 SBSQ HOSP IP/OBS MODERATE 35: CPT | Performed by: NURSE PRACTITIONER

## 2021-06-22 PROCEDURE — 99232 SBSQ HOSP IP/OBS MODERATE 35: CPT | Performed by: INTERNAL MEDICINE

## 2021-06-22 PROCEDURE — 85610 PROTHROMBIN TIME: CPT | Performed by: INTERNAL MEDICINE

## 2021-06-22 PROCEDURE — 99232 SBSQ HOSP IP/OBS MODERATE 35: CPT | Performed by: FAMILY MEDICINE

## 2021-06-22 PROCEDURE — 80053 COMPREHEN METABOLIC PANEL: CPT | Performed by: INTERNAL MEDICINE

## 2021-06-22 PROCEDURE — 82948 REAGENT STRIP/BLOOD GLUCOSE: CPT

## 2021-06-22 RX ORDER — BUMETANIDE 0.25 MG/ML
1 INJECTION, SOLUTION INTRAMUSCULAR; INTRAVENOUS
Status: DISCONTINUED | OUTPATIENT
Start: 2021-06-22 | End: 2021-06-26

## 2021-06-22 RX ORDER — OXYCODONE HYDROCHLORIDE 5 MG/1
2.5 TABLET ORAL EVERY 4 HOURS PRN
Status: DISCONTINUED | OUTPATIENT
Start: 2021-06-22 | End: 2021-07-05 | Stop reason: HOSPADM

## 2021-06-22 RX ADMIN — BUMETANIDE 2 MG: 0.25 INJECTION INTRAMUSCULAR; INTRAVENOUS at 05:13

## 2021-06-22 RX ADMIN — DICLOFENAC SODIUM 2 G: 10 GEL TOPICAL at 10:08

## 2021-06-22 RX ADMIN — POTASSIUM CHLORIDE 20 MEQ: 1500 TABLET, EXTENDED RELEASE ORAL at 19:32

## 2021-06-22 RX ADMIN — ACETAMINOPHEN 975 MG: 325 TABLET, FILM COATED ORAL at 22:17

## 2021-06-22 RX ADMIN — FERROUS SULFATE TAB 325 MG (65 MG ELEMENTAL FE) 325 MG: 325 (65 FE) TAB at 10:08

## 2021-06-22 RX ADMIN — ACETAMINOPHEN 975 MG: 325 TABLET, FILM COATED ORAL at 05:12

## 2021-06-22 RX ADMIN — MONTELUKAST 10 MG: 10 TABLET, FILM COATED ORAL at 10:07

## 2021-06-22 RX ADMIN — DICLOFENAC SODIUM 2 G: 10 GEL TOPICAL at 11:04

## 2021-06-22 RX ADMIN — BUMETANIDE 1 MG: 0.25 INJECTION INTRAMUSCULAR; INTRAVENOUS at 11:04

## 2021-06-22 RX ADMIN — FLUTICASONE FUROATE AND VILANTEROL TRIFENATATE 1 PUFF: 100; 25 POWDER RESPIRATORY (INHALATION) at 10:08

## 2021-06-22 RX ADMIN — DICLOFENAC SODIUM 2 G: 10 GEL TOPICAL at 19:32

## 2021-06-22 RX ADMIN — LIDOCAINE 5% 1 PATCH: 700 PATCH TOPICAL at 10:07

## 2021-06-22 RX ADMIN — POTASSIUM CHLORIDE 20 MEQ: 1500 TABLET, EXTENDED RELEASE ORAL at 10:07

## 2021-06-22 RX ADMIN — ACETAMINOPHEN 975 MG: 325 TABLET, FILM COATED ORAL at 14:20

## 2021-06-22 RX ADMIN — PANTOPRAZOLE SODIUM 40 MG: 40 TABLET, DELAYED RELEASE ORAL at 05:12

## 2021-06-22 RX ADMIN — DIGOXIN 125 MCG: 125 TABLET ORAL at 10:07

## 2021-06-22 RX ADMIN — BUMETANIDE 1 MG: 0.25 INJECTION INTRAMUSCULAR; INTRAVENOUS at 19:32

## 2021-06-22 NOTE — ASSESSMENT & PLAN NOTE
Lab Results   Component Value Date    HGBA1C 4 5 03/15/2021       Recent Labs     06/21/21  1640 06/21/21 2056 06/22/21  0746 06/22/21  1140   POCGLU 129 150* 89 97       Blood Sugar Average: Last 72 hrs:  (P) 118 [id number]   · Hold oral antihyperglycemics  · Start sliding scale insulin  · accuchecks     Blood sugars are acceptable

## 2021-06-22 NOTE — PROGRESS NOTES
Progress Note - Palliative & Supportive Care  Gabe Reji  70 y o   female  S /S -01   MRN: [id number]  Encounter: [id number]     Assessment  · Lung mass  · COPD  · Debility  · CKD  · DMII  · Obesity  · Chronic a-fib  · SOB  · Anxiety  · Pain  · Difficulty sleeping  · Palliative care encounter  · Goals of care discussion     Plan:  1  Symptom management  Pain   · Acetaminophen 975 mg PO Q8H scheduled  · voltaren topical gel QID  · lidoderm patch  · Start oxycodone 2 5mg PO Q4H PRN for moderate to severe pain        Anxiety related to health  · Denies depression but does endorse anxiety  · Lorazepam 0 25mg Q6H PRN  · Refuses to see Palliative Medicine , has refused spiritual care consult and offers to assist with facetiming family      Constipation  · Senokot QD PRN      2  Goals - level 1 full code   · Patient believes she can continue to get better and stronger  · Agreeable to lung biopsy when appropriate  · Disease focused care  Will continue discussions regarding [address] as patient's clinical presentation evolves  · Encouraged follow up with Palliative Medicine on an outpatient basis after discharge for continued symptom management  Our office will contact patient to schedule a hospital follow up      24 History  Chart reviewed before visit     lying in bed  In good spirits today  Eating and drinking  Does want to continue attempting to get well enough to get biopsy    Review of Systems: Pertinent items are noted in HPI      Medications    Current Facility-Administered Medications:     acetaminophen (TYLENOL) tablet 975 mg, 975 mg, Oral, Q8H [facility], Jennifer P Bloch, CRNP, 975 mg at 06/22/21 0512    albuterol (PROVENTIL HFA,VENTOLIN HFA) inhaler 2 puff, 2 puff, Inhalation, Q6H PRN, Manolo Navarrete PA-C    bumetanide (BUMEX) injection 2 mg, 2 mg, Intravenous, TID (diuretic), Vonda Zazueta MD, 2 mg at 06/22/21 0513    Diclofenac Sodium (VOLTAREN) 1 % topical gel 2 g, 2 g, Topical, 4x Daily, Gearld Breeding, CRNP, 2 g at 06/21/21 2101    digoxin (LANOXIN) tablet 125 mcg, 125 mcg, Oral, Daily, Manolo Navarrete PA-C, 125 mcg at 06/21/21 0940    ferrous sulfate tablet 325 mg, 325 mg, Oral, Daily With Breakfast, Manolo Navarrete PA-C, 325 mg at 06/21/21 0940    fluticasone-vilanterol (BREO ELLIPTA) 100-25 mcg/inh inhaler 1 puff, 1 puff, Inhalation, Daily, Rosalba Lim PA-C, 1 puff at 06/21/21 0941    insulin lispro (HumaLOG) 100 units/mL subcutaneous injection 1-5 Units, 1-5 Units, Subcutaneous, TID AC, 1 Units at 06/19/21 1832 **AND** Fingerstick Glucose (POCT), , , TID AC, Manolo Navarrete PA-C    insulin lispro (HumaLOG) 100 units/mL subcutaneous injection 1-5 Units, 1-5 Units, Subcutaneous, HS, Manolo Navarrete PA-C, 1 Units at 06/21/21 2102    ipratropium-albuterol (DUO-NEB) 0 5-2 5 mg/3 mL inhalation solution 3 mL, 3 mL, Nebulization, 4x Daily PRN, Rosalba Lim PA-C    lidocaine (LIDODERM) 5 % patch 1 patch, 1 patch, Topical, Daily, Esdras De Guzman PA-C, 1 patch at 06/21/21 0941    LORazepam (ATIVAN) tablet 0 25 mg, 0 25 mg, Oral, Q6H PRN, Milderd Mylar, CRNP    montelukast (SINGULAIR) tablet 10 mg, 10 mg, Oral, Daily, Manolo Navarrete PA-C, 10 mg at 06/21/21 0940    pantoprazole (PROTONIX) EC tablet 40 mg, 40 mg, Oral, Early Morning, Manolo Navarrete PA-C, 40 mg at 06/22/21 1516    potassium chloride (K-DUR,KLOR-CON) CR tablet 20 mEq, 20 mEq, Oral, BID, Manolo Navarrete PA-C, 20 mEq at 06/21/21 1809    senna-docusate sodium (SENOKOT S) 8 6-50 mg per tablet 1 tablet, 1 tablet, Oral, Daily PRN, Manolo Navarrete PA-C    sodium chloride (OCEAN) 0 65 % nasal spray 1 spray, 1 spray, Each Nare, Q1H PRN, DAVID Tavarez, 1 spray at 06/19/21 2149    Objective  /64 (BP Location: Left arm)   Pulse 68   Temp 97 9 °F (36 6 °C) (Oral)   Resp 18   Ht 5' 3" (1 6 m)   Wt 108 kg (238 lb 12 1 oz)   SpO2 93%   BMI 42 29 kg/m²   Physical Exam:   Constitutional: Obese  In no acute distress  Head: Normocephalic and atraumatic  Eyes: EOM are normal  No ocular discharge  No scleral icterus  Neck: no visible adenopathy or masses  Respiratory: Effort normal  No stridor  No respiratory distress  Gastrointestinal: No abdominal distension  Musculoskeletal: No edema  Neurological: Alert, oriented and appropriately conversant  Skin: Dry, no diaphoresis  Psychiatric: Displays a normal mood and affect  Behavior, judgement and thought content appear normal      Lab Results:   I have personally reviewed pertinent labs  , CBC:   Lab Results   Component Value Date    WBC 7 82 06/22/2021    HGB 8 1 (L) 06/22/2021    HCT 28 4 (L) 06/22/2021    MCV 90 06/22/2021     (H) 06/22/2021    MCH 25 7 (L) 06/22/2021    MCHC 28 5 (L) 06/22/2021    RDW 18 1 (H) 06/22/2021    MPV 9 9 06/22/2021    NRBC 0 06/22/2021   , CMP:   Lab Results   Component Value Date    SODIUM 139 06/22/2021    K 4 4 06/22/2021    CL 99 (L) 06/22/2021    CO2 33 (H) 06/22/2021    BUN 37 (H) 06/22/2021    CREATININE 1 31 (H) 06/22/2021    CALCIUM 9 5 06/22/2021    AST 17 06/22/2021    ALT 15 06/22/2021    ALKPHOS 62 06/22/2021    EGFR 40 06/22/2021       Counseling / Coordination of Care  Total floor / unit time spent today 25 minutes       Clearance Graham, [facility]

## 2021-06-22 NOTE — PROGRESS NOTES
Cardiology Progress Note - Dock Economy 70 y o  female MRN: [id number]    Unit/Bed#: S -01 Encounter: [id number]      Assessment/Recommendations:  1  Multifactorial hypoxic respiratory failure: With component of volume overload  2  Acute on chronic diastolic heart failure:  Secondary to bioprosthetic mitral valve stenosis  Continued on IV Bumex and with continued elevated creatinine today compared to yesterday  Unclear whether I&Os are accurate  Continue diuresis - but will reduce dosing today  3  Bioprosthetic mitral stenosis:  With possible dehiscence  Redo sternotomy would be very difficult at this time considering comorbidities  Continue medical management with diuresis for the time being  4  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  Currently INR is therapeutic over 2 5  If reduces down to less than this, will need heparin drip  Continued on digoxin for rate control  5  Lung mass:  With concern for metastasis  For past continued workup and biopsy as per primary team and pulmonology  Subjective:   Patient seen and examined  No significant events overnight   stable dyspnea on exertion, ; pertinent negatives - chest pain, chest pressure/discomfort, irregular heart beat, lower extremity edema and palpitations  Objective:     Vitals: Blood pressure 127/64, pulse 68, temperature 97 9 °F (36 6 °C), temperature source Oral, resp  rate 18, height 5' 3" (1 6 m), weight 108 kg (238 lb 12 1 oz), SpO2 93 %  , Body mass index is 42 29 kg/m² ,   Orthostatic Blood Pressures      Most Recent Value   Blood Pressure  127/64 filed at 06/22/2021 0700   Patient Position - Orthostatic VS  Lying filed at 06/22/2021 0700            Intake/Output Summary (Last 24 hours) at 6/22/2021 1037  Last data filed at 6/22/2021 0700  Gross per 24 hour   Intake 480 ml   Output 2100 ml   Net -1620 ml         Physical Exam:    GEN: Dock Economy appears well, alert and oriented x 3, pleasant and cooperative   HEENT: pupils equal, round, and reactive to light; extraocular muscles intact  NECK: supple, no carotid bruits   HEART: regular rhythm, normal S1 and S2, +systolic/diastolic murmur, no clicks, gallops or rubs   LUNGS: clear to auscultation bilaterally; no wheezes, rales, or rhonchi   ABDOMEN: normal bowel sounds, soft, no tenderness, no distention  EXTREMITIES: peripheral pulses normal; no clubbing, cyanosis, or edema  NEURO: no focal findings   SKIN: normal without suspicious lesions on exposed skin    Medications:      Current Facility-Administered Medications:     acetaminophen (TYLENOL) tablet 975 mg, 975 mg, Oral, Q8H [facility], Jennifer P Bloch, CRNP, 975 mg at 06/22/21 0512    albuterol (PROVENTIL HFA,VENTOLIN HFA) inhaler 2 puff, 2 puff, Inhalation, Q6H PRN, Manolo Navarrete PA-C    bumetanide (BUMEX) injection 2 mg, 2 mg, Intravenous, TID (diuretic), Jen Yang MD, 2 mg at 06/22/21 0513    Diclofenac Sodium (VOLTAREN) 1 % topical gel 2 g, 2 g, Topical, 4x Daily, DAVID Bland, 2 g at 06/22/21 1008    digoxin (LANOXIN) tablet 125 mcg, 125 mcg, Oral, Daily, Manolo Navarrete PA-C, 125 mcg at 06/22/21 1007    ferrous sulfate tablet 325 mg, 325 mg, Oral, Daily With Breakfast, Manolo Navarrete PA-C, 325 mg at 06/22/21 1008    fluticasone-vilanterol (BREO ELLIPTA) 100-25 mcg/inh inhaler 1 puff, 1 puff, Inhalation, Daily, Shavon Urbina PA-C, 1 puff at 06/22/21 1008    insulin lispro (HumaLOG) 100 units/mL subcutaneous injection 1-5 Units, 1-5 Units, Subcutaneous, TID AC, 1 Units at 06/19/21 1832 **AND** Fingerstick Glucose (POCT), , , TID AC, Manolo Navarrete PA-C    insulin lispro (HumaLOG) 100 units/mL subcutaneous injection 1-5 Units, 1-5 Units, Subcutaneous, HS, Manolo Navarrete PA-C, 1 Units at 06/21/21 2102    ipratropium-albuterol (DUO-NEB) 0 5-2 5 mg/3 mL inhalation solution 3 mL, 3 mL, Nebulization, 4x Daily PRN, Shavon Urbina PA-C    lidocaine (LIDODERM) 5 % patch 1 patch, 1 patch, Topical, Daily, Esdras De Guzman PA-C, 1 patch at 06/22/21 1007    LORazepam (ATIVAN) tablet 0 25 mg, 0 25 mg, Oral, Q6H PRN, DAVID Joe    montelukast (SINGULAIR) tablet 10 mg, 10 mg, Oral, Daily, Manolo Navarrete PA-C, 10 mg at 06/22/21 1007    pantoprazole (PROTONIX) EC tablet 40 mg, 40 mg, Oral, Early Morning, Manolo Navarrete PA-C, 40 mg at 06/22/21 0852    potassium chloride (K-DUR,KLOR-CON) CR tablet 20 mEq, 20 mEq, Oral, BID, Manolo Navarrete PA-C, 20 mEq at 06/22/21 1007    senna-docusate sodium (SENOKOT S) 8 6-50 mg per tablet 1 tablet, 1 tablet, Oral, Daily PRN, Manolo Navarrete PA-C    sodium chloride (OCEAN) 0 65 % nasal spray 1 spray, 1 spray, Each Nare, Q1H PRN, DAVID Tavarez, 1 spray at 06/19/21 2149     Labs & Results:    Results from last 7 days   Lab Units 06/17/21  1842   TROPONIN I ng/mL <0 02     Results from last 7 days   Lab Units 06/22/21  0519 06/20/21  1013 06/18/21  0539   WBC Thousand/uL 7 82 6 90 7 30   HEMOGLOBIN g/dL 8 1* 8 5* 8 9*   HEMATOCRIT % 28 4* 28 6* 30 9*   PLATELETS Thousands/uL 419* 393* 412*         Results from last 7 days   Lab Units 06/22/21  0519 06/20/21  1013 06/19/21  0551 06/17/21  1842   POTASSIUM mmol/L 4 4 4 0 3 9 3 8   CHLORIDE mmol/L 99* 101 101 101   CO2 mmol/L 33* 33* 32 32   BUN mg/dL 37* 27* 22 22   CREATININE mg/dL 1 31* 1 28 1 18 1 03   CALCIUM mg/dL 9 5 8 9 8 8 9 0   ALK PHOS U/L 62 64  --  73   ALT U/L 15 13  --  17   AST U/L 17 16  --  21     Results from last 7 days   Lab Units 06/22/21  0519 06/20/21  1013 06/18/21  0539   INR  2 72* 2 91* 2 69*   PTT seconds  --  41*  --            Echo: personally reviewed - EF normal, dyssynergy septum due to RV prolonged pressure overload, left atrial enlargement, bio MVR with possible sewing ring dehiscence, moderate mitral stenosis, trace regurgitation    Mild AS, mild AR, moderate to severe TR with severe pulmonary hypertension    EKG personally reviewed by Shahnaz Harkins MD

## 2021-06-22 NOTE — ASSESSMENT & PLAN NOTE
Presents from nursing facility with subjective worsening of shortness of breath  CT scans finding significant for potential mass verses volume overload versus infection  Chronically wears 4L   Echo in 2020 - EF 50-55%  Diastolic dysfunction with inability to calculate filling pressures due to bioprosthetic MVR  Severe tricuspid regurgitation  At least moderate pulmonary hypertension assuming an RA pressure of 10mmHg  Radiology evaluation appreciated  Diuretics were decreased today

## 2021-06-22 NOTE — PROGRESS NOTES
Progress Note - Pulmonary   Gabe Agent 70 y o  female MRN: [id number]  Unit/Bed#: S -01 Encounter: [id number]    Assessment/Plan:    Abnormal CT chest with 3 cm RUL lung mass and adenopathy              Plan is for IR-guided lung mass biopsy; however, INR must be below 1 5 and she requires heparin drip bridge  AC per primary team    Breathing status is optimized at present for biopsy      Acute on chronic diastolic CHF with bioprosthetic mitral stenosis              Diuresis per Cardiology  Monitor I/Os and daily weights      Permanent atrial fibrillation with left atrial appendage thrombus and Coumadin coagulopathy              Digoxin for rate control per Cardiology  Coumadin is held at present  INR 2 7  Heparin bridge for biopsy as above      Acute pulmonary insufficiency on chronic hypoxic respiratory failure due to acute CHF and lung mass              Baseline oxygen requirement is 4L NC  Titrate oxygen to maintain POX > or = 89%  OOB as tolerated      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              Quit 9 years ago  50+ pack year history  Maintains on Symbicort and albuterol as outpatient  Continue Breo and albuterol PRN as inpatient  Outpatient follow-up pending course  Chief Complaint:    "I feel good "    Subjective:    Vita Grant is sitting up in bed  She reports she feels good  She denies any SOB at present  No cough or chest pain  Objective:    Vitals: Blood pressure 127/64, pulse 68, temperature 97 9 °F (36 6 °C), temperature source Oral, resp  rate 18, height 5' 3" (1 6 m), weight 108 kg (238 lb 12 1 oz), SpO2 93 %  4L NC,Body mass index is 42 29 kg/m²        Intake/Output Summary (Last 24 hours) at 6/22/2021 1020  Last data filed at 6/22/2021 0700  Gross per 24 hour   Intake 480 ml   Output 2100 ml   Net -1620 ml       Invasive Devices Peripheral Intravenous Line            Peripheral IV 06/21/21 Left;Ventral (anterior) Forearm 1 day          Drain            External Urinary Catheter 1 day                Physical Exam:     Physical Exam  Vitals reviewed  Constitutional:       General: She is not in acute distress  Appearance: She is well-developed  She is not toxic-appearing or diaphoretic  Interventions: Nasal cannula in place  HENT:      Head: Normocephalic and atraumatic  Eyes:      General: No scleral icterus  Neck:      Trachea: No tracheal deviation  Cardiovascular:      Rate and Rhythm: Normal rate and regular rhythm  Heart sounds: S1 normal and S2 normal    Pulmonary:      Effort: Pulmonary effort is normal  No tachypnea, accessory muscle usage or respiratory distress  Breath sounds: No stridor  Rales present  No decreased breath sounds, wheezing or rhonchi  Chest:      Chest wall: No tenderness  Abdominal:      General: Bowel sounds are normal  There is no distension  Palpations: Abdomen is soft  Tenderness: There is no abdominal tenderness  There is no guarding or rebound  Musculoskeletal:      Cervical back: Neck supple  Skin:     General: Skin is warm and dry  Findings: No rash  Neurological:      Mental Status: She is alert and oriented to person, place, and time  GCS: GCS eye subscore is 4  GCS verbal subscore is 5  GCS motor subscore is 6  Psychiatric:         Speech: Speech normal          Behavior: Behavior normal  Behavior is cooperative           Labs:   CBC:   Lab Results   Component Value Date    WBC 7 82 06/22/2021    HGB 8 1 (L) 06/22/2021    HCT 28 4 (L) 06/22/2021    MCV 90 06/22/2021     (H) 06/22/2021    MCH 25 7 (L) 06/22/2021    MCHC 28 5 (L) 06/22/2021    RDW 18 1 (H) 06/22/2021    MPV 9 9 06/22/2021    NRBC 0 06/22/2021   , CMP:   Lab Results   Component Value Date    SODIUM 139 06/22/2021    K 4 4 06/22/2021    CL 99 (L) 06/22/2021    CO2 33 (H) 06/22/2021    BUN 37 (H) 06/22/2021    CREATININE 1 31 (H) 06/22/2021    CALCIUM 9 5 06/22/2021    AST 17 06/22/2021    ALT 15 06/22/2021    ALKPHOS 62 06/22/2021    EGFR 40 06/22/2021   , PT/INR:   Lab Results   Component Value Date    INR 2 72 (H) 06/22/2021     Imaging and other studies: None new

## 2021-06-22 NOTE — ASSESSMENT & PLAN NOTE
Does not appear to be in acute exacerbation at this time  Continue home inhalers  This could be contributing to patient's shortness of breath  Lung mass  Will likely require biopsy with Interventional Radiology

## 2021-06-22 NOTE — PROGRESS NOTES
[facility]  Progress Note - Edgar Blandon 1945, 70 y o  female MRN: [id number]  Unit/Bed#: S -01 Encounter: [id number]  Primary Care Provider: Farida Reddy MD   Date and time admitted to hospital: 6/17/2021  5:52 PM    CKD (chronic kidney disease)  Assessment & Plan  Lab Results   Component Value Date    EGFR 40 06/22/2021    EGFR 42 06/20/2021    EGFR 46 06/19/2021    CREATININE 1 31 (H) 06/22/2021    CREATININE 1 28 06/20/2021    CREATININE 1 18 06/19/2021   CKD stage 2-3 in the setting of HTN and T2DM requiring frequent bmp checks  Check creatinine tomorrow  Trending up slowly  Continue to monitor  Patient's Bumex decreased from 2 mg to 1 mg    Morbid obesity (HCC)  Assessment & Plan  Diet lifestyle modifications  Counseled on weight loss  Noted pulmonary hypertension on prior echo  This is likely contributing to patient's symptomatology    Type 2 diabetes mellitus [facility])  Assessment & Plan  Lab Results   Component Value Date    HGBA1C 4 5 03/15/2021       Recent Labs     06/21/21  1640 06/21/21  2056 06/22/21  0746 06/22/21  1140   POCGLU 129 150* 89 97       Blood Sugar Average: Last 72 hrs:  (P) 118 [id number]   · Hold oral antihyperglycemics  · Start sliding scale insulin  · accuchecks   Blood sugars are acceptable    Chronic atrial fibrillation (HCC)  Assessment & Plan  Anticoagulated on warfarin  With history of appendage thrombus  INR is 2 9  Goal 2 5-3 5  If below 2 5 needs gtt  Monitor PT INR daily  Continue digoxin    Now IR are considering gtt again - will hold coumadin depending on planned procedure  COPD (chronic obstructive pulmonary disease) ([facility] [address] )  Assessment & Plan  Does not appear to be in acute exacerbation at this time  Continue home inhalers  This could be contributing to patient's shortness of breath  Lung mass  Will likely require biopsy with Interventional Radiology      * Shortness of breath  Assessment & Plan  Presents from nursing facility with subjective worsening of shortness of breath  CT scans finding significant for potential mass verses volume overload versus infection  Chronically wears 4L   Echo in 2020 - EF 50-55%  Diastolic dysfunction with inability to calculate filling pressures due to bioprosthetic MVR  Severe tricuspid regurgitation  At least moderate pulmonary hypertension assuming an RA pressure of 10mmHg  Radiology evaluation appreciated  Diuretics were decreased today  VTE Pharmacologic Prophylaxis:   Pharmacologic: Pharmacologic VTE Prophylaxis contraindicated due to Has a therapeutic INR  Mechanical VTE Prophylaxis in Place: Yes    Patient Centered Rounds: I have performed bedside rounds with nursing staff today  Education and Discussions with Family / Patient:  I offered call but patient politely declined    Time Spent for Care: 20 minutes  More than 50% of total time spent on counseling and coordination of care as described above  Current Length of Stay: 4 day(s)    Current Patient Status: Inpatient   Certification Statement: The patient will continue to require additional inpatient hospital stay due to Needing IV diuresis and likely requiring a biopsy well    Discharge Plan:  Patient will be least another 48 hours    Code Status: Level 1 - Full Code      Subjective:   Patient seen examined  She states she is okay  Objective:     Vitals:   Temp (24hrs), Av °F (36 7 °C), Min:97 9 °F (36 6 °C), Max:98 1 °F (36 7 °C)    Temp:  [97 9 °F (36 6 °C)-98 1 °F (36 7 °C)] 97 9 °F (36 6 °C)  HR:  [55-83] 68  Resp:  [18] 18  BP: ()/(52-96) 127/64  SpO2:  [93 %-99 %] 93 %  Body mass index is 42 29 kg/m²  Input and Output Summary (last 24 hours):        Intake/Output Summary (Last 24 hours) at 2021 1432  Last data filed at 2021 1356  Gross per 24 hour   Intake 600 ml   Output 2200 ml   Net -1600 ml       Physical Exam:     Physical Exam  (   General Appearance:    Alert, cooperative, no distress, appears stated age                               Lungs:     Clear to auscultation bilaterally, respirations unlabored       Heart:    Regular rate and rhythm, S1 and S2 normal, no murmur, rub    or gallop   Abdomen:     Soft, non-tender, bowel sounds active all four quadrants,     no masses, no organomegaly           Extremities:   edema       Additional Data:     Labs:    Results from last 7 days   Lab Units 06/22/21  0519   WBC Thousand/uL 7 82   HEMOGLOBIN g/dL 8 1*   HEMATOCRIT % 28 4*   PLATELETS Thousands/uL 419*   NEUTROS PCT % 72   LYMPHS PCT % 14   MONOS PCT % 7   EOS PCT % 5     Results from last 7 days   Lab Units 06/22/21  0519   SODIUM mmol/L 139   POTASSIUM mmol/L 4 4   CHLORIDE mmol/L 99*   CO2 mmol/L 33*   BUN mg/dL 37*   CREATININE mg/dL 1 31*   ANION GAP mmol/L 7   CALCIUM mg/dL 9 5   ALBUMIN g/dL 2 7*   TOTAL BILIRUBIN mg/dL 0 43   ALK PHOS U/L 62   ALT U/L 15   AST U/L 17   GLUCOSE RANDOM mg/dL 92     Results from last 7 days   Lab Units 06/22/21  0519   INR  2 72*     Results from last 7 days   Lab Units 06/22/21  1140 06/22/21  0746 06/21/21  2056 06/21/21  1640 06/21/21  1059 06/21/21  0733 06/20/21  2322 06/20/21  1502 06/20/21  1109 06/20/21  0751 06/19/21  2111 06/19/21  1533   POC GLUCOSE mg/dl 97 89 150* 129 118 106 100 133 119 96 131 175*         Results from last 7 days   Lab Units 06/19/21  0551 06/17/21  2327   PROCALCITONIN ng/ml <0 05 <0 05           * I Have Reviewed All Lab Data Listed Above  * Additional Pertinent Lab Tests Reviewed:  [facility]        Recent Cultures (last 7 days):           Last 24 Hours Medication List:   Current Facility-Administered Medications   Medication Dose Route Frequency Provider Last Rate    acetaminophen  975 mg Oral Q8H [address] DAVID Root      albuterol  2 puff Inhalation Q6H PRN Manolo Navarrete PA-C      bumetanide  1 mg Intravenous TID (diuretic) Desirae Dover MD      Diclofenac Sodium  2 g Topical 4x Daily DAVID Nugent      digoxin  125 mcg Oral Daily Manolo Navarrete PA-C      ferrous sulfate  325 mg Oral Daily With Breakfast Manolo Navarrete PA-C      fluticasone-vilanterol  1 puff Inhalation Daily [place], [place]      insulin lispro  1-5 Units Subcutaneous TID AC Manolo Navarrete PA-C      insulin lispro  1-5 Units Subcutaneous HS Manolo Navarrete PA-C      ipratropium-albuterol  3 mL Nebulization 4x Daily PRN LOPEZ Jo      lidocaine  1 patch Topical Daily Esdrasalli Slaughter PA-C      LORazepam  0 25 mg Oral Q6H PRN DAVID Capone      montelukast  10 mg Oral Daily Manolo Navarrete PA-C      pantoprazole  40 mg Oral Early Morning Manolo Navarrete PA-C      potassium chloride  20 mEq Oral BID Manolo Navarrete PA-C      senna-docusate sodium  1 tablet Oral Daily PRN Manolo Navarrete PA-C      sodium chloride  1 spray Each Nare Q1H PRN DAVID Tavarez          Today, Patient Was Seen By: Geraldine Iverson MD    ** Please Note: Dictation voice to text software may have been used in the creation of this document   **

## 2021-06-22 NOTE — ASSESSMENT & PLAN NOTE
Lab Results   Component Value Date    EGFR 40 06/22/2021    EGFR 42 06/20/2021    EGFR 46 06/19/2021    CREATININE 1 31 (H) 06/22/2021    CREATININE 1 28 06/20/2021    CREATININE 1 18 06/19/2021   CKD stage 2-3 in the setting of HTN and T2DM requiring frequent bmp checks  Check creatinine tomorrow  Trending up slowly  Continue to monitor    Patient's Bumex decreased from 2 mg to 1 mg

## 2021-06-23 LAB
ANION GAP SERPL CALCULATED.3IONS-SCNC: 7 MMOL/L (ref 4–13)
APTT PPP: 160 SECONDS (ref 23–37)
APTT PPP: 67 SECONDS (ref 23–37)
APTT PPP: >210 SECONDS (ref 23–37)
BUN SERPL-MCNC: 43 MG/DL (ref 5–25)
CALCIUM SERPL-MCNC: 9 MG/DL (ref 8.3–10.1)
CHLORIDE SERPL-SCNC: 100 MMOL/L (ref 100–108)
CO2 SERPL-SCNC: 33 MMOL/L (ref 21–32)
CREAT SERPL-MCNC: 1.3 MG/DL (ref 0.6–1.3)
ERYTHROCYTE [DISTWIDTH] IN BLOOD BY AUTOMATED COUNT: 17.9 % (ref 11.6–15.1)
GFR SERPL CREATININE-BSD FRML MDRD: 41 ML/MIN/1.73SQ M
GLUCOSE SERPL-MCNC: 104 MG/DL (ref 65–140)
GLUCOSE SERPL-MCNC: 133 MG/DL (ref 65–140)
GLUCOSE SERPL-MCNC: 84 MG/DL (ref 65–140)
GLUCOSE SERPL-MCNC: 87 MG/DL (ref 65–140)
GLUCOSE SERPL-MCNC: 92 MG/DL (ref 65–140)
HCT VFR BLD AUTO: 28.8 % (ref 34.8–46.1)
HGB BLD-MCNC: 8.6 G/DL (ref 11.5–15.4)
INR PPP: 2.16 (ref 0.84–1.19)
MCH RBC QN AUTO: 26.6 PG (ref 26.8–34.3)
MCHC RBC AUTO-ENTMCNC: 29.9 G/DL (ref 31.4–37.4)
MCV RBC AUTO: 89 FL (ref 82–98)
PLATELET # BLD AUTO: 409 THOUSANDS/UL (ref 149–390)
PMV BLD AUTO: 10 FL (ref 8.9–12.7)
POTASSIUM SERPL-SCNC: 4.3 MMOL/L (ref 3.5–5.3)
PROTHROMBIN TIME: 24.1 SECONDS (ref 11.6–14.5)
RBC # BLD AUTO: 3.23 MILLION/UL (ref 3.81–5.12)
SODIUM SERPL-SCNC: 140 MMOL/L (ref 136–145)
WBC # BLD AUTO: 7.91 THOUSAND/UL (ref 4.31–10.16)

## 2021-06-23 PROCEDURE — 99232 SBSQ HOSP IP/OBS MODERATE 35: CPT | Performed by: INTERNAL MEDICINE

## 2021-06-23 PROCEDURE — 99232 SBSQ HOSP IP/OBS MODERATE 35: CPT | Performed by: FAMILY MEDICINE

## 2021-06-23 PROCEDURE — 85027 COMPLETE CBC AUTOMATED: CPT | Performed by: FAMILY MEDICINE

## 2021-06-23 PROCEDURE — 85730 THROMBOPLASTIN TIME PARTIAL: CPT | Performed by: FAMILY MEDICINE

## 2021-06-23 PROCEDURE — 85610 PROTHROMBIN TIME: CPT | Performed by: FAMILY MEDICINE

## 2021-06-23 PROCEDURE — 80048 BASIC METABOLIC PNL TOTAL CA: CPT | Performed by: FAMILY MEDICINE

## 2021-06-23 PROCEDURE — 82948 REAGENT STRIP/BLOOD GLUCOSE: CPT

## 2021-06-23 PROCEDURE — 85730 THROMBOPLASTIN TIME PARTIAL: CPT | Performed by: INTERNAL MEDICINE

## 2021-06-23 RX ORDER — HEPARIN SODIUM 1000 [USP'U]/ML
2000 INJECTION, SOLUTION INTRAVENOUS; SUBCUTANEOUS
Status: DISCONTINUED | OUTPATIENT
Start: 2021-06-23 | End: 2021-06-28

## 2021-06-23 RX ORDER — HEPARIN SODIUM 10000 [USP'U]/100ML
3-20 INJECTION, SOLUTION INTRAVENOUS
Status: DISPENSED | OUTPATIENT
Start: 2021-06-23 | End: 2021-06-25

## 2021-06-23 RX ORDER — HEPARIN SODIUM 1000 [USP'U]/ML
4000 INJECTION, SOLUTION INTRAVENOUS; SUBCUTANEOUS
Status: DISCONTINUED | OUTPATIENT
Start: 2021-06-23 | End: 2021-06-28

## 2021-06-23 RX ADMIN — POTASSIUM CHLORIDE 20 MEQ: 1500 TABLET, EXTENDED RELEASE ORAL at 18:06

## 2021-06-23 RX ADMIN — FLUTICASONE FUROATE AND VILANTEROL TRIFENATATE 1 PUFF: 100; 25 POWDER RESPIRATORY (INHALATION) at 10:31

## 2021-06-23 RX ADMIN — LIDOCAINE 5% 1 PATCH: 700 PATCH TOPICAL at 10:09

## 2021-06-23 RX ADMIN — FERROUS SULFATE TAB 325 MG (65 MG ELEMENTAL FE) 325 MG: 325 (65 FE) TAB at 10:09

## 2021-06-23 RX ADMIN — DICLOFENAC SODIUM 2 G: 10 GEL TOPICAL at 12:41

## 2021-06-23 RX ADMIN — BUMETANIDE 1 MG: 0.25 INJECTION INTRAMUSCULAR; INTRAVENOUS at 18:06

## 2021-06-23 RX ADMIN — BUMETANIDE 1 MG: 0.25 INJECTION INTRAMUSCULAR; INTRAVENOUS at 10:48

## 2021-06-23 RX ADMIN — PANTOPRAZOLE SODIUM 40 MG: 40 TABLET, DELAYED RELEASE ORAL at 05:35

## 2021-06-23 RX ADMIN — POTASSIUM CHLORIDE 20 MEQ: 1500 TABLET, EXTENDED RELEASE ORAL at 10:09

## 2021-06-23 RX ADMIN — MONTELUKAST 10 MG: 10 TABLET, FILM COATED ORAL at 10:09

## 2021-06-23 RX ADMIN — ACETAMINOPHEN 975 MG: 325 TABLET, FILM COATED ORAL at 05:35

## 2021-06-23 RX ADMIN — HEPARIN SODIUM 11.1 UNITS/KG/HR: 10000 INJECTION, SOLUTION INTRAVENOUS at 10:31

## 2021-06-23 RX ADMIN — BUMETANIDE 1 MG: 0.25 INJECTION INTRAMUSCULAR; INTRAVENOUS at 05:34

## 2021-06-23 RX ADMIN — DIGOXIN 125 MCG: 125 TABLET ORAL at 10:09

## 2021-06-23 NOTE — PLAN OF CARE
Problem: PAIN - ADULT  Goal: Verbalizes/displays adequate comfort level or baseline comfort level  Description: Interventions:  - Encourage patient to monitor pain and request assistance  - Assess pain using appropriate pain scale  - Administer analgesics based on type and severity of pain and evaluate response  - Implement non-pharmacological measures as appropriate and evaluate response  - Consider cultural and social influences on pain and pain management  - Notify physician/advanced practitioner if interventions unsuccessful or patient reports new pain  Outcome: Progressing     Problem: INFECTION - ADULT  Goal: Absence or prevention of progression during hospitalization  Description: INTERVENTIONS:  - Assess and monitor for signs and symptoms of infection  - Monitor lab/diagnostic results  - Monitor all insertion sites, i e  indwelling lines, tubes, and drains  - Monitor endotracheal if appropriate and nasal secretions for changes in amount and color  - [place] appropriate cooling/warming therapies per order  - Administer medications as ordered  - Instruct and encourage patient and family to use good hand hygiene technique  - Identify and instruct in appropriate isolation precautions for identified infection/condition  Outcome: Progressing  Goal: Absence of fever/infection during neutropenic period  Description: INTERVENTIONS:  - Monitor WBC    Outcome: Progressing     Problem: SAFETY ADULT  Goal: Patient will remain free of falls  Description: INTERVENTIONS:  - Educate patient/family on patient safety including physical limitations  - Instruct patient to call for assistance with activity   - Consult OT/PT to assist with strengthening/mobility   - Keep Call bell within reach  - Keep bed low and locked with side rails adjusted as appropriate  - Keep care items and personal belongings within reach  - Initiate and maintain comfort rounds  - Make Fall Risk Sign visible to staff  - Apply yellow socks and bracelet for high fall risk patients  - Consider moving patient to room near nurses station  Outcome: Progressing  Goal: Maintain or return to baseline ADL function  Description: INTERVENTIONS:  -  Assess patient's ability to carry out ADLs; assess patient's baseline for ADL function and identify physical deficits which impact ability to perform ADLs (bathing, care of mouth/teeth, toileting, grooming, dressing, etc )  - Assess/evaluate cause of self-care deficits   - Assess range of motion  - Assess patient's mobility; develop plan if impaired  - Assess patient's need for assistive devices and provide as appropriate  - Encourage maximum independence but intervene and supervise when necessary  - Involve family in performance of ADLs  - Assess for home care needs following discharge   - Consider OT consult to assist with ADL evaluation and planning for discharge  - Provide patient education as appropriate  Outcome: Progressing  Goal: Maintains/Returns to pre admission functional level  Description: INTERVENTIONS:  - Perform BMAT or MOVE assessment daily    - Set and communicate daily mobility goal to care team and patient/family/caregiver     - Collaborate with rehabilitation services on mobility goals if consulted  - Out of bed for toileting  - Record patient progress and toleration of activity level   Outcome: Progressing     Problem: DISCHARGE PLANNING  Goal: Discharge to home or other facility with appropriate resources  Description: INTERVENTIONS:  - Identify barriers to discharge w/patient and caregiver  - Arrange for needed discharge resources and transportation as appropriate  - Identify discharge learning needs (meds, wound care, etc )  - Arrange for interpretive services to assist at discharge as needed  - Refer to Case Management Department for coordinating discharge planning if the patient needs post-hospital services based on physician/advanced practitioner order or complex needs related to functional status, cognitive ability, or social support system  Outcome: Progressing     Problem: Knowledge Deficit  Goal: Patient/family/caregiver demonstrates understanding of disease process, treatment plan, medications, and discharge instructions  Description: Complete learning assessment and assess knowledge base    Interventions:  - Provide teaching at level of understanding  - Provide teaching via preferred learning methods  Outcome: Progressing     Problem: RESPIRATORY - ADULT  Goal: Achieves optimal ventilation and oxygenation  Description: INTERVENTIONS:  - Assess for changes in respiratory status  - Assess for changes in mentation and behavior  - Position to facilitate oxygenation and minimize respiratory effort  - Oxygen administered by appropriate delivery if ordered  - Initiate smoking cessation education as indicated  - Encourage broncho-pulmonary hygiene including cough, deep breathe, Incentive Spirometry  - Assess the need for suctioning and aspirate as needed  - Assess and instruct to report SOB or any respiratory difficulty  - Respiratory Therapy support as indicated  Outcome: Progressing     Problem: METABOLIC, FLUID AND ELECTROLYTES - ADULT  Goal: Fluid balance maintained  Description: INTERVENTIONS:  - Monitor labs   - Monitor I/O and WT  - Instruct patient on fluid and nutrition as appropriate  - Assess for signs & symptoms of volume excess or deficit  Outcome: Progressing  Goal: Glucose maintained within target range  Description: INTERVENTIONS:  - Monitor Blood Glucose as ordered  - Assess for signs and symptoms of hyperglycemia and hypoglycemia  - Administer ordered medications to maintain glucose within target range  - Assess nutritional intake and initiate nutrition service referral as needed  Outcome: Progressing     Problem: SKIN/TISSUE INTEGRITY - ADULT  Goal: Skin Integrity remains intact(Skin Breakdown Prevention)  Description: Assess:  --Inspect skin when repositioning, toileting, and assisting with ADLS  -Assess extremities for adequate circulation and sensation     Bed Management:  -Have minimal linens on bed & keep smooth, unwrinkled  -Change linens as needed when moist or perspiring  Toileting:  -Offer bedside commode  Activity:    -Encourage activity and walks on unit  -Encourage or provide ROM exercises   -Use appropriate equipment to lift or move patient in bed    Skin Care:  -Avoid use of baby powder, tape, friction and shearing, hot water or constrictive clothing    -Do not massage red bony areas    Outcome: Progressing  Goal: Incision(s), wounds(s) or drain site(s) healing without S/S of infection  Description: INTERVENTIONS  - Assess and document dressing, incision, wound bed, drain sites and surrounding tissue  - Provide patient and family education    Outcome: Progressing  Goal: Pressure injury heals and does not worsen  Description: Interventions:  - Implement low air loss mattress or specialty surface (Criteria met)  - Apply silicone foam dressing  - Apply fecal or urinary incontinence containment device   - Utilize friction reducing device or surface for transfers   - Consider nutrition services referral as needed  Outcome: Progressing     Problem: MOBILITY - ADULT  Goal: Maintain or return to baseline ADL function  Description: INTERVENTIONS:  -  Assess patient's ability to carry out ADLs; assess patient's baseline for ADL function and identify physical deficits which impact ability to perform ADLs (bathing, care of mouth/teeth, toileting, grooming, dressing, etc )  - Assess/evaluate cause of self-care deficits   - Assess range of motion  - Assess patient's mobility; develop plan if impaired  - Assess patient's need for assistive devices and provide as appropriate  - Encourage maximum independence but intervene and supervise when necessary  - Involve family in performance of ADLs  - Assess for home care needs following discharge   - Consider OT consult to assist with ADL evaluation and planning for discharge  - Provide patient education as appropriate  Outcome: Progressing  Goal: Maintains/Returns to pre admission functional level  Description: INTERVENTIONS:  - Perform BMAT or MOVE assessment daily    - Set and communicate daily mobility goal to care team and patient/family/caregiver  - Collaborate with rehabilitation services on mobility goals if consulted  - Out of bed for toileting  - Record patient progress and toleration of activity level   Outcome: Progressing     Problem: Nutrition/Hydration-ADULT  Goal: Nutrient/Hydration intake appropriate for improving, restoring or maintaining nutritional needs  Description: Monitor and assess patient's nutrition/hydration status for malnutrition  Collaborate with interdisciplinary team and initiate plan and interventions as ordered  Monitor patient's weight and dietary intake as ordered or per policy  Utilize nutrition screening tool and intervene as necessary  Determine patient's food preferences and provide high-protein, high-caloric foods as appropriate       INTERVENTIONS:  - Monitor oral intake, urinary output, labs, and treatment plans  - Assess nutrition and hydration status and recommend course of action  - Evaluate amount of meals eaten  - Assist patient with eating if necessary   - Allow adequate time for meals  - Recommend/ encourage appropriate diets, oral nutritional supplements, and vitamin/mineral supplements  - Order, calculate, and assess calorie counts as needed  - Recommend, monitor, and adjust tube feedings and TPN/PPN based on assessed needs  - Assess need for intravenous fluids  - Provide specific nutrition/hydration education as appropriate  - Include patient/family/caregiver in decisions related to nutrition  Outcome: Progressing     Problem: Prexisting or High Potential for Compromised Skin Integrity  Goal: Skin integrity is maintained or improved  Description: INTERVENTIONS:  - Identify patients at risk for skin breakdown  - Assess and monitor skin integrity  - Assess and monitor nutrition and hydration status  - Monitor labs   - Assess for incontinence   - Turn and reposition patient  - Assist with mobility/ambulation  - Relieve pressure over bony prominences  - Avoid friction and shearing  - Provide appropriate hygiene as needed including keeping skin clean and dry  - Evaluate need for skin moisturizer/barrier cream  - Collaborate with interdisciplinary team   - Patient/family teaching  - Consider wound care consult   Outcome: Progressing     Problem: Potential for Falls  Goal: Patient will remain free of falls  Description: INTERVENTIONS:  - Educate patient/family on patient safety including physical limitations  - Instruct patient to call for assistance with activity   - Consult OT/PT to assist with strengthening/mobility   - Keep Call bell within reach  - Keep bed low and locked with side rails adjusted as appropriate  - Keep care items and personal belongings within reach  - Initiate and maintain comfort rounds  - Make Fall Risk Sign visible to staff  -Apply yellow socks and bracelet for high fall risk patients  - Consider moving patient to room near nurses station  Outcome: Progressing

## 2021-06-23 NOTE — PROGRESS NOTES
Cardiology Progress Note - Tin Adame 70 y o  female MRN: [id number]    Unit/Bed#: S -01 Encounter: [id number]      Assessment/Recommendations:  1  Multifactorial hypoxic respiratory failure: With component of volume overload  2  Acute on chronic diastolic heart failure:  Secondary to bioprosthetic mitral valve stenosis  Continued on IV Bumex and with stable creatinine today compared to yesterday  Unclear whether I&Os are accurate  Continue diuresis - continue on current dosing today - likely change to PO dosing tomorrow  3  Bioprosthetic mitral stenosis:  With possible dehiscence  Redo sternotomy would be very difficult at this time considering comorbidities  Continue medical management with diuresis for the time being  4  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  Currently INR is therapeutic over 2 5  Will need heparin drip started now that INR is less than 2 5  Continued on digoxin for rate control  5  Lung mass:  With concern for metastasis  For past continued workup and biopsy as per primary team and pulmonology  Subjective:   Patient seen and examined  No significant events overnight   stable dyspnea on exertion, ; pertinent negatives - chest pain, chest pressure/discomfort, irregular heart beat, lower extremity edema and palpitations  Objective:     Vitals: Blood pressure 97/54, pulse 55, temperature 97 8 °F (36 6 °C), temperature source Oral, resp  rate 18, height 5' 3" (1 6 m), weight 105 kg (231 lb 11 3 oz), SpO2 100 %  , Body mass index is 41 04 kg/m² ,   Orthostatic Blood Pressures      Most Recent Value   Blood Pressure  97/54 filed at 06/23/2021 0700   Patient Position - Orthostatic VS  Lying filed at 06/23/2021 0700            Intake/Output Summary (Last 24 hours) at 6/23/2021 0377  Last data filed at 6/22/2021 1356  Gross per 24 hour   Intake 120 ml   Output 1200 ml   Net -1080 ml         Physical Exam:    GEN: Tin Adame appears well, alert and oriented x 3, pleasant and cooperative   HEENT: pupils equal, round, and reactive to light; extraocular muscles intact  NECK: supple, no carotid bruits   HEART: regular rhythm, normal S1 and S2, +systolic/diastolic murmur, no clicks, gallops or rubs   LUNGS: clear to auscultation bilaterally; no wheezes, rales, or rhonchi   ABDOMEN: normal bowel sounds, soft, no tenderness, no distention  EXTREMITIES: peripheral pulses normal; no clubbing, cyanosis, or edema  NEURO: no focal findings   SKIN: normal without suspicious lesions on exposed skin    Medications:      Current Facility-Administered Medications:     acetaminophen (TYLENOL) tablet 975 mg, 975 mg, Oral, Q8H [address], Jennifer P Bloch, CRNP, 975 mg at 06/23/21 0535    albuterol (PROVENTIL HFA,VENTOLIN HFA) inhaler 2 puff, 2 puff, Inhalation, Q6H PRN, Manolo Navarrete PA-C    bumetanide (BUMEX) injection 1 mg, 1 mg, Intravenous, TID (diuretic), Aurora Butler MD, 1 mg at 06/23/21 0534    Diclofenac Sodium (VOLTAREN) 1 % topical gel 2 g, 2 g, Topical, 4x Daily, DAVID Bland, 2 g at 06/22/21 1932    digoxin (LANOXIN) tablet 125 mcg, 125 mcg, Oral, Daily, Manolo Navarrete PA-C, 125 mcg at 06/22/21 1007    ferrous sulfate tablet 325 mg, 325 mg, Oral, Daily With Breakfast, Manolo Navarrete PA-C, 325 mg at 06/22/21 1008    fluticasone-vilanterol (BREO ELLIPTA) 100-25 mcg/inh inhaler 1 puff, 1 puff, Inhalation, Daily, LOPEZ Oneill, 1 puff at 06/22/21 1008    insulin lispro (HumaLOG) 100 units/mL subcutaneous injection 1-5 Units, 1-5 Units, Subcutaneous, TID AC, 1 Units at 06/19/21 1832 **AND** Fingerstick Glucose (POCT), , , TID AC, Manolo Navarrete PA-C    insulin lispro (HumaLOG) 100 units/mL subcutaneous injection 1-5 Units, 1-5 Units, Subcutaneous, HS, Manolo Navarrete PA-C, 1 Units at 06/21/21 2102    ipratropium-albuterol (DUO-NEB) 0 5-2 5 mg/3 mL inhalation solution 3 mL, 3 mL, Nebulization, 4x Daily PRN, LOPEZ Oneill    lidocaine (LIDODERM) 5 % patch 1 patch, 1 patch, Topical, Daily, Esdras De Guzman PA-C, 1 patch at 06/22/21 1007    LORazepam (ATIVAN) tablet 0 25 mg, 0 25 mg, Oral, Q6H PRN, Mallika Buenrostroeror, CRNP    montelukast (SINGULAIR) tablet 10 mg, 10 mg, Oral, Daily, Manolo Navarrete PA-C, 10 mg at 06/22/21 1007    oxyCODONE (ROXICODONE) IR tablet 2 5 mg, 2 5 mg, Oral, Q4H PRN, Mallika Longr, DAVID    pantoprazole (PROTONIX) EC tablet 40 mg, 40 mg, Oral, Early Morning, Manolo Navarrete PA-C, 40 mg at 06/23/21 0535    potassium chloride (K-DUR,KLOR-CON) CR tablet 20 mEq, 20 mEq, Oral, BID, Manolo Navarrete PA-C, 20 mEq at 06/22/21 1932    senna-docusate sodium (SENOKOT S) 8 6-50 mg per tablet 1 tablet, 1 tablet, Oral, Daily PRN, Manolo Navarrete PA-C    sodium chloride (OCEAN) 0 65 % nasal spray 1 spray, 1 spray, Each Nare, Q1H PRN, DAVID Tavarez, 1 spray at 06/19/21 2149     Labs & Results:    Results from last 7 days   Lab Units 06/17/21  1842   TROPONIN I ng/mL <0 02     Results from last 7 days   Lab Units 06/23/21  0550 06/22/21  0519 06/20/21  1013   WBC Thousand/uL 7 91 7 82 6 90   HEMOGLOBIN g/dL 8 6* 8 1* 8 5*   HEMATOCRIT % 28 8* 28 4* 28 6*   PLATELETS Thousands/uL 409* 419* 393*         Results from last 7 days   Lab Units 06/23/21  0550 06/22/21  0519 06/20/21  1013 06/17/21  1842   POTASSIUM mmol/L 4 3 4 4 4 0 3 8   CHLORIDE mmol/L 100 99* 101 101   CO2 mmol/L 33* 33* 33* 32   BUN mg/dL 43* 37* 27* 22   CREATININE mg/dL 1 30 1 31* 1 28 1 03   CALCIUM mg/dL 9 0 9 5 8 9 9 0   ALK PHOS U/L  --  62 64 73   ALT U/L  --  15 13 17   AST U/L  --  17 16 21     Results from last 7 days   Lab Units 06/23/21  0550 06/22/21  0519 06/20/21  1013   INR  2 16* 2 72* 2 91*   PTT seconds  --   --  41*           Echo: personally reviewed - EF normal, dyssynergy septum due to RV prolonged pressure overload, left atrial enlargement, bio MVR with possible sewing ring dehiscence, moderate mitral stenosis, trace regurgitation  Mild AS, mild AR, moderate to severe TR with severe pulmonary hypertension    EKG personally reviewed by Ellie Clifton MD

## 2021-06-23 NOTE — PROGRESS NOTES
Progress Note - Pulmonary   Michell Marsh 70 y o  female MRN: [id number]  Unit/Bed#: S -01 Encounter: [id number]    Assessment/Plan:    Abnormal CT chest with 3 cm RUL lung mass and adenopathy              Plan is for IR-guided lung mass biopsy; however, INR must be below 1 5 and she requires heparin drip bridge  AC per primary team               Breathing status is optimized at present for biopsy      Acute on chronic diastolic CHF with bioprosthetic mitral stenosis              Diuresis per Cardiology              Monitor I/Os and daily weights      Permanent atrial fibrillation with left atrial appendage thrombus and Coumadin coagulopathy              Digoxin for rate control per Cardiology              Coumadin is held at present  INR 2  1               Heparin bridge for biopsy as above      Acute pulmonary insufficiency on chronic hypoxic respiratory failure due to acute CHF and lung mass              Baseline oxygen requirement is 4L NC               Titrate oxygen to maintain POX > or = 89%                 OOB as tolerated      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              Quit 9 years ago               50+ pack year history              Maintains on Symbicort and albuterol as outpatient               Continue Breo and albuterol PRN as inpatient                 Outpatient follow-up pending course  Chief Complaint:    "I'm okay "    Subjective:    Bailee Paredes is sitting up in bed  She reports she is feeling good  Breathing has improved  She has no specific complaints  Objective:    Vitals: Blood pressure 97/54, pulse 55, temperature 97 8 °F (36 6 °C), temperature source Oral, resp  rate 18, height 5' 3" (1 6 m), weight 105 kg (231 lb 11 3 oz), SpO2 100 %  4L NC,Body mass index is 41 04 kg/m²        Intake/Output Summary (Last 24 hours) at 6/23/2021 1018  Last data filed at 6/22/2021 1356  Gross per 24 hour   Intake 120 ml   Output 1200 ml   Net -1080 ml       Invasive Devices     Peripheral Intravenous Line            Peripheral IV 06/21/21 Left;Ventral (anterior) Forearm 2 days          Drain            External Urinary Catheter 2 days                Physical Exam:     Physical Exam  Vitals reviewed  Constitutional:       General: She is not in acute distress  Appearance: She is well-developed  She is not toxic-appearing or diaphoretic  Interventions: Nasal cannula in place  HENT:      Head: Normocephalic and atraumatic  Eyes:      General: No scleral icterus  Neck:      Trachea: No tracheal deviation  Cardiovascular:      Rate and Rhythm: Normal rate and regular rhythm  Heart sounds: S1 normal and S2 normal  No murmur heard  No friction rub  No gallop  Pulmonary:      Effort: Pulmonary effort is normal  No tachypnea, accessory muscle usage or respiratory distress  Breath sounds: No stridor  Examination of the right-lower field reveals rales  Examination of the left-lower field reveals rales  Rales present  No decreased breath sounds, wheezing or rhonchi  Chest:      Chest wall: No tenderness  Abdominal:      General: Bowel sounds are normal  There is no distension  Palpations: Abdomen is soft  Tenderness: There is no abdominal tenderness  There is no guarding or rebound  Musculoskeletal:      Cervical back: Neck supple  Skin:     General: Skin is warm and dry  Findings: No rash  Neurological:      Mental Status: She is alert and oriented to person, place, and time  GCS: GCS eye subscore is 4  GCS verbal subscore is 5  GCS motor subscore is 6  Psychiatric:         Speech: Speech normal          Behavior: Behavior normal  Behavior is cooperative         Labs:   CBC:   Lab Results   Component Value Date    WBC 7 91 06/23/2021    HGB 8 6 (L) 06/23/2021    HCT 28 8 (L) 06/23/2021    MCV 89 06/23/2021     (H) 06/23/2021    MCH 26 6 (L) 06/23/2021    MCHC 29 9 (L) 06/23/2021    RDW 17 9 (H) 06/23/2021    MPV 10 0 06/23/2021   , CMP:   Lab Results   Component Value Date    SODIUM 140 06/23/2021    K 4 3 06/23/2021     06/23/2021    CO2 33 (H) 06/23/2021    BUN 43 (H) 06/23/2021    CREATININE 1 30 06/23/2021    CALCIUM 9 0 06/23/2021    EGFR 41 06/23/2021   , PT/INR:   Lab Results   Component Value Date    INR 2 16 (H) 06/23/2021     Imaging and other studies: None new

## 2021-06-23 NOTE — ASSESSMENT & PLAN NOTE
Anticoagulated on warfarin  With history of appendage thrombus  INR is 2 9  Goal 2 5-3 5  If below 2 5 needs gtt  Monitor PT INR daily  Continue digoxin    Now IR are considering gtt again - will hold coumadin depending on planned procedure     NPO after midnight just in case the patient's INR does reach 1 5  Hopefully get biopsy tomorrow    Currently on heparin

## 2021-06-23 NOTE — PROGRESS NOTES
[facility]  Progress Note - Michell Marsh 1945, 70 y o  female MRN: [id number]  Unit/Bed#: S -01 Encounter: [id number]  Primary Care Provider: Lilia Rodriguez MD   Date and time admitted to hospital: 6/17/2021  5:52 PM    CKD (chronic kidney disease)  Assessment & Plan  Lab Results   Component Value Date    EGFR 41 06/23/2021    EGFR 40 06/22/2021    EGFR 42 06/20/2021    CREATININE 1 30 06/23/2021    CREATININE 1 31 (H) 06/22/2021    CREATININE 1 28 06/20/2021   CKD stage 2-3 in the setting of HTN and T2DM requiring frequent bmp checks  Check creatinine tomorrow  Trending up slowly  Continue to monitor  Patient's Bumex decreased from 2 mg to 1 mg    Morbid obesity (HCC)  Assessment & Plan  Diet lifestyle modifications  Counseled on weight loss  Noted pulmonary hypertension on prior echo  This is likely contributing to patient's symptomatology    Type 2 diabetes mellitus [facility])  Assessment & Plan  Lab Results   Component Value Date    HGBA1C 4 5 03/15/2021       Recent Labs     06/22/21  1140 06/22/21  1627 06/23/21  0748 06/23/21  1055   POCGLU 97 105 92 84       Blood Sugar Average: Last 72 hrs:  (P) 798 [id number]   · Hold oral antihyperglycemics  · Start sliding scale insulin  · accuchecks   Blood sugars are acceptable    Chronic atrial fibrillation (HCC)  Assessment & Plan  Anticoagulated on warfarin  With history of appendage thrombus  INR is 2 9  Goal 2 5-3 5  If below 2 5 needs gtt  Monitor PT INR daily  Continue digoxin    Now IR are considering gtt again - will hold coumadin depending on planned procedure     NPO after midnight just in case the patient's INR does reach 1 5  Hopefully get biopsy tomorrow  Currently on heparin         COPD (chronic obstructive pulmonary disease) ([facility] [address] )  Assessment & Plan  Does not appear to be in acute exacerbation at this time  Continue home inhalers    This could be contributing to patient's shortness of breath  Lung mass  Will likely require biopsy with Interventional Radiology     Patient with chronic hypoxemic respiratory failure as well    * Shortness of breath  Assessment & Plan  Presents from nursing facility with subjective worsening of shortness of breath  CT scans finding significant for potential mass verses volume overload versus infection  Chronically wears 4L   Echo in 2020 - EF 50-55%  Diastolic dysfunction with inability to calculate filling pressures due to bioprosthetic MVR  Severe tricuspid regurgitation  At least moderate pulmonary hypertension assuming an RA pressure of 10mmHg  Radiology evaluation appreciated  Diuretics were decreased today     Continue to monitor closely  VTE Pharmacologic Prophylaxis:   Pharmacologic: Heparin  Mechanical VTE Prophylaxis in Place: Yes    Patient Centered Rounds: I have performed bedside rounds with nursing staff today  Education and Discussions with Family / Patient:  Offered but the patient politely declined    Time Spent for Care: 20 minutes  More than 50% of total time spent on counseling and coordination of care as described above  Current Length of Stay: 5 day(s)    Current Patient Status: Inpatient   Certification Statement: The patient will continue to require additional inpatient hospital stay due to Needing a biopsy  Patient is also requiring diuretics    Discharge Plan:  Patient will be here least another 48 hours    Code Status: Level 1 - Full Code      Subjective:   Patient seen examined  Just little tired today but otherwise no complaints    Objective:     Vitals:   Temp (24hrs), Av 2 °F (36 8 °C), Min:97 7 °F (36 5 °C), Max:98 7 °F (37 1 °C)    Temp:  [97 7 °F (36 5 °C)-98 7 °F (37 1 °C)] 97 8 °F (36 6 °C)  HR:  [55-95] 64  Resp:  [18] 18  BP: ()/(54-65) 138/64  SpO2:  [96 %-100 %] 100 %  Body mass index is 41 04 kg/m²       Input and Output Summary (last 24 hours):     No intake or output data in the 24 hours ending 06/23/21 1404    Physical Exam:     Physical Exam  (   General Appearance:    Alert, cooperative, no distress, appears stated age                               Lungs:     Decreased breath sounds at the bases       Heart:    Regular rate and rhythm, S1 and S2 normal, no murmur, rub    or gallop   Abdomen:     Soft, non-tender, bowel sounds active all four quadrants,     no masses, no organomegaly           Extremities:   Extremities normal, atraumatic, no cyanosis or edema       Additional Data:     Labs:    Results from last 7 days   Lab Units 06/23/21  0550 06/22/21  0519   WBC Thousand/uL 7 91 7 82   HEMOGLOBIN g/dL 8 6* 8 1*   HEMATOCRIT % 28 8* 28 4*   PLATELETS Thousands/uL 409* 419*   NEUTROS PCT %  --  72   LYMPHS PCT %  --  14   MONOS PCT %  --  7   EOS PCT %  --  5     Results from last 7 days   Lab Units 06/23/21  0550 06/22/21  0519   SODIUM mmol/L 140 139   POTASSIUM mmol/L 4 3 4 4   CHLORIDE mmol/L 100 99*   CO2 mmol/L 33* 33*   BUN mg/dL 43* 37*   CREATININE mg/dL 1 30 1 31*   ANION GAP mmol/L 7 7   CALCIUM mg/dL 9 0 9 5   ALBUMIN g/dL  --  2 7*   TOTAL BILIRUBIN mg/dL  --  0 43   ALK PHOS U/L  --  62   ALT U/L  --  15   AST U/L  --  17   GLUCOSE RANDOM mg/dL 87 92     Results from last 7 days   Lab Units 06/23/21  0550   INR  2 16*     Results from last 7 days   Lab Units 06/23/21  1055 06/23/21  0748 06/22/21  1627 06/22/21  1140 06/22/21  0746 06/21/21  2056 06/21/21  1640 06/21/21  1059 06/21/21  0733 06/20/21  2322 06/20/21  1502 06/20/21  1109   POC GLUCOSE mg/dl 84 92 105 97 89 150* 129 118 106 100 133 119         Results from last 7 days   Lab Units 06/19/21  0551 06/17/21  2327   PROCALCITONIN ng/ml <0 05 <0 05           * I Have Reviewed All Lab Data Listed Above  * Additional Pertinent Lab Tests Reviewed:  [facility]        Recent Cultures (last 7 days):           Last 24 Hours Medication List:   Current Facility-Administered Medications Medication Dose Route Frequency Provider Last Rate    acetaminophen  975 mg Oral Q8H [address] DAVID Pettit      albuterol  2 puff Inhalation Q6H PRN Margurette Lennox, PA-C      bumetanide  1 mg Intravenous TID (diuretic) Gail Sarah MD      Diclofenac Sodium  2 g Topical 4x Daily Mukul TobinsDAVID      digoxin  125 mcg Oral Daily Manolo Navarrete PA-C      ferrous sulfate  325 mg Oral Daily With Breakfast Manolo Navarrete PA-C      fluticasone-vilanterol  1 puff Inhalation Daily LOPEZ Jo      heparin (porcine)  3-20 Units/kg/hr (Order-Specific) Intravenous Titrated Gail Sarah MD 11 1 Units/kg/hr (06/23/21 1031)    heparin (porcine)  2,000 Units Intravenous Q1H PRN Gail Sarah MD      heparin (porcine)  4,000 Units Intravenous Q1H PRN Gail Sarah MD      insulin lispro  1-5 Units Subcutaneous TID AC Manolo Navarrete PA-C      insulin lispro  1-5 Units Subcutaneous HS Manolo Navarrete PA-C      ipratropium-albuterol  3 mL Nebulization 4x Daily PRN LOPEZ Jo      lidocaine  1 patch Topical Daily Christine Aleman PA-C      LORazepam  0 25 mg Oral Q6H PRN DAVID Pettit      montelukast  10 mg Oral Daily Margurette Lennox, PA-C      oxyCODONE  2 5 mg Oral Q4H PRN DAVID Pettit      pantoprazole  40 mg Oral Early Morning Manolo Navarrete PA-C      potassium chloride  20 mEq Oral BID Manolo Navarrete PA-C      senna-docusate sodium  1 tablet Oral Daily PRN Manolo Navarrete PA-C      sodium chloride  1 spray Each Nare Q1H PRN DAVID Tavarez          Today, Patient Was Seen By: Torsten Mehta MD    ** Please Note: Dictation voice to text software may have been used in the creation of this document   **

## 2021-06-23 NOTE — ASSESSMENT & PLAN NOTE
Lab Results   Component Value Date    EGFR 41 06/23/2021    EGFR 40 06/22/2021    EGFR 42 06/20/2021    CREATININE 1 30 06/23/2021    CREATININE 1 31 (H) 06/22/2021    CREATININE 1 28 06/20/2021   CKD stage 2-3 in the setting of HTN and T2DM requiring frequent bmp checks  Check creatinine tomorrow  Trending up slowly  Continue to monitor    Patient's Bumex decreased from 2 mg to 1 mg

## 2021-06-23 NOTE — ASSESSMENT & PLAN NOTE
Presents from nursing facility with subjective worsening of shortness of breath  CT scans finding significant for potential mass verses volume overload versus infection  Chronically wears 4L   Echo in 2020 - EF 50-55%  Diastolic dysfunction with inability to calculate filling pressures due to bioprosthetic MVR  Severe tricuspid regurgitation  At least moderate pulmonary hypertension assuming an RA pressure of 10mmHg  Radiology evaluation appreciated  Diuretics were decreased today     Continue to monitor closely

## 2021-06-23 NOTE — ASSESSMENT & PLAN NOTE
Does not appear to be in acute exacerbation at this time  Continue home inhalers  This could be contributing to patient's shortness of breath  Lung mass  Will likely require biopsy with Interventional Radiology     Patient with chronic hypoxemic respiratory failure as well

## 2021-06-23 NOTE — ASSESSMENT & PLAN NOTE
Lab Results   Component Value Date    HGBA1C 4 5 03/15/2021       Recent Labs     06/22/21  1140 06/22/21  1627 06/23/21  0748 06/23/21  1055   POCGLU 97 105 92 84       Blood Sugar Average: Last 72 hrs:  (P) 306 [id number]   · Hold oral antihyperglycemics  · Start sliding scale insulin  · accuchecks     Blood sugars are acceptable

## 2021-06-23 NOTE — ED PROVIDER NOTES
History  Chief Complaint   Patient presents with    Shortness of Breath     Pt presents via EMS, coming from [address]  Pt states she has been progessively SOB for two days  pt had an abnomal chest Xray  denies CP, or cough  hx of CHF, afib     70 yr female sent from local long term care facility - wears o2 nc- since 1/21 covid-- c/o 2 days fo increasing sob- had abnormal cxr at [facility] -- pt denies any fevers/uti/cough / no cp- no melena normal amount of urine output-       History provided by:  Patient   used: No    Shortness of Breath  Associated symptoms: no cough and no wheezing        Prior to Admission Medications   Prescriptions Last Dose Informant Patient Reported? Taking? albuterol (PROVENTIL HFA,VENTOLIN HFA) 90 mcg/act inhaler   Yes No   Sig: Inhale 2 puffs every 6 (six) hours as needed   budesonide-formoterol (SYMBICORT) 80-4 5 MCG/ACT inhaler   Yes No   Sig: Inhale 2 puffs 2 (two) times a day Rinse mouth after use     digoxin (LANOXIN) 0 125 mg tablet   Yes No   Sig: Take 0 125 mg by mouth daily   ferrous sulfate (EQL Iron Supplement Therapy) 325 (65 Fe) mg tablet   No No   Sig: Take 1 tablet (325 mg total) by mouth daily with breakfast   furosemide (LASIX) 40 mg tablet   Yes No   Sig: Take 40 mg by mouth 2 (two) times a day   melatonin 3 mg   No No   Sig: Take 1 tablet (3 mg total) by mouth daily at bedtime   montelukast (SINGULAIR) 10 mg tablet   Yes No   Sig: Take 1 tablet by mouth daily   nystatin (MYCOSTATIN) powder   No No   Sig: Apply topically 2 (two) times a day Apply to skin folds   omeprazole (PriLOSEC) 20 mg delayed release capsule   Yes No   Sig: Take 20 mg by mouth daily   potassium chloride (K-DUR,KLOR-CON) 10 mEq tablet   Yes No   Sig: Take 20 mEq by mouth 2 (two) times a day   senna-docusate sodium (Senokot S) 8 6-50 mg per tablet   Yes No   Sig: Take 1 tablet by mouth daily as needed   warfarin (COUMADIN) 3 mg tablet   Yes No   Sig: Take 3 mg by mouth daily Facility-Administered Medications: None       Past Medical History:   Diagnosis Date    Atrial fibrillation ([facility] )     COPD (chronic obstructive pulmonary disease) ([facility] )     Diabetes mellitus ([facility] )     Hypertension     Respiratory failure ([facility] )        Past Surgical History:   Procedure Laterality Date    COLONOSCOPY      MITRAL VALVE REPLACEMENT      Bovine       History reviewed  No pertinent family history  I have reviewed and agree with the history as documented  E-Cigarette/Vaping    E-Cigarette Use Former User      E-Cigarette/Vaping Substances    Nicotine No     THC No     CBD No     Flavoring No     Other No     Unknown No      Social History     Tobacco Use    Smoking status: Former Smoker     Quit date: 2010     Years since quittin 4    Smokeless tobacco: Never Used   Vaping Use    Vaping Use: Former   Substance Use Topics    Alcohol use: Not Currently    Drug use: Not Currently       Review of Systems   Constitutional: Negative  HENT: Negative  Eyes: Negative  Respiratory: Positive for shortness of breath  Negative for apnea, cough, choking, chest tightness, wheezing and stridor  Cardiovascular: Negative  Gastrointestinal: Negative  Endocrine: Negative  Genitourinary: Negative  Musculoskeletal: Negative  Skin: Negative  Allergic/Immunologic: Negative  Neurological: Negative  Hematological: Negative  Psychiatric/Behavioral: Negative  Physical Exam  Physical Exam  Vitals and nursing note reviewed  Constitutional:       General: She is not in acute distress  Appearance: She is well-developed  She is not ill-appearing, toxic-appearing or diaphoretic  Interventions: She is not intubated  Comments: avss-- pulse ox 96 % on 2 liters o2 nc- interpretation is normal- in nad    HENT:      Head: Normocephalic and atraumatic  Eyes:      Extraocular Movements: Extraocular movements intact        Pupils: Pupils are equal, round, and reactive to light  Comments: Mm pink   Neck:      Thyroid: No thyromegaly  Vascular: No hepatojugular reflux or JVD  Trachea: No tracheal deviation  Comments: No pmt c/t/l/s spine   Cardiovascular:      Rate and Rhythm: Normal rate and regular rhythm  No extrasystoles are present  Pulses: Normal pulses  Heart sounds: Normal heart sounds  No murmur heard  No friction rub  No gallop  Pulmonary:      Effort: Tachypnea present  No bradypnea, accessory muscle usage or respiratory distress  She is not intubated  Breath sounds: No stridor  Examination of the right-lower field reveals rales  Examination of the left-lower field reveals rales  Rales present  No decreased breath sounds, wheezing or rhonchi  Chest:      Chest wall: No mass, deformity, tenderness, crepitus or edema  There is no dullness to percussion  Abdominal:      General: Bowel sounds are normal       Palpations: Abdomen is soft  There is no hepatomegaly, splenomegaly or mass  Tenderness: There is no abdominal tenderness  There is no guarding or rebound  Comments: Soft ntnd- no hsm- no cva tenderness- no peritoneal signs   Musculoskeletal:         General: Normal range of motion  Cervical back: Normal range of motion and neck supple  Right lower leg: No tenderness  Edema present  Left lower leg: No tenderness  Edema present  Comments: Equal bilateral radial/dp pulses- trace ble pretibial edema- nt no asym/ erythema   Lymphadenopathy:      Cervical: No cervical adenopathy  Skin:     General: Skin is warm  Capillary Refill: Capillary refill takes less than 2 seconds  Coloration: Skin is not cyanotic or pale  Findings: No ecchymosis, erythema or rash  Nails: There is no clubbing  Neurological:      General: No focal deficit present  Mental Status: She is alert and oriented to person, place, and time  Cranial Nerves: No cranial nerve deficit  Motor: No weakness  Comments: Normal non focal neuro exam    Psychiatric:         Mood and Affect: Mood is anxious           Behavior: Behavior normal          Vital Signs  ED Triage Vitals   Temperature Pulse Respirations Blood Pressure SpO2   06/17/21 2202 06/17/21 1803 06/17/21 1803 06/17/21 1803 06/17/21 1803   97 8 °F (36 6 °C) 73 18 123/68 98 %      Temp Source Heart Rate Source Patient Position - Orthostatic VS BP Location FiO2 (%)   06/17/21 2202 06/17/21 1803 06/17/21 1803 06/17/21 1803 --   Oral Monitor Lying Right arm       Pain Score       06/17/21 2240       No Pain           Vitals:    06/22/21 0513 06/22/21 0700 06/22/21 1626 06/22/21 1900   BP: 133/60 127/64 108/59 100/55   Pulse: 65 68 94 95   Patient Position - Orthostatic VS: Lying Lying Sitting Lying         Visual Acuity      ED Medications  Medications   ferrous sulfate tablet 325 mg (325 mg Oral Given 6/22/21 1008)   digoxin (LANOXIN) tablet 125 mcg (125 mcg Oral Given 6/22/21 1007)   albuterol (PROVENTIL HFA,VENTOLIN HFA) inhaler 2 puff (has no administration in time range)   senna-docusate sodium (SENOKOT S) 8 6-50 mg per tablet 1 tablet (1 tablet Oral Refused 6/17/21 2334)   pantoprazole (PROTONIX) EC tablet 40 mg (40 mg Oral Given 6/22/21 0512)   potassium chloride (K-DUR,KLOR-CON) CR tablet 20 mEq (20 mEq Oral Given 6/22/21 1932)   montelukast (SINGULAIR) tablet 10 mg (10 mg Oral Given 6/22/21 1007)   insulin lispro (HumaLOG) 100 units/mL subcutaneous injection 1-5 Units (1 Units Subcutaneous Not Given 6/22/21 1700)   insulin lispro (HumaLOG) 100 units/mL subcutaneous injection 1-5 Units (1 Units Subcutaneous Given 6/21/21 2102)   Diclofenac Sodium (VOLTAREN) 1 % topical gel 2 g (2 g Topical Given 6/22/21 1932)   fluticasone-vilanterol (BREO ELLIPTA) 100-25 mcg/inh inhaler 1 puff (1 puff Inhalation Given 6/22/21 1008)   ipratropium-albuterol (DUO-NEB) 0 5-2 5 mg/3 mL inhalation solution 3 mL (has no administration in time range) lidocaine (LIDODERM) 5 % patch 1 patch (1 patch Topical Medication Applied 6/22/21 1007)   sodium chloride (OCEAN) 0 65 % nasal spray 1 spray (1 spray Each Nare Given 6/19/21 2149)   LORazepam (ATIVAN) tablet 0 25 mg (has no administration in time range)   acetaminophen (TYLENOL) tablet 975 mg (975 mg Oral Given 6/22/21 1420)   bumetanide (BUMEX) injection 1 mg (1 mg Intravenous Given 6/22/21 1932)   oxyCODONE (ROXICODONE) IR tablet 2 5 mg (has no administration in time range)   LORazepam (ATIVAN) tablet 0 5 mg (0 5 mg Oral Given 6/17/21 2334)   iohexol (OMNIPAQUE) 350 MG/ML injection (SINGLE-DOSE) 85 mL (85 mL Intravenous Given 6/18/21 0505)   budesonide-formoterol (SYMBICORT) 80-4 5 MCG/ACT inhaler 2 puff (2 puffs Inhalation Given 6/18/21 1757)       Diagnostic Studies  Results Reviewed     Procedure Component Value Units Date/Time    NT-BNP PRO [[id number]]  (Abnormal) Collected: 06/17/21 2327    Lab Status: Final result Specimen: Blood from Arm, Left Updated: 06/17/21 2359     NT-proBNP 4,339 pg/mL     NT-BNP PRO [[id number]]  (Abnormal) Collected: 06/17/21 1842    Lab Status: Final result Specimen: Blood from Arm, Left Updated: 06/17/21 2316     NT-proBNP 4,538 pg/mL     Procalcitonin with AM Reflex [[id number]]     Lab Status: No result Specimen: Blood     Troponin I [[id number]]  (Normal) Collected: 06/17/21 1842    Lab Status: Final result Specimen: Blood from Arm, Left Updated: 06/17/21 1925     Troponin I <0 02 ng/mL     Digoxin level [[id number]]  (Normal) Collected: 06/17/21 1842    Lab Status: Final result Specimen: Blood from Arm, Left Updated: 06/17/21 1924     Digoxin Lvl 1 4 ng/mL     Comprehensive metabolic panel [[id number]]  (Abnormal) Collected: 06/17/21 1842    Lab Status: Final result Specimen: Blood from Arm, Left Updated: 06/17/21 1923     Sodium 142 mmol/L      Potassium 3 8 mmol/L      Chloride 101 mmol/L      CO2 32 mmol/L      ANION GAP 9 mmol/L      BUN 22 mg/dL      Creatinine 1 03 mg/dL Glucose 94 mg/dL      Calcium 9 0 mg/dL      Corrected Calcium 10 0 mg/dL      AST 21 U/L      ALT 17 U/L      Alkaline Phosphatase 73 U/L      Total Protein 7 2 g/dL      Albumin 2 7 g/dL      Total Bilirubin 0 48 mg/dL      eGFR 54 ml/min/1 73sq m     Narrative:      [place] guidelines for Chronic Kidney Disease (CKD):     Stage 1 with normal or high GFR (GFR > 90 mL/min/1 73 square meters)    Stage 2 Mild CKD (GFR = 60-89 mL/min/1 73 square meters)    Stage 3A Moderate CKD (GFR = 45-59 mL/min/1 73 square meters)    Stage 3B Moderate CKD (GFR = 30-44 mL/min/1 73 square meters)    Stage 4 Severe CKD (GFR = 15-29 mL/min/1 73 square meters)    Stage 5 End Stage CKD (GFR <15 mL/min/1 73 square meters)  Note: GFR calculation is accurate only with a steady state creatinine    Protime-INR [[id number]]  (Abnormal) Collected: 06/17/21 1842    Lab Status: Final result Specimen: Blood from Arm, Left Updated: 06/17/21 1916     Protime 27 9 seconds      INR 2 60    CBC and differential [[id number]]  (Abnormal) Collected: 06/17/21 1842    Lab Status: Final result Specimen: Blood from Arm, Left Updated: 06/17/21 1853     WBC 7 20 Thousand/uL      RBC 3 39 Million/uL      Hemoglobin 8 8 g/dL      Hematocrit 29 7 %      MCV 88 fL      MCH 26 0 pg      MCHC 29 6 g/dL      RDW 17 6 %      MPV 9 8 fL      Platelets 402 Thousands/uL      nRBC 0 /100 WBCs      Neutrophils Relative 69 %      Immat GRANS % 1 %      Lymphocytes Relative 18 %      Monocytes Relative 8 %      Eosinophils Relative 3 %      Basophils Relative 1 %      Neutrophils Absolute 5 05 Thousands/µL      Immature Grans Absolute 0 05 Thousand/uL      Lymphocytes Absolute 1 28 Thousands/µL      Monocytes Absolute 0 58 Thousand/µL      Eosinophils Absolute 0 18 Thousand/µL      Basophils Absolute 0 06 Thousands/µL     Blood gas, venous [[id number]]  (Abnormal) Collected: 06/17/21 1842    Lab Status: Final result Specimen: Blood from Arm, Left Updated: 06/17/21 1852     pH, [place] 7 444     pCO2, [place] 47 5 mm Hg      pO2, [place] 32 3 mm Hg      HCO3, [place] 31 8 mmol/L      Base Excess, [place]dal[place] 6 9 mmol/L      O2 Content, [place] 7 4 ml/dL      O2 HGB, VENOUS 54 5 %                  CT chest w contrast   Final Result by Rachell Thompson DO (06/18 4438)   1  No significant change in 3 cm noncalcified nodule posterolateral aspect right upper lobe  Based on current Fleischner Society 2017 Guidelines on incidental pulmonary nodule, either PET/CT scan evaluation, tissue sampling or short term interval    followup non-contrast CT followup (initially in 3 months) may be considered appropriate  2   Diffuse interstitial prominence in the upper lobes bilaterally, right side greater than left  Although the findings may be cardiogenic in nature, lymphangitic spread of neoplasm not excluded, given the presence of right upper lobe pulmonary nodule  3   Additional noncalcified pleural-based bilateral upper lobe soft tissue masses as described above  4   Scattered groundglass infiltrates throughout the lungs bilaterally, likely cardiogenic in nature  These do not have the typical appearance for Covid type pneumonia  5   Cardiomegaly with severe left atrial enlargement  Recommend follow-up cardiology consultation  The study was marked in [facility] for immediate notification  Workstation performed: [id number]         CT chest without contrast   Final Result by Ry Hernandez MD (06/17 2033)      1  Masslike opacity in the right middle lobe measuring 2 6 x 4 0 cm   9 mm irregular nodular opacity in the right lower lobe abutting the major fissure  Mediastinal /subcarinal lymphadenopathy  Recommend contrast-enhanced chest CT for further    evaluation, and consider PET/CT and/or tissue sampling  2   Moderate right pleural effusion    Adjacent consolidative opacity in the right lower lobe at the lung base, may be due to atelectasis and/or pneumonia  3   Bilateral upper lobe septal thickening may be related to pulmonary edema  Multifocal bilateral groundglass opacities with considerations including pulmonary edema, infectious, or inflammatory etiology  Peripheral groundglass opacities noted    particularly in the right lower lobe, cannot exclude COVID-19 pneumonia  4   Cardiomegaly with marked left atrial dilatation  Consider correlation with echocardiogram          I personally discussed this study with Oscar Post on 6/17/2021 at 8:28 PM                Workstation performed: [id number]                    Procedures  Procedures         ED Course  ED Course as of Jun 22 2132   Thu Jun 17, 2021 1821 Er md note- recent labs reviewed by  er md- 6/1/7 /21  oupt  cxr report reviewed by er md       2053 Er md note- oral temp 98 0                                               MDM    Disposition  Final diagnoses:   Dyspnea, unspecified type   Mass of middle lobe of right lung   Pleural effusion on right     Time reflects when diagnosis was documented in both MDM as applicable and the Disposition within this note     Time User Action Codes Description Comment    6/17/2021  9:08 PM Uvaldo Royal D Add [R06 00] Dyspnea, unspecified type     6/17/2021  9:08 PM Bob Wilson Add [R91 8] Mass of middle lobe of right lung     6/17/2021  9:08 PM Uvaldo Royal YUNI Add [J90] Pleural effusion on right     6/17/2021  9:23 PM Manolo Navarrete Add [R06 02] Shortness of breath       ED Disposition     ED Disposition Condition Date/Time Comment    Admit Stable Thu Jun 17, 2021  9:10 PM Case was discussed with Kishan ZIEGLER and the patient's admission status was agreed to be Admission Status: observation status to the service of Dr Still Person             Follow-up Information    None         Current Discharge Medication List      CONTINUE these medications which have NOT CHANGED    Details   albuterol (PROVENTIL HFA,VENTOLIN HFA) 90 mcg/act inhaler Inhale 2 puffs every 6 (six) hours as needed    Comments: Substitution to a formulary equivalent within the same pharmaceutical class is authorized  budesonide-formoterol (SYMBICORT) 80-4 5 MCG/ACT inhaler Inhale 2 puffs 2 (two) times a day Rinse mouth after use  digoxin (LANOXIN) 0 125 mg tablet Take 0 125 mg by mouth daily      ferrous sulfate (EQL Iron Supplement Therapy) 325 (65 Fe) mg tablet Take 1 tablet (325 mg total) by mouth daily with breakfast  Qty: 30 tablet, Refills: 0    Associated Diagnoses: GI bleed      furosemide (LASIX) 40 mg tablet Take 40 mg by mouth 2 (two) times a day      melatonin 3 mg Take 1 tablet (3 mg total) by mouth daily at bedtime  Qty: 30 tablet, Refills: 0    Associated Diagnoses: GI bleed      montelukast (SINGULAIR) 10 mg tablet Take 1 tablet by mouth daily      nystatin (MYCOSTATIN) powder Apply topically 2 (two) times a day Apply to skin folds  Qty: 15 g, Refills: 0    Associated Diagnoses: GI bleed      omeprazole (PriLOSEC) 20 mg delayed release capsule Take 20 mg by mouth daily      potassium chloride (K-DUR,KLOR-CON) 10 mEq tablet Take 20 mEq by mouth 2 (two) times a day      senna-docusate sodium (Senokot S) 8 6-50 mg per tablet Take 1 tablet by mouth daily as needed      warfarin (COUMADIN) 3 mg tablet Take 3 mg by mouth daily           No discharge procedures on file      PDMP Review     None          ED Provider  Electronically Signed by           Billy Acevedo MD  06/22/21 3665

## 2021-06-24 LAB
APTT PPP: 68 SECONDS (ref 23–37)
APTT PPP: 72 SECONDS (ref 23–37)
GLUCOSE SERPL-MCNC: 118 MG/DL (ref 65–140)
GLUCOSE SERPL-MCNC: 123 MG/DL (ref 65–140)
GLUCOSE SERPL-MCNC: 128 MG/DL (ref 65–140)
GLUCOSE SERPL-MCNC: 95 MG/DL (ref 65–140)
GLUCOSE SERPL-MCNC: 98 MG/DL (ref 65–140)
INR PPP: 1.88 (ref 0.84–1.19)
PROTHROMBIN TIME: 21.7 SECONDS (ref 11.6–14.5)

## 2021-06-24 PROCEDURE — 99232 SBSQ HOSP IP/OBS MODERATE 35: CPT | Performed by: INTERNAL MEDICINE

## 2021-06-24 PROCEDURE — 0BBD3ZX EXCISION OF RIGHT MIDDLE LUNG LOBE, PERCUTANEOUS APPROACH, DIAGNOSTIC: ICD-10-PCS | Performed by: RADIOLOGY

## 2021-06-24 PROCEDURE — 85730 THROMBOPLASTIN TIME PARTIAL: CPT | Performed by: FAMILY MEDICINE

## 2021-06-24 PROCEDURE — 82948 REAGENT STRIP/BLOOD GLUCOSE: CPT

## 2021-06-24 PROCEDURE — 99232 SBSQ HOSP IP/OBS MODERATE 35: CPT | Performed by: FAMILY MEDICINE

## 2021-06-24 PROCEDURE — 85610 PROTHROMBIN TIME: CPT | Performed by: NURSE PRACTITIONER

## 2021-06-24 RX ADMIN — FERROUS SULFATE TAB 325 MG (65 MG ELEMENTAL FE) 325 MG: 325 (65 FE) TAB at 10:18

## 2021-06-24 RX ADMIN — MONTELUKAST 10 MG: 10 TABLET, FILM COATED ORAL at 10:11

## 2021-06-24 RX ADMIN — POTASSIUM CHLORIDE 20 MEQ: 1500 TABLET, EXTENDED RELEASE ORAL at 18:32

## 2021-06-24 RX ADMIN — POTASSIUM CHLORIDE 20 MEQ: 1500 TABLET, EXTENDED RELEASE ORAL at 10:11

## 2021-06-24 RX ADMIN — FLUTICASONE FUROATE AND VILANTEROL TRIFENATATE 1 PUFF: 100; 25 POWDER RESPIRATORY (INHALATION) at 10:12

## 2021-06-24 RX ADMIN — DIGOXIN 125 MCG: 125 TABLET ORAL at 10:11

## 2021-06-24 RX ADMIN — BUMETANIDE 1 MG: 0.25 INJECTION INTRAMUSCULAR; INTRAVENOUS at 05:28

## 2021-06-24 RX ADMIN — HEPARIN SODIUM 11.1 UNITS/KG/HR: 10000 INJECTION, SOLUTION INTRAVENOUS at 10:21

## 2021-06-24 RX ADMIN — BUMETANIDE 1 MG: 0.25 INJECTION INTRAMUSCULAR; INTRAVENOUS at 18:32

## 2021-06-24 RX ADMIN — PANTOPRAZOLE SODIUM 40 MG: 40 TABLET, DELAYED RELEASE ORAL at 05:36

## 2021-06-24 RX ADMIN — BUMETANIDE 1 MG: 0.25 INJECTION INTRAMUSCULAR; INTRAVENOUS at 11:57

## 2021-06-24 NOTE — ASSESSMENT & PLAN NOTE
Anticoagulated on warfarin  With history of appendage thrombus  INR is 2 9  Goal 2 5-3 5  If below 2 5 needs gtt  Monitor PT INR daily  Continue digoxin    Now IR are considering gtt again - will hold coumadin depending on planned procedure     NPO after midnight just in case the patient's INR does reach 1 5  Hopefully get biopsy tomorrow  Currently on heparin    INR today is 1 8

## 2021-06-24 NOTE — PROGRESS NOTES
Progress Note - Pulmonary   Martha Garcia 70 y o  female MRN: [id number]  Unit/Bed#: S -01 Encounter: [id number]    Assessment/Plan:    Abnormal CT chest with 3 cm RUL lung mass and adenopathy              Plan is for IR-guided lung mass biopsy; however, INR must be below 1 5  INR is pending  She is NPO in case procedure can be done today      Acute on chronic diastolic CHF with bioprosthetic mitral stenosis              Diuresis per Cardiology              Monitor I/Os and daily weights      Permanent atrial fibrillation with left atrial appendage thrombus and Coumadin coagulopathy              Digoxin for rate control per Cardiology              On Heparin bridge for biopsy as above  Will need to resume Coumadin after biopsy per primary team/Cardiology      Acute pulmonary insufficiency on chronic hypoxic respiratory failure due to acute CHF and lung mass              Baseline oxygen requirement is 4L NC, which she is currently on               Titrate oxygen to maintain POX > or = 89%                 OOB as tolerated      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              Quit 9 years ago               50+ pack year history              Maintains on Symbicort and albuterol as outpatient               Continue Breo and albuterol PRN as inpatient                 Outpatient follow-up pending course  D/W nursing  Chief Complaint:    "I didn't sleep well "    Subjective:    Danis Thomas reports she feels okay  She did not sleep well  No SOB  No chest pain  No hemoptysis  Objective:    Vitals: Blood pressure 107/62, pulse 64, temperature 97 6 °F (36 4 °C), temperature source Oral, resp  rate 16, height 5' 3" (1 6 m), weight 105 kg (231 lb 11 3 oz), SpO2 99 %  4L NC,Body mass index is 41 04 kg/m²        Intake/Output Summary (Last 24 hours) at 6/24/2021 0843  Last data filed at 6/23/2021 1300  Gross per 24 hour   Intake 240 ml   Output 250 ml   Net -10 ml       Invasive Devices Peripheral Intravenous Line            Peripheral IV 06/21/21 Left;Ventral (anterior) Forearm 3 days          Drain            External Urinary Catheter 3 days                Physical Exam:     Physical Exam  Vitals reviewed  Constitutional:       General: She is not in acute distress  Appearance: She is well-developed  She is not toxic-appearing or diaphoretic  HENT:      Head: Normocephalic and atraumatic  Eyes:      General: No scleral icterus  Neck:      Trachea: No tracheal deviation  Cardiovascular:      Rate and Rhythm: Normal rate and regular rhythm  Heart sounds: S1 normal and S2 normal  No murmur heard  No friction rub  No gallop  Pulmonary:      Effort: Pulmonary effort is normal  No tachypnea, accessory muscle usage or respiratory distress  Breath sounds: Normal breath sounds  No stridor  No decreased breath sounds, wheezing, rhonchi or rales  Chest:      Chest wall: No tenderness  Abdominal:      General: Bowel sounds are normal  There is no distension  Palpations: Abdomen is soft  Tenderness: There is no abdominal tenderness  There is no guarding or rebound  Musculoskeletal:      Cervical back: Neck supple  Skin:     General: Skin is warm and dry  Findings: No rash  Neurological:      Mental Status: She is alert and oriented to person, place, and time  GCS: GCS eye subscore is 4  GCS verbal subscore is 5  GCS motor subscore is 6  Psychiatric:         Speech: Speech normal          Behavior: Behavior normal  Behavior is cooperative         Labs: PT/INR: Pending    Imaging and other studies: None new

## 2021-06-24 NOTE — PHYSICAL THERAPY NOTE
PHYSICAL THERAPY SCREEN NOTE    Patient Name: Martha Garcia  [id number] Date: 6/24/2021 06/24/21 1512   PT Last Visit   PT Visit Date 06/24/21   Note Type   Note type Screen   Assessment   Assessment PT orders received, chart review performed  Spoke to Mealnie Fernandez  Pt is a bedhold at [facility], where she is a LTC resident and a nery lift for [address] [place] [address], facility reported pt is bedbound  At this time, acute PT will not impact pt's overall functional mobility or discharge disposition, therefore pt w/ no acute PT needs  Will DC from IPPT caseload, recommend nursing staff utilize nery lift to get pt OOB        Danie Montes, PT, DPT

## 2021-06-24 NOTE — OCCUPATIONAL THERAPY NOTE
Occupational Therapy Screen Note        Patient Name: Austin Zaragoza  [id number] Date: 6/24/2021 06/24/21 1408   OT Last Visit   OT Visit Date 06/24/21   Note Type   Note type Screen   Assessment   Assessment OT orders received  Chart review completed  Based on chart review and conversation with BONG Rodriguez, pt is from [facility] and is bed-bound at baseline, use of nery lift for OOB and is dependent for all ADLs  Pt is at her functional baseline and would not benefit from skilled OT intervention at this time  D/C OT services          [facility], OTR/L

## 2021-06-24 NOTE — PLAN OF CARE
Problem: PAIN - ADULT  Goal: Verbalizes/displays adequate comfort level or baseline comfort level  Description: Interventions:  - Encourage patient to monitor pain and request assistance  - Assess pain using appropriate pain scale  - Administer analgesics based on type and severity of pain and evaluate response  - Implement non-pharmacological measures as appropriate and evaluate response  - Consider cultural and social influences on pain and pain management  - Notify physician/advanced practitioner if interventions unsuccessful or patient reports new pain  6/24/2021 1453 by Edmund Castillo RN  Outcome: Progressing  Problem: INFECTION - ADULT  Goal: Absence or prevention of progression during hospitalization  Description: INTERVENTIONS:  - Assess and monitor for signs and symptoms of infection  - Monitor lab/diagnostic results  - Monitor all insertion sites, i e  indwelling lines, tubes, and drains  - Monitor endotracheal if appropriate and nasal secretions for changes in amount and color  - [place] appropriate cooling/warming therapies per order  - Administer medications as ordered  - Instruct and encourage patient and family to use good hand hygiene technique  - Identify and instruct in appropriate isolation precautions for identified infection/condition  6/24/2021 1453 by Edmund Castillo RN  Outcome: Progressing  Goal: Absence of fever/infection during neutropenic period  Description: INTERVENTIONS:  - Monitor WBC    6/24/2021 1453 by Edmund Castillo RN  Outcome: Progressing     Problem: SAFETY ADULT  Goal: Patient will remain free of falls  Description: INTERVENTIONS:  - Educate patient/family on patient safety including physical limitations  - Instruct patient to call for assistance with activity   - Consult OT/PT to assist with strengthening/mobility   - Keep Call bell within reach  - Keep bed low and locked with side rails adjusted as appropriate  - Keep care items and personal belongings within reach  - Initiate and maintain comfort rounds  - Make Fall Risk Sign visible to staff  - Offer Toileting every 2 Hours, in advance of need  - Initiate/Maintain bed and chair alarm  - Apply yellow socks and bracelet for high fall risk patients  - Consider moving patient to room near nurses station  6/24/2021 1453 by Lenore Brice RN  Outcome: Progressing  Goal: Maintain or return to baseline ADL function  Description: INTERVENTIONS:  -  Assess patient's ability to carry out ADLs; assess patient's baseline for ADL function and identify physical deficits which impact ability to perform ADLs (bathing, care of mouth/teeth, toileting, grooming, dressing, etc )  - Assess/evaluate cause of self-care deficits   - Assess range of motion  - Assess patient's mobility; develop plan if impaired  - Assess patient's need for assistive devices and provide as appropriate  - Encourage maximum independence but intervene and supervise when necessary  - Involve family in performance of ADLs  - Assess for home care needs following discharge   - Consider OT consult to assist with ADL evaluation and planning for discharge  - Provide patient education as appropriate  6/24/2021 1453 by Lenore Brice RN  Outcome: Progressing  Goal: Maintains/Returns to pre admission functional level  Description: INTERVENTIONS:  - Perform BMAT or MOVE assessment daily    - Set and communicate daily mobility goal to care team and patient/family/caregiver  - Collaborate with rehabilitation services on mobility goals if consulted  - Perform Range of Motion 2 times a day  - Reposition patient every 2 hours    - Dangle patient 2 times a day  - Out of bed for meals 2 times a day  - Out of bed for toileting  - Record patient progress and toleration of activity level   6/24/2021 1453 by Lenore Brice RN  Outcome: Progressing     Problem: DISCHARGE PLANNING  Goal: Discharge to home or other facility with appropriate resources  Description: INTERVENTIONS:  - Identify barriers to discharge w/patient and caregiver  - Arrange for needed discharge resources and transportation as appropriate  - Identify discharge learning needs (meds, wound care, etc )  - Arrange for interpretive services to assist at discharge as needed  - Refer to Case Management Department for coordinating discharge planning if the patient needs post-hospital services based on physician/advanced practitioner order or complex needs related to functional status, cognitive ability, or social support system  6/24/2021 1453 by William Zimmerman RN  Outcome: Progressing  Problem: Knowledge Deficit  Goal: Patient/family/caregiver demonstrates understanding of disease process, treatment plan, medications, and discharge instructions  Description: Complete learning assessment and assess knowledge base    Interventions:  - Provide teaching at level of understanding  - Provide teaching via preferred learning methods  6/24/2021 1453 by William Zimmerman RN  Outcome: Progressing     Problem: RESPIRATORY - ADULT  Goal: Achieves optimal ventilation and oxygenation  Description: INTERVENTIONS:  - Assess for changes in respiratory status  - Assess for changes in mentation and behavior  - Position to facilitate oxygenation and minimize respiratory effort  - Oxygen administered by appropriate delivery if ordered  - Initiate smoking cessation education as indicated  - Encourage broncho-pulmonary hygiene including cough, deep breathe, Incentive Spirometry  - Assess the need for suctioning and aspirate as needed  - Assess and instruct to report SOB or any respiratory difficulty  - Respiratory Therapy support as indicated  6/24/2021 1453 by William Zimmerman RN  Outcome: Progressing  Problem: METABOLIC, FLUID AND ELECTROLYTES - ADULT  Goal: Fluid balance maintained  Description: INTERVENTIONS:  - Monitor labs   - Monitor I/O and WT  - Instruct patient on fluid and nutrition as appropriate  - Assess for signs & symptoms of volume excess or deficit  6/24/2021 1453 by Matthew Meza RN  Outcome: Progressing  Goal: Glucose maintained within target range  Description: INTERVENTIONS:  - Monitor Blood Glucose as ordered  - Assess for signs and symptoms of hyperglycemia and hypoglycemia  - Administer ordered medications to maintain glucose within target range  - Assess nutritional intake and initiate nutrition service referral as needed  6/24/2021 1453 by Matthew Meza RN  Outcome: Progressing     Problem: SKIN/TISSUE INTEGRITY - ADULT  Goal: Skin Integrity remains intact(Skin Breakdown Prevention)  Description: Assess:  -Perform Jevon assessment every 8  -Clean and moisturize skin every 2  -Inspect skin when repositioning, toileting, and assisting with ADLS  -Assess under medical devices such as purewick every 2 hours  -Assess extremities for adequate circulation and sensation     Bed Management:  -Have minimal linens on bed & keep smooth, unwrinkled  -Change linens as needed when moist or perspiring  -Avoid sitting or lying in one position for more than 3 hours while in bed  -Keep HOB at 45 degrees     Toileting:  -Offer bedside commode  -Assess for incontinence every 8  -Use incontinent care products after each incontinent episode such as calazine lotion     Activity:  -Encourage activity and walks on unit  -Encourage or provide ROM exercises   -Turn and reposition patient every 2 Hours  -Use appropriate equipment to lift or move patient in bed  -Consider limitation of chair time 3 hour intervals    Skin Care:  -Avoid use of baby powder, tape, friction and shearing, hot water or constrictive clothing  -Relieve pressure over bony prominences using foam rolls  -Do not massage red bony areas    Outcome: Progressing  Goal: Incision(s), wounds(s) or drain site(s) healing without S/S of infection  Description: INTERVENTIONS  - Assess and document dressing, incision, wound bed, drain sites and surrounding tissue  - Provide patient and family education  - Perform skin care/dressing changes every 2  6/24/2021 1453 by Sasha Daniel RN  Outcome: Progressing  Goal: Pressure injury heals and does not worsen  Description: Interventions:  - Implement low air loss mattress or specialty surface (Criteria met)  - Apply silicone foam dressing  - Instruct/assist with weight shifting every 2 minutes when in chair   - Limit chair time to 3 hour intervals  - Use special pressure reducing interventions such as pillows when in chair   - Apply fecal or urinary incontinence containment device   - Perform passive or active ROM every 3  - Turn and reposition patient & offload bony prominences every 2 hours   - Utilize friction reducing device or surface for transfers   - Use incontinent care products after each incontinent episode such as calazine lotion  - Consider nutrition services referral as needed  6/24/2021 1453 by Sasha Daniel RN  Problem: MOBILITY - ADULT  Goal: Maintain or return to baseline ADL function  Description: INTERVENTIONS:  -  Assess patient's ability to carry out ADLs; assess patient's baseline for ADL function and identify physical deficits which impact ability to perform ADLs (bathing, care of mouth/teeth, toileting, grooming, dressing, etc )  - Assess/evaluate cause of self-care deficits   - Assess range of motion  - Assess patient's mobility; develop plan if impaired  - Assess patient's need for assistive devices and provide as appropriate  - Encourage maximum independence but intervene and supervise when necessary  - Involve family in performance of ADLs  - Assess for home care needs following discharge   - Consider OT consult to assist with ADL evaluation and planning for discharge  - Provide patient education as appropriate  6/24/2021 1453 by Sasha Daniel RN  Outcome: Progressing  Goal: Maintains/Returns to pre admission functional level  Description: INTERVENTIONS:  - Perform BMAT or MOVE assessment daily    - Set and communicate daily mobility goal to care team and patient/family/caregiver  - Collaborate with rehabilitation services on mobility goals if consulted  - Perform Range of Motion 3 times a day  - Reposition patient every 2 hours  - Dangle patient 2 times a day  - Ambulate patient 2 times a day  - Out of bed for toileting  - Record patient progress and toleration of activity level   6/24/2021 1453 by Terry Ernst RN  Outcome: Progressing  Problem: Nutrition/Hydration-ADULT  Goal: Nutrient/Hydration intake appropriate for improving, restoring or maintaining nutritional needs  Description: Monitor and assess patient's nutrition/hydration status for malnutrition  Collaborate with interdisciplinary team and initiate plan and interventions as ordered  Monitor patient's weight and dietary intake as ordered or per policy  Utilize nutrition screening tool and intervene as necessary  Determine patient's food preferences and provide high-protein, high-caloric foods as appropriate       INTERVENTIONS:  - Monitor oral intake, urinary output, labs, and treatment plans  - Assess nutrition and hydration status and recommend course of action  - Evaluate amount of meals eaten  - Assist patient with eating if necessary   - Allow adequate time for meals  - Recommend/ encourage appropriate diets, oral nutritional supplements, and vitamin/mineral supplements  - Order, calculate, and assess calorie counts as needed  - Recommend, monitor, and adjust tube feedings and TPN/PPN based on assessed needs  - Assess need for intravenous fluids  - Provide specific nutrition/hydration education as appropriate  - Include patient/family/caregiver in decisions related to nutrition  6/24/2021 1453 by Terry Ernst RN  Outcome: Progressing     Problem: Prexisting or High Potential for Compromised Skin Integrity  Goal: Skin integrity is maintained or improved  Description: INTERVENTIONS:  - Identify patients at risk for skin breakdown  - Assess and monitor skin integrity  - Assess and monitor nutrition and hydration status  - Monitor labs   - Assess for incontinence   - Turn and reposition patient  - Assist with mobility/ambulation  - Relieve pressure over bony prominences  - Avoid friction and shearing  - Provide appropriate hygiene as needed including keeping skin clean and dry  - Evaluate need for skin moisturizer/barrier cream  - Collaborate with interdisciplinary team   - Patient/family teaching  - Consider wound care consult   6/24/2021 1453 by Lawrence Rodriguez RN  Outcome: Progressing     Problem: Potential for Falls  Goal: Patient will remain free of falls  Description: INTERVENTIONS:  - Educate patient/family on patient safety including physical limitations  - Instruct patient to call for assistance with activity   - Consult OT/PT to assist with strengthening/mobility   - Keep Call bell within reach  - Keep bed low and locked with side rails adjusted as appropriate  - Keep care items and personal belongings within reach  - Initiate and maintain comfort rounds  - Make Fall Risk Sign visible to staff  - Offer Toileting every 2 Hours, in advance of need  - Initiate/Maintain bed and chair alarm  - Apply yellow socks and bracelet for high fall risk patients  - Consider moving patient to room near nurses station  6/24/2021 1453 by Lawrence Rodriguez RN  Outcome: Progressing

## 2021-06-24 NOTE — ASSESSMENT & PLAN NOTE
Lab Results   Component Value Date    HGBA1C 4 5 03/15/2021       Recent Labs     06/23/21  2341 06/24/21  0742 06/24/21  1055 06/24/21  1330   POCGLU 133 98 95 123       Blood Sugar Average: Last 72 hrs:  (P) 649 [id number]   · Hold oral antihyperglycemics  · Start sliding scale insulin  · accuchecks     Blood sugars are acceptable

## 2021-06-24 NOTE — PROGRESS NOTES
[facility]  Progress Note - Austin Zaragoza 1945, 70 y o  female MRN: [id number]  Unit/Bed#: S -01 Encounter: [id number]  Primary Care Provider: Lissette Wyatt MD   Date and time admitted to hospital: 6/17/2021  5:52 PM    CKD (chronic kidney disease)  Assessment & Plan  Lab Results   Component Value Date    EGFR 41 06/23/2021    EGFR 40 06/22/2021    EGFR 42 06/20/2021    CREATININE 1 30 06/23/2021    CREATININE 1 31 (H) 06/22/2021    CREATININE 1 28 06/20/2021   CKD stage 2-3 in the setting of HTN and T2DM requiring frequent bmp checks  Check creatinine tomorrow  Trending up slowly  Continue to monitor  Patient's Bumex decreased from 2 mg to 1 mg    Morbid obesity (HCC)  Assessment & Plan  Diet lifestyle modifications  Counseled on weight loss  Noted pulmonary hypertension on prior echo  This is likely contributing to patient's symptomatology    Type 2 diabetes mellitus [facility])  Assessment & Plan  Lab Results   Component Value Date    HGBA1C 4 5 03/15/2021       Recent Labs     06/23/21  2341 06/24/21  0742 06/24/21  1055 06/24/21  1330   POCGLU 133 98 95 123       Blood Sugar Average: Last 72 hrs:  (P) 962 [id number]   · Hold oral antihyperglycemics  · Start sliding scale insulin  · accuchecks   Blood sugars are acceptable    Chronic atrial fibrillation (HCC)  Assessment & Plan  Anticoagulated on warfarin  With history of appendage thrombus  INR is 2 9  Goal 2 5-3 5  If below 2 5 needs gtt  Monitor PT INR daily  Continue digoxin    Now IR are considering gtt again - will hold coumadin depending on planned procedure     NPO after midnight just in case the patient's INR does reach 1 5  Hopefully get biopsy tomorrow  Currently on heparin  INR today is 1 8         COPD (chronic obstructive pulmonary disease) ([facility] [address] )  Assessment & Plan  Does not appear to be in acute exacerbation at this time  Continue home inhalers    This could be contributing to patient's shortness of breath  Lung mass  Will likely require biopsy with Interventional Radiology     Patient with chronic hypoxemic respiratory failure as well    * Shortness of breath  Assessment & Plan  Presents from nursing facility with subjective worsening of shortness of breath  CT scans finding significant for potential mass verses volume overload versus infection  Chronically wears 4L   Echo in 2020 - EF 50-55%  Diastolic dysfunction with inability to calculate filling pressures due to bioprosthetic MVR  Severe tricuspid regurgitation  At least moderate pulmonary hypertension assuming an RA pressure of 10mmHg  Radiology evaluation appreciated diuretics were decreased to couple days ago  Continue monitor  Will recheck a BMP tomorrow            VTE Pharmacologic Prophylaxis:   Pharmacologic: Heparin Drip  Mechanical VTE Prophylaxis in Place: Yes    Patient Centered Rounds: I have performed bedside rounds with nursing staff today  Education and Discussions with Family / Patient:  Offered but the patient politely declined    Time Spent for Care: 20 minutes  More than 50% of total time spent on counseling and coordination of care as described above  Current Length of Stay: 6 day(s)    Current Patient Status: Inpatient   Certification Statement: The patient will continue to require additional inpatient hospital stay due to Needing a heparin drip while the INR becomes subtherapeutic for a likely IR biopsy    Discharge Plan:  Patient will be here for a couple of days    Code Status: Level 1 - Full Code      Subjective:   Patient seen examined  No acute events  States she feels fine    Objective:     Vitals:   Temp (24hrs), Av 1 °F (36 7 °C), Min:97 6 °F (36 4 °C), Max:98 4 °F (36 9 °C)    Temp:  [97 6 °F (36 4 °C)-98 4 °F (36 9 °C)] 97 6 °F (36 4 °C)  HR:  [59-69] 64  Resp:  [16] 16  BP: ()/(51-74) 107/62  SpO2:  [99 %-100 %] 99 %  Body mass index is 41 04 kg/m²       Input and Output Summary (last 24 hours):     No intake or output data in the 24 hours ending 06/24/21 1357    Physical Exam:     Physical Exam  (   General Appearance:    Alert, cooperative, no distress, appears stated age                               Lungs:     Clear to auscultation bilaterally, respirations unlabored       Heart:    Regular rate and rhythm, S1 and S2 normal, no murmur, rub    or gallop   Abdomen:     Soft, non-tender, bowel sounds active all four quadrants,     no masses, no organomegaly           Extremities:   Extremities normal, atraumatic, no cyanosis or edema       Additional Data:     Labs:    Results from last 7 days   Lab Units 06/23/21  0550 06/22/21  0519   WBC Thousand/uL 7 91 7 82   HEMOGLOBIN g/dL 8 6* 8 1*   HEMATOCRIT % 28 8* 28 4*   PLATELETS Thousands/uL 409* 419*   NEUTROS PCT %  --  72   LYMPHS PCT %  --  14   MONOS PCT %  --  7   EOS PCT %  --  5     Results from last 7 days   Lab Units 06/23/21  0550 06/22/21  0519   SODIUM mmol/L 140 139   POTASSIUM mmol/L 4 3 4 4   CHLORIDE mmol/L 100 99*   CO2 mmol/L 33* 33*   BUN mg/dL 43* 37*   CREATININE mg/dL 1 30 1 31*   ANION GAP mmol/L 7 7   CALCIUM mg/dL 9 0 9 5   ALBUMIN g/dL  --  2 7*   TOTAL BILIRUBIN mg/dL  --  0 43   ALK PHOS U/L  --  62   ALT U/L  --  15   AST U/L  --  17   GLUCOSE RANDOM mg/dL 87 92     Results from last 7 days   Lab Units 06/24/21  0641   INR  1 88*     Results from last 7 days   Lab Units 06/24/21  1330 06/24/21  1055 06/24/21  0742 06/23/21  2341 06/23/21  1558 06/23/21  1055 06/23/21  0748 06/22/21  1627 06/22/21  1140 06/22/21  0746 06/21/21  2056 06/21/21  1640   POC GLUCOSE mg/dl 123 95 98 133 104 84 92 105 97 89 150* 129         Results from last 7 days   Lab Units 06/19/21  0551 06/17/21  2327   PROCALCITONIN ng/ml <0 05 <0 05           * I Have Reviewed All Lab Data Listed Above  * Additional Pertinent Lab Tests Reviewed:  All [place] Admission Reviewed        Recent Cultures (last 7 days):           Last 24 Hours Medication List:   Current Facility-Administered Medications   Medication Dose Route Frequency Provider Last Rate    acetaminophen  975 mg Oral Q8H [address] Shannan Ebbing, CRNP      albuterol  2 puff Inhalation Q6H PRN Manolo Navarrete PA-C      bumetanide  1 mg Intravenous TID (diuretic) Charlotte Barton MD      Diclofenac Sodium  2 g Topical 4x Daily Cris Lipps, DAVID      digoxin  125 mcg Oral Daily Manolo Navarrete PA-C      ferrous sulfate  325 mg Oral Daily With Breakfast Manolo Navarrete PA-C      fluticasone-vilanterol  1 puff Inhalation Daily LOPEZ Jo      heparin (porcine)  3-20 Units/kg/hr (Order-Specific) Intravenous Titrated Charlotte Barton MD 11 1 Units/kg/hr (06/24/21 1021)    heparin (porcine)  2,000 Units Intravenous Q1H PRN Charlotte Barton MD      heparin (porcine)  4,000 Units Intravenous Q1H PRN Charlotte Barton MD      insulin lispro  1-5 Units Subcutaneous TID AC Manolo Navarrete PA-C      insulin lispro  1-5 Units Subcutaneous HS Manolo Navarrete PA-C      ipratropium-albuterol  3 mL Nebulization 4x Daily PRN LOPEZ Jo      lidocaine  1 patch Topical Daily Marietta Jamison PA-C      LORazepam  0 25 mg Oral Q6H PRN Shannan Ebbing, CRNP      montelukast  10 mg Oral Daily Lorraine Coburn PA-C      oxyCODONE  2 5 mg Oral Q4H PRN Shannan Ebbing, CRNP      pantoprazole  40 mg Oral Early Morning Manolo Navarrete PA-C      potassium chloride  20 mEq Oral BID Manolo Navarrete PA-C      senna-docusate sodium  1 tablet Oral Daily PRN Manolo Navarrete PA-C      sodium chloride  1 spray Each Nare Q1H PRN DAVID Tavarez          Today, Patient Was Seen By: Carmine Cheng MD    ** Please Note: Dictation voice to text software may have been used in the creation of this document   **

## 2021-06-24 NOTE — ASSESSMENT & PLAN NOTE
Presents from nursing facility with subjective worsening of shortness of breath  CT scans finding significant for potential mass verses volume overload versus infection  Chronically wears 4L   Echo in 2020 - EF 50-55%  Diastolic dysfunction with inability to calculate filling pressures due to bioprosthetic MVR  Severe tricuspid regurgitation  At least moderate pulmonary hypertension assuming an RA pressure of 10mmHg  Radiology evaluation appreciated diuretics were decreased to couple days ago  Continue monitor    Will recheck a BMP tomorrow

## 2021-06-24 NOTE — PROGRESS NOTES
Cardiology Progress Note - Shanti Wood 70 y o  female MRN: [id number]    Unit/Bed#: S -01 Encounter: [id number]      Assessment/Recommendations:  1  Multifactorial hypoxic respiratory failure: With component of volume overload  2  Acute on chronic diastolic heart failure:  Secondary to bioprosthetic mitral valve stenosis  Continued on IV Bumex and with stable creatinine today compared to yesterday  Unclear whether I&Os are accurate  Continue diuresis - continue on current dosing today - possible change to PO dosing tomorrow  3  Bioprosthetic mitral stenosis:  With possible dehiscence  Redo sternotomy would be very difficult at this time considering comorbidities  Continue medical management with diuresis for the time being  4  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  Currently INR is therapeutic over 2 5  Will need heparin drip started now that INR is less than 2 5  Continued on digoxin for rate control  5  Lung mass:  With concern for metastasis  For past continued workup and biopsy as per primary team and pulmonology  Subjective:   Patient seen and examined  No significant events overnight   stable dyspnea on exertion, ; pertinent negatives - chest pain, chest pressure/discomfort, irregular heart beat, lower extremity edema and palpitations  Objective:     Vitals: Blood pressure 107/62, pulse 64, temperature 97 6 °F (36 4 °C), temperature source Oral, resp  rate 16, height 5' 3" (1 6 m), weight 105 kg (231 lb 11 3 oz), SpO2 99 %  , Body mass index is 41 04 kg/m² ,   Orthostatic Blood Pressures      Most Recent Value   Blood Pressure  107/62 filed at 06/24/2021 0700   Patient Position - Orthostatic VS  Lying filed at 06/23/2021 2125            Intake/Output Summary (Last 24 hours) at 6/24/2021 1119  Last data filed at 6/23/2021 1300  Gross per 24 hour   Intake 240 ml   Output 250 ml   Net -10 ml         Physical Exam:    GEN: Shanti Wood appears well, alert and oriented x 3, pleasant and cooperative   HEENT: pupils equal, round, and reactive to light; extraocular muscles intact  NECK: supple, no carotid bruits   HEART: regular rhythm, normal S1 and S2, +systolic/diastolic murmur, no clicks, gallops or rubs   LUNGS: clear to auscultation bilaterally; no wheezes, rales, or rhonchi   ABDOMEN: normal bowel sounds, soft, no tenderness, no distention  EXTREMITIES: peripheral pulses normal; no clubbing, cyanosis, or edema  NEURO: no focal findings   SKIN: normal without suspicious lesions on exposed skin    Medications:      Current Facility-Administered Medications:     acetaminophen (TYLENOL) tablet 975 mg, 975 mg, Oral, Q8H [facility], Jennifer P Bloch, CRNP, 975 mg at 06/23/21 0535    albuterol (PROVENTIL HFA,VENTOLIN HFA) inhaler 2 puff, 2 puff, Inhalation, Q6H PRN, Manolo Navarrete PA-C    bumetanide (BUMEX) injection 1 mg, 1 mg, Intravenous, TID (diuretic), Luiza Kemp MD, 1 mg at 06/24/21 0528    Diclofenac Sodium (VOLTAREN) 1 % topical gel 2 g, 2 g, Topical, 4x Daily, DAVID Bland, 2 g at 06/23/21 1241    digoxin (LANOXIN) tablet 125 mcg, 125 mcg, Oral, Daily, Manolo Navarrete PA-C, 125 mcg at 06/24/21 1011    ferrous sulfate tablet 325 mg, 325 mg, Oral, Daily With Breakfast, Manolo Navarrete PA-C, 325 mg at 06/24/21 1018    fluticasone-vilanterol (BREO ELLIPTA) 100-25 mcg/inh inhaler 1 puff, 1 puff, Inhalation, Daily, Keily Brush PA-C, 1 puff at 06/24/21 1012    heparin (porcine) 25,000 units in 0 45% NaCl 250 mL infusion (premix), 3-20 Units/kg/hr (Order-Specific), Intravenous, Titrated, Luiza Kemp MD, Last Rate: 10 mL/hr at 06/24/21 1021, 11 1 Units/kg/hr at 06/24/21 1021    heparin (porcine) injection 2,000 Units, 2,000 Units, Intravenous, Q1H PRN, Luiza Kemp MD    heparin (porcine) injection 4,000 Units, 4,000 Units, Intravenous, Q1H PRN, Luiza Kemp MD    insulin lispro (HumaLOG) 100 units/mL subcutaneous injection 1-5 Units, 1-5 Units, Subcutaneous, TID AC, 1 Units at 06/19/21 1832 **AND** Fingerstick Glucose (POCT), , , TID AC, Manolo Navarrete PA-C    insulin lispro (HumaLOG) 100 units/mL subcutaneous injection 1-5 Units, 1-5 Units, Subcutaneous, HS, Manolo Navarrete PA-C, 1 Units at 06/21/21 2102    ipratropium-albuterol (DUO-NEB) 0 5-2 5 mg/3 mL inhalation solution 3 mL, 3 mL, Nebulization, 4x Daily PRN, Cheko Ortiz PA-C    lidocaine (LIDODERM) 5 % patch 1 patch, 1 patch, Topical, Daily, Esdras De Guzman PA-C, 1 patch at 06/23/21 1009    LORazepam (ATIVAN) tablet 0 25 mg, 0 25 mg, Oral, Q6H PRN, DAVID Martinez    montelukast (SINGULAIR) tablet 10 mg, 10 mg, Oral, Daily, Manolo Navarrete PA-C, 10 mg at 06/24/21 1011    oxyCODONE (ROXICODONE) IR tablet 2 5 mg, 2 5 mg, Oral, Q4H PRN, DAVID Martinez    pantoprazole (PROTONIX) EC tablet 40 mg, 40 mg, Oral, Early Morning, Manolo Navarrete PA-C, 40 mg at 06/24/21 0536    potassium chloride (K-DUR,KLOR-CON) CR tablet 20 mEq, 20 mEq, Oral, BID, Manolo Navarrete PA-C, 20 mEq at 06/24/21 1011    senna-docusate sodium (SENOKOT S) 8 6-50 mg per tablet 1 tablet, 1 tablet, Oral, Daily PRN, Manolo Navarrete PA-C    sodium chloride (OCEAN) 0 65 % nasal spray 1 spray, 1 spray, Each Nare, Q1H PRN, DAVID Tavarez, 1 spray at 06/19/21 2149     Labs & Results:    Results from last 7 days   Lab Units 06/17/21  1842   TROPONIN I ng/mL <0 02     Results from last 7 days   Lab Units 06/23/21  0550 06/22/21  0519 06/20/21  1013   WBC Thousand/uL 7 91 7 82 6 90   HEMOGLOBIN g/dL 8 6* 8 1* 8 5*   HEMATOCRIT % 28 8* 28 4* 28 6*   PLATELETS Thousands/uL 409* 419* 393*         Results from last 7 days   Lab Units 06/23/21  0550 06/22/21  0519 06/20/21  1013 06/17/21  1842   POTASSIUM mmol/L 4 3 4 4 4 0 3 8   CHLORIDE mmol/L 100 99* 101 101   CO2 mmol/L 33* 33* 33* 32   BUN mg/dL 43* 37* 27* 22   CREATININE mg/dL 1 30 1 31* 1 28 1 03   CALCIUM mg/dL 9 0 9 5 8 9 9 0   ALK PHOS U/L  -- 62 64 73   ALT U/L  --  15 13 17   AST U/L  --  17 16 21     Results from last 7 days   Lab Units 06/24/21  0641 06/23/21  2344 06/23/21  1731 06/23/21  0550 06/22/21  0519 06/20/21  1013   INR   --   --   --  2 16* 2 72* 2 91*   PTT seconds 72* 68* 67*  --   --  41*           Echo: personally reviewed - EF normal, dyssynergy septum due to RV prolonged pressure overload, left atrial enlargement, bio MVR with possible sewing ring dehiscence, moderate mitral stenosis, trace regurgitation    Mild AS, mild AR, moderate to severe TR with severe pulmonary hypertension    EKG personally reviewed by Debbie Dill MD

## 2021-06-25 LAB
ANION GAP SERPL CALCULATED.3IONS-SCNC: 6 MMOL/L (ref 4–13)
APTT PPP: 45 SECONDS (ref 23–37)
APTT PPP: 52 SECONDS (ref 23–37)
APTT PPP: 64 SECONDS (ref 23–37)
BUN SERPL-MCNC: 44 MG/DL (ref 5–25)
CALCIUM SERPL-MCNC: 8.9 MG/DL (ref 8.3–10.1)
CHLORIDE SERPL-SCNC: 100 MMOL/L (ref 100–108)
CO2 SERPL-SCNC: 29 MMOL/L (ref 21–32)
CREAT SERPL-MCNC: 1.15 MG/DL (ref 0.6–1.3)
ERYTHROCYTE [DISTWIDTH] IN BLOOD BY AUTOMATED COUNT: 18 % (ref 11.6–15.1)
GFR SERPL CREATININE-BSD FRML MDRD: 47 ML/MIN/1.73SQ M
GLUCOSE SERPL-MCNC: 101 MG/DL (ref 65–140)
GLUCOSE SERPL-MCNC: 105 MG/DL (ref 65–140)
GLUCOSE SERPL-MCNC: 127 MG/DL (ref 65–140)
GLUCOSE SERPL-MCNC: 133 MG/DL (ref 65–140)
GLUCOSE SERPL-MCNC: 92 MG/DL (ref 65–140)
HCT VFR BLD AUTO: 27.6 % (ref 34.8–46.1)
HGB BLD-MCNC: 8.1 G/DL (ref 11.5–15.4)
INR PPP: 1.59 (ref 0.84–1.19)
MCH RBC QN AUTO: 26.3 PG (ref 26.8–34.3)
MCHC RBC AUTO-ENTMCNC: 29.3 G/DL (ref 31.4–37.4)
MCV RBC AUTO: 90 FL (ref 82–98)
PLATELET # BLD AUTO: 414 THOUSANDS/UL (ref 149–390)
PMV BLD AUTO: 10.5 FL (ref 8.9–12.7)
POTASSIUM SERPL-SCNC: 4.4 MMOL/L (ref 3.5–5.3)
PROTHROMBIN TIME: 19 SECONDS (ref 11.6–14.5)
RBC # BLD AUTO: 3.08 MILLION/UL (ref 3.81–5.12)
SODIUM SERPL-SCNC: 135 MMOL/L (ref 136–145)
WBC # BLD AUTO: 8.2 THOUSAND/UL (ref 4.31–10.16)

## 2021-06-25 PROCEDURE — 85027 COMPLETE CBC AUTOMATED: CPT | Performed by: FAMILY MEDICINE

## 2021-06-25 PROCEDURE — 85610 PROTHROMBIN TIME: CPT | Performed by: FAMILY MEDICINE

## 2021-06-25 PROCEDURE — 80048 BASIC METABOLIC PNL TOTAL CA: CPT | Performed by: FAMILY MEDICINE

## 2021-06-25 PROCEDURE — 85730 THROMBOPLASTIN TIME PARTIAL: CPT | Performed by: FAMILY MEDICINE

## 2021-06-25 PROCEDURE — 99232 SBSQ HOSP IP/OBS MODERATE 35: CPT | Performed by: INTERNAL MEDICINE

## 2021-06-25 PROCEDURE — 99232 SBSQ HOSP IP/OBS MODERATE 35: CPT | Performed by: FAMILY MEDICINE

## 2021-06-25 PROCEDURE — 82948 REAGENT STRIP/BLOOD GLUCOSE: CPT

## 2021-06-25 RX ORDER — HEPARIN SODIUM 10000 [USP'U]/100ML
3-20 INJECTION, SOLUTION INTRAVENOUS
Status: DISPENSED | OUTPATIENT
Start: 2021-06-25 | End: 2021-06-28

## 2021-06-25 RX ADMIN — MONTELUKAST 10 MG: 10 TABLET, FILM COATED ORAL at 09:25

## 2021-06-25 RX ADMIN — FLUTICASONE FUROATE AND VILANTEROL TRIFENATATE 1 PUFF: 100; 25 POWDER RESPIRATORY (INHALATION) at 09:27

## 2021-06-25 RX ADMIN — BUMETANIDE 1 MG: 0.25 INJECTION INTRAMUSCULAR; INTRAVENOUS at 17:17

## 2021-06-25 RX ADMIN — BUMETANIDE 1 MG: 0.25 INJECTION INTRAMUSCULAR; INTRAVENOUS at 11:07

## 2021-06-25 RX ADMIN — DIGOXIN 125 MCG: 125 TABLET ORAL at 09:25

## 2021-06-25 RX ADMIN — DOCUSATE SODIUM AND SENNOSIDES 1 TABLET: 8.6; 5 TABLET, FILM COATED ORAL at 21:33

## 2021-06-25 RX ADMIN — FERROUS SULFATE TAB 325 MG (65 MG ELEMENTAL FE) 325 MG: 325 (65 FE) TAB at 09:25

## 2021-06-25 RX ADMIN — POTASSIUM CHLORIDE 20 MEQ: 1500 TABLET, EXTENDED RELEASE ORAL at 09:25

## 2021-06-25 RX ADMIN — POTASSIUM CHLORIDE 20 MEQ: 1500 TABLET, EXTENDED RELEASE ORAL at 17:17

## 2021-06-25 RX ADMIN — HEPARIN SODIUM 2000 UNITS: 1000 INJECTION INTRAVENOUS; SUBCUTANEOUS at 16:45

## 2021-06-25 RX ADMIN — BUMETANIDE 1 MG: 0.25 INJECTION INTRAMUSCULAR; INTRAVENOUS at 05:06

## 2021-06-25 NOTE — ASSESSMENT & PLAN NOTE
Lab Results   Component Value Date    HGBA1C 4 5 03/15/2021       Recent Labs     06/24/21  1500 06/24/21  2100 06/25/21  0717 06/25/21  1116   POCGLU 118 128 105 101       Blood Sugar Average: Last 72 hrs:  (P) 105 [id number]   · Hold oral antihyperglycemics  · Start sliding scale insulin  · accuchecks     Blood sugars are acceptable

## 2021-06-25 NOTE — PLAN OF CARE
Problem: PAIN - ADULT  Goal: Verbalizes/displays adequate comfort level or baseline comfort level  Description: Interventions:  - Encourage patient to monitor pain and request assistance  - Assess pain using appropriate pain scale  - Administer analgesics based on type and severity of pain and evaluate response  - Implement non-pharmacological measures as appropriate and evaluate response  - Consider cultural and social influences on pain and pain management  - Notify physician/advanced practitioner if interventions unsuccessful or patient reports new pain  Outcome: Progressing     Problem: INFECTION - ADULT  Goal: Absence or prevention of progression during hospitalization  Description: INTERVENTIONS:  - Assess and monitor for signs and symptoms of infection  - Monitor lab/diagnostic results  - Monitor all insertion sites, i e  indwelling lines, tubes, and drains  - Monitor endotracheal if appropriate and nasal secretions for changes in amount and color  - [place] appropriate cooling/warming therapies per order  - Administer medications as ordered  - Instruct and encourage patient and family to use good hand hygiene technique  - Identify and instruct in appropriate isolation precautions for identified infection/condition  Outcome: Progressing  Goal: Absence of fever/infection during neutropenic period  Description: INTERVENTIONS:  - Monitor WBC    Outcome: Progressing     Problem: SAFETY ADULT  Goal: Patient will remain free of falls  Description: INTERVENTIONS:  - Educate patient/family on patient safety including physical limitations  - Instruct patient to call for assistance with activity   - Consult OT/PT to assist with strengthening/mobility   - Keep Call bell within reach  - Keep bed low and locked with side rails adjusted as appropriate  - Keep care items and personal belongings within reach  - Initiate and maintain comfort rounds  - Make Fall Risk Sign visible to staff  - Apply yellow socks and bracelet for high fall risk patients  - Consider moving patient to room near nurses station  Outcome: Progressing  Goal: Maintain or return to baseline ADL function  Description: INTERVENTIONS:  -  Assess patient's ability to carry out ADLs; assess patient's baseline for ADL function and identify physical deficits which impact ability to perform ADLs (bathing, care of mouth/teeth, toileting, grooming, dressing, etc )  - Assess/evaluate cause of self-care deficits   - Assess range of motion  - Assess patient's mobility; develop plan if impaired  - Assess patient's need for assistive devices and provide as appropriate  - Encourage maximum independence but intervene and supervise when necessary  - Involve family in performance of ADLs  - Assess for home care needs following discharge   - Consider OT consult to assist with ADL evaluation and planning for discharge  - Provide patient education as appropriate  Outcome: Progressing  Goal: Maintains/Returns to pre admission functional level  Description: INTERVENTIONS:  - Perform BMAT or MOVE assessment daily    - Set and communicate daily mobility goal to care team and patient/family/caregiver     - Collaborate with rehabilitation services on mobility goals if consulted  - Out of bed for toileting  - Record patient progress and toleration of activity level   Outcome: Progressing     Problem: DISCHARGE PLANNING  Goal: Discharge to home or other facility with appropriate resources  Description: INTERVENTIONS:  - Identify barriers to discharge w/patient and caregiver  - Arrange for needed discharge resources and transportation as appropriate  - Identify discharge learning needs (meds, wound care, etc )  - Arrange for interpretive services to assist at discharge as needed  - Refer to Case Management Department for coordinating discharge planning if the patient needs post-hospital services based on physician/advanced practitioner order or complex needs related to functional status, cognitive ability, or social support system  Outcome: Progressing     Problem: Knowledge Deficit  Goal: Patient/family/caregiver demonstrates understanding of disease process, treatment plan, medications, and discharge instructions  Description: Complete learning assessment and assess knowledge base    Interventions:  - Provide teaching at level of understanding  - Provide teaching via preferred learning methods  Outcome: Progressing     Problem: RESPIRATORY - ADULT  Goal: Achieves optimal ventilation and oxygenation  Description: INTERVENTIONS:  - Assess for changes in respiratory status  - Assess for changes in mentation and behavior  - Position to facilitate oxygenation and minimize respiratory effort  - Oxygen administered by appropriate delivery if ordered  - Initiate smoking cessation education as indicated  - Encourage broncho-pulmonary hygiene including cough, deep breathe, Incentive Spirometry  - Assess the need for suctioning and aspirate as needed  - Assess and instruct to report SOB or any respiratory difficulty  - Respiratory Therapy support as indicated  Outcome: Progressing     Problem: METABOLIC, FLUID AND ELECTROLYTES - ADULT  Goal: Fluid balance maintained  Description: INTERVENTIONS:  - Monitor labs   - Monitor I/O and WT  - Instruct patient on fluid and nutrition as appropriate  - Assess for signs & symptoms of volume excess or deficit  Outcome: Progressing  Goal: Glucose maintained within target range  Description: INTERVENTIONS:  - Monitor Blood Glucose as ordered  - Assess for signs and symptoms of hyperglycemia and hypoglycemia  - Administer ordered medications to maintain glucose within target range  - Assess nutritional intake and initiate nutrition service referral as needed  Outcome: Progressing     Problem: SKIN/TISSUE INTEGRITY - ADULT  Goal: Skin Integrity remains intact(Skin Breakdown Prevention)  Description: Assess:  -Inspect skin when repositioning, toileting, and assisting with ADLS  -Assess extremities for adequate circulation and sensation     Bed Management:  -Have minimal linens on bed & keep smooth, unwrinkled  -Change linens as needed when moist or perspiring    Toileting:  -Offer bedside commode    Activity:    -Encourage activity and walks on unit  -Encourage or provide ROM exercises   -Use appropriate equipment to lift or move patient in bed    Skin Care:  -Avoid use of baby powder, tape, friction and shearing, hot water or constrictive clothing  -Do not massage red bony areas    Outcome: Progressing  Goal: Incision(s), wounds(s) or drain site(s) healing without S/S of infection  Description: INTERVENTIONS  - Assess and document dressing, incision, wound bed, drain sites and surrounding tissue  - Provide patient and family education  Outcome: Progressing  Goal: Pressure injury heals and does not worsen  Description: Interventions:  - Implement low air loss mattress or specialty surface (Criteria met)  - Apply silicone foam dressing  - Apply fecal or urinary incontinence containment device   - Utilize friction reducing device or surface for transfers   - Consider nutrition services referral as needed  Outcome: Progressing     Problem: MOBILITY - ADULT  Goal: Maintain or return to baseline ADL function  Description: INTERVENTIONS:  -  Assess patient's ability to carry out ADLs; assess patient's baseline for ADL function and identify physical deficits which impact ability to perform ADLs (bathing, care of mouth/teeth, toileting, grooming, dressing, etc )  - Assess/evaluate cause of self-care deficits   - Assess range of motion  - Assess patient's mobility; develop plan if impaired  - Assess patient's need for assistive devices and provide as appropriate  - Encourage maximum independence but intervene and supervise when necessary  - Involve family in performance of ADLs  - Assess for home care needs following discharge   - Consider OT consult to assist with ADL evaluation and planning for discharge  - Provide patient education as appropriate  Outcome: Progressing  Goal: Maintains/Returns to pre admission functional level  Description: INTERVENTIONS:  - Perform BMAT or MOVE assessment daily    - Set and communicate daily mobility goal to care team and patient/family/caregiver  - Collaborate with rehabilitation services on mobility goals if consulted  - Out of bed for toileting  - Record patient progress and toleration of activity level   Outcome: Progressing     Problem: Nutrition/Hydration-ADULT  Goal: Nutrient/Hydration intake appropriate for improving, restoring or maintaining nutritional needs  Description: Monitor and assess patient's nutrition/hydration status for malnutrition  Collaborate with interdisciplinary team and initiate plan and interventions as ordered  Monitor patient's weight and dietary intake as ordered or per policy  Utilize nutrition screening tool and intervene as necessary  Determine patient's food preferences and provide high-protein, high-caloric foods as appropriate       INTERVENTIONS:  - Monitor oral intake, urinary output, labs, and treatment plans  - Assess nutrition and hydration status and recommend course of action  - Evaluate amount of meals eaten  - Assist patient with eating if necessary   - Allow adequate time for meals  - Recommend/ encourage appropriate diets, oral nutritional supplements, and vitamin/mineral supplements  - Order, calculate, and assess calorie counts as needed  - Recommend, monitor, and adjust tube feedings and TPN/PPN based on assessed needs  - Assess need for intravenous fluids  - Provide specific nutrition/hydration education as appropriate  - Include patient/family/caregiver in decisions related to nutrition  Outcome: Progressing     Problem: Prexisting or High Potential for Compromised Skin Integrity  Goal: Skin integrity is maintained or improved  Description: INTERVENTIONS:  - Identify patients at risk for skin breakdown  - Assess and monitor skin integrity  - Assess and monitor nutrition and hydration status  - Monitor labs   - Assess for incontinence   - Turn and reposition patient  - Assist with mobility/ambulation  - Relieve pressure over bony prominences  - Avoid friction and shearing  - Provide appropriate hygiene as needed including keeping skin clean and dry  - Evaluate need for skin moisturizer/barrier cream  - Collaborate with interdisciplinary team   - Patient/family teaching  - Consider wound care consult   Outcome: Progressing     Problem: Potential for Falls  Goal: Patient will remain free of falls  Description: INTERVENTIONS:  - Educate patient/family on patient safety including physical limitations  - Instruct patient to call for assistance with activity   - Consult OT/PT to assist with strengthening/mobility   - Keep Call bell within reach  - Keep bed low and locked with side rails adjusted as appropriate  - Keep care items and personal belongings within reach  - Initiate and maintain comfort rounds  - Make Fall Risk Sign visible to staff  - Apply yellow socks and bracelet for high fall risk patients  - Consider moving patient to room near nurses station  Outcome: Progressing

## 2021-06-25 NOTE — PROGRESS NOTES
[facility]  Progress Note - Mando Raid 1945, 70 y o  female MRN: [id number]  Unit/Bed#: S -01 Encounter: [id number]  Primary Care Provider: Juvenal Hector MD   Date and time admitted to hospital: 6/17/2021  5:52 PM    CKD (chronic kidney disease)  Assessment & Plan  Lab Results   Component Value Date    EGFR 47 06/25/2021    EGFR 41 06/23/2021    EGFR 40 06/22/2021    CREATININE 1 15 06/25/2021    CREATININE 1 30 06/23/2021    CREATININE 1 31 (H) 06/22/2021   CKD stage 2-3 in the setting of HTN and T2DM requiring frequent bmp checks  Creatinine is stable   Continue to monitor  Patient's Bumex decreased from 2 mg to 1 mg    Morbid obesity (HCC)  Assessment & Plan  Diet lifestyle modifications  Counseled on weight loss  Noted pulmonary hypertension on prior echo  This is likely contributing to patient's symptomatology    Type 2 diabetes mellitus [facility])  Assessment & Plan  Lab Results   Component Value Date    HGBA1C 4 5 03/15/2021       Recent Labs     06/24/21  1500 06/24/21  2100 06/25/21  0717 06/25/21  1116   POCGLU 118 128 105 101       Blood Sugar Average: Last 72 hrs:  (P) 105 [id number]   · Hold oral antihyperglycemics  · Start sliding scale insulin  · accuchecks   Blood sugars are acceptable    Chronic atrial fibrillation (HCC)  Assessment & Plan  Anticoagulated on warfarin  With history of appendage thrombus  INR is 2 9  Goal 2 5-3 5  If below 2 5 needs gtt  Monitor PT INR daily  Continue digoxin    Now IR are considering gtt again - will hold coumadin depending on planned procedure     INR is 1 59  Ideally should be below 1 5  Patient likely to the OR with Interventional Radiology for a biopsy on Monday  COPD (chronic obstructive pulmonary disease) ([facility] [address] )  Assessment & Plan  Does not appear to be in acute exacerbation at this time  Continue home inhalers  This could be contributing to patient's shortness of breath  Lung mass    Will likely require biopsy with Interventional Radiology     Patient with chronic hypoxemic respiratory failure as well  Patient is okay from the pulmonology standpoint for the procedure however INR is slightly elevated at 1 59  It should be below 1 5 ideally  Patient is going to get a done on Monday  NPO after midnight  Patient also can go today because is also emergent cases in Interventional Radiology  Restart heparin drip    * Shortness of breath  Assessment & Plan  Presents from nursing facility with subjective worsening of shortness of breath  CT scans finding significant for potential mass verses volume overload versus infection  Chronically wears 4L   Echo in 2020 - EF 50-55%  Diastolic dysfunction with inability to calculate filling pressures due to bioprosthetic MVR  Severe tricuspid regurgitation  At least moderate pulmonary hypertension assuming an RA pressure of 10mmHg  Radiology evaluation appreciated diuretics were decreased to couple days ago  Continue monitor  Will recheck a BMP tomorrow            VTE Pharmacologic Prophylaxis:   Pharmacologic: Heparin  Mechanical VTE Prophylaxis in Place: Yes    Patient Centered Rounds: I have performed bedside rounds with nursing staff today  Discussions with Specialists or Other Care Team Provider:  Discussed with interventional radiology  I also discussed this case with pulmonology    Education and Discussions with Family / Patient:  I offered to call the family for an update  The patient politely declined    Time Spent for Care: 20 minutes  More than 50% of total time spent on counseling and coordination of care as described above      Current Length of Stay: 7 day(s)    Current Patient Status: Inpatient   Certification Statement: The patient will continue to require additional inpatient hospital stay due to Needing a biopsy and also still requiring IV diuretics    Discharge Plan:  Patient will be here at least until mid week next week because the patient will need to be bridged otherwise we can consider maybe even doing a Lovenox bridge as an outpatient    Code Status: Level 1 - Full Code      Subjective:   Patient seen examined  She is little frustrated today  She was hoping to get the biopsy  Objective:     Vitals:   Temp (24hrs), Av 9 °F (36 6 °C), Min:97 8 °F (36 6 °C), Max:97 9 °F (36 6 °C)    Temp:  [97 8 °F (36 6 °C)-97 9 °F (36 6 °C)] 97 9 °F (36 6 °C)  HR:  [59-71] 71  Resp:  [18] 18  BP: (100-126)/(57-66) 105/66  SpO2:  [95 %-96 %] 95 %  Body mass index is 40 58 kg/m²  Input and Output Summary (last 24 hours):        Intake/Output Summary (Last 24 hours) at 2021 1348  Last data filed at 2021 1259  Gross per 24 hour   Intake 360 ml   Output 1450 ml   Net -1090 ml       Physical Exam:     Physical Exam  (   General Appearance:    Alert, cooperative, no distress, appears stated age                               Lungs:     Clear to auscultation bilaterally, respirations unlabored       Heart:    Regular rate and rhythm, S1 and S2 normal, no murmur, rub    or gallop   Abdomen:     Soft, non-tender, bowel sounds active all four quadrants,     no masses, no organomegaly           Extremities:   Extremities normal, atraumatic, no cyanosis or edema       Additional Data:     Labs:    Results from last 7 days   Lab Units 21  0521 21  0519   WBC Thousand/uL 8 20 7 82   HEMOGLOBIN g/dL 8 1* 8 1*   HEMATOCRIT % 27 6* 28 4*   PLATELETS Thousands/uL 414* 419*   NEUTROS PCT %  --  72   LYMPHS PCT %  --  14   MONOS PCT %  --  7   EOS PCT %  --  5     Results from last 7 days   Lab Units 21  0521 21  0519   SODIUM mmol/L 135* 139   POTASSIUM mmol/L 4 4 4 4   CHLORIDE mmol/L 100 99*   CO2 mmol/L 29 33*   BUN mg/dL 44* 37*   CREATININE mg/dL 1 15 1 31*   ANION GAP mmol/L 6 7   CALCIUM mg/dL 8 9 9 5   ALBUMIN g/dL  --  2 7*   TOTAL BILIRUBIN mg/dL  --  0 43   ALK PHOS U/L  --  62   ALT U/L  --  15   AST U/L  --  17 GLUCOSE RANDOM mg/dL 92 92     Results from last 7 days   Lab Units 06/25/21  0521   INR  1 59*     Results from last 7 days   Lab Units 06/25/21  1116 06/25/21  0717 06/24/21  2100 06/24/21  1500 06/24/21  1330 06/24/21  1055 06/24/21  0742 06/23/21  2341 06/23/21  1558 06/23/21  1055 06/23/21  0748 06/22/21  1627   POC GLUCOSE mg/dl 101 105 128 118 123 95 98 133 104 84 92 105         Results from last 7 days   Lab Units 06/19/21  0551   PROCALCITONIN ng/ml <0 05           * I Have Reviewed All Lab Data Listed Above  * Additional Pertinent Lab Tests Reviewed:  Zoila Casey Admission Reviewed        Recent Cultures (last 7 days):           Last 24 Hours Medication List:   Current Facility-Administered Medications   Medication Dose Route Frequency Provider Last Rate    acetaminophen  975 mg Oral Q8H [facility] DAVID De Jesus      albuterol  2 puff Inhalation Q6H PRN Manolo Navarrete PA-C      bumetanide  1 mg Intravenous TID (diuretic) Jenniffer Fatima MD      Diclofenac Sodium  2 g Topical 4x Daily DAVID Ferreira      digoxin  125 mcg Oral Daily Manolo Navarrete PA-C      ferrous sulfate  325 mg Oral Daily With Breakfast Manolo Navarrete PA-C      fluticasone-vilanterol  1 puff Inhalation Daily LOPEZ Jo      heparin (porcine)  3-20 Units/kg/hr (Order-Specific) Intravenous Titrated Shawn Coy MD 11 1 Units/kg/hr (06/25/21 0936)    heparin (porcine)  2,000 Units Intravenous Q1H PRN Jenniffer Fatima MD      heparin (porcine)  4,000 Units Intravenous Q1H PRN Jenniffer Fatima MD      insulin lispro  1-5 Units Subcutaneous TID AC Manolo Navarrete PA-C      insulin lispro  1-5 Units Subcutaneous HS Manolo Navarrete PA-C      ipratropium-albuterol  3 mL Nebulization 4x Daily PRN LOPEZ Jo      lidocaine  1 patch Topical Daily Esdras Sadaf Richardson PA-C      LORazepam  0 25 mg Oral Q6H PRN DAVID De Jesus      montelukast  10 mg Oral Daily Dulce Hernandez PA-C  oxyCODONE  2 5 mg Oral Q4H PRN DAVID Nguyen      pantoprazole  40 mg Oral Early Morning Manolo Navarrete PA-C      potassium chloride  20 mEq Oral BID Dulce Hernandez PA-C      senna-docusate sodium  1 tablet Oral Daily PRN Manolo Navarrete PA-C      sodium chloride  1 spray Each Nare Q1H PRN DAVID Weiner          Today, Patient Was Seen By: Shawn Coy MD    ** Please Note: Dictation voice to text software may have been used in the creation of this document   **

## 2021-06-25 NOTE — ASSESSMENT & PLAN NOTE
Does not appear to be in acute exacerbation at this time  Continue home inhalers  This could be contributing to patient's shortness of breath  Lung mass  Will likely require biopsy with Interventional Radiology     Patient with chronic hypoxemic respiratory failure as well  Patient is okay from the pulmonology standpoint for the procedure however INR is slightly elevated at 1 59  It should be below 1 5 ideally  Patient is going to get a done on Monday  NPO after midnight  Patient also can go today because is also emergent cases in Interventional Radiology    Restart heparin drip

## 2021-06-25 NOTE — PROGRESS NOTES
Progress Note - Cardiology   Ton Logan 70 y o  female MRN: [id number]  Unit/Bed#: S -64 Encounter: [id number]  06/25/21  12:57 PM    Impression and Plan:    28-year-old with a history of bioprosthetic mitral valve replacement with stenosis/possible dehiscence, chronic diastolic CHF with frequent admissions and rehabilitation over the last few months, COPD, hypertension, diabetes, chronic AFib-on Coumadin for anticoagulation at baseline and digoxin for rate control, CT showed a pleural based soft tissue mass mediastinal/hilar lymphadenopathy, overall concerning for malignancy  Admitted with acute CHF in school consult Cardiology  Follows at [address] [place] [address]:    History of  bioprosthetic mitral valve replacement with stenosis and acute on chronic diastolic CHF[de-identified]  Redo sternotomy would be high risk (comorbidities, minimal functional status-has not walked or gotten out of bed in 3 months, prior to that ambulant with a walker) and hence for now will medically manage  No obvious evidence of dehiscence on my review although mitral valve inflow directed at the LVOT in no towards the apex/cavity, no prior echo images for comparison here  Will need follow-up with the primary cardiologist and repeat echocardiography/FARIDEH post discharge  Has been On IV Bumex with stable creatinine  Last echo-December 2020 from [facility] also suggested severe prosthetic stenosis although no dehiscence was suspected  Also with moderate to severe TR and severe pulmonary hypertension  Volume status overall seems to have improved-is on her baseline 4 L oxygen at this time  Will change IV Bumex to p o  Torsemide 20 mg daily  Needs to undergo rehabilitation post discharge prior to any consideration for any valve procedure    Permanent atrial fibrillation:  History of left atrial appendage thrombus, INR is subtherapeutic  Heart rates are controlled  Continue IV heparin    Awaiting biopsy of the mass - after that should resume Coumadin with bridging IV heparin    Acute diastolic CHF:  Currently on Bumex 1 mg t i d   Input output charting is not very accurate but negative by a kg since yesterday  Volume status overall seems to have improved-is on her baseline 4 L oxygen at this time  Will change IV Bumex to p o  Torsemide 20 mg daily      ===================================================================    Chief Complaint:   Chief Complaint   Patient presents with    Shortness of Breath     Pt presents via EMS, coming from [facility]  Pt states she has been progessively SOB for two days  pt had an abnomal chest Xray  denies CP, or cough  hx of CHF, afib         Subjective/Objective     Subjective:  Denies any complaints    Objective:  No distress at the time of exam    Patient Active Problem List   Diagnosis    COPD (chronic obstructive pulmonary disease) ([facility] [address] )    GI bleed    Chronic atrial fibrillation (HCC)    Type 2 diabetes mellitus (HCC)    Chronic diastolic congestive heart failure (HCC)    Morbid obesity (HCC)    Stasis edema of both lower extremities    Shortness of breath    CKD (chronic kidney disease)       Vitals: /66 (BP Location: Right arm)   Pulse 71   Temp 97 9 °F (36 6 °C) (Oral)   Resp 18   Ht 5' 3" (1 6 m)   Wt 104 kg (229 lb 0 9 oz)   SpO2 95%   BMI 40 58 kg/m²     No intake/output data recorded  Wt Readings from Last 3 Encounters:   06/25/21 104 kg (229 lb 0 9 oz)   04/16/21 116 kg (255 lb)   03/08/21 113 kg (250 lb)       Intake/Output Summary (Last 24 hours) at 6/25/2021 1257  Last data filed at 6/25/2021 0700  Gross per 24 hour   Intake 240 ml   Output 1450 ml   Net -1210 ml     I/O last 3 completed shifts:   In: 240 [P O :240]  Out: 1450 [Urine:1450]    Invasive Devices     Peripheral Intravenous Line            Peripheral IV 06/25/21 Right;Ventral (anterior) Forearm <1 day          Drain            External Urinary Catheter 4 days Physical Exam:  GEN: Neftaly Pitcher appears ill chronically, alert and oriented x 3, pleasant and cooperative , obese  HEENT: pupils equal, round, and reactive to light; extraocular muscles intact  NECK: supple, no carotid bruits or JVD  HEART: irregular rhythm, normal S1 and S2, diastolic mitral stenotic low-pitched murmur, no clicks, gallops or rubs   LUNGS: clear to auscultation bilaterally; no wheezes or rhonchi, no rales  ABDOMEN/GI: normal bowel sounds, soft, no tenderness, no distention  EXTREMITIES/Musculoskeltal: peripheral pulses normal; no clubbing, cyanosis, no edema  NEURO: no focal motor findings   SKIN: normal without suspicious lesions on exposed skin              Lab Results:       Results from last 7 days   Lab Units 06/25/21  0521 06/23/21  0550 06/22/21  0519   WBC Thousand/uL 8 20 7 91 7 82   HEMOGLOBIN g/dL 8 1* 8 6* 8 1*   HEMATOCRIT % 27 6* 28 8* 28 4*   PLATELETS Thousands/uL 414* 409* 419*         Results from last 7 days   Lab Units 06/25/21  0521 06/23/21  0550 06/22/21  0519 06/20/21  1013   POTASSIUM mmol/L 4 4 4 3 4 4 4 0   CHLORIDE mmol/L 100 100 99* 101   CO2 mmol/L 29 33* 33* 33*   BUN mg/dL 44* 43* 37* 27*   CREATININE mg/dL 1 15 1 30 1 31* 1 28   CALCIUM mg/dL 8 9 9 0 9 5 8 9   ALK PHOS U/L  --   --  62 64   ALT U/L  --   --  15 13   AST U/L  --   --  17 16     Results from last 7 days   Lab Units 06/25/21  0521 06/24/21  0641 06/23/21  0550   INR  1 59* 1 88* 2 16*       Imaging: I have personally reviewed pertinent reports      EKG/Telemtry:  No events    Scheduled Meds:  Current Facility-Administered Medications   Medication Dose Route Frequency Provider Last Rate    acetaminophen  975 mg Oral Q8H [address] Milderd Mylar, CRNP      albuterol  2 puff Inhalation Q6H PRN Manolo Navarrete PA-C      bumetanide  1 mg Intravenous TID (diuretic) Michael Pleitez MD      Diclofenac Sodium  2 g Topical 4x Daily DAVID Storey      digoxin  125 mcg Oral Daily Lala Irving LOPEZ Navarrete      ferrous sulfate  325 mg Oral Daily With Breakfast Manolo Navarrete PA-C      fluticasone-vilanterol  1 puff Inhalation Daily Javid Raymundo PA-C      heparin (porcine)  3-20 Units/kg/hr (Order-Specific) Intravenous Titrated Anika Patel MD 11 1 Units/kg/hr (06/25/21 0936)    heparin (porcine)  2,000 Units Intravenous Q1H PRN Rodolfo Gill MD      heparin (porcine)  4,000 Units Intravenous Q1H PRN Rodolfo Gill MD      insulin lispro  1-5 Units Subcutaneous TID AC Manolo Navarrete PA-C      insulin lispro  1-5 Units Subcutaneous HS Manolo Navarrete PA-C      ipratropium-albuterol  3 mL Nebulization 4x Daily PRN Javid Raymundo PA-C      lidocaine  1 patch Topical Daily Esdrasalli De Los Santos PA-C      LORazepam  0 25 mg Oral Q6H PRN Lydia Salty, CRNP      montelukast  10 mg Oral Daily Aileen Trujillo PA-C      oxyCODONE  2 5 mg Oral Q4H PRN Lydia Brusht, CRNP      pantoprazole  40 mg Oral Early Morning Manolo Navarreet PA-C      potassium chloride  20 mEq Oral BID Aileen Trujillo PA-C      senna-docusate sodium  1 tablet Oral Daily PRN Manolo Navarrete PA-C      sodium chloride  1 spray Each Nare Q1H PRN DAVID Tavarez       Continuous Infusions:  heparin (porcine), 3-20 Units/kg/hr (Order-Specific), Last Rate: 11 1 Units/kg/hr (06/25/21 0936)        VTE Pharmacologic Prophylaxis: Heparin  VTE Mechanical Prophylaxis: sequential compression device    This note was completed in part utilizing m-[employer] fluency direct voice recognition software  Grammatical errors, random word insertion, spelling mistakes, occasional wrong word or "sound-alike" substitutions and incomplete sentences may be an occasional consequence of the system secondary to software limitations, ambient noise and hardware issues  At the time of dictation, efforts were made to edit, clarify and /or correct errors    Please read the chart carefully and recognize, using context, where substitutions have occurred  If you have any questions or concerns about the context, text or information contained within the body of this dictation, please contact myself, the provider, for further clarification

## 2021-06-25 NOTE — ASSESSMENT & PLAN NOTE
Lab Results   Component Value Date    EGFR 47 06/25/2021    EGFR 41 06/23/2021    EGFR 40 06/22/2021    CREATININE 1 15 06/25/2021    CREATININE 1 30 06/23/2021    CREATININE 1 31 (H) 06/22/2021   CKD stage 2-3 in the setting of HTN and T2DM requiring frequent bmp checks  Creatinine is stable   Continue to monitor    Patient's Bumex decreased from 2 mg to 1 mg

## 2021-06-25 NOTE — ASSESSMENT & PLAN NOTE
Anticoagulated on warfarin  With history of appendage thrombus  INR is 2 9  Goal 2 5-3 5  If below 2 5 needs gtt  Monitor PT INR daily  Continue digoxin    Now IR are considering gtt again - will hold coumadin depending on planned procedure     INR is 1 59  Ideally should be below 1 5  Patient likely to the OR with Interventional Radiology for a biopsy on Monday

## 2021-06-25 NOTE — QUICK NOTE
IR Note    INR 1 59 today    Guideline is 1 5, bleeding in the chest can be difficult to stop after a lung biopsy and hemoptysis can be much more difficult to manage on someone who is already suffering from respiratory compromise    Based on this and multiple emergency procedures today will not proceed with biopsy today    Recommendations  - add on status for Monday 6/28  - NPO after midnight 6/28  - stop heparin drip 6/28 at 4AM in anticipation of possible biopsy in the morning

## 2021-06-25 NOTE — PROGRESS NOTES
Progress Note - Pulmonary   Lannie Mortimer 70 y o  female MRN: [id number]  Unit/Bed#: S -01 Encounter: [id number]    Assessment/Plan:    Abnormal CT chest with 3 cm RUL lung mass and adenopathy              INR 1 59  D/W IR today  Will keep on heparin drip over weekend and biopsy Monday as per D/W SLIM as well      Acute on chronic diastolic CHF with bioprosthetic mitral stenosis              Diuresis per Cardiology  Possible switch to PO today pending their evaluation               Monitor I/Os and daily weights      Permanent atrial fibrillation with left atrial appendage thrombus and Coumadin coagulopathy              Digoxin for rate control per Cardiology              On Heparin bridge for biopsy as above  Will need to resume Coumadin after biopsy per primary team/Cardiology      Acute pulmonary insufficiency on chronic hypoxic respiratory failure due to acute CHF and lung mass              Baseline oxygen requirement is 4L NC, which she is currently on               Titrate oxygen to maintain POX > or = 89%                 OOB as tolerated      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              Quit 9 years ago               50+ pack year history              Maintains on Symbicort and albuterol as outpatient               Continue Breo and albuterol PRN as inpatient                 Outpatient follow-up pending course  D/W nursing and primary team  Will see again 06/28/2021  Please call with questions or concerns in the interim  Chief Complaint:    "I'm okay "    Subjective:    Annalisa Stage is resting in bed this morning  She is NPO and heparin gtt is held for possible IR lung mass biopsy today  She denies any new SOB or cough  No other complaints  Objective:    Vitals: Blood pressure 105/66, pulse 71, temperature 97 9 °F (36 6 °C), temperature source Oral, resp  rate 18, height 5' 3" (1 6 m), weight 104 kg (229 lb 0 9 oz), SpO2 95 %   4L NC,Body mass index is 40 58 kg/m²  Intake/Output Summary (Last 24 hours) at 6/25/2021 0739  Last data filed at 6/25/2021 0700  Gross per 24 hour   Intake 240 ml   Output 1450 ml   Net -1210 ml       Invasive Devices     Peripheral Intravenous Line            Peripheral IV 06/25/21 Right;Ventral (anterior) Forearm <1 day          Drain            External Urinary Catheter 4 days                Physical Exam:     Physical Exam  Vitals reviewed  Constitutional:       General: She is not in acute distress  Appearance: She is well-developed  She is obese  She is not toxic-appearing or diaphoretic  Interventions: Nasal cannula in place  HENT:      Head: Normocephalic and atraumatic  Eyes:      General: No scleral icterus  Neck:      Trachea: No tracheal deviation  Cardiovascular:      Rate and Rhythm: Normal rate and regular rhythm  Heart sounds: S1 normal and S2 normal  No murmur heard  No friction rub  No gallop  Pulmonary:      Effort: Pulmonary effort is normal  No tachypnea, accessory muscle usage or respiratory distress  Breath sounds: Normal breath sounds  No stridor  No decreased breath sounds, wheezing, rhonchi or rales  Chest:      Chest wall: No tenderness  Abdominal:      General: Bowel sounds are normal  There is no distension  Palpations: Abdomen is soft  Tenderness: There is no abdominal tenderness  There is no guarding or rebound  Musculoskeletal:      Cervical back: Neck supple  Skin:     General: Skin is warm and dry  Findings: No rash  Neurological:      Mental Status: She is alert and oriented to person, place, and time  GCS: GCS eye subscore is 4  GCS verbal subscore is 5  GCS motor subscore is 6  Psychiatric:         Speech: Speech normal          Behavior: Behavior normal  Behavior is cooperative           Labs:   CBC:   Lab Results   Component Value Date    WBC 8 20 06/25/2021    HGB 8 1 (L) 06/25/2021    HCT 27 6 (L) 06/25/2021    MCV 90 06/25/2021    PLT 414 (H) 06/25/2021    MCH 26 3 (L) 06/25/2021    MCHC 29 3 (L) 06/25/2021    RDW 18 0 (H) 06/25/2021    MPV 10 5 06/25/2021   , CMP:   Lab Results   Component Value Date    SODIUM 135 (L) 06/25/2021    K 4 4 06/25/2021     06/25/2021    CO2 29 06/25/2021    BUN 44 (H) 06/25/2021    CREATININE 1 15 06/25/2021    CALCIUM 8 9 06/25/2021    EGFR 47 06/25/2021   , PT/INR:   Lab Results   Component Value Date    INR 1 59 (H) 06/25/2021     Imaging and other studies: None new

## 2021-06-26 LAB
APTT PPP: 76 SECONDS (ref 23–37)
GLUCOSE SERPL-MCNC: 114 MG/DL (ref 65–140)
GLUCOSE SERPL-MCNC: 128 MG/DL (ref 65–140)
GLUCOSE SERPL-MCNC: 142 MG/DL (ref 65–140)
GLUCOSE SERPL-MCNC: 97 MG/DL (ref 65–140)

## 2021-06-26 PROCEDURE — 99232 SBSQ HOSP IP/OBS MODERATE 35: CPT | Performed by: FAMILY MEDICINE

## 2021-06-26 PROCEDURE — 85730 THROMBOPLASTIN TIME PARTIAL: CPT | Performed by: FAMILY MEDICINE

## 2021-06-26 PROCEDURE — 82948 REAGENT STRIP/BLOOD GLUCOSE: CPT

## 2021-06-26 PROCEDURE — 99232 SBSQ HOSP IP/OBS MODERATE 35: CPT | Performed by: INTERNAL MEDICINE

## 2021-06-26 RX ORDER — TORSEMIDE 20 MG/1
40 TABLET ORAL 2 TIMES DAILY
Status: DISCONTINUED | OUTPATIENT
Start: 2021-06-26 | End: 2021-07-05 | Stop reason: HOSPADM

## 2021-06-26 RX ADMIN — HEPARIN SODIUM 13.1 UNITS/KG/HR: 10000 INJECTION, SOLUTION INTRAVENOUS at 18:05

## 2021-06-26 RX ADMIN — PANTOPRAZOLE SODIUM 40 MG: 40 TABLET, DELAYED RELEASE ORAL at 05:45

## 2021-06-26 RX ADMIN — DIGOXIN 125 MCG: 125 TABLET ORAL at 09:25

## 2021-06-26 RX ADMIN — POTASSIUM CHLORIDE 20 MEQ: 1500 TABLET, EXTENDED RELEASE ORAL at 09:26

## 2021-06-26 RX ADMIN — FLUTICASONE FUROATE AND VILANTEROL TRIFENATATE 1 PUFF: 100; 25 POWDER RESPIRATORY (INHALATION) at 09:27

## 2021-06-26 RX ADMIN — FERROUS SULFATE TAB 325 MG (65 MG ELEMENTAL FE) 325 MG: 325 (65 FE) TAB at 09:29

## 2021-06-26 RX ADMIN — TORSEMIDE 40 MG: 20 TABLET ORAL at 17:58

## 2021-06-26 RX ADMIN — POTASSIUM CHLORIDE 20 MEQ: 1500 TABLET, EXTENDED RELEASE ORAL at 18:02

## 2021-06-26 RX ADMIN — DOCUSATE SODIUM AND SENNOSIDES 1 TABLET: 8.6; 5 TABLET, FILM COATED ORAL at 18:17

## 2021-06-26 RX ADMIN — MONTELUKAST 10 MG: 10 TABLET, FILM COATED ORAL at 09:25

## 2021-06-26 NOTE — PROGRESS NOTES
[facility]  Progress Note - Austin Zaragoza 1945, 70 y o  female MRN: [id number]  Unit/Bed#: S -01 Encounter: [id number]  Primary Care Provider: Lissette Wyatt MD   Date and time admitted to hospital: 6/17/2021  5:52 PM    CKD (chronic kidney disease)  Assessment & Plan  Lab Results   Component Value Date    EGFR 47 06/25/2021    EGFR 41 06/23/2021    EGFR 40 06/22/2021    CREATININE 1 15 06/25/2021    CREATININE 1 30 06/23/2021    CREATININE 1 31 (H) 06/22/2021   CKD stage 2-3 in the setting of HTN and T2DM requiring frequent bmp checks  Creatinine is stable   Continue to monitor  Continue to monitor intermittently  Patient is now on p o  Diuretics    Morbid obesity (HCC)  Assessment & Plan  Diet lifestyle modifications  Counseled on weight loss  Noted pulmonary hypertension on prior echo  This is likely contributing to patient's symptomatology    Type 2 diabetes mellitus [facility])  Assessment & Plan  Lab Results   Component Value Date    HGBA1C 4 5 03/15/2021       Recent Labs     06/25/21  1601 06/25/21  2052 06/26/21  0618 06/26/21  1036   POCGLU 133 127 97 114       Blood Sugar Average: Last 72 hrs:  (P) 774 [id number]   · Hold oral antihyperglycemics  · Start sliding scale insulin  · accuchecks   Blood sugars are acceptable    Chronic atrial fibrillation (HCC)  Assessment & Plan  Anticoagulated on warfarin  With history of appendage thrombus  INR is 2 9  Goal 2 5-3 5  If below 2 5 needs gtt  Monitor PT INR daily  Continue digoxin    Now IR are considering gtt again - will hold coumadin depending on planned procedure     INR is 1 59  Ideally should be below 1 5  Patient likely to the OR with Interventional Radiology for a biopsy on Monday  COPD (chronic obstructive pulmonary disease) ([facility] [address] )  Assessment & Plan  Does not appear to be in acute exacerbation at this time  Continue home inhalers    This could be contributing to patient's shortness of breath  Lung mass  Will likely require biopsy with Interventional Radiology     Patient with chronic hypoxemic respiratory failure as well  Patient is okay from the pulmonology standpoint for the procedure however INR is slightly elevated at 1 59  It should be below 1 5 ideally  Patient is going to get a done on Monday  Heparin drip restarted  The patient should have the heparin on hold from 4:00 a m  On Monday  Get an INR tomorrow    * Shortness of breath  Assessment & Plan  Presents from nursing facility with subjective worsening of shortness of breath  CT scans finding significant for potential mass verses volume overload versus infection  Chronically wears 4L   Echo in 2020 - EF 50-55%  Diastolic dysfunction with inability to calculate filling pressures due to bioprosthetic MVR  Severe tricuspid regurgitation  At least moderate pulmonary hypertension assuming an RA pressure of 10mmHg  Radiology evaluation appreciated diuretics were decreased to couple days ago  Patient states from the shortness of breath standpoint she is stable     Patient is now on p o  Diuretics per Cardiology recommendations  VTE Pharmacologic Prophylaxis:   Pharmacologic: Heparin Drip  Mechanical VTE Prophylaxis in Place: Yes    Patient Centered Rounds: I have performed bedside rounds with nursing staff today  Education and Discussions with Family / Patient:  Offered but the patient politely declined    Time Spent for Care: 20 minutes  More than 50% of total time spent on counseling and coordination of care as described above  Current Length of Stay: 8 day(s)    Current Patient Status: Inpatient   Certification Statement: The patient will continue to require additional inpatient hospital stay due to Needing biopsy on Monday    Discharge Plan:  Anticipate discharge earliest on Tuesday    Code Status: Level 1 - Full Code      Subjective:   Patient seen examined    She states she feels little better than yesterday    Objective:     Vitals:   Temp (24hrs), Av 2 °F (36 8 °C), Min:98 °F (36 7 °C), Max:98 6 °F (37 °C)    Temp:  [98 °F (36 7 °C)-98 6 °F (37 °C)] 98 6 °F (37 °C)  HR:  [64-75] 75  Resp:  [18-19] 19  BP: (110-118)/(56-74) 110/56  SpO2:  [95 %-98 %] 98 %  Body mass index is 41 32 kg/m²  Input and Output Summary (last 24 hours):        Intake/Output Summary (Last 24 hours) at 2021 1211  Last data filed at 2021 0900  Gross per 24 hour   Intake 710 ml   Output 950 ml   Net -240 ml       Physical Exam:     Physical Exam  (   General Appearance:    Alert, cooperative, no distress, appears stated age                               Lungs:     Clear to auscultation bilaterally, respirations unlabored       Heart:    Regular rate and rhythm, S1 and S2 normal, no murmur, rub    or gallop   Abdomen:     Soft, non-tender, bowel sounds active all four quadrants,     no masses, no organomegaly           Extremities:   Extremities normal, atraumatic, no cyanosis or edema       Additional Data:     Labs:    Results from last 7 days   Lab Units 21  0521  0519   WBC Thousand/uL 8 20 7 82   HEMOGLOBIN g/dL 8 1* 8 1*   HEMATOCRIT % 27 6* 28 4*   PLATELETS Thousands/uL 414* 419*   NEUTROS PCT %  --  72   LYMPHS PCT %  --  14   MONOS PCT %  --  7   EOS PCT %  --  5     Results from last 7 days   Lab Units 21  0521 21  0519   SODIUM mmol/L 135* 139   POTASSIUM mmol/L 4 4 4 4   CHLORIDE mmol/L 100 99*   CO2 mmol/L 29 33*   BUN mg/dL 44* 37*   CREATININE mg/dL 1 15 1 31*   ANION GAP mmol/L 6 7   CALCIUM mg/dL 8 9 9 5   ALBUMIN g/dL  --  2 7*   TOTAL BILIRUBIN mg/dL  --  0 43   ALK PHOS U/L  --  62   ALT U/L  --  15   AST U/L  --  17   GLUCOSE RANDOM mg/dL 92 92     Results from last 7 days   Lab Units 21  0521   INR  1 59*     Results from last 7 days   Lab Units 21  1036 21  0618 21  2052 21  1601 21  1116 21  0717 21  2100 21  1500 06/24/21  1330 06/24/21  1055 06/24/21  0742 06/23/21  2341   POC GLUCOSE mg/dl 114 97 127 133 101 105 128 118 123 95 98 133                   * I Have Reviewed All Lab Data Listed Above  * Additional Pertinent Lab Tests Reviewed:  Zoila Casey Admission Reviewed        Recent Cultures (last 7 days):           Last 24 Hours Medication List:   Current Facility-Administered Medications   Medication Dose Route Frequency Provider Last Rate    acetaminophen  975 mg Oral Q8H [address] Richrd Divine, CRNP      albuterol  2 puff Inhalation Q6H PRN Poly Boyer PA-C      Diclofenac Sodium  2 g Topical 4x Daily Jennifer GraverDAVID      digoxin  125 mcg Oral Daily Manolo Navarrete PA-C      ferrous sulfate  325 mg Oral Daily With Breakfast Manolo Navarrete PA-C      fluticasone-vilanterol  1 puff Inhalation Daily Gillian Spivey PA-C      heparin (porcine)  3-20 Units/kg/hr (Order-Specific) Intravenous Titrated Kristine Romero MD 13 1 Units/kg/hr (06/26/21 0717)    heparin (porcine)  2,000 Units Intravenous Q1H PRN Penny Delatorre MD      heparin (porcine)  4,000 Units Intravenous Q1H PRN Penny Delatorre MD      insulin lispro  1-5 Units Subcutaneous TID AC Manolo Navarrete PA-C      insulin lispro  1-5 Units Subcutaneous HS Manolo Navarrete PA-C      ipratropium-albuterol  3 mL Nebulization 4x Daily PRN Gillian Spivey PA-C      lidocaine  1 patch Topical Daily Esdrasalli Sandhu PA-C      LORazepam  0 25 mg Oral Q6H PRN DAVID Rodriguez      montelukast  10 mg Oral Daily Poly Boyer PA-C      oxyCODONE  2 5 mg Oral Q4H PRN DAVID Rodriguez      pantoprazole  40 mg Oral Early Morning Manolo Navarrete PA-C      potassium chloride  20 mEq Oral BID Poly Boyer PA-C      senna-docusate sodium  1 tablet Oral Daily PRN Manolo Navarrete PA-C      sodium chloride  1 spray Each Nare Q1H PRN DAVID Tavarez      torsemide  40 mg Oral BID LOPEZ Villalpando, Patient Was Seen By: Bill Stockton MD    ** Please Note: Dictation voice to text software may have been used in the creation of this document   **

## 2021-06-26 NOTE — PLAN OF CARE
Problem: METABOLIC, FLUID AND ELECTROLYTES - ADULT  Goal: Fluid balance maintained  Description: INTERVENTIONS:  - Monitor labs   - Monitor I/O and WT  - Instruct patient on fluid and nutrition as appropriate  - Assess for signs & symptoms of volume excess or deficit  Outcome: Progressing  Goal: Glucose maintained within target range  Description: INTERVENTIONS:  - Monitor Blood Glucose as ordered  - Assess for signs and symptoms of hyperglycemia and hypoglycemia  - Administer ordered medications to maintain glucose within target range  - Assess nutritional intake and initiate nutrition service referral as needed  Outcome: Progressing

## 2021-06-26 NOTE — ASSESSMENT & PLAN NOTE
Lab Results   Component Value Date    HGBA1C 4 5 03/15/2021       Recent Labs     06/25/21  1601 06/25/21 2052 06/26/21 0618 06/26/21  1036   POCGLU 133 127 97 114       Blood Sugar Average: Last 72 hrs:  (P) 021 [id number]   · Hold oral antihyperglycemics  · Start sliding scale insulin  · accuchecks     Blood sugars are acceptable

## 2021-06-26 NOTE — PROGRESS NOTES
Progress Note - Electrophysiology-Cardiology (EP)   Sarah Beth Greenwood 70 y o  female MRN: [id number]  Unit/Bed#: S -01 Encounter: [id number]      Assessment/Plan:   Primary diagnosis:   1  Acute on chronic diastolic CHF exacerbation, NYHA IV, AHA class C    * patient presented to [facility] on  due to concerns of SOB, she was diagnosed with acute on chronic diastolic CHF exacerbation, cardiology consulted for volume management    * as an outpatient was on lasix 40mg PO BID, inpatient has been on bumex, volume status is difficult to assess given  Her severe TR and 4L NC use, today she has no LE edema and lungs are without rales at the bases, we will discontinue IV bumex and start torsemide 40mg BID starting tonight    * continue daily BMP monitoring and I/O monitoring    * on 4L NC at baseline     2  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    * on heparin gtt at the moment for upcoming lung biopsy    * INR goal 2 5-3 5 due to her severe MS    3  Permanent atrial fibrillation w/ AAMIR thrombus   * see above    * rate controlled     4  Lung mass    * concerning for malignancy, pulmonology following     5  Chronic hypoxic respiratory failure on 4L NC at baseline     EKG:   See prior notes    Imaging: I have personally reviewed pertinent reports      Results for orders placed during the hospital encounter of 21    Echo complete with contrast if indicated    Narrative  [place] [address], [address] [facility]  [phone]    Transthoracic Echocardiogram  2D, M-mode, Doppler, and Color Doppler    Study date:  2021    Patient: Sue Koenig  MR number: [id number]  Account number: [de-identified]  : 1945  Age: 70 years  Gender: Female  Status: Inpatient  Location: Bedside  Height: 63 in  Weight: 245 5 lb  BP: 98/ 52 mmHg    Indications: Dyspnea    Diagnoses: R06 00 - Dyspnea, unspecified    Sonographer:  CRISTI Stevenson  Primary Physician:  George Meneses MD  Referring Physician:  Ramirez Cardenas MD  Interpreting Physician:  Nisa Moore MD    SUMMARY    LEFT VENTRICLE:  The ventricle was moderately dilated  Systolic function was normal  Ejection fraction was estimated to be 60 %  There were no regional wall motion abnormalities  VENTRICULAR SEPTUM:  There was dyssynergic motion  These changes are consistent with RV volume and pressure overload  LEFT ATRIUM:  The atrium was massively dilated  MITRAL VALVE:  A 27 mm (Gordon Bovine) pericardial bioprosthesis was present  There was possible sewing ring dehiscence, with mitral inflow being directed towards the LVOT  Transmitral velocity and gradient were increased due to stenosis, as well as concomitant increased transaortic flow  There was moderate stenosis  There was trace regurgitation  AORTIC VALVE:  Transaortic velocity was increased due to valvular stenosis  There was mild stenosis  There was mild regurgitation  TRICUSPID VALVE:  There was moderate to severe regurgitation  The findings suggest severe pulmonary hypertension  IVC, HEPATIC VEINS:  The inferior vena cava was markedly dilated  Respirophasic changes were blunted (less than 50% variation)  HISTORY: PRIOR HISTORY: COPD, afib, DMII, MVR, HTN, CHF, former smoker    PROCEDURE: The procedure was performed at the bedside  This was a routine study  The transthoracic approach was used  The study included complete 2D imaging, M-mode, complete spectral Doppler, and color Doppler  The heart rate was 59 bpm,  at the start of the study  Images were obtained from the parasternal, apical, subcostal, and suprasternal notch acoustic windows  Echocardiographic views were limited due to restricted patient mobility and lung interference  This was a  technically difficult study  LEFT VENTRICLE: The ventricle was moderately dilated  Systolic function was normal  Ejection fraction was estimated to be 60 %   There were no regional wall motion abnormalities  Wall thickness was normal  DOPPLER: The study was not  technically sufficient to allow evaluation of LV diastolic function  VENTRICULAR SEPTUM: There was dyssynergic motion  These changes are consistent with RV volume and pressure overload  RIGHT VENTRICLE: The size was normal  Systolic function was normal  Wall thickness was normal     LEFT ATRIUM: The atrium was massively dilated  ATRIAL SEPTUM: The septum bows from left to right, consistent with increased left atrial pressure  RIGHT ATRIUM: The atrium was moderately dilated  MITRAL VALVE: A 27 mm (Gordon Bovine) pericardial bioprosthesis was present  There was possible sewing ring dehiscence, with mitral inflow being directed towards the LVOT  DOPPLER: Transmitral velocity and gradient were increased due to  stenosis, as well as concomitant increased transaortic flow  There was moderate stenosis  There was trace regurgitation  AORTIC VALVE: The valve was trileaflet  Leaflets exhibited mildly increased thickness and normal cuspal separation  DOPPLER: Transaortic velocity was increased due to valvular stenosis  There was mild stenosis  There was mild  regurgitation  TRICUSPID VALVE: The valve structure was normal  There was normal leaflet separation  DOPPLER: The transtricuspid velocity was within the normal range  There was no evidence for stenosis  There was moderate to severe regurgitation  Estimated peak PA pressure was 80 mmHg  The findings suggest severe pulmonary hypertension  PULMONIC VALVE: Leaflets exhibited normal thickness, no calcification, and normal cuspal separation  DOPPLER: The transpulmonic velocity was within the normal range  There was trace regurgitation  PERICARDIUM: There was no pericardial effusion  The pericardium was normal in appearance  AORTA: The root exhibited normal size  The ascending aorta was normal in size  SYSTEMIC VEINS: IVC: The inferior vena cava was markedly dilated  Respirophasic changes were blunted (less than 50% variation)  MEASUREMENT TABLES    DOPPLER MEASUREMENTS  LVOT   (Reference normals)  Peak frederick   224 cm/s   (--)  Mean frederick   144 cm/s   (--)  VTI   49 cm   (--)  Peak gradient   20 mmHg   (--)  Mean gradient   10 mmHg   (--)    SYSTEM MEASUREMENT TABLES    2D  %FS: 22 32 %  Ao Diam: 3 48 cm  Ao asc: 3 57 cm  EDV(Teich): 230 32 ml  EF(Teich): 43 91 %  ESV(Teich): 129 18 ml  IVSd: 0 82 cm  LA Area: 58 44 cm2  LA Diam: 6 95 cm  LVEDV MOD A4C: 49 82 ml  LVEF MOD A4C: 39 47 %  LVESV MOD A4C: 30 15 ml  LVIDd: 6 69 cm  LVIDs: 5 19 cm  LVLd A4C: 6 79 cm  LVLs A4C: 5 57 cm  LVOT Diam: 1 93 cm  LVPWd: 0 84 cm  RA Area: 28 83 cm2  RVIDd: 5 43 cm  SV MOD A4C: 19 66 ml  SV(Teich): 101 14 ml    CW  AV Env  Ti: 307 64 ms  AV MaxP 64 mmHg  AV VTI: 43 03 cm  AV Vmax: 2 09 m/s  AV Vmean: 1 39 m/s  AV meanP 09 mmHg  MV VTI: 102 93 cm  MV Vmax: 1 9 m/s  MV Vmean: 1 43 m/s  MV maxP 45 mmHg  MV meanP 92 mmHg  PV Vmax: 1 18 m/s  PV maxP 67 mmHg  TR MaxP 87 mmHg  TR Vmax: 3 74 m/s    MM  TAPSE: 1 6 cm    PW  DEANNE (VTI): 2 cm2  DEANNE Vmax: 1 93 cm2  DEANNE Vmax, Pt: 1 89 cm2  AVAI (VTI): 0 cm2/m2  AVAI Vmax: 0 cm2/m2  E' Sept: 0 04 m/s  LVOT Env  Ti: 323 5 ms  LVOT VTI: 29 52 cm  LVOT Vmax: 1 38 m/s  LVOT Vmax: 1 35 m/s  LVOT Vmean: 0 91 m/s  LVOT maxP 35 mmHg  LVOT maxP 73 mmHg  LVOT meanP mmHg  LVSI Dopp: 40 85 ml/m2  LVSV Dopp: 86 2 ml  MVA (VTI): 0 84 cm2  RVOT Vmax: 0 53 m/s  RVOT maxP 14 mmHg    [facility] Accredited Echocardiography Laboratory    Prepared and electronically signed by    Jhon Howe MD  Signed 2021 14:01:13      Subjective/Objective   Subjective: today patient followed by cardiology service for continued diuretic management  Today she has no major concerns or complaints  She is saddened about not being able to be home since end of 2020  She recalls fond memories of her martínez katz terriers  Vitals: /56 (BP Location: Right arm)   Pulse 75   Temp 98 6 °F (37 °C) (Oral)   Resp 19   Ht 5' 3" (1 6 m)   Wt 106 kg (233 lb 4 oz)   SpO2 98%   BMI 41 32 kg/m²   Vitals:    06/25/21 0600 06/26/21 0600   Weight: 104 kg (229 lb 0 9 oz) 106 kg (233 lb 4 oz)     Orthostatic Blood Pressures      Most Recent Value   Blood Pressure  110/56 filed at 06/26/2021 8874   Patient Position - Orthostatic VS  Lying filed at 06/26/2021 3727            Intake/Output Summary (Last 24 hours) at 6/26/2021 5199  Last data filed at 6/26/2021 0700  Gross per 24 hour   Intake 360 ml   Output 950 ml   Net -590 ml       Invasive Devices     Peripheral Intravenous Line            Peripheral IV 06/25/21 Right;Ventral (anterior) Forearm 1 day          Drain            External Urinary Catheter 5 days                            Scheduled Meds:  Current Facility-Administered Medications   Medication Dose Route Frequency Provider Last Rate    acetaminophen  975 mg Oral Q8H [facility] IrineoDAVID Khalil      albuterol  2 puff Inhalation Q6H PRN Manloo Navarrete PA-C      Diclofenac Sodium  2 g Topical 4x Daily [place] BroderickDAVID lipscomb      digoxin  125 mcg Oral Daily Manolo Navarrete PA-C      ferrous sulfate  325 mg Oral Daily With Breakfast Manolo Navarrete PA-C      fluticasone-vilanterol  1 puff Inhalation Daily LOPEZ Jo      heparin (porcine)  3-20 Units/kg/hr (Order-Specific) Intravenous Titrated Иван Shahid MD 13 1 Units/kg/hr (06/26/21 0717)    heparin (porcine)  2,000 Units Intravenous Q1H PRN Susan Crane MD      heparin (porcine)  4,000 Units Intravenous Q1H PRN Susan Crane MD      insulin lispro  1-5 Units Subcutaneous TID AC Manolo Navarrete PA-C      insulin lispro  1-5 Units Subcutaneous HS Manolo Navarrete PA-C      ipratropium-albuterol  3 mL Nebulization 4x Daily PRN LOPEZ Jo      lidocaine  1 patch Topical Daily Esdrasalli Tripathi PA-C      LORazepam  0 25 mg Oral Q6H PRN DAVID Contreras      montelukast  10 mg Oral Daily Lorraine Coburn [place]      oxyCODONE  2 5 mg Oral Q4H PRN DAVID Contreras      pantoprazole  40 mg Oral Early Morning Manolo Navarrete PA-C      potassium chloride  20 mEq Oral BID Lorraine Coburn PA-C      senna-docusate sodium  1 tablet Oral Daily PRN Manolo Navarrete PA-C      sodium chloride  1 spray Each Nare Q1H PRN DAVID Tavarez      torsemide  40 mg Oral BID Js Greenwood PA-C       Continuous Infusions:heparin (porcine), 3-20 Units/kg/hr (Order-Specific), Last Rate: 13 1 Units/kg/hr (06/26/21 0717)      PRN Meds: albuterol    heparin (porcine)    heparin (porcine)    ipratropium-albuterol    LORazepam    oxyCODONE    senna-docusate sodium    sodium chloride    Physical Exam:   GEN: NAD, alert and oriented, well appearing  SKIN: dry without significant lesions or rashes  HEENT: NCAT, PERRL, EOMs intact  NECK: No JVD or carotid bruits appreciated  CARDIOVASCULAR: RRR, normal S1, S2 without murmurs, rubs, or gallops appreciated, no LE edema  LUNGS: 4L NC intact   ABDOMEN: Soft, nontender, nondistended  EXTREMITIES/VASCULAR: perfused without clubbing, cyanosis, or edema b/l  PSYCH: Normal mood and affect  NEURO: CN ll-Xll grossly intact                Lab Results: I have personally reviewed pertinent lab results      Results from last 7 days   Lab Units 06/25/21  0521 06/23/21  0550 06/22/21  0519   WBC Thousand/uL 8 20 7 91 7 82   HEMOGLOBIN g/dL 8 1* 8 6* 8 1*   HEMATOCRIT % 27 6* 28 8* 28 4*   PLATELETS Thousands/uL 414* 409* 419*     Results from last 7 days   Lab Units 06/25/21  0521 06/23/21  0550 06/22/21  0519   POTASSIUM mmol/L 4 4 4 3 4 4   CHLORIDE mmol/L 100 100 99*   CO2 mmol/L 29 33* 33*   BUN mg/dL 44* 43* 37*   CREATININE mg/dL 1 15 1 30 1 31*   CALCIUM mg/dL 8 9 9 0 9 5     Results from last 7 days   Lab Units 06/26/21  0554 06/25/21  2236 06/25/21  1541 06/25/21  0521 06/24/21  0641 06/23/21  0550   INR   -- --   --  1 59* 1 88* 2 16*   PTT seconds 76* 64* 45* 52* 72*  --

## 2021-06-26 NOTE — ASSESSMENT & PLAN NOTE
Does not appear to be in acute exacerbation at this time  Continue home inhalers  This could be contributing to patient's shortness of breath  Lung mass  Will likely require biopsy with Interventional Radiology     Patient with chronic hypoxemic respiratory failure as well  Patient is okay from the pulmonology standpoint for the procedure however INR is slightly elevated at 1 59  It should be below 1 5 ideally  Patient is going to get a done on Monday  Heparin drip restarted  The patient should have the heparin on hold from 4:00 a m  On Monday    Get an INR tomorrow

## 2021-06-26 NOTE — ASSESSMENT & PLAN NOTE
Presents from nursing facility with subjective worsening of shortness of breath  CT scans finding significant for potential mass verses volume overload versus infection  Chronically wears 4L   Echo in 2020 - EF 50-55%  Diastolic dysfunction with inability to calculate filling pressures due to bioprosthetic MVR  Severe tricuspid regurgitation  At least moderate pulmonary hypertension assuming an RA pressure of 10mmHg  Radiology evaluation appreciated diuretics were decreased to couple days ago  Patient states from the shortness of breath standpoint she is stable     Patient is now on p o  Diuretics per Cardiology recommendations

## 2021-06-26 NOTE — ASSESSMENT & PLAN NOTE
Lab Results   Component Value Date    EGFR 47 06/25/2021    EGFR 41 06/23/2021    EGFR 40 06/22/2021    CREATININE 1 15 06/25/2021    CREATININE 1 30 06/23/2021    CREATININE 1 31 (H) 06/22/2021   CKD stage 2-3 in the setting of HTN and T2DM requiring frequent bmp checks  Creatinine is stable   Continue to monitor  Continue to monitor intermittently  Patient is now on p o   Diuretics

## 2021-06-27 LAB
ANION GAP SERPL CALCULATED.3IONS-SCNC: 6 MMOL/L (ref 4–13)
APTT PPP: 37 SECONDS (ref 23–37)
APTT PPP: 57 SECONDS (ref 23–37)
BUN SERPL-MCNC: 40 MG/DL (ref 5–25)
CALCIUM SERPL-MCNC: 9.4 MG/DL (ref 8.3–10.1)
CHLORIDE SERPL-SCNC: 99 MMOL/L (ref 100–108)
CO2 SERPL-SCNC: 34 MMOL/L (ref 21–32)
CREAT SERPL-MCNC: 1.15 MG/DL (ref 0.6–1.3)
GFR SERPL CREATININE-BSD FRML MDRD: 47 ML/MIN/1.73SQ M
GLUCOSE SERPL-MCNC: 105 MG/DL (ref 65–140)
GLUCOSE SERPL-MCNC: 106 MG/DL (ref 65–140)
GLUCOSE SERPL-MCNC: 131 MG/DL (ref 65–140)
GLUCOSE SERPL-MCNC: 97 MG/DL (ref 65–140)
GLUCOSE SERPL-MCNC: 97 MG/DL (ref 65–140)
GLUCOSE SERPL-MCNC: 99 MG/DL (ref 65–140)
INR PPP: 1.26 (ref 0.84–1.19)
POTASSIUM SERPL-SCNC: 3.5 MMOL/L (ref 3.5–5.3)
PROTHROMBIN TIME: 15.9 SECONDS (ref 11.6–14.5)
SODIUM SERPL-SCNC: 139 MMOL/L (ref 136–145)

## 2021-06-27 PROCEDURE — 85730 THROMBOPLASTIN TIME PARTIAL: CPT | Performed by: FAMILY MEDICINE

## 2021-06-27 PROCEDURE — 99232 SBSQ HOSP IP/OBS MODERATE 35: CPT | Performed by: FAMILY MEDICINE

## 2021-06-27 PROCEDURE — 82948 REAGENT STRIP/BLOOD GLUCOSE: CPT

## 2021-06-27 PROCEDURE — 99232 SBSQ HOSP IP/OBS MODERATE 35: CPT | Performed by: INTERNAL MEDICINE

## 2021-06-27 PROCEDURE — 85610 PROTHROMBIN TIME: CPT | Performed by: FAMILY MEDICINE

## 2021-06-27 PROCEDURE — 80048 BASIC METABOLIC PNL TOTAL CA: CPT | Performed by: PHYSICIAN ASSISTANT

## 2021-06-27 RX ADMIN — TORSEMIDE 40 MG: 20 TABLET ORAL at 21:07

## 2021-06-27 RX ADMIN — TORSEMIDE 40 MG: 20 TABLET ORAL at 08:36

## 2021-06-27 RX ADMIN — HEPARIN SODIUM 4000 UNITS: 1000 INJECTION INTRAVENOUS; SUBCUTANEOUS at 21:07

## 2021-06-27 RX ADMIN — HEPARIN SODIUM 15.1 UNITS/KG/HR: 10000 INJECTION, SOLUTION INTRAVENOUS at 17:04

## 2021-06-27 RX ADMIN — LORAZEPAM 0.25 MG: 0.5 TABLET ORAL at 18:35

## 2021-06-27 RX ADMIN — DIGOXIN 125 MCG: 125 TABLET ORAL at 08:38

## 2021-06-27 RX ADMIN — POTASSIUM CHLORIDE 20 MEQ: 1500 TABLET, EXTENDED RELEASE ORAL at 17:10

## 2021-06-27 RX ADMIN — FLUTICASONE FUROATE AND VILANTEROL TRIFENATATE 1 PUFF: 100; 25 POWDER RESPIRATORY (INHALATION) at 08:41

## 2021-06-27 RX ADMIN — POTASSIUM CHLORIDE 20 MEQ: 1500 TABLET, EXTENDED RELEASE ORAL at 08:36

## 2021-06-27 RX ADMIN — HEPARIN SODIUM 2000 UNITS: 1000 INJECTION INTRAVENOUS; SUBCUTANEOUS at 08:39

## 2021-06-27 RX ADMIN — MONTELUKAST 10 MG: 10 TABLET, FILM COATED ORAL at 08:38

## 2021-06-27 RX ADMIN — PANTOPRAZOLE SODIUM 40 MG: 40 TABLET, DELAYED RELEASE ORAL at 05:12

## 2021-06-27 RX ADMIN — FERROUS SULFATE TAB 325 MG (65 MG ELEMENTAL FE) 325 MG: 325 (65 FE) TAB at 08:38

## 2021-06-27 NOTE — ASSESSMENT & PLAN NOTE
Anticoagulated on warfarin  With history of appendage thrombus  INR is 2 9  Goal 2 5-3 5  If below 2 5 needs gtt  Monitor PT INR daily  Continue digoxin    Now IR are considering gtt again - will hold coumadin depending on planned procedure     INR is now at a level where intervention can be done  Ideally should be below 1 5  Patient likely to the OR with Interventional Radiology for a biopsy on Monday  Patient did have an episode of epistaxis earlier this morning  I did hold the heparin for 2 hours

## 2021-06-27 NOTE — ASSESSMENT & PLAN NOTE
Lab Results   Component Value Date    EGFR 47 06/27/2021    EGFR 47 06/25/2021    EGFR 41 06/23/2021    CREATININE 1 15 06/27/2021    CREATININE 1 15 06/25/2021    CREATININE 1 30 06/23/2021   CKD stage 2-3 in the setting of HTN and T2DM requiring frequent bmp checks  Creatinine is stable   Continue to monitor  Continue to monitor intermittently  Patient is now on p o   Diuretics

## 2021-06-27 NOTE — ASSESSMENT & PLAN NOTE
Lab Results   Component Value Date    HGBA1C 4 5 03/15/2021       Recent Labs     06/26/21  1530 06/26/21 2051 06/27/21  0636 06/27/21  1025   POCGLU 142* 128 99 105       Blood Sugar Average: Last 72 hrs:  (P) 114 2   · Hold oral antihyperglycemics  · Start sliding scale insulin  · accuchecks     Blood sugars are acceptable

## 2021-06-27 NOTE — PROGRESS NOTES
Pt had nose bleed in the morning around 1000  SLIM notified; order given to hold heparin gtt for 2 hours  Restarted the heparin gtt at 1200  Pt had another nose bleed starting around 1800  SLIM notified, order given to hold heparin for 2 hours  Will let the night shift RN know to restart the drip at 2000  PTT is due at 2000  PCA is aware  Per SLIM Heparin gtt will need to be stopped at 0400 6/28/21 for IR procedure tomorrow  Safety measures are in place  Will continue to monitor pt

## 2021-06-27 NOTE — PROGRESS NOTES
Cardiology Service Progress Note - Soledad Nurse 70 y o  female MRN: [id number]    Unit/Bed#: S -01 Encounter: [id number]      Assessment:    Principal Problem:    Shortness of breath  Active Problems:    COPD (chronic obstructive pulmonary disease) (HCC)    Chronic atrial fibrillation (HCC)    Type 2 diabetes mellitus ([facility] [address] )    Morbid obesity (HCC)    CKD (chronic kidney disease)      Plan:  Dyspnea multifactorial, improving with diuretics, acute on chronic diastolic congestive failure, and concomitant COPD  Additionally valvular heart disease status post bioprosthetic mitral valve replacement, with possible dehiscence and functional mitral stenosis and insufficiency  Probably underestimated on the echocardiogram   With severe tricuspid insufficiency and pulmonary hypertension clinical examination is to just mild volume overloaded but improving  Will continue current diuretic regimen, she is on heparin as a bridge for CT-guided lung biopsy of lung mass  Chronic atrial fibrillation, on rate control plus anticoagulation  Subjective:   Patient seen and examined  No significant events overnight   ; pertinent negatives - chest pain, palpitations, paroxysmal nocturnal dyspnea and syncope  For sleep hygiene, she does have improvement in her dyspnea  Feels fatigued, episode of epistaxis last evening  Objective:     Vitals: Blood pressure 100/60, pulse 61, temperature 98 2 °F (36 8 °C), temperature source Oral, resp  rate 19, height 5' 3" (1 6 m), weight 104 kg (229 lb 11 5 oz), SpO2 95 %  , Body mass index is 40 69 kg/m² , I/O last 3 completed shifts: In: 860 [P O :860]  Out: 2223 [Urine:2223]  No intake/output data recorded    Wt Readings from Last 3 Encounters:   06/27/21 104 kg (229 lb 11 5 oz)   04/16/21 116 kg (255 lb)   03/08/21 113 kg (250 lb)       Intake/Output Summary (Last 24 hours) at 6/27/2021 0913  Last data filed at 6/27/2021 0401  Gross per 24 hour   Intake 270 ml   Output 1473 ml Net -1203 ml     I/O last 3 completed shifts: In: 18 [P O :860]  Out: 2223 [Urine:2223]    No significant arrhythmias seen on telemetry review    No telemetry      Physical Exam:    General appearance: alert and fatigued  Neck: JVD - 6 cm above sternal notch, no carotid bruit and Positive hepatojugular reflux , prominent V-waves  Lungs: crackles, rhonchi and Decreased breath sounds throughout, inspiratory rales at the bases  Heart: irregularly irregular rhythm, no S3 or S4, systolic murmur: holosystolic 3/6, high pitch at lower left sternal border, radiates to axilla, no click and no rub  Abdomen: soft, non-tender; bowel sounds normal; no masses,  no organomegaly  Extremities: extremities normal, warm and well-perfused; no cyanosis, clubbing, or edema  Pulses: 2+ and symmetric      Current Facility-Administered Medications:     acetaminophen (TYLENOL) tablet 975 mg, 975 mg, Oral, Q8H [facility], Jennifer P Bloch, CRNP, 975 mg at 06/23/21 0535    albuterol (PROVENTIL HFA,VENTOLIN HFA) inhaler 2 puff, 2 puff, Inhalation, Q6H PRN, Manolo Navarrete PA-C    Diclofenac Sodium (VOLTAREN) 1 % topical gel 2 g, 2 g, Topical, 4x Daily, DAVID Bland, 2 g at 06/23/21 1241    digoxin (LANOXIN) tablet 125 mcg, 125 mcg, Oral, Daily, Manolo Navarrete PA-C, 125 mcg at 06/27/21 4943    ferrous sulfate tablet 325 mg, 325 mg, Oral, Daily With Breakfast, Manolo Navarrete PA-C, 325 mg at 06/27/21 0838    fluticasone-vilanterol (BREO ELLIPTA) 100-25 mcg/inh inhaler 1 puff, 1 puff, Inhalation, Daily, Justin Barfield PA-C, 1 puff at 06/27/21 0841    heparin (porcine) 25,000 units in 0 45% NaCl 250 mL infusion (premix), 3-20 Units/kg/hr (Order-Specific), Intravenous, Titrated, Kelly Ibrahim MD, Last Rate: 13 6 mL/hr at 06/27/21 0843, 15 1 Units/kg/hr at 06/27/21 0843    heparin (porcine) injection 2,000 Units, 2,000 Units, Intravenous, Q1H PRN, Ye Francis MD, 2,000 Units at 06/27/21 0839    heparin (porcine) injection 4,000 Units, 4,000 Units, Intravenous, Q1H PRN, Stefany Bobby MD    insulin lispro (HumaLOG) 100 units/mL subcutaneous injection 1-5 Units, 1-5 Units, Subcutaneous, TID AC, 1 Units at 06/19/21 1832 **AND** Fingerstick Glucose (POCT), , , TID AC, Manolo Navarrete PA-C    insulin lispro (HumaLOG) 100 units/mL subcutaneous injection 1-5 Units, 1-5 Units, Subcutaneous, HS, Manolo Navarrete PA-C, 1 Units at 06/21/21 2102    ipratropium-albuterol (DUO-NEB) 0 5-2 5 mg/3 mL inhalation solution 3 mL, 3 mL, Nebulization, 4x Daily PRN, Sandoval Forrester PA-C    lidocaine (LIDODERM) 5 % patch 1 patch, 1 patch, Topical, Daily, Esdras De Guzman PA-C, 1 patch at 06/23/21 1009    LORazepam (ATIVAN) tablet 0 25 mg, 0 25 mg, Oral, Q6H PRN, DAVID Dickerson    montelukast (SINGULAIR) tablet 10 mg, 10 mg, Oral, Daily, Manolo Navarrete PA-C, 10 mg at 06/27/21 7581    oxyCODONE (ROXICODONE) IR tablet 2 5 mg, 2 5 mg, Oral, Q4H PRN, DAVID Dickerson    pantoprazole (PROTONIX) EC tablet 40 mg, 40 mg, Oral, Early Morning, Manolo Navarrete PA-C, 40 mg at 06/27/21 6168    potassium chloride (K-DUR,KLOR-CON) CR tablet 20 mEq, 20 mEq, Oral, BID, Manolo Navarrete PA-C, 20 mEq at 06/27/21 0836    senna-docusate sodium (SENOKOT S) 8 6-50 mg per tablet 1 tablet, 1 tablet, Oral, Daily PRN, Emir Loredo PA-C, 1 tablet at 06/26/21 1817    sodium chloride (OCEAN) 0 65 % nasal spray 1 spray, 1 spray, Each Nare, Q1H PRN, DAVID Tavarez, 1 spray at 06/19/21 2149    torsemide (DEMADEX) tablet 40 mg, 40 mg, Oral, BID, Js Greenwood PA-C, 40 mg at 06/27/21 0836    Labs & Results:        Results from last 7 days   Lab Units 06/25/21  0521 06/23/21  0550 06/22/21  0519   WBC Thousand/uL 8 20 7 91 7 82   HEMOGLOBIN g/dL 8 1* 8 6* 8 1*   HEMATOCRIT % 27 6* 28 8* 28 4*   PLATELETS Thousands/uL 414* 409* 419*         Results from last 7 days   Lab Units 06/27/21  0522 06/25/21  0521 06/23/21  0550 06/22/21  0519 06/20/21  1013   POTASSIUM mmol/L 3 5 4 4 4 3 4 4 4 0   CHLORIDE mmol/L 99* 100 100 99* 101   CO2 mmol/L 34* 29 33* 33* 33*   BUN mg/dL 40* 44* 43* 37* 27*   CREATININE mg/dL 1 15 1 15 1 30 1 31* 1 28   CALCIUM mg/dL 9 4 8 9 9 0 9 5 8 9   ALK PHOS U/L  --   --   --  62 64   ALT U/L  --   --   --  15 13   AST U/L  --   --   --  17 16     Results from last 7 days   Lab Units 06/27/21  0522 06/25/21  0521 06/24/21  0641   INR  1 26* 1 59* 1 88*       Chest X-Ray is obtained; result -    Echo        EKG personally reviewed by Gilda Bartlett MD      Counseling / Coordination of Care  Total floor / unit time spent today 15 minutes  Greater than 50% of total time was spent with the patient and / or family counseling and / or coordination of care  A description of the counseling / coordination of care: Shravan Deshpande Thank you for the opportunity to participate in the care of this patient     [address] [place] [address] Edwige Franco MD   06/27/21   9:13 AM

## 2021-06-27 NOTE — PROGRESS NOTES
[facility]  Progress Note - Keo Cortes 1945, 70 y o  female MRN: [id number]  Unit/Bed#: S -01 Encounter: [id number]  Primary Care Provider: Eliza Alberto MD   Date and time admitted to hospital: 6/17/2021  5:52 PM    CKD (chronic kidney disease)  Assessment & Plan  Lab Results   Component Value Date    EGFR 47 06/27/2021    EGFR 47 06/25/2021    EGFR 41 06/23/2021    CREATININE 1 15 06/27/2021    CREATININE 1 15 06/25/2021    CREATININE 1 30 06/23/2021   CKD stage 2-3 in the setting of HTN and T2DM requiring frequent bmp checks  Creatinine is stable   Continue to monitor  Continue to monitor intermittently  Patient is now on p o  Diuretics    Morbid obesity (HCC)  Assessment & Plan  Diet lifestyle modifications  Counseled on weight loss  Noted pulmonary hypertension on prior echo  This is likely contributing to patient's symptomatology    Type 2 diabetes mellitus [facility])  Assessment & Plan  Lab Results   Component Value Date    HGBA1C 4 5 03/15/2021       Recent Labs     06/26/21  1530 06/26/21  2051 06/27/21  0636 06/27/21  1025   POCGLU 142* 128 99 105       Blood Sugar Average: Last 72 hrs:  (P) 114 2   · Hold oral antihyperglycemics  · Start sliding scale insulin  · accuchecks   Blood sugars are acceptable    Chronic atrial fibrillation (HCC)  Assessment & Plan  Anticoagulated on warfarin  With history of appendage thrombus  INR is 2 9  Goal 2 5-3 5  If below 2 5 needs gtt  Monitor PT INR daily  Continue digoxin    Now IR are considering gtt again - will hold coumadin depending on planned procedure     INR is now at a level where intervention can be done  Ideally should be below 1 5  Patient likely to the OR with Interventional Radiology for a biopsy on Monday  Patient did have an episode of epistaxis earlier this morning  I did hold the heparin for 2 hours          COPD (chronic obstructive pulmonary disease) ([facility] [address] )  Assessment & Plan  Does not appear to be in acute exacerbation at this time  Continue home inhalers  This could be contributing to patient's shortness of breath  Lung mass  Will likely require biopsy with Interventional Radiology     Patient with chronic hypoxemic respiratory failure as well  Patient is okay from the pulmonology standpoint for the procedure however INR is slightly elevated at 1 59  It should be below 1 5 ideally  Patient is going to get a done on Monday  Heparin drip restarted  The patient should have the heparin on hold from 4:00 a m  On Monday  Get an INR tomorrow    * Shortness of breath  Assessment & Plan  Presents from nursing facility with subjective worsening of shortness of breath  CT scans finding significant for potential mass verses volume overload versus infection  Chronically wears 4L   Echo in 2020 - EF 50-55%  Diastolic dysfunction with inability to calculate filling pressures due to bioprosthetic MVR  Severe tricuspid regurgitation  At least moderate pulmonary hypertension assuming an RA pressure of 10mmHg  Radiology evaluation appreciated diuretics were decreased to couple days ago  Patient states from the shortness of breath standpoint she is stable     Patient is now on p o  Diuretics per Cardiology recommendations  VTE Pharmacologic Prophylaxis:   Pharmacologic: Heparin Drip  Mechanical VTE Prophylaxis in Place: Yes    Patient Centered Rounds: I have performed bedside rounds with nursing staff today  Education and Discussions with Family / Patient:  Offered but the patient politely declined    Time Spent for Care: 20 minutes  More than 50% of total time spent on counseling and coordination of care as described above      Current Length of Stay: 9 day(s)    Current Patient Status: Inpatient   Certification Statement: The patient will continue to require additional inpatient hospital stay due to Needing a biopsy    Discharge Plan:  Hopeful discharge in the next 48 hours if the patient is able to go on Lovenox for bridging purposes    Code Status: Level 1 - Full Code      Subjective:   Patient seen examined  A little bit better today  Objective:     Vitals:   Temp (24hrs), Av 9 °F (36 6 °C), Min:97 6 °F (36 4 °C), Max:98 2 °F (36 8 °C)    Temp:  [97 6 °F (36 4 °C)-98 2 °F (36 8 °C)] 98 2 °F (36 8 °C)  HR:  [61-81] 61  Resp:  [16-19] 19  BP: ()/(56-60) 100/60  SpO2:  [92 %-96 %] 95 %  Body mass index is 40 69 kg/m²  Input and Output Summary (last 24 hours):        Intake/Output Summary (Last 24 hours) at 2021 1324  Last data filed at 2021 0900  Gross per 24 hour   Intake 470 ml   Output 1853 ml   Net -1383 ml       Physical Exam:     Physical Exam  (   General Appearance:    Alert, cooperative, no distress, appears stated age                               Lungs:     Clear to auscultation bilaterally, respirations unlabored       Heart:    Regular rate and rhythm, S1 and S2 normal, no murmur, rub    or gallop   Abdomen:     Soft, non-tender, bowel sounds active all four quadrants,     no masses, no organomegaly           Extremities:   Extremities normal, atraumatic, no cyanosis or edema     Additional Data:     Labs:    Results from last 7 days   Lab Units 21  0521 21  0519   WBC Thousand/uL 8 20 7 82   HEMOGLOBIN g/dL 8 1* 8 1*   HEMATOCRIT % 27 6* 28 4*   PLATELETS Thousands/uL 414* 419*   NEUTROS PCT %  --  72   LYMPHS PCT %  --  14   MONOS PCT %  --  7   EOS PCT %  --  5     Results from last 7 days   Lab Units 21  0522 21  0519   SODIUM mmol/L 139 139   POTASSIUM mmol/L 3 5 4 4   CHLORIDE mmol/L 99* 99*   CO2 mmol/L 34* 33*   BUN mg/dL 40* 37*   CREATININE mg/dL 1 15 1 31*   ANION GAP mmol/L 6 7   CALCIUM mg/dL 9 4 9 5   ALBUMIN g/dL  --  2 7*   TOTAL BILIRUBIN mg/dL  --  0 43   ALK PHOS U/L  --  62   ALT U/L  --  15   AST U/L  --  17   GLUCOSE RANDOM mg/dL 97 92     Results from last 7 days   Lab Units 21  0522   INR  1 26*     Results from last 7 days   Lab Units 06/27/21  1025 06/27/21  0636 06/26/21  2051 06/26/21  1530 06/26/21  1036 06/26/21  0618 06/25/21 2052 06/25/21  1601 06/25/21  1116 06/25/21  0717 06/24/21  2100 06/24/21  1500   POC GLUCOSE mg/dl 105 99 128 142* 114 97 127 133 101 105 128 118                   * I Have Reviewed All Lab Data Listed Above  * Additional Pertinent Lab Tests Reviewed:  [address] Admission Reviewed    Recent Cultures (last 7 days):           Last 24 Hours Medication List:   Current Facility-Administered Medications   Medication Dose Route Frequency Provider Last Rate    acetaminophen  975 mg Oral Q8H [address] Trinidad Shar, CRNP      albuterol  2 puff Inhalation Q6H PRN Dulce Hernandez PA-C      Diclofenac Sodium  2 g Topical 4x Daily DAVID Ferreira      digoxin  125 mcg Oral Daily Manolo Navarrete PA-C      ferrous sulfate  325 mg Oral Daily With Breakfast Manolo Navarrete PA-C      fluticasone-vilanterol  1 puff Inhalation Daily Barbara Escobedo PA-C      heparin (porcine)  3-20 Units/kg/hr (Order-Specific) Intravenous Titrated Shawn Coy MD 15 1 Units/kg/hr (06/27/21 1212)    heparin (porcine)  2,000 Units Intravenous Q1H PRN Jenniffer Fatima MD      heparin (porcine)  4,000 Units Intravenous Q1H PRN Jenniffer Fatima MD      insulin lispro  1-5 Units Subcutaneous TID AC Manolo Navarrete PA-C      insulin lispro  1-5 Units Subcutaneous HS Manolo Navarrete PA-C      ipratropium-albuterol  3 mL Nebulization 4x Daily PRN Barbara Escobedo PA-C      lidocaine  1 patch Topical Daily Esdras Richardson PA-C      LORazepam  0 25 mg Oral Q6H PRN Trinidad SharDAVID      montelukast  10 mg Oral Daily Dulce Hernandez PA-C      oxyCODONE  2 5 mg Oral Q4H PRN Trinidad Shar, CRNP      pantoprazole  40 mg Oral Early Morning Manolo Navarrete PA-C      potassium chloride  20 mEq Oral BID Dulce Hernandez PA-C      senna-docusate sodium  1 tablet Oral Daily PRN Dulce Hernandez PA-C  sodium chloride  1 spray Each Nare Q1H PRN DAVID Tavarez      torsemide  40 mg Oral BID Lizette Duran PA-C          Today, Patient Was Seen By: Richard Mills MD    ** Please Note: Dictation voice to text software may have been used in the creation of this document   **

## 2021-06-28 ENCOUNTER — APPOINTMENT (INPATIENT)
Dept: RADIOLOGY | Facility: HOSPITAL | Age: 76
DRG: 291 | End: 2021-06-28
Payer: MEDICARE

## 2021-06-28 ENCOUNTER — APPOINTMENT (INPATIENT)
Dept: CT IMAGING | Facility: HOSPITAL | Age: 76
DRG: 291 | End: 2021-06-28
Attending: RADIOLOGY
Payer: MEDICARE

## 2021-06-28 PROBLEM — R91.8 LUNG MASS: Status: ACTIVE | Noted: 2021-06-28

## 2021-06-28 PROBLEM — K59.00 CONSTIPATION: Status: ACTIVE | Noted: 2021-06-28

## 2021-06-28 LAB
APTT PPP: 144 SECONDS (ref 23–37)
APTT PPP: 27 SECONDS (ref 23–37)
ERYTHROCYTE [DISTWIDTH] IN BLOOD BY AUTOMATED COUNT: 18.5 % (ref 11.6–15.1)
GLUCOSE SERPL-MCNC: 105 MG/DL (ref 65–140)
GLUCOSE SERPL-MCNC: 107 MG/DL (ref 65–140)
GLUCOSE SERPL-MCNC: 98 MG/DL (ref 65–140)
GLUCOSE SERPL-MCNC: 99 MG/DL (ref 65–140)
HCT VFR BLD AUTO: 28.1 % (ref 34.8–46.1)
HGB BLD-MCNC: 8.3 G/DL (ref 11.5–15.4)
INR PPP: 1.2 (ref 0.84–1.19)
INR PPP: 1.22 (ref 0.84–1.19)
MCH RBC QN AUTO: 26.5 PG (ref 26.8–34.3)
MCHC RBC AUTO-ENTMCNC: 29.5 G/DL (ref 31.4–37.4)
MCV RBC AUTO: 90 FL (ref 82–98)
PLATELET # BLD AUTO: 373 THOUSANDS/UL (ref 149–390)
PMV BLD AUTO: 10.3 FL (ref 8.9–12.7)
PROTHROMBIN TIME: 15.3 SECONDS (ref 11.6–14.5)
PROTHROMBIN TIME: 15.5 SECONDS (ref 11.6–14.5)
RBC # BLD AUTO: 3.13 MILLION/UL (ref 3.81–5.12)
WBC # BLD AUTO: 7.68 THOUSAND/UL (ref 4.31–10.16)

## 2021-06-28 PROCEDURE — 99153 MOD SED SAME PHYS/QHP EA: CPT

## 2021-06-28 PROCEDURE — 99152 MOD SED SAME PHYS/QHP 5/>YRS: CPT

## 2021-06-28 PROCEDURE — 82948 REAGENT STRIP/BLOOD GLUCOSE: CPT

## 2021-06-28 PROCEDURE — 85610 PROTHROMBIN TIME: CPT | Performed by: FAMILY MEDICINE

## 2021-06-28 PROCEDURE — 99232 SBSQ HOSP IP/OBS MODERATE 35: CPT | Performed by: PHYSICIAN ASSISTANT

## 2021-06-28 PROCEDURE — 99232 SBSQ HOSP IP/OBS MODERATE 35: CPT | Performed by: INTERNAL MEDICINE

## 2021-06-28 PROCEDURE — 88305 TISSUE EXAM BY PATHOLOGIST: CPT | Performed by: PATHOLOGY

## 2021-06-28 PROCEDURE — 32408 CORE NDL BX LNG/MED PERQ: CPT

## 2021-06-28 PROCEDURE — 77012 CT SCAN FOR NEEDLE BIOPSY: CPT

## 2021-06-28 PROCEDURE — 88333 PATH CONSLTJ SURG CYTO XM 1: CPT | Performed by: PATHOLOGY

## 2021-06-28 PROCEDURE — 71045 X-RAY EXAM CHEST 1 VIEW: CPT

## 2021-06-28 PROCEDURE — 85027 COMPLETE CBC AUTOMATED: CPT | Performed by: FAMILY MEDICINE

## 2021-06-28 PROCEDURE — 99232 SBSQ HOSP IP/OBS MODERATE 35: CPT | Performed by: FAMILY MEDICINE

## 2021-06-28 PROCEDURE — 85730 THROMBOPLASTIN TIME PARTIAL: CPT | Performed by: FAMILY MEDICINE

## 2021-06-28 RX ORDER — HEPARIN SODIUM 1000 [USP'U]/ML
4000 INJECTION, SOLUTION INTRAVENOUS; SUBCUTANEOUS
Status: DISCONTINUED | OUTPATIENT
Start: 2021-06-28 | End: 2021-06-30

## 2021-06-28 RX ORDER — POLYETHYLENE GLYCOL 3350 17 G/17G
17 POWDER, FOR SOLUTION ORAL DAILY
Status: DISCONTINUED | OUTPATIENT
Start: 2021-06-28 | End: 2021-07-05 | Stop reason: HOSPADM

## 2021-06-28 RX ORDER — LIDOCAINE WITH 8.4% SOD BICARB 0.9%(10ML)
SYRINGE (ML) INJECTION CODE/TRAUMA/SEDATION MEDICATION
Status: COMPLETED | OUTPATIENT
Start: 2021-06-28 | End: 2021-06-28

## 2021-06-28 RX ORDER — FENTANYL CITRATE 50 UG/ML
INJECTION, SOLUTION INTRAMUSCULAR; INTRAVENOUS CODE/TRAUMA/SEDATION MEDICATION
Status: COMPLETED | OUTPATIENT
Start: 2021-06-28 | End: 2021-06-28

## 2021-06-28 RX ORDER — AMOXICILLIN 250 MG
1 CAPSULE ORAL 2 TIMES DAILY PRN
Status: DISCONTINUED | OUTPATIENT
Start: 2021-06-28 | End: 2021-07-05 | Stop reason: HOSPADM

## 2021-06-28 RX ORDER — HEPARIN SODIUM 10000 [USP'U]/100ML
3-20 INJECTION, SOLUTION INTRAVENOUS
Status: DISCONTINUED | OUTPATIENT
Start: 2021-06-28 | End: 2021-06-28

## 2021-06-28 RX ORDER — HEPARIN SODIUM 1000 [USP'U]/ML
2000 INJECTION, SOLUTION INTRAVENOUS; SUBCUTANEOUS
Status: DISCONTINUED | OUTPATIENT
Start: 2021-06-28 | End: 2021-06-30

## 2021-06-28 RX ORDER — HEPARIN SODIUM 10000 [USP'U]/100ML
3-20 INJECTION, SOLUTION INTRAVENOUS
Status: DISCONTINUED | OUTPATIENT
Start: 2021-06-28 | End: 2021-06-30

## 2021-06-28 RX ORDER — WARFARIN SODIUM 4 MG/1
4 TABLET ORAL
Status: DISCONTINUED | OUTPATIENT
Start: 2021-06-28 | End: 2021-06-30

## 2021-06-28 RX ADMIN — TORSEMIDE 40 MG: 20 TABLET ORAL at 20:25

## 2021-06-28 RX ADMIN — HEPARIN SODIUM 11.1 UNITS/KG/HR: 10000 INJECTION, SOLUTION INTRAVENOUS at 17:50

## 2021-06-28 RX ADMIN — LORAZEPAM 0.25 MG: 0.5 TABLET ORAL at 00:39

## 2021-06-28 RX ADMIN — FENTANYL CITRATE 25 MCG: 50 INJECTION, SOLUTION INTRAMUSCULAR; INTRAVENOUS at 09:58

## 2021-06-28 RX ADMIN — LORAZEPAM 0.25 MG: 0.5 TABLET ORAL at 08:21

## 2021-06-28 RX ADMIN — TORSEMIDE 40 MG: 20 TABLET ORAL at 12:24

## 2021-06-28 RX ADMIN — FENTANYL CITRATE 25 MCG: 50 INJECTION, SOLUTION INTRAMUSCULAR; INTRAVENOUS at 09:27

## 2021-06-28 RX ADMIN — POTASSIUM CHLORIDE 20 MEQ: 1500 TABLET, EXTENDED RELEASE ORAL at 12:25

## 2021-06-28 RX ADMIN — FLUTICASONE FUROATE AND VILANTEROL TRIFENATATE 1 PUFF: 100; 25 POWDER RESPIRATORY (INHALATION) at 12:29

## 2021-06-28 RX ADMIN — DIGOXIN 125 MCG: 125 TABLET ORAL at 12:25

## 2021-06-28 RX ADMIN — MONTELUKAST 10 MG: 10 TABLET, FILM COATED ORAL at 12:24

## 2021-06-28 RX ADMIN — ACETAMINOPHEN 975 MG: 325 TABLET, FILM COATED ORAL at 20:24

## 2021-06-28 RX ADMIN — WARFARIN SODIUM 4 MG: 4 TABLET ORAL at 17:51

## 2021-06-28 RX ADMIN — LIDOCAINE 5% 1 PATCH: 700 PATCH TOPICAL at 23:45

## 2021-06-28 RX ADMIN — FERROUS SULFATE TAB 325 MG (65 MG ELEMENTAL FE) 325 MG: 325 (65 FE) TAB at 12:25

## 2021-06-28 RX ADMIN — POTASSIUM CHLORIDE 20 MEQ: 1500 TABLET, EXTENDED RELEASE ORAL at 17:51

## 2021-06-28 RX ADMIN — ACETAMINOPHEN 975 MG: 325 TABLET, FILM COATED ORAL at 12:25

## 2021-06-28 RX ADMIN — Medication 10 ML: at 09:30

## 2021-06-28 RX ADMIN — OXYCODONE HYDROCHLORIDE 2.5 MG: 5 TABLET ORAL at 12:25

## 2021-06-28 NOTE — PROGRESS NOTES
Progress Note - Palliative & Supportive Care  Shanti Wood  70 y o   female  S /S -01   MRN: [id number]  Encounter: [id number]     ASSESSMENT:    Patient Active Problem List   Diagnosis    COPD (chronic obstructive pulmonary disease) ([facility] [address] )    GI bleed    Chronic atrial fibrillation (HCC)    Type 2 diabetes mellitus (HCC)    Chronic diastolic congestive heart failure ([facility] [address] )    Morbid obesity (HCC)    Stasis edema of both lower extremities    Shortness of breath    CKD (chronic kidney disease)    Lung mass    Constipation     Active issues specifically addressed today include: COPD, acute post-procedure (biopsy discomfort), constipation, lung mass    PLAN:    1  Goals:    Patient is now s/p lung biopsy and she is awaiting results  She tolerated the procedure w/o significant adverse events   Continue disease-directed care w/o limits for now   Palliative will follow for ongoing goals of care discussions as situation evolves   Encouraged follow up with Palliative Medicine on an outpatient basis after discharge for continued symptom management  Our office will contact patient to schedule a hospital follow up  2  Social Support:   Supportive listening provided   Provided anxiety containment      3  Symptom management:   Continue Tylenol ATC   Continue oxyIR 2 5mg PRN   Continue Voltaren gel   Continue PPI   Increased bowel regimen today as patient has not had a bowel movement in several days   Continue lorazepam PRN      Code status: Level 1 - Full Code   Decisional apparatus:  Patient does have capacity to make medical decisions on my exam today  If such capacity is lost, patient's substitute decision maker would default to her [address] [place] [address] [place]  Advance Directive / Living Will / POLST:  Patient states she has completed advanced directives and will provide copies  We appreciate the opportunity to participate in this patient's care  We will continue to follow  Please do not hesitate to contact our on-call provider through our clinic answering service at [phone] should you have acute symptom control concerns  INTERVAL HISTORY:    Patient tolerated today's biopsy w/o significant adverse effects  She notes some discomfort d/t the biopsy but feels Tylenol and PRN oxycodone were effective (she had one dose of each post-procedure, at the time of [facility]'s visit today)  She is content to wait for the results of the test but is hopeful that there might not be any malignancy  Review of Systems   Constitutional: Positive for activity change and fatigue  Eyes: Negative for pain  Respiratory: Negative for shortness of breath  Gastrointestinal: Positive for constipation  Negative for abdominal pain, nausea and vomiting  Endocrine: Negative for polydipsia and polyphagia  Musculoskeletal: Positive for arthralgias and myalgias  Neurological: Negative for facial asymmetry and speech difficulty  Psychiatric/Behavioral: The patient is nervous/anxious          MEDICATIONS / ALLERGIES:  all current active meds have been reviewed and current meds:   Current Facility-Administered Medications   Medication Dose Route Frequency    acetaminophen (TYLENOL) tablet 975 mg  975 mg Oral Q8H [address]    albuterol (PROVENTIL HFA,VENTOLIN HFA) inhaler 2 puff  2 puff Inhalation Q6H PRN    bisacodyl (DULCOLAX) EC tablet 5 mg  5 mg Oral Daily PRN    Diclofenac Sodium (VOLTAREN) 1 % topical gel 2 g  2 g Topical 4x Daily    digoxin (LANOXIN) tablet 125 mcg  125 mcg Oral Daily    ferrous sulfate tablet 325 mg  325 mg Oral Daily With Breakfast    fluticasone-vilanterol (BREO ELLIPTA) 100-25 mcg/inh inhaler 1 puff  1 puff Inhalation Daily    heparin (ACS LOW)   Intravenous Once    insulin lispro (HumaLOG) 100 units/mL subcutaneous injection 1-5 Units  1-5 Units Subcutaneous TID AC    insulin lispro (HumaLOG) 100 units/mL subcutaneous injection 1-5 Units  1-5 Units Subcutaneous HS    ipratropium-albuterol (DUO-NEB) 0 5-2 5 mg/3 mL inhalation solution 3 mL  3 mL Nebulization 4x Daily PRN    lidocaine (LIDODERM) 5 % patch 1 patch  1 patch Topical Daily    LORazepam (ATIVAN) tablet 0 25 mg  0 25 mg Oral Q6H PRN    montelukast (SINGULAIR) tablet 10 mg  10 mg Oral Daily    oxyCODONE (ROXICODONE) IR tablet 2 5 mg  2 5 mg Oral Q4H PRN    pantoprazole (PROTONIX) EC tablet 40 mg  40 mg Oral Early Morning    polyethylene glycol (MIRALAX) packet 17 g  17 g Oral Daily    potassium chloride (K-DUR,KLOR-CON) CR tablet 20 mEq  20 mEq Oral BID    senna-docusate sodium (SENOKOT S) 8 6-50 mg per tablet 1 tablet  1 tablet Oral BID PRN    sodium chloride (OCEAN) 0 65 % nasal spray 1 spray  1 spray Each Nare Q1H PRN    torsemide (DEMADEX) tablet 40 mg  40 mg Oral BID    warfarin (COUMADIN) tablet 4 mg  4 mg Oral Daily (warfarin)       Allergies   Allergen Reactions    Augmentin [Amoxicillin-Pot Clavulanate] Anaphylaxis    Levaquin [Levofloxacin] Anaphylaxis       OBJECTIVE:  BP 96/53 (BP Location: Left arm)   Pulse 58   Temp 97 7 °F (36 5 °C) (Oral)   Resp 20   Ht 5' 3" (1 6 m)   Wt 103 kg (227 lb 8 2 oz)   SpO2 96%   BMI 40 30 kg/m²   Nursing notes and vital signs reviewed  Physical Exam:  Constitutional: Pale elderly female  Chronically ill-appearing  Appears well-nourished  In no acute physical or emotional distress  Head: Normocephalic and atraumatic  Eyes: EOM are normal  No ocular discharge  No scleral icterus  Neck: No visible adenopathy or masses  Respiratory: Effort normal  No stridor  No respiratory distress  Speaking comfortably in complete sentences  Gastrointestinal: No abdominal distension  Neurological: Alert, oriented and appropriately conversant  No focal deficits  Follows commands appropriately  Skin: No diaphoresis, no rashes seen on exposed areas of skin  Psychiatric: Displays a normal mood and affect   Behavior, judgment and thought content appear normal      Lab Results: I have personally reviewed pertinent labs  Imaging Studies: I have personally reviewed pertinent reports  EKG, Pathology, and Other Studies: I have personally reviewed pertinent reports  Counseling / Coordination of Care: Total floor / unit time spent today 20 minutes  Greater than 50% of total time was spent with the patient and / or family counseling and / or coordination of care  A description of the counseling / coordination of care: symptom assessment and management, medication review, psychosocial support, chart review, imaging review and lab review  Zaid Cordon MD  [facility] Palliative and Supportive Care      Portions of this document may have been created using dictation software and as such some "sound alike" terms may have been generated by the system  Do not hesitate to contact me with any questions or clarifications

## 2021-06-28 NOTE — PLAN OF CARE
Problem: PAIN - ADULT  Goal: Verbalizes/displays adequate comfort level or baseline comfort level  Description: Interventions:  - Encourage patient to monitor pain and request assistance  - Assess pain using appropriate pain scale  - Administer analgesics based on type and severity of pain and evaluate response  - Implement non-pharmacological measures as appropriate and evaluate response  - Consider cultural and social influences on pain and pain management  - Notify physician/advanced practitioner if interventions unsuccessful or patient reports new pain  Outcome: Progressing     Problem: INFECTION - ADULT  Goal: Absence or prevention of progression during hospitalization  Description: INTERVENTIONS:  - Assess and monitor for signs and symptoms of infection  - Monitor lab/diagnostic results  - Monitor all insertion sites, i e  indwelling lines, tubes, and drains  - Monitor endotracheal if appropriate and nasal secretions for changes in amount and color  - [place] appropriate cooling/warming therapies per order  - Administer medications as ordered  - Instruct and encourage patient and family to use good hand hygiene technique  - Identify and instruct in appropriate isolation precautions for identified infection/condition  Outcome: Progressing     Problem: SAFETY ADULT  Goal: Patient will remain free of falls  Description: INTERVENTIONS:  - Educate patient/family on patient safety including physical limitations  - Instruct patient to call for assistance with activity   - Consult OT/PT to assist with strengthening/mobility   - Keep Call bell within reach  - Keep bed low and locked with side rails adjusted as appropriate  - Keep care items and personal belongings within reach  - Initiate and maintain comfort rounds  - Make Fall Risk Sign visible to staff  - Offer Toileting every Hours, in advance of need  - Initiate/Maintain alarm  - Obtain necessary fall risk management equipment:   - Apply yellow socks and bracelet for high fall risk patients  - Consider moving patient to room near nurses station  Outcome: Progressing  Goal: Maintain or return to baseline ADL function  Description: INTERVENTIONS:  -  Assess patient's ability to carry out ADLs; assess patient's baseline for ADL function and identify physical deficits which impact ability to perform ADLs (bathing, care of mouth/teeth, toileting, grooming, dressing, etc )  - Assess/evaluate cause of self-care deficits   - Assess range of motion  - Assess patient's mobility; develop plan if impaired  - Assess patient's need for assistive devices and provide as appropriate  - Encourage maximum independence but intervene and supervise when necessary  - Involve family in performance of ADLs  - Assess for home care needs following discharge   - Consider OT consult to assist with ADL evaluation and planning for discharge  - Provide patient education as appropriate  Outcome: Progressing  Goal: Maintains/Returns to pre admission functional level  Description: INTERVENTIONS:  - Perform BMAT or MOVE assessment daily    - Set and communicate daily mobility goal to care team and patient/family/caregiver  - Collaborate with rehabilitation services on mobility goals if consulted  - Perform Range of Motion times a day  - Reposition patient every hours    - Dangle patient times a day  - Stand patient times a day  - Ambulate patient times a day  - Out of bed to chair times a day   - Out of bed for meals  times a day  - Out of bed for toileting  - Record patient progress and toleration of activity level   Outcome: Progressing     Problem: DISCHARGE PLANNING  Goal: Discharge to home or other facility with appropriate resources  Description: INTERVENTIONS:  - Identify barriers to discharge w/patient and caregiver  - Arrange for needed discharge resources and transportation as appropriate  - Identify discharge learning needs (meds, wound care, etc )  - Arrange for interpretive services to assist at discharge as needed  - Refer to Case Management Department for coordinating discharge planning if the patient needs post-hospital services based on physician/advanced practitioner order or complex needs related to functional status, cognitive ability, or social support system  Outcome: Progressing     Problem: Knowledge Deficit  Goal: Patient/family/caregiver demonstrates understanding of disease process, treatment plan, medications, and discharge instructions  Description: Complete learning assessment and assess knowledge base    Interventions:  - Provide teaching at level of understanding  - Provide teaching via preferred learning methods  Outcome: Progressing     Problem: RESPIRATORY - ADULT  Goal: Achieves optimal ventilation and oxygenation  Description: INTERVENTIONS:  - Assess for changes in respiratory status  - Assess for changes in mentation and behavior  - Position to facilitate oxygenation and minimize respiratory effort  - Oxygen administered by appropriate delivery if ordered  - Initiate smoking cessation education as indicated  - Encourage broncho-pulmonary hygiene including cough, deep breathe, Incentive Spirometry  - Assess the need for suctioning and aspirate as needed  - Assess and instruct to report SOB or any respiratory difficulty  - Respiratory Therapy support as indicated  Outcome: Progressing     Problem: METABOLIC, FLUID AND ELECTROLYTES - ADULT  Goal: Fluid balance maintained  Description: INTERVENTIONS:  - Monitor labs   - Monitor I/O and WT  - Instruct patient on fluid and nutrition as appropriate  - Assess for signs & symptoms of volume excess or deficit  Outcome: Progressing  Goal: Glucose maintained within target range  Description: INTERVENTIONS:  - Monitor Blood Glucose as ordered  - Assess for signs and symptoms of hyperglycemia and hypoglycemia  - Administer ordered medications to maintain glucose within target range  - Assess nutritional intake and initiate nutrition service referral as needed  Outcome: Progressing     Problem: SKIN/TISSUE INTEGRITY - ADULT  Goal: Skin Integrity remains intact(Skin Breakdown Prevention)  Description: Assess:  -Perform Jevon assessment every   -Clean and moisturize skin every   -Inspect skin when repositioning, toileting, and assisting with ADLS  -Assess under medical devices such as   -Assess extremities for adequate circulation and sensation     Bed Management:  -Have minimal linens on bed & keep smooth, unwrinkled  -Change linens as needed when moist or perspiring  -Avoid sitting or lying in one position for more than  hours while in bed  -Keep HOB at    Toileting:  -Offer bedside commode  -Assess for incontinence every   -Use incontinent care products after each incontinent episode such as     Activity:  -Mobilize patient  times a day  -Encourage activity and walks on unit  -Encourage or provide ROM exercises   -Turn and reposition patient every  Hours  -Use appropriate equipment to lift or move patient in bed  -Instruct/ Assist with weight shifting every  when out of bed in chair  -Consider limitation of chair time  hour intervals    Skin Care:  -Avoid use of baby powder, tape, friction and shearing, hot water or constrictive clothing  -Relieve pressure over bony prominences using   -Do not massage red bony areas    Next Steps:  -Teach patient strategies to minimize risks such as   -Consider consults to  interdisciplinary teams such as   Outcome: Progressing  Goal: Incision(s), wounds(s) or drain site(s) healing without S/S of infection  Description: INTERVENTIONS  - Assess and document dressing, incision, wound bed, drain sites and surrounding tissue  - Provide patient and family education  - Perform skin care/dressing changes every   Outcome: Progressing  Goal: Pressure injury heals and does not worsen  Description: Interventions:  - Implement low air loss mattress or specialty surface (Criteria met)  - Apply silicone foam dressing  - Instruct/assist with weight shifting every  minutes when in chair   - Limit chair time to hour intervals  - Use special pressure reducing interventions such as when in chair   - Apply fecal or urinary incontinence containment device   - Perform passive or active ROM every   - Turn and reposition patient & offload bony prominences every hours   - Utilize friction reducing device or surface for transfers   - Consider consults to  interdisciplinary teams   - Use incontinent care products after each incontinent episode such as   - Consider nutrition services referral as needed  Outcome: Progressing     Problem: MOBILITY - ADULT  Goal: Maintain or return to baseline ADL function  Description: INTERVENTIONS:  -  Assess patient's ability to carry out ADLs; assess patient's baseline for ADL function and identify physical deficits which impact ability to perform ADLs (bathing, care of mouth/teeth, toileting, grooming, dressing, etc )  - Assess/evaluate cause of self-care deficits   - Assess range of motion  - Assess patient's mobility; develop plan if impaired  - Assess patient's need for assistive devices and provide as appropriate  - Encourage maximum independence but intervene and supervise when necessary  - Involve family in performance of ADLs  - Assess for home care needs following discharge   - Consider OT consult to assist with ADL evaluation and planning for discharge  - Provide patient education as appropriate  Outcome: Progressing  Goal: Maintains/Returns to pre admission functional level  Description: INTERVENTIONS:  - Perform BMAT or MOVE assessment daily    - Set and communicate daily mobility goal to care team and patient/family/caregiver  - Collaborate with rehabilitation services on mobility goals if consulted  - Perform Range of Motion  times a day  - Reposition patient every hours    - Dangle patient  times a day  - Stand patient  times a day  - Ambulate patient  times a day  - Out of bed to chair times a day   - Out of bed for meals times a day  - Out of bed for toileting  - Record patient progress and toleration of activity level   Outcome: Progressing     Problem: Nutrition/Hydration-ADULT  Goal: Nutrient/Hydration intake appropriate for improving, restoring or maintaining nutritional needs  Description: Monitor and assess patient's nutrition/hydration status for malnutrition  Collaborate with interdisciplinary team and initiate plan and interventions as ordered  Monitor patient's weight and dietary intake as ordered or per policy  Utilize nutrition screening tool and intervene as necessary  Determine patient's food preferences and provide high-protein, high-caloric foods as appropriate       INTERVENTIONS:  - Monitor oral intake, urinary output, labs, and treatment plans  - Assess nutrition and hydration status and recommend course of action  - Evaluate amount of meals eaten  - Assist patient with eating if necessary   - Allow adequate time for meals  - Recommend/ encourage appropriate diets, oral nutritional supplements, and vitamin/mineral supplements  - Order, calculate, and assess calorie counts as needed  - Recommend, monitor, and adjust tube feedings and TPN/PPN based on assessed needs  - Assess need for intravenous fluids  - Provide specific nutrition/hydration education as appropriate  - Include patient/family/caregiver in decisions related to nutrition  Outcome: Progressing     Problem: Prexisting or High Potential for Compromised Skin Integrity  Goal: Skin integrity is maintained or improved  Description: INTERVENTIONS:  - Identify patients at risk for skin breakdown  - Assess and monitor skin integrity  - Assess and monitor nutrition and hydration status  - Monitor labs   - Assess for incontinence   - Turn and reposition patient  - Assist with mobility/ambulation  - Relieve pressure over bony prominences  - Avoid friction and shearing  - Provide appropriate hygiene as needed including keeping skin clean and dry  - Evaluate need for skin moisturizer/barrier cream  - Collaborate with interdisciplinary team   - Patient/family teaching  - Consider wound care consult   Outcome: Progressing     Problem: Potential for Falls  Goal: Patient will remain free of falls  Description: INTERVENTIONS:  - Educate patient/family on patient safety including physical limitations  - Instruct patient to call for assistance with activity   - Consult OT/PT to assist with strengthening/mobility   - Keep Call bell within reach  - Keep bed low and locked with side rails adjusted as appropriate  - Keep care items and personal belongings within reach  - Initiate and maintain comfort rounds  - Make Fall Risk Sign visible to staff  - Offer Toileting every  Hours,   - Initiate/Maintain alarm  - Obtain necessary fall risk management equipment:   - Apply yellow socks and bracelet for high fall risk patients  - Consider moving patient to room near nurses station  Outcome: Progressing

## 2021-06-28 NOTE — SOCIAL WORK
Palliative LSW saw patient at the bedside today  LSW appreciates the opportunity to provider patient/family with inpatient emotional support and guidance while patient continues to receive medical attention from the medical team     Topics discussed: Pt s/p biopsy  Pt reports having pain after biopsy completion  Pt denies any nausea at this time  Pt reports not having BM for the past several days  Pt denies any concerns with eating       Areas that need follow-up: None  Resources given: None  Others present: Dr Teresita Banerjee, LSW    LSW will continue to follow as requested by the medical team, patient, or family

## 2021-06-28 NOTE — ASSESSMENT & PLAN NOTE
Lab Results   Component Value Date    HGBA1C 4 5 03/15/2021       Recent Labs     06/27/21  2108 06/27/21  2218 06/28/21  0747 06/28/21  1112   POCGLU 106 131 98 107       Blood Sugar Average: Last 72 hrs:  (P) 964 [id number]   · Hold oral antihyperglycemics  · Start sliding scale insulin  · accuchecks     Blood sugars are acceptable

## 2021-06-28 NOTE — ASSESSMENT & PLAN NOTE
Does not appear to be in acute exacerbation at this time  Continue home inhalers  This could be contributing to patient's shortness of breath  Chronic hypoxemic respiratory failure  Patient is at baseline oxygen requirement  Lung mass  Will likely require biopsy with Interventional Radiology     Patient with chronic hypoxemic respiratory failure as well  Patient is okay from the pulmonology standpoint for the procedure however INR is slightly elevated at 1 59  It should be below 1 5 ideally  Patient was able to get the biopsy today on 06/28  Will restart Coumadin at 4 mg tonight  Continue monitor INR  Patient needs to be bridged  Patient was on heparin through the weekend but did have couple of episodes of epistaxis  I will keep the patient on heparin 24 hours and no further episodes can consider bridging with Lovenox back at the skilled nursing facility since the biopsy report will take some time and the patient should be okay for discharge if stable tomorrow

## 2021-06-28 NOTE — ASSESSMENT & PLAN NOTE
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

## 2021-06-28 NOTE — DISCHARGE INSTRUCTIONS
Needle Biopsy of the Lung    WHAT YOU NEED TO KNOW:  A needle biopsy of the lung is a procedure to remove cells or tissue from your lung  You may have a fine needle aspiration biopsy (FNAB), or a core needle biopsy (CNB)  A FNAB is used to remove cells through a thin needle  CNB uses a thicker needle to remove lung tissue  The samples are collected and tested for inflammation, infection, or cancer  DISCHARGE INSTRUCTIONS:   Resume your normal diet  Small sips of flat soda will help with nausea  Limit your activity for 24 hours  Wound Care:      - Remove band aid in 24 hours      Contact Interventional Radiology at [phone] Sara PATIENTS: Contact Interventional Radiology at [phone]) Emelina Lamas PATIENTS: Contact Interventional Radiology at [phone]) if any of the following occur:    - You have a fever greater than 101*    - You cough up large amounts of bright red blood     - You have chest pain with breathing    - You have shortness of breath    -You have persistent nausea and vomiting    - You have pus, redness or swelling around your biopsy site    - You have questions or concerns about your condition or care

## 2021-06-28 NOTE — PLAN OF CARE
Problem: PAIN - ADULT  Goal: Verbalizes/displays adequate comfort level or baseline comfort level  Description: Interventions:  - Encourage patient to monitor pain and request assistance  - Assess pain using appropriate pain scale  - Administer analgesics based on type and severity of pain and evaluate response  - Implement non-pharmacological measures as appropriate and evaluate response  - Consider cultural and social influences on pain and pain management  - Notify physician/advanced practitioner if interventions unsuccessful or patient reports new pain  Outcome: Progressing     Problem: INFECTION - ADULT  Goal: Absence or prevention of progression during hospitalization  Description: INTERVENTIONS:  - Assess and monitor for signs and symptoms of infection  - Monitor lab/diagnostic results  - Monitor all insertion sites, i e  indwelling lines, tubes, and drains  - Monitor endotracheal if appropriate and nasal secretions for changes in amount and color  - [place] appropriate cooling/warming therapies per order  - Administer medications as ordered  - Instruct and encourage patient and family to use good hand hygiene technique  - Identify and instruct in appropriate isolation precautions for identified infection/condition  Outcome: Progressing  Goal: Absence of fever/infection during neutropenic period  Description: INTERVENTIONS:  - Monitor WBC    Outcome: Progressing     Problem: SAFETY ADULT  Goal: Patient will remain free of falls  Description: INTERVENTIONS:  - Educate patient/family on patient safety including physical limitations  - Instruct patient to call for assistance with activity   - Consult OT/PT to assist with strengthening/mobility   - Keep Call bell within reach  - Keep bed low and locked with side rails adjusted as appropriate  - Keep care items and personal belongings within reach  - Initiate and maintain comfort rounds  - Make Fall Risk Sign visible to staff  - Apply yellow socks and bracelet for high fall risk patients  - Consider moving patient to room near nurses station  Outcome: Progressing  Goal: Maintain or return to baseline ADL function  Description: INTERVENTIONS:  -  Assess patient's ability to carry out ADLs; assess patient's baseline for ADL function and identify physical deficits which impact ability to perform ADLs (bathing, care of mouth/teeth, toileting, grooming, dressing, etc )  - Assess/evaluate cause of self-care deficits   - Assess range of motion  - Assess patient's mobility; develop plan if impaired  - Assess patient's need for assistive devices and provide as appropriate  - Encourage maximum independence but intervene and supervise when necessary  - Involve family in performance of ADLs  - Assess for home care needs following discharge   - Consider OT consult to assist with ADL evaluation and planning for discharge  - Provide patient education as appropriate  Outcome: Progressing  Goal: Maintains/Returns to pre admission functional level  Description: INTERVENTIONS:  - Perform BMAT or MOVE assessment daily    - Set and communicate daily mobility goal to care team and patient/family/caregiver     - Collaborate with rehabilitation services on mobility goals if consulted  - Record patient progress and toleration of activity level   Outcome: Progressing     Problem: DISCHARGE PLANNING  Goal: Discharge to home or other facility with appropriate resources  Description: INTERVENTIONS:  - Identify barriers to discharge w/patient and caregiver  - Arrange for needed discharge resources and transportation as appropriate  - Identify discharge learning needs (meds, wound care, etc )  - Arrange for interpretive services to assist at discharge as needed  - Refer to Case Management Department for coordinating discharge planning if the patient needs post-hospital services based on physician/advanced practitioner order or complex needs related to functional status, cognitive ability, or social support system  Outcome: Progressing     Problem: Knowledge Deficit  Goal: Patient/family/caregiver demonstrates understanding of disease process, treatment plan, medications, and discharge instructions  Description: Complete learning assessment and assess knowledge base    Interventions:  - Provide teaching at level of understanding  - Provide teaching via preferred learning methods  Outcome: Progressing     Problem: RESPIRATORY - ADULT  Goal: Achieves optimal ventilation and oxygenation  Description: INTERVENTIONS:  - Assess for changes in respiratory status  - Assess for changes in mentation and behavior  - Position to facilitate oxygenation and minimize respiratory effort  - Oxygen administered by appropriate delivery if ordered  - Initiate smoking cessation education as indicated  - Encourage broncho-pulmonary hygiene including cough, deep breathe, Incentive Spirometry  - Assess the need for suctioning and aspirate as needed  - Assess and instruct to report SOB or any respiratory difficulty  - Respiratory Therapy support as indicated  Outcome: Progressing     Problem: METABOLIC, FLUID AND ELECTROLYTES - ADULT  Goal: Fluid balance maintained  Description: INTERVENTIONS:  - Monitor labs   - Monitor I/O and WT  - Instruct patient on fluid and nutrition as appropriate  - Assess for signs & symptoms of volume excess or deficit  Outcome: Progressing  Goal: Glucose maintained within target range  Description: INTERVENTIONS:  - Monitor Blood Glucose as ordered  - Assess for signs and symptoms of hyperglycemia and hypoglycemia  - Administer ordered medications to maintain glucose within target range  - Assess nutritional intake and initiate nutrition service referral as needed  Outcome: Progressing     Problem: SKIN/TISSUE INTEGRITY - ADULT  Goal: Skin Integrity remains intact(Skin Breakdown Prevention)  Description: Assess:  -Perform Jevon assessment  -Clean and moisturize skin every 2  -Inspect skin when repositioning, toileting, and assisting with ADLS  -Assess extremities for adequate circulation and sensation     Bed Management:  -Have minimal linens on bed & keep smooth, unwrinkled  -Change linens as needed when moist or perspiring  -Avoid sitting or lying in one position for more than 2 hours while in bed  -Keep HOB at 45 degrees     Toileting:  -Offer bedside commode  -Assess for incontinence every 2 hrs  -Use incontinent care products after each incontinent episode    Skin Care:  -Avoid use of baby powder, tape, friction and shearing, hot water or constrictive clothing  -Relieve pressure over bony prominences  -Do not massage red bony areas    Outcome: Progressing  Goal: Incision(s), wounds(s) or drain site(s) healing without S/S of infection  Description: INTERVENTIONS  - Assess and document dressing, incision, wound bed, drain sites and surrounding tissue  - Provide patient and family education  Outcome: Progressing  Goal: Pressure injury heals and does not worsen  Description: Interventions:  - Implement low air loss mattress or specialty surface (Criteria met)  - Apply silicone foam dressing  - Use special pressure reducing interventions such as allevyn when in chair   - Apply fecal or urinary incontinence containment device   - Turn and reposition patient & offload bony prominences every 2 hours   - Utilize friction reducing device or surface for transfers   - Consider consults to  interdisciplinary teams  - Use incontinent care products after each incontinent episode   - Consider nutrition services referral as needed  Outcome: Progressing     Problem: MOBILITY - ADULT  Goal: Maintain or return to baseline ADL function  Description: INTERVENTIONS:  -  Assess patient's ability to carry out ADLs; assess patient's baseline for ADL function and identify physical deficits which impact ability to perform ADLs (bathing, care of mouth/teeth, toileting, grooming, dressing, etc )  - Assess/evaluate cause of self-care deficits   - Assess range of motion  - Assess patient's mobility; develop plan if impaired  - Assess patient's need for assistive devices and provide as appropriate  - Encourage maximum independence but intervene and supervise when necessary  - Involve family in performance of ADLs  - Assess for home care needs following discharge   - Consider OT consult to assist with ADL evaluation and planning for discharge  - Provide patient education as appropriate  Outcome: Progressing  Goal: Maintains/Returns to pre admission functional level  Description: INTERVENTIONS:  - Perform BMAT or MOVE assessment daily    - Set and communicate daily mobility goal to care team and patient/family/caregiver  - Collaborate with rehabilitation services on mobility goals if consulted  - Reposition patient every 2 hours  - Record patient progress and toleration of activity level   Outcome: Progressing     Problem: Nutrition/Hydration-ADULT  Goal: Nutrient/Hydration intake appropriate for improving, restoring or maintaining nutritional needs  Description: Monitor and assess patient's nutrition/hydration status for malnutrition  Collaborate with interdisciplinary team and initiate plan and interventions as ordered  Monitor patient's weight and dietary intake as ordered or per policy  Utilize nutrition screening tool and intervene as necessary  Determine patient's food preferences and provide high-protein, high-caloric foods as appropriate       INTERVENTIONS:  - Monitor oral intake, urinary output, labs, and treatment plans  - Assess nutrition and hydration status and recommend course of action  - Evaluate amount of meals eaten  - Assist patient with eating if necessary   - Allow adequate time for meals  - Recommend/ encourage appropriate diets, oral nutritional supplements, and vitamin/mineral supplements  - Order, calculate, and assess calorie counts as needed  - Recommend, monitor, and adjust tube feedings and TPN/PPN based on assessed needs  - Assess need for intravenous fluids  - Provide specific nutrition/hydration education as appropriate  - Include patient/family/caregiver in decisions related to nutrition  Outcome: Progressing     Problem: Prexisting or High Potential for Compromised Skin Integrity  Goal: Skin integrity is maintained or improved  Description: INTERVENTIONS:  - Identify patients at risk for skin breakdown  - Assess and monitor skin integrity  - Assess and monitor nutrition and hydration status  - Monitor labs   - Assess for incontinence   - Turn and reposition patient  - Assist with mobility/ambulation  - Relieve pressure over bony prominences  - Avoid friction and shearing  - Provide appropriate hygiene as needed including keeping skin clean and dry  - Evaluate need for skin moisturizer/barrier cream  - Collaborate with interdisciplinary team   - Patient/family teaching  - Consider wound care consult   Outcome: Progressing     Problem: Potential for Falls  Goal: Patient will remain free of falls  Description: INTERVENTIONS:  - Educate patient/family on patient safety including physical limitations  - Instruct patient to call for assistance with activity   - Consult OT/PT to assist with strengthening/mobility   - Keep Call bell within reach  - Keep bed low and locked with side rails adjusted as appropriate  - Keep care items and personal belongings within reach  - Initiate and maintain comfort rounds  - Make Fall Risk Sign visible to staff  - Offer Toileting every 2 Hours, in advance of need  - Initiate/Maintain bed alarm  - Apply yellow socks and bracelet for high fall risk patients  - Consider moving patient to room near nurses station  Outcome: Progressing

## 2021-06-28 NOTE — BRIEF OP NOTE (RAD/CATH)
INTERVENTIONAL RADIOLOGY PROCEDURE NOTE    Date: 6/28/2021    Procedure: Procedure name not found  Preoperative diagnosis:   1  Shortness of breath    2  Dyspnea, unspecified type    3  Mass of middle lobe of right lung    4  Pleural effusion on right         Postoperative diagnosis: Same  Surgeon: Celia Shea MD     Assistant: None  No qualified resident was available  Blood loss: None    Specimens: Yes (4-5 small 18G cores submitted in formalin)     Findings: Technically successful 18G core bx (6 passes) with several white tissue cores  Some atypical cells on on-site preps  Await fixed sections  No immediate complications  Complications: None immediate      Anesthesia: local and IV Fentanyl

## 2021-06-28 NOTE — INTERVAL H&P NOTE
Update: (This section must be completed if the H&P was completed greater than 24 hrs to procedure or admission)    H&P reviewed  After examining the patient, I find no changed to the H&P since it had been written  Patient re-evaluated   Accept as history and physical     Matthew Day MD/June 28, 2021/10:11 AM

## 2021-06-28 NOTE — SEDATION DOCUMENTATION
Procedure ended  Lung biopsy completed, bandaid to site  Patient transferred to inpatient room via bed   Report given to primary RN

## 2021-06-28 NOTE — ASSESSMENT & PLAN NOTE
Anticoagulated on warfarin  With history of appendage thrombus  INR is 2 9  Goal 2 5-3 5  If below 2 5 needs gtt  Monitor PT INR daily  Continue digoxin    Restart heparin  Patient is status post biopsy  Coumadin to be restarted today    Check an INR daily

## 2021-06-28 NOTE — PROGRESS NOTES
Progress Note - Cardiology   Soledad Nurse 70 y o  female MRN: [id number]  Unit/Bed#: S -01 Encounter: [id number]    Assessment:    75F with MV replacement with ? 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  She has chronic afib, AAMIR thrombus present, was on Coumadin goal INR 1 2-7 2    Diastolic CHF  Prior admissions to hospital and then rehab, had [facility]lou recently as well  Prior outpatient CT surgery eval was delayed since she was in rehab  Now here with diastolic CHF, but volume status overall improved  Also found to have lung mass which is now s/p biopsy on 6/28/21  Plan:    Afib is rate controlled  Resume heparin infusion ASAP as she does have h/o AAMIR thrombus present  On digoxin for rate control  Depending on need for other procedures, etc, resume warfarin  DCHF: in the setting of valve disease, afib  Volume status improved, On oral torsemide now  Monitor BMP periodically  MV bioprosthetic stenosis, ? Dehiscence - not good candidate at this time for a redo sternotomy  Was previously planned to see Dr Almas Treviño at [facility]  Would plan for the same as outpatient  Severe TR/pulmonary HTN: Diuretics as noted above  Subjective/Objective     Subjective:  Patient denies specific complaints  Went for IR biopsy of lung mass today  Changed to oral diuretics over the weekend  Heparin drip was held in the setting of biopsy, but is to be restarted      Objective:    Vitals: /62   Pulse 72   Temp 98 4 °F (36 9 °C) (Oral)   Resp 20   Ht 5' 3" (1 6 m)   Wt 103 kg (227 lb 8 2 oz)   SpO2 99%   BMI 40 30 kg/m²   Vitals:    06/27/21 0600 06/28/21 0600   Weight: 104 kg (229 lb 11 5 oz) 103 kg (227 lb 8 2 oz)     Orthostatic Blood Pressures      Most Recent Value   Blood Pressure  101/62 filed at 06/28/2021 1000   Patient Position - Orthostatic VS  Lying filed at 06/28/2021 0700            Intake/Output Summary (Last 24 hours) at 6/28/2021 1046  Last data filed at 6/28/2021 0830  Gross per 24 hour   Intake 500 ml   Output 2477 ml   Net -1977 ml     Physical Exam:   General appearance: Chronically ill appearing female  Head: Normocephalic, without obvious abnormality, atraumatic  Neck: no carotid bruit, no JVD and supple, symmetrical, trachea midline  Lungs: decreased sounds  Heart: S1, S2 irregular  + systolic and diastolic murmurs heard  Abdomen: Obese, soft, non-tender; bowel sounds normal; no masses,  no organomegaly  Extremities: no pitting edema  Skin: Skin color, texture, turgor normal  No rashes or lesions  Neurologic: Grossly normal  Alert and oriented      Medications:    Current Facility-Administered Medications:     acetaminophen (TYLENOL) tablet 975 mg, 975 mg, Oral, Q8H [address], Trish P Bloch, CRNP, 975 mg at 06/23/21 0535    albuterol (PROVENTIL HFA,VENTOLIN HFA) inhaler 2 puff, 2 puff, Inhalation, Q6H PRN, Manolo Navarrete PA-C    Diclofenac Sodium (VOLTAREN) 1 % topical gel 2 g, 2 g, Topical, 4x Daily, Desiree DAVID Bliss, 2 g at 06/23/21 1241    digoxin (LANOXIN) tablet 125 mcg, 125 mcg, Oral, Daily, Manolo Navarrete PA-C, 125 mcg at 06/27/21 3925    ferrous sulfate tablet 325 mg, 325 mg, Oral, Daily With Breakfast, Manolo Navarrete PA-C, 325 mg at 06/27/21 0838    fluticasone-vilanterol (BREO ELLIPTA) 100-25 mcg/inh inhaler 1 puff, 1 puff, Inhalation, Daily, Shabana Tavarez PA-C, 1 puff at 06/27/21 0841    heparin (porcine) injection 2,000 Units, 2,000 Units, Intravenous, Q1H PRN, Dilan Edwards MD, 2,000 Units at 06/27/21 0839    heparin (porcine) injection 4,000 Units, 4,000 Units, Intravenous, Q1H PRN, Dilan Edwards MD, 4,000 Units at 06/27/21 2107    insulin lispro (HumaLOG) 100 units/mL subcutaneous injection 1-5 Units, 1-5 Units, Subcutaneous, TID AC, 1 Units at 06/19/21 1832 **AND** Fingerstick Glucose (POCT), , , TID SHAJI, Manolo Navarrete PA-C    insulin lispro (HumaLOG) 100 units/mL subcutaneous injection 1-5 Units, 1-5 Units, Subcutaneous, HS, Manolo Navarrete PA-C, 1 Units at 06/21/21 2102    ipratropium-albuterol (DUO-NEB) 0 5-2 5 mg/3 mL inhalation solution 3 mL, 3 mL, Nebulization, 4x Daily PRN, Josselyn Block PA-C    lidocaine (LIDODERM) 5 % patch 1 patch, 1 patch, Topical, Daily, Esdras De Guzman PA-C, 1 patch at 06/23/21 1009    LORazepam (ATIVAN) tablet 0 25 mg, 0 25 mg, Oral, Q6H PRN, DAVID Kim, 0 25 mg at 06/28/21 8606    montelukast (SINGULAIR) tablet 10 mg, 10 mg, Oral, Daily, Manolo Navarrete PA-C, 10 mg at 06/27/21 5130    oxyCODONE (ROXICODONE) IR tablet 2 5 mg, 2 5 mg, Oral, Q4H PRN, DAVID Kim    pantoprazole (PROTONIX) EC tablet 40 mg, 40 mg, Oral, Early Morning, Manolo Navarrete PA-C, 40 mg at 06/27/21 2136    potassium chloride (K-DUR,KLOR-CON) CR tablet 20 mEq, 20 mEq, Oral, BID, Manolo Navarrete PA-C, 20 mEq at 06/27/21 1710    senna-docusate sodium (SENOKOT S) 8 6-50 mg per tablet 1 tablet, 1 tablet, Oral, Daily PRN, Patience Fink PA-C, 1 tablet at 06/26/21 1817    sodium chloride (OCEAN) 0 65 % nasal spray 1 spray, 1 spray, Each Nare, Q1H PRN, DAVID Tavarez, 1 spray at 06/19/21 2149    torsemide (DEMADEX) tablet 40 mg, 40 mg, Oral, BID, Js Greenwood PA-C, 40 mg at 06/27/21 2107    Lab Results:      Results from last 7 days   Lab Units 06/25/21  0521 06/23/21  0550 06/22/21  0519   WBC Thousand/uL 8 20 7 91 7 82   HEMOGLOBIN g/dL 8 1* 8 6* 8 1*   HEMATOCRIT % 27 6* 28 8* 28 4*   PLATELETS Thousands/uL 414* 409* 419*         Results from last 7 days   Lab Units 06/27/21  0522 06/25/21  0521 06/23/21  0550 06/22/21  0519   SODIUM mmol/L 139 135* 140 139   POTASSIUM mmol/L 3 5 4 4 4 3 4 4   CHLORIDE mmol/L 99* 100 100 99*   CO2 mmol/L 34* 29 33* 33*   BUN mg/dL 40* 44* 43* 37*   CREATININE mg/dL 1 15 1 15 1 30 1 31*   CALCIUM mg/dL 9 4 8 9 9 0 9 5   ALK PHOS U/L  --   --   --  62   ALT U/L  --   --   --  15   AST U/L  --   --   --  17     Results from last 7 days   Lab Units 06/28/21  0332 06/27/21 2029 06/27/21  0522 06/25/21  0521   INR  1 22*  --  1 26* 1 59*   PTT seconds 144* 37 57* 52*           Telemetry: not on tele    Echo:  LEFT VENTRICLE:  The ventricle was moderately dilated  Systolic function was normal  Ejection fraction was estimated to be 60 %  There were no regional wall motion abnormalities      VENTRICULAR SEPTUM:  There was dyssynergic motion  These changes are consistent with RV volume and pressure overload      LEFT ATRIUM:  The atrium was massively dilated      MITRAL VALVE:  A 27 mm (Gordon Bovine) pericardial bioprosthesis was present  There was possible sewing ring dehiscence, with mitral inflow being directed towards the LVOT  Transmitral velocity and gradient were increased due to stenosis, as well as concomitant increased transaortic flow  There was moderate stenosis  There was trace regurgitation      AORTIC VALVE:  Transaortic velocity was increased due to valvular stenosis  There was mild stenosis  There was mild regurgitation      TRICUSPID VALVE:  There was moderate to severe regurgitation  The findings suggest severe pulmonary hypertension      IVC, HEPATIC VEINS:  The inferior vena cava was markedly dilated  Respirophasic changes were blunted (less than 50% variation)

## 2021-06-28 NOTE — PROGRESS NOTES
Progress Note - Pulmonary   Charly Filler 70 y o  female MRN: [id number]  Unit/Bed#: S -01 Encounter: [id number]    Assessment & Plan:  · Abnormal CT chest with 3 cm right upper lobe lung mass  · Chronic hypoxic respiratory failure  · Acute on chronic diastolic CHF with bioprosthetic mitral replacement  · Permanent AFib with left atrial appendage thrombus and Coumadin coagulopathy  · Former smoker  · Suspected underlying COPD    Patient is seen this morning following CT-guided biopsy by IR for 3 cm right upper lobe lung mass  She is stable on her baseline oxygen requirement of 4 L by nasal cannula  Follow-up pathology results  Reviewed chest CT showing right-sided pleural effusion, if biopsy is conclusive for malignancy may benefit from thoracentesis to assist with staging  Continue anticoagulation per Cardiology/primary team  Patient taking Breo 1 puff daily and albuterol p r n  While admitted  Resume Symbicort when discharged  Discussed with SLIM    Subjective:   Patient says that her breathing is fine  She does not have any chest pain or significant cough  She is just complaining of pain from the biopsy  12 point review of systems otherwise negative  Objective:     Vitals: Blood pressure 101/62, pulse 72, temperature 98 4 °F (36 9 °C), temperature source Oral, resp  rate 20, height 5' 3" (1 6 m), weight 103 kg (227 lb 8 2 oz), SpO2 99 %  ,Body mass index is 40 3 kg/m²  Intake/Output Summary (Last 24 hours) at 6/28/2021 1028  Last data filed at 6/28/2021 0830  Gross per 24 hour   Intake 500 ml   Output 2477 ml   Net -1977 ml       Invasive Devices     Peripheral Intravenous Line            Peripheral IV 06/25/21 Right;Ventral (anterior) Forearm 3 days                Physical Exam:   General appearance: Alert and oriented, in no acute distress  Head: Normocephalic, without obvious abnormality, atraumatic  Eyes: EOMI  No discharge bilaterally  No scleral icterus     Neck: Supple, symmetrical, trachea midline  Lungs:Decreased breath sounds  Heart: Regular rate and rhythm, S1, S2 normal, no murmur  Abdomen:  No appreciable distension or tenderness  Extremities: No edema or tenderness  Skin: Warm and dry  Neurologic: No acute focal deficits are noted     Labs: I have personally reviewed pertinent lab results  Imaging and other studies: I have personally reviewed pertinent reports     and I have personally reviewed pertinent films in PACS

## 2021-06-28 NOTE — PROGRESS NOTES
[facility]  Progress Note - Itzel Oliveira 1945, 70 y o  female MRN: [id number]  Unit/Bed#: S -01 Encounter: [id number]  Primary Care Provider: Joann Desai MD   Date and time admitted to hospital: 6/17/2021  5:52 PM    CKD (chronic kidney disease)  Assessment & Plan  Lab Results   Component Value Date    EGFR 47 06/27/2021    EGFR 47 06/25/2021    EGFR 41 06/23/2021    CREATININE 1 15 06/27/2021    CREATININE 1 15 06/25/2021    CREATININE 1 30 06/23/2021   CKD stage 2-3 in the setting of HTN and T2DM requiring frequent bmp checks  Creatinine is stable   Continue to monitor  Continue to monitor intermittently  Patient is now on p o  Diuretics    Morbid obesity (HCC)  Assessment & Plan  Diet lifestyle modifications  Counseled on weight loss  Noted pulmonary hypertension on prior echo  This is likely contributing to patient's symptomatology    Type 2 diabetes mellitus [facility])  Assessment & Plan  Lab Results   Component Value Date    HGBA1C 4 5 03/15/2021       Recent Labs     06/27/21  2108 06/27/21  2218 06/28/21  0747 06/28/21  1112   POCGLU 106 131 98 107       Blood Sugar Average: Last 72 hrs:  (P) 103 [id number]   · Hold oral antihyperglycemics  · Start sliding scale insulin  · accuchecks   Blood sugars are acceptable    Chronic atrial fibrillation (HCC)  Assessment & Plan  Anticoagulated on warfarin  With history of appendage thrombus  INR is 2 9  Goal 2 5-3 5  If below 2 5 needs gtt  Monitor PT INR daily  Continue digoxin    Restart heparin  Patient is status post biopsy  Coumadin to be restarted today  Check an INR daily        COPD (chronic obstructive pulmonary disease) (HCC)  Assessment & Plan  Does not appear to be in acute exacerbation at this time  Continue home inhalers  This could be contributing to patient's shortness of breath  Chronic hypoxemic respiratory failure  Patient is at baseline oxygen requirement  Lung mass    Will likely require biopsy with Interventional Radiology     Patient with chronic hypoxemic respiratory failure as well  Patient is okay from the pulmonology standpoint for the procedure however INR is slightly elevated at 1 59  It should be below 1 5 ideally  Patient was able to get the biopsy today on 06/28  Will restart Coumadin at 4 mg tonight  Continue monitor INR  Patient needs to be bridged  Patient was on heparin through the weekend but did have couple of episodes of epistaxis  I will keep the patient on heparin 24 hours and no further episodes can consider bridging with Lovenox back at the skilled nursing facility since the biopsy report will take some time and the patient should be okay for discharge if stable tomorrow  * Shortness of breath  Assessment & Plan  Presents from nursing facility with subjective worsening of shortness of breath  CT scans finding significant for potential mass verses volume overload versus infection  Chronically wears 4L   Echo in 2020 - EF 50-55%  Diastolic dysfunction with inability to calculate filling pressures due to bioprosthetic MVR  Severe tricuspid regurgitation  At least moderate pulmonary hypertension assuming an RA pressure of 10mmHg  Radiology evaluation appreciated diuretics were decreased to couple days ago  Patient states from the shortness of breath standpoint she is stable     Patient is now on p o  Diuretics per Cardiology recommendations  Doing stable from that standpoint  VTE Pharmacologic Prophylaxis:   Pharmacologic: Heparin Drip  Mechanical VTE Prophylaxis in Place: Yes    Patient Centered Rounds: I have performed bedside rounds with nursing staff today  Discussions with Specialists or Other Care Team Provider:  Discussed with pulmonology earlier    Education and Discussions with Family / Patient:  Offered to call the patient politely declined    Time Spent for Care: 20 minutes    More than 50% of total time spent on counseling and coordination of care as described above  Current Length of Stay: 10 day(s)    Current Patient Status: Inpatient   Certification Statement: The patient will continue to require additional inpatient hospital stay due to Needing to be bridged with heparin status post biopsy    Discharge Plan:  Hopeful discharge in the next couple of days depending on INR and whether we can bridge the patient with Lovenox or not    Code Status: Level 1 - Full Code      Subjective:   Patient seen examined  This came up from the biopsy  She is doing well  She has no complaints except that she has little tired    Objective:     Vitals:   Temp (24hrs), Av 4 °F (36 9 °C), Min:98 °F (36 7 °C), Max:98 9 °F (37 2 °C)    Temp:  [98 °F (36 7 °C)-98 9 °F (37 2 °C)] 98 4 °F (36 9 °C)  HR:  [54-80] 65  Resp:  [18-20] 20  BP: ()/(50-68) 96/57  SpO2:  [95 %-100 %] 99 %  Body mass index is 40 3 kg/m²  Input and Output Summary (last 24 hours):        Intake/Output Summary (Last 24 hours) at 2021 1422  Last data filed at 2021 0830  Gross per 24 hour   Intake 500 ml   Output 2477 ml   Net -1977 ml       Physical Exam:     Physical Exam  General Appearance:    Alert, cooperative, no distress, appears stated age                               Lungs:     Clear to auscultation bilaterally, respirations unlabored       Heart:    Regular rate and rhythm, S1 and S2 normal, no murmur, rub    or gallop   Abdomen:     Soft, non-tender, bowel sounds active all four quadrants,     no masses, no organomegaly           Extremities:   Extremities normal, atraumatic, no cyanosis or edema                       Additional Data:     Labs:    Results from last 7 days   Lab Units 21  0521 21  0519   WBC Thousand/uL 8 20 7 82   HEMOGLOBIN g/dL 8 1* 8 1*   HEMATOCRIT % 27 6* 28 4*   PLATELETS Thousands/uL 414* 419*   NEUTROS PCT %  --  72   LYMPHS PCT %  --  14   MONOS PCT %  --  7   EOS PCT %  --  5     Results from last 7 days   Lab Units 06/27/21  0522 06/22/21  0519   SODIUM mmol/L 139 139   POTASSIUM mmol/L 3 5 4 4   CHLORIDE mmol/L 99* 99*   CO2 mmol/L 34* 33*   BUN mg/dL 40* 37*   CREATININE mg/dL 1 15 1 31*   ANION GAP mmol/L 6 7   CALCIUM mg/dL 9 4 9 5   ALBUMIN g/dL  --  2 7*   TOTAL BILIRUBIN mg/dL  --  0 43   ALK PHOS U/L  --  62   ALT U/L  --  15   AST U/L  --  17   GLUCOSE RANDOM mg/dL 97 92     Results from last 7 days   Lab Units 06/28/21  0332   INR  1 22*     Results from last 7 days   Lab Units 06/28/21  1112 06/28/21  0747 06/27/21  2218 06/27/21  2108 06/27/21  1621 06/27/21  1025 06/27/21  0636 06/26/21  2051 06/26/21  1530 06/26/21  1036 06/26/21  0618 06/25/21 2052   POC GLUCOSE mg/dl 107 98 131 106 97 105 99 128 142* 114 97 127                   * I Have Reviewed All Lab Data Listed Above  * Additional Pertinent Lab Tests Reviewed:  [facility]        Recent Cultures (last 7 days):           Last 24 Hours Medication List:   Current Facility-Administered Medications   Medication Dose Route Frequency Provider Last Rate    acetaminophen  975 mg Oral Q8H [facility] DAVID Jones      albuterol  2 puff Inhalation Q6H PRN Ute Zaldivar PA-C      Diclofenac Sodium  2 g Topical 4x Daily Negrito MaskerDAVID      digoxin  125 mcg Oral Daily Manolo Navarrete PA-C      ferrous sulfate  325 mg Oral Daily With Breakfast Manolo Navarrete PA-C      fluticasone-vilanterol  1 puff Inhalation Daily LOPEZ Jo      heparin (porcine)  3-20 Units/kg/hr (Order-Specific) Intravenous Titrated Billie Cordova MD      heparin (porcine)  2,000 Units Intravenous Q1H PRN Debbie Holland MD      heparin (porcine)  4,000 Units Intravenous Q1H PRN Debbie Holland MD      insulin lispro  1-5 Units Subcutaneous TID AC Manolo Navarrete PA-C      insulin lispro  1-5 Units Subcutaneous HS Manolo Navarrete PA-C      ipratropium-albuterol  3 mL Nebulization 4x Daily PRN LOPEZ Jo      lidocaine  1 patch Topical Daily Wallace Mock PA-C      LORazepam  0 25 mg Oral Q6H PRN DAVID Garcia      montelukast  10 mg Oral Daily Mckenna Alexandra      oxyCODONE  2 5 mg Oral Q4H PRN DAVID Garcia      pantoprazole  40 mg Oral Early Morning Manolo Navarrete PA-C      potassium chloride  20 mEq Oral BID Kaitlynn Carrero PA-C      senna-docusate sodium  1 tablet Oral Daily PRN Manolo Navarrete PA-C      sodium chloride  1 spray Each Nare Q1H PRN DAVID Tavarez      torsemide  40 mg Oral BID Js Greenwood PA-C      warfarin  4 mg Oral Daily (warfarin) Araceli Hanks MD          Today, Patient Was Seen By: Araceli Hanks MD    ** Please Note: Dictation voice to text software may have been used in the creation of this document   **

## 2021-06-29 LAB
ANION GAP SERPL CALCULATED.3IONS-SCNC: 6 MMOL/L (ref 4–13)
APTT PPP: 43 SECONDS (ref 23–37)
APTT PPP: 51 SECONDS (ref 23–37)
APTT PPP: 58 SECONDS (ref 23–37)
APTT PPP: 63 SECONDS (ref 23–37)
BUN SERPL-MCNC: 40 MG/DL (ref 5–25)
CALCIUM SERPL-MCNC: 9.4 MG/DL (ref 8.3–10.1)
CHLORIDE SERPL-SCNC: 101 MMOL/L (ref 100–108)
CO2 SERPL-SCNC: 34 MMOL/L (ref 21–32)
CREAT SERPL-MCNC: 1.28 MG/DL (ref 0.6–1.3)
ERYTHROCYTE [DISTWIDTH] IN BLOOD BY AUTOMATED COUNT: 18.7 % (ref 11.6–15.1)
GFR SERPL CREATININE-BSD FRML MDRD: 42 ML/MIN/1.73SQ M
GLUCOSE SERPL-MCNC: 101 MG/DL (ref 65–140)
GLUCOSE SERPL-MCNC: 106 MG/DL (ref 65–140)
GLUCOSE SERPL-MCNC: 131 MG/DL (ref 65–140)
GLUCOSE SERPL-MCNC: 96 MG/DL (ref 65–140)
GLUCOSE SERPL-MCNC: 99 MG/DL (ref 65–140)
HCT VFR BLD AUTO: 27.7 % (ref 34.8–46.1)
HGB BLD-MCNC: 7.9 G/DL (ref 11.5–15.4)
INR PPP: 1.2 (ref 0.84–1.19)
MCH RBC QN AUTO: 26 PG (ref 26.8–34.3)
MCHC RBC AUTO-ENTMCNC: 28.5 G/DL (ref 31.4–37.4)
MCV RBC AUTO: 91 FL (ref 82–98)
PLATELET # BLD AUTO: 336 THOUSANDS/UL (ref 149–390)
PMV BLD AUTO: 10.2 FL (ref 8.9–12.7)
POTASSIUM SERPL-SCNC: 3.6 MMOL/L (ref 3.5–5.3)
PROTHROMBIN TIME: 15.3 SECONDS (ref 11.6–14.5)
RBC # BLD AUTO: 3.04 MILLION/UL (ref 3.81–5.12)
SODIUM SERPL-SCNC: 141 MMOL/L (ref 136–145)
WBC # BLD AUTO: 6.87 THOUSAND/UL (ref 4.31–10.16)

## 2021-06-29 PROCEDURE — 85730 THROMBOPLASTIN TIME PARTIAL: CPT | Performed by: FAMILY MEDICINE

## 2021-06-29 PROCEDURE — 82948 REAGENT STRIP/BLOOD GLUCOSE: CPT

## 2021-06-29 PROCEDURE — 99232 SBSQ HOSP IP/OBS MODERATE 35: CPT | Performed by: INTERNAL MEDICINE

## 2021-06-29 PROCEDURE — 85027 COMPLETE CBC AUTOMATED: CPT | Performed by: FAMILY MEDICINE

## 2021-06-29 PROCEDURE — 85730 THROMBOPLASTIN TIME PARTIAL: CPT | Performed by: INTERNAL MEDICINE

## 2021-06-29 PROCEDURE — 80048 BASIC METABOLIC PNL TOTAL CA: CPT | Performed by: FAMILY MEDICINE

## 2021-06-29 PROCEDURE — 85610 PROTHROMBIN TIME: CPT | Performed by: FAMILY MEDICINE

## 2021-06-29 RX ADMIN — DIGOXIN 125 MCG: 125 TABLET ORAL at 09:07

## 2021-06-29 RX ADMIN — HEPARIN SODIUM 2000 UNITS: 1000 INJECTION INTRAVENOUS; SUBCUTANEOUS at 23:25

## 2021-06-29 RX ADMIN — HEPARIN SODIUM 2000 UNITS: 1000 INJECTION INTRAVENOUS; SUBCUTANEOUS at 09:27

## 2021-06-29 RX ADMIN — FERROUS SULFATE TAB 325 MG (65 MG ELEMENTAL FE) 325 MG: 325 (65 FE) TAB at 09:16

## 2021-06-29 RX ADMIN — DOCUSATE SODIUM AND SENNOSIDES 1 TABLET: 8.6; 5 TABLET, FILM COATED ORAL at 22:25

## 2021-06-29 RX ADMIN — FLUTICASONE FUROATE AND VILANTEROL TRIFENATATE 1 PUFF: 100; 25 POWDER RESPIRATORY (INHALATION) at 09:08

## 2021-06-29 RX ADMIN — POTASSIUM CHLORIDE 20 MEQ: 1500 TABLET, EXTENDED RELEASE ORAL at 19:01

## 2021-06-29 RX ADMIN — TORSEMIDE 40 MG: 20 TABLET ORAL at 09:07

## 2021-06-29 RX ADMIN — TORSEMIDE 40 MG: 20 TABLET ORAL at 19:02

## 2021-06-29 RX ADMIN — BISACODYL 5 MG: 5 TABLET, COATED ORAL at 22:25

## 2021-06-29 RX ADMIN — MONTELUKAST 10 MG: 10 TABLET, FILM COATED ORAL at 09:07

## 2021-06-29 RX ADMIN — POTASSIUM CHLORIDE 20 MEQ: 1500 TABLET, EXTENDED RELEASE ORAL at 09:07

## 2021-06-29 RX ADMIN — ACETAMINOPHEN 975 MG: 325 TABLET, FILM COATED ORAL at 12:14

## 2021-06-29 RX ADMIN — WARFARIN SODIUM 4 MG: 4 TABLET ORAL at 19:02

## 2021-06-29 RX ADMIN — ACETAMINOPHEN 975 MG: 325 TABLET, FILM COATED ORAL at 19:01

## 2021-06-29 RX ADMIN — LIDOCAINE 5% 1 PATCH: 700 PATCH TOPICAL at 09:07

## 2021-06-29 RX ADMIN — PANTOPRAZOLE SODIUM 40 MG: 40 TABLET, DELAYED RELEASE ORAL at 05:02

## 2021-06-29 RX ADMIN — HEPARIN SODIUM 2000 UNITS: 1000 INJECTION INTRAVENOUS; SUBCUTANEOUS at 01:12

## 2021-06-29 NOTE — ASSESSMENT & PLAN NOTE
Anticoagulated on warfarin  With history of appendage thrombus  INR is 2 9  Goal 2 5-3 5  If below 2 5 needs gtt  Monitor PT INR daily  Continue digoxin    Restart heparin  Patient is status post biopsy  Coumadin to be restarted today    Check an INR daily  Subtherapeutic

## 2021-06-29 NOTE — ASSESSMENT & PLAN NOTE
Presents from nursing facility with subjective worsening of shortness of breath  CT scans finding significant for potential mass verses volume overload versus infection  Chronically wears 4L   Echo in 2020 - EF 50-55%  Diastolic dysfunction with inability to calculate filling pressures due to bioprosthetic MVR  Severe tricuspid regurgitation  At least moderate pulmonary hypertension assuming an RA pressure of 10mmHg  Radiology evaluation appreciated diuretics were decreased to couple days ago  Patient states from the shortness of breath standpoint she is stable     Patient is now on p o  Diuretics per Cardiology recommendations      At baseline

## 2021-06-29 NOTE — PROGRESS NOTES
[facility]  Progress Note - Tin Adame 1945, 70 y o  female MRN: [id number]  Unit/Bed#: S -01 Encounter: [id number]  Primary Care Provider: Valente Buchanan MD   Date and time admitted to hospital: 6/17/2021  5:52 PM    CKD (chronic kidney disease)  Assessment & Plan  Lab Results   Component Value Date    EGFR 42 06/29/2021    EGFR 47 06/27/2021    EGFR 47 06/25/2021    CREATININE 1 28 06/29/2021    CREATININE 1 15 06/27/2021    CREATININE 1 15 06/25/2021   CKD stage 2-3 in the setting of HTN and T2DM requiring frequent bmp checks  Creatinine is stable   Continue to monitor  Continue to monitor intermittently  Patient is now on p o  Diuretics    Type 2 diabetes mellitus [facility])  Assessment & Plan  Lab Results   Component Value Date    HGBA1C 4 5 03/15/2021       Recent Labs     06/28/21  2037 06/29/21  0817 06/29/21  1125 06/29/21  1652   POCGLU 105 96 101 106       Blood Sugar Average: Last 72 hrs:  (P) 557 6410   · Hold oral antihyperglycemics  · Start sliding scale insulin  · accuchecks   Blood sugars are acceptable    Chronic atrial fibrillation (HCC)  Assessment & Plan  Anticoagulated on warfarin  With history of appendage thrombus  INR is 2 9  Goal 2 5-3 5  If below 2 5 needs gtt  Monitor PT INR daily  Continue digoxin    Restart heparin  Patient is status post biopsy  Coumadin to be restarted today  Check an INR daily  Subtherapeutic         COPD (chronic obstructive pulmonary disease) ([facility] [address] )  Assessment & Plan  Does not appear to be in acute exacerbation at this time  Continue home inhalers  This could be contributing to patient's shortness of breath  Chronic hypoxemic respiratory failure  Patient is at baseline oxygen requirement  Lung mass  Will likely require biopsy with Interventional Radiology     Patient with chronic hypoxemic respiratory failure as well    Patient is okay from the pulmonology standpoint for the procedure however INR is slightly elevated at 1 59  It should be below 1 5 ideally  Patient was able to get the biopsy today on 06/28  Will restart Coumadin at 4 mg tonight  Continue monitor INR  Patient needs to be bridged  Patient was on heparin through the weekend but did have couple of episodes of epistaxis  I will keep the patient on heparin 24 hours and no further episodes can consider bridging with Lovenox back at the skilled nursing facility since the biopsy report will take some time and the patient should be okay for discharge if stable tomorrow  * Shortness of breath  Assessment & Plan  Presents from nursing facility with subjective worsening of shortness of breath  CT scans finding significant for potential mass verses volume overload versus infection  Chronically wears 4L   Echo in 2020 - EF 50-55%  Diastolic dysfunction with inability to calculate filling pressures due to bioprosthetic MVR  Severe tricuspid regurgitation  At least moderate pulmonary hypertension assuming an RA pressure of 10mmHg  Radiology evaluation appreciated diuretics were decreased to couple days ago  Patient states from the shortness of breath standpoint she is stable     Patient is now on p o  Diuretics per Cardiology recommendations  At baseline             VTE Pharmacologic Prophylaxis:   Pharmacologic: Heparin  Mechanical VTE Prophylaxis in Place: Yes    Patient Centered Rounds: I have performed bedside rounds with nursing staff today  Discussions with Specialists or Other Care Team Provider: discussed with CM  Education and Discussions with Family / Patient: Discussed with patient  Time Spent for Care: 30 minutes  More than 50% of total time spent on counseling and coordination of care as described above      Current Length of Stay: 11 day(s)    Current Patient Status: Inpatient   Certification Statement: The patient will continue to require additional inpatient hospital stay due to subtherapeutic INR     Discharge Plan: Not medically stable for DC  Code Status: Level 1 - Full Code      Subjective:   Patient seen and examined  Feeling "good"    Objective:     Vitals:   Temp (24hrs), Av 3 °F (36 8 °C), Min:97 9 °F (36 6 °C), Max:98 7 °F (37 1 °C)    Temp:  [97 9 °F (36 6 °C)-98 7 °F (37 1 °C)] 98 7 °F (37 1 °C)  HR:  [57-80] 57  Resp:  [18-20] 18  BP: ()/(59-62) 109/60  SpO2:  [96 %-97 %] 97 %  Body mass index is 40 03 kg/m²  Input and Output Summary (last 24 hours): Intake/Output Summary (Last 24 hours) at 2021 1708  Last data filed at 2021 1300  Gross per 24 hour   Intake 360 ml   Output 1415 ml   Net -1055 ml       Physical Exam:     Physical Exam  Constitutional:       General: She is not in acute distress  Appearance: She is ill-appearing  She is not toxic-appearing or diaphoretic  Comments: Pale      HENT:      Head: Normocephalic  Nose: No congestion or rhinorrhea  Mouth/Throat:      Pharynx: No oropharyngeal exudate  Eyes:      General: No scleral icterus  Right eye: No discharge  Left eye: No discharge  Pupils: Pupils are equal, round, and reactive to light  Cardiovascular:      Rate and Rhythm: Normal rate  Heart sounds: Murmur heard  No friction rub  No gallop  Pulmonary:      Effort: No respiratory distress  Breath sounds: No stridor  No wheezing, rhonchi or rales  Chest:      Chest wall: No tenderness  Abdominal:      General: Abdomen is flat  There is no distension  Palpations: There is no mass  Tenderness: There is no abdominal tenderness  There is no right CVA tenderness, left CVA tenderness or guarding  Hernia: No hernia is present  Musculoskeletal:         General: No swelling, deformity or signs of injury  Right lower leg: No edema  Left lower leg: No edema  Skin:     Capillary Refill: Capillary refill takes less than 2 seconds  Coloration: Skin is pale  Skin is not jaundiced  Findings: No bruising or lesion  Neurological:      General: No focal deficit present  Cranial Nerves: No cranial nerve deficit  Sensory: No sensory deficit  Motor: No weakness  Gait: Gait normal       Deep Tendon Reflexes: Reflexes normal    Psychiatric:         Mood and Affect: Mood normal          Additional Data:     Labs:    Results from last 7 days   Lab Units 06/29/21  0818   WBC Thousand/uL 6 87   HEMOGLOBIN g/dL 7 9*   HEMATOCRIT % 27 7*   PLATELETS Thousands/uL 336     Results from last 7 days   Lab Units 06/29/21  0818   SODIUM mmol/L 141   POTASSIUM mmol/L 3 6   CHLORIDE mmol/L 101   CO2 mmol/L 34*   BUN mg/dL 40*   CREATININE mg/dL 1 28   ANION GAP mmol/L 6   CALCIUM mg/dL 9 4   GLUCOSE RANDOM mg/dL 99     Results from last 7 days   Lab Units 06/29/21  0818   INR  1 20*     Results from last 7 days   Lab Units 06/29/21  1652 06/29/21  1125 06/29/21  0817 06/28/21  2037 06/28/21  1630 06/28/21  1112 06/28/21  0747 06/27/21  2218 06/27/21  2108 06/27/21  1621 06/27/21  1025 06/27/21  0636   POC GLUCOSE mg/dl 106 101 96 105 99 107 98 131 106 97 105 99                   * I Have Reviewed All Lab Data Listed Above  * Additional Pertinent Lab Tests Reviewed: [address] Admission Reviewed    Imaging:    Imaging Reports Reviewed Today Include:   CXR  -    "   No pneumothorax after right lung nodule biopsy  Edema superimposed on emphysema  "  Imaging Personally Reviewed by Myself Includes:   As above       Recent Cultures (last 7 days):           Last 24 Hours Medication List:   Current Facility-Administered Medications   Medication Dose Route Frequency Provider Last Rate    acetaminophen  975 mg Oral Q8H [address] DAVID Kyle      albuterol  2 puff Inhalation Q6H PRN Manolo Navarrete PA-C      bisacodyl  5 mg Oral Daily PRN Mamta Green MD      Diclofenac Sodium  2 g Topical 4x Daily OdinDAVID Leon      digoxin  125 mcg Oral Daily Manolo Navarrete LOPEZ      ferrous sulfate  325 mg Oral Daily With Breakfast Manolo Navarrete PA-C      fluticasone-vilanterol  1 puff Inhalation Daily LOPEZ Jo      heparin (porcine)  3-20 Units/kg/hr (Order-Specific) Intravenous Titrated So Seth MD 15 1 Units/kg/hr (06/29/21 0928)    heparin (porcine)  2,000 Units Intravenous Q1H PRN So Seth MD      heparin (porcine)  4,000 Units Intravenous Q1H PRN So Seth MD      insulin lispro  1-5 Units Subcutaneous TID AC Manolo Navarrete PA-C      insulin lispro  1-5 Units Subcutaneous HS Manolo Navarrete PA-C      ipratropium-albuterol  3 mL Nebulization 4x Daily PRN LOPEZ Jo      lidocaine  1 patch Topical Daily Esdrasalli Dickens PA-C      LORazepam  0 25 mg Oral Q6H PRN Carletha Emperor, CRNP      montelukast  10 mg Oral Daily Jerry Smith PA-C      oxyCODONE  2 5 mg Oral Q4H PRN Carletha Emperor, CRNP      pantoprazole  40 mg Oral Early Morning Manolo Navarrete PA-C      polyethylene glycol  17 g Oral Daily Satnam Cyr MD      potassium chloride  20 mEq Oral BID Jerry Smith PA-C      senna-docusate sodium  1 tablet Oral BID PRN Satnam Cyr MD      sodium chloride  1 spray Each Nare Q1H PRN [facility], CRNP      torsemide  40 mg Oral BID Js Greenwood PA-C      warfarin  4 mg Oral Daily (warfarin) So Seth MD          Today, Patient Was Seen By: Javier Evans MD    ** Please Note: Dictation voice to text software may have been used in the creation of this document   **

## 2021-06-29 NOTE — PROGRESS NOTES
Progress Note - Cardiology   Amado Arana 70 y o  female MRN: [id number]  Unit/Bed#: S -01 Encounter: [id number]    Assessment:    75F with MV replacement with ? 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  She has chronic afib, AAMIR thrombus present, was on Coumadin goal INR 0 2-4 9    Diastolic CHF  Prior admissions to hospital and then rehab, had Marylou recently as well  Prior outpatient CT surgery eval was delayed since she was in rehab  Now here with diastolic CHF, but volume status overall improved  Also found to have lung mass which is now s/p biopsy on 6/28/21  Plan:    Afib is rate controlled  Needs heparin bridge to therapeutic INR (2 5-2 5)  DCHF: in the setting of valve disease, afib  Volume status improved, On oral torsemide now  Monitor BMP periodically  MV bioprosthetic stenosis, ? Dehiscence - not good candidate at this time for a redo sternotomy  Was previously planned to see Dr Soco Conteh at [facility]  Would plan for the same as outpatient  Severe TR/pulmonary HTN: Diuretics as noted above  Will sign off, can have her follow up with her usual cardiologist as outpatient  Please call with questions or changes  Subjective/Objective     Subjective:  Denies complaints  INR not therapeutic  On heparin drip      Objective:    Vitals: /60 (BP Location: Left arm)   Pulse 57   Temp 98 7 °F (37 1 °C) (Oral)   Resp 18   Ht 5' 3" (1 6 m)   Wt 102 kg (225 lb 15 5 oz)   SpO2 97%   BMI 40 03 kg/m²   Vitals:    06/28/21 0600 06/29/21 0533   Weight: 103 kg (227 lb 8 2 oz) 102 kg (225 lb 15 5 oz)     Orthostatic Blood Pressures      Most Recent Value   Blood Pressure  109/60 filed at 06/29/2021 1516   Patient Position - Orthostatic VS  Lying filed at 06/29/2021 1516            Intake/Output Summary (Last 24 hours) at 6/29/2021 1626  Last data filed at 6/29/2021 1300  Gross per 24 hour   Intake 360 ml   Output 1415 ml   Net -1055 joey     Physical Exam:  GEN: Oseas Hammond is in NAD, laying in bed  HEENT: pupils equal, round, and reactive to light; extraocular muscles intact  NECK: supple, no carotid bruits   HEART: irregularly irregular  + systolic and diastolic murmurs    LUNGS: decreased  ABDOMEN: normal bowel sounds, soft, no tenderness, no distention  EXTREMITIES: peripheral pulses normal; no clubbing, cyanosis, or edema  NEURO: no focal findings   SKIN: normal without suspicious lesions on exposed skin    Medications:    Current Facility-Administered Medications:     acetaminophen (TYLENOL) tablet 975 mg, 975 mg, Oral, Q8H [address], Jennifer P Bloch, ORALIANP, 975 mg at 06/29/21 1214    albuterol (PROVENTIL HFA,VENTOLIN HFA) inhaler 2 puff, 2 puff, Inhalation, Q6H PRN, Manolo Navarrete PA-C    bisacodyl (DULCOLAX) EC tablet 5 mg, 5 mg, Oral, Daily PRN, Cody Johnson MD    Diclofenac Sodium (VOLTAREN) 1 % topical gel 2 g, 2 g, Topical, 4x Daily, DAVID Bland, 2 g at 06/23/21 1241    digoxin (LANOXIN) tablet 125 mcg, 125 mcg, Oral, Daily, Manolo Navarrete PA-C, 125 mcg at 06/29/21 0907    ferrous sulfate tablet 325 mg, 325 mg, Oral, Daily With Breakfast, Manolo Navarrete PA-C, 325 mg at 06/29/21 0916    fluticasone-vilanterol (BREO ELLIPTA) 100-25 mcg/inh inhaler 1 puff, 1 puff, Inhalation, Daily, Sandoval Forrester PA-C, 1 puff at 06/29/21 0908    heparin (porcine) 25,000 units in 0 45% NaCl 250 mL infusion (premix), 3-20 Units/kg/hr (Order-Specific), Intravenous, Titrated, Leroy Hart MD, Last Rate: 13 6 mL/hr at 06/29/21 0928, 15 1 Units/kg/hr at 06/29/21 0928    heparin (porcine) injection 2,000 Units, 2,000 Units, Intravenous, Q1H PRN, Leroy Hart MD, 2,000 Units at 06/29/21 0927    heparin (porcine) injection 4,000 Units, 4,000 Units, Intravenous, Q1H PRN, Leroy Hart MD    insulin lispro (HumaLOG) 100 units/mL subcutaneous injection 1-5 Units, 1-5 Units, Subcutaneous, TID AC, 1 Units at 06/19/21 1832 **AND** Fingerstick Glucose (POCT), , , TID AC, Manolo Navarrete PA-C    insulin lispro (HumaLOG) 100 units/mL subcutaneous injection 1-5 Units, 1-5 Units, Subcutaneous, HS, Manolo Navarrete PA-C, 1 Units at 06/21/21 2102    ipratropium-albuterol (DUO-NEB) 0 5-2 5 mg/3 mL inhalation solution 3 mL, 3 mL, Nebulization, 4x Daily PRN, Katey Juarez PA-C    lidocaine (LIDODERM) 5 % patch 1 patch, 1 patch, Topical, Daily, Esdras De Guzman PA-C, 1 patch at 06/29/21 0907    LORazepam (ATIVAN) tablet 0 25 mg, 0 25 mg, Oral, Q6H PRN, DAVID Martinez, 0 25 mg at 06/28/21 0821    montelukast (SINGULAIR) tablet 10 mg, 10 mg, Oral, Daily, Manolo Navarrete PA-C, 10 mg at 06/29/21 8828    oxyCODONE (ROXICODONE) IR tablet 2 5 mg, 2 5 mg, Oral, Q4H PRN, DAVID Martinez, 2 5 mg at 06/28/21 1225    pantoprazole (PROTONIX) EC tablet 40 mg, 40 mg, Oral, Early Morning, Manolo Navarrete PA-C, 40 mg at 06/29/21 0502    polyethylene glycol (MIRALAX) packet 17 g, 17 g, Oral, Daily, Leigh Halsted, MD    potassium chloride (K-DUR,KLOR-CON) CR tablet 20 mEq, 20 mEq, Oral, BID, Manolo Navarrete PA-C, 20 mEq at 06/29/21 0907    senna-docusate sodium (SENOKOT S) 8 6-50 mg per tablet 1 tablet, 1 tablet, Oral, BID PRN, Leigh Halsted, MD    sodium chloride (OCEAN) 0 65 % nasal spray 1 spray, 1 spray, Each Nare, Q1H PRN, DAVID Tavarez, 1 spray at 06/19/21 2149    torsemide (DEMADEX) tablet 40 mg, 40 mg, Oral, BID, Js Greenwood PA-C, 40 mg at 06/29/21 0907    warfarin (COUMADIN) tablet 4 mg, 4 mg, Oral, Daily (warfarin), Nicola Wiseman MD, 4 mg at 06/28/21 1751    Lab Results:      Results from last 7 days   Lab Units 06/29/21  0818 06/28/21  1629 06/25/21  0521   WBC Thousand/uL 6 87 7 68 8 20   HEMOGLOBIN g/dL 7 9* 8 3* 8 1*   HEMATOCRIT % 27 7* 28 1* 27 6*   PLATELETS Thousands/uL 336 373 414*         Results from last 7 days   Lab Units 06/29/21  0818 06/27/21  0522 06/25/21  0521   SODIUM mmol/L 141 139 135*   POTASSIUM mmol/L 3 6 3 5 4 4   CHLORIDE mmol/L 101 99* 100   CO2 mmol/L 34* 34* 29   BUN mg/dL 40* 40* 44*   CREATININE mg/dL 1 28 1 15 1 15   CALCIUM mg/dL 9 4 9 4 8 9     Results from last 7 days   Lab Units 06/29/21  0818 06/29/21  0006 06/28/21  1629 06/28/21  0332   INR  1 20*  --  1 20* 1 22*   PTT seconds 51* 43* 27 144*           Telemetry: not on tele    Echo:  LEFT VENTRICLE:  The ventricle was moderately dilated  Systolic function was normal  Ejection fraction was estimated to be 60 %  There were no regional wall motion abnormalities      VENTRICULAR SEPTUM:  There was dyssynergic motion  These changes are consistent with RV volume and pressure overload      LEFT ATRIUM:  The atrium was massively dilated      MITRAL VALVE:  A 27 mm (Gordon Bovine) pericardial bioprosthesis was present  There was possible sewing ring dehiscence, with mitral inflow being directed towards the LVOT  Transmitral velocity and gradient were increased due to stenosis, as well as concomitant increased transaortic flow  There was moderate stenosis  There was trace regurgitation      AORTIC VALVE:  Transaortic velocity was increased due to valvular stenosis  There was mild stenosis  There was mild regurgitation      TRICUSPID VALVE:  There was moderate to severe regurgitation  The findings suggest severe pulmonary hypertension      IVC, HEPATIC VEINS:  The inferior vena cava was markedly dilated  Respirophasic changes were blunted (less than 50% variation)

## 2021-06-29 NOTE — ASSESSMENT & PLAN NOTE
Lab Results   Component Value Date    HGBA1C 4 5 03/15/2021       Recent Labs     06/28/21 2037 06/29/21  0817 06/29/21  1125 06/29/21  1652   POCGLU 105 96 101 106       Blood Sugar Average: Last 72 hrs:  (P) 108 1875   · Hold oral antihyperglycemics  · Start sliding scale insulin  · accuchecks     Blood sugars are acceptable

## 2021-06-29 NOTE — CASE MANAGEMENT
Patient's INR not yet therapeutic; anticipate d/c back to [facility] where she is a bedhold  Spoke with Clarence Polanco at the [facility] re: anticipated d/c in next 24-48hrs; reports that she will be able to accept patient back whenever she's ready  Will need a COVID test within 72 hours of d/c  Met with patient at bedside to discuss return to the [facility] at discharge; patient confirmed same  Offer made to contact her friend/POA, but patient declined  IMM reviewed in anticipation of d/c back to SNF; patient signed and copy provided  Patient will need BLS transport at discharge      BRANDIN Ramirez  6/29/2021  2:26 PM

## 2021-06-29 NOTE — PROGRESS NOTES
Progress Note - Pulmonary   Jeffery Mattson 70 y o  female MRN: [id number]  Unit/Bed#: S -01 Encounter: [id number]    Assessment:  · Abnormal CT chest 3 cm right upper lobe lung mass  · Chronic respiratory failure with hypoxia  · Acute on chronic diastolic CHF with bioprosthetic mitral valve  · Permanent AFib with left atrial appendage thrombus  · Former smoker   · Suspected underlying COPD    Plan:  S/p CT-guided IR biopsy of 3 cm right upper lobe lung mass yesterday 6/28/21  Final report pending  Preliminary show few atypical cells, macrophages, bronchial cells  Earlier in admission R sided pleural effusion showed insignificant amount of fluid for thoracentesis  Follow-up final pathology results  Stable on her baseline 4 L  Continue anticoagulation per Cardiology/primary team  Patient taking Breo 1 puff daily and albuterol p r n  While admitted  Resume Symbicort when discharged  Palliative Care following    Subjective:   Patient says her breathing is at baseline  Still has some pain at biopsy site  Otherwise no new complaints or issues  12 point review of systems otherwise negative  Objective:     Vitals: Blood pressure 98/62, pulse 80, temperature 97 9 °F (36 6 °C), temperature source Oral, resp  rate 20, height 5' 3" (1 6 m), weight 102 kg (225 lb 15 5 oz), SpO2 96 %  ,Body mass index is 40 03 kg/m²  Intake/Output Summary (Last 24 hours) at 6/29/2021 0936  Last data filed at 6/29/2021 1800  Gross per 24 hour   Intake 360 ml   Output 516 ml   Net -156 ml       Invasive Devices     Peripheral Intravenous Line            Peripheral IV 06/25/21 Right;Ventral (anterior) Forearm 4 days                Physical Exam:   General appearance: Alert and oriented, in no acute distress  Head: Normocephalic, without obvious abnormality, atraumatic  Eyes: EOMI  No discharge bilaterally  No scleral icterus     Neck: Supple, symmetrical, trachea midline  Lungs: Decreased breath sounds  Heart: Regular rate  Abdomen:  No appreciable distension or tenderness  Extremities: No significant edema or tenderness  Skin: Warm and dry  Neurologic: No acute focal deficits are noted    Labs: I have personally reviewed pertinent lab results  Imaging and other studies: I have personally reviewed pertinent reports

## 2021-06-30 LAB
APTT PPP: 121 SECONDS (ref 23–37)
APTT PPP: 57 SECONDS (ref 23–37)
APTT PPP: 68 SECONDS (ref 23–37)
ERYTHROCYTE [DISTWIDTH] IN BLOOD BY AUTOMATED COUNT: 18.7 % (ref 11.6–15.1)
GLUCOSE SERPL-MCNC: 103 MG/DL (ref 65–140)
GLUCOSE SERPL-MCNC: 105 MG/DL (ref 65–140)
GLUCOSE SERPL-MCNC: 108 MG/DL (ref 65–140)
GLUCOSE SERPL-MCNC: 121 MG/DL (ref 65–140)
HCT VFR BLD AUTO: 27.6 % (ref 34.8–46.1)
HCT VFR BLD AUTO: 28.3 % (ref 34.8–46.1)
HCT VFR BLD AUTO: 28.7 % (ref 34.8–46.1)
HCT VFR BLD AUTO: 29 % (ref 34.8–46.1)
HGB BLD-MCNC: 8.2 G/DL (ref 11.5–15.4)
HGB BLD-MCNC: 8.3 G/DL (ref 11.5–15.4)
HGB BLD-MCNC: 8.4 G/DL (ref 11.5–15.4)
HGB BLD-MCNC: 8.5 G/DL (ref 11.5–15.4)
INR PPP: 1.21 (ref 0.84–1.19)
INR PPP: 1.23 (ref 0.84–1.19)
MCH RBC QN AUTO: 26.5 PG (ref 26.8–34.3)
MCHC RBC AUTO-ENTMCNC: 29.3 G/DL (ref 31.4–37.4)
MCV RBC AUTO: 90 FL (ref 82–98)
PLATELET # BLD AUTO: 365 THOUSANDS/UL (ref 149–390)
PMV BLD AUTO: 10.4 FL (ref 8.9–12.7)
PROTHROMBIN TIME: 15.4 SECONDS (ref 11.6–14.5)
PROTHROMBIN TIME: 15.6 SECONDS (ref 11.6–14.5)
RBC # BLD AUTO: 3.13 MILLION/UL (ref 3.81–5.12)
WBC # BLD AUTO: 8.3 THOUSAND/UL (ref 4.31–10.16)

## 2021-06-30 PROCEDURE — 85610 PROTHROMBIN TIME: CPT | Performed by: FAMILY MEDICINE

## 2021-06-30 PROCEDURE — 85027 COMPLETE CBC AUTOMATED: CPT | Performed by: INTERNAL MEDICINE

## 2021-06-30 PROCEDURE — 85730 THROMBOPLASTIN TIME PARTIAL: CPT | Performed by: INTERNAL MEDICINE

## 2021-06-30 PROCEDURE — 99232 SBSQ HOSP IP/OBS MODERATE 35: CPT | Performed by: INTERNAL MEDICINE

## 2021-06-30 PROCEDURE — 85610 PROTHROMBIN TIME: CPT | Performed by: INTERNAL MEDICINE

## 2021-06-30 PROCEDURE — 85730 THROMBOPLASTIN TIME PARTIAL: CPT | Performed by: NURSE PRACTITIONER

## 2021-06-30 PROCEDURE — 30903 CONTROL OF NOSEBLEED: CPT | Performed by: PHYSICIAN ASSISTANT

## 2021-06-30 PROCEDURE — 99232 SBSQ HOSP IP/OBS MODERATE 35: CPT | Performed by: NURSE PRACTITIONER

## 2021-06-30 PROCEDURE — 85014 HEMATOCRIT: CPT | Performed by: INTERNAL MEDICINE

## 2021-06-30 PROCEDURE — 99221 1ST HOSP IP/OBS SF/LOW 40: CPT | Performed by: PHYSICIAN ASSISTANT

## 2021-06-30 PROCEDURE — 85018 HEMOGLOBIN: CPT | Performed by: INTERNAL MEDICINE

## 2021-06-30 PROCEDURE — 82948 REAGENT STRIP/BLOOD GLUCOSE: CPT

## 2021-06-30 RX ORDER — HEPARIN SODIUM 10000 [USP'U]/100ML
3-20 INJECTION, SOLUTION INTRAVENOUS
Status: DISCONTINUED | OUTPATIENT
Start: 2021-06-30 | End: 2021-07-05 | Stop reason: HOSPADM

## 2021-06-30 RX ORDER — WARFARIN SODIUM 4 MG/1
8 TABLET ORAL
Status: DISCONTINUED | OUTPATIENT
Start: 2021-06-30 | End: 2021-07-01

## 2021-06-30 RX ADMIN — PANTOPRAZOLE SODIUM 40 MG: 40 TABLET, DELAYED RELEASE ORAL at 06:02

## 2021-06-30 RX ADMIN — MONTELUKAST 10 MG: 10 TABLET, FILM COATED ORAL at 08:27

## 2021-06-30 RX ADMIN — TORSEMIDE 40 MG: 20 TABLET ORAL at 08:28

## 2021-06-30 RX ADMIN — POTASSIUM CHLORIDE 20 MEQ: 1500 TABLET, EXTENDED RELEASE ORAL at 08:28

## 2021-06-30 RX ADMIN — LIDOCAINE 5% 1 PATCH: 700 PATCH TOPICAL at 08:29

## 2021-06-30 RX ADMIN — DIGOXIN 125 MCG: 125 TABLET ORAL at 08:28

## 2021-06-30 RX ADMIN — POTASSIUM CHLORIDE 20 MEQ: 1500 TABLET, EXTENDED RELEASE ORAL at 17:57

## 2021-06-30 RX ADMIN — WARFARIN SODIUM 8 MG: 4 TABLET ORAL at 17:56

## 2021-06-30 RX ADMIN — FERROUS SULFATE TAB 325 MG (65 MG ELEMENTAL FE) 325 MG: 325 (65 FE) TAB at 08:29

## 2021-06-30 RX ADMIN — HEPARIN SODIUM 14.1 UNITS/KG/HR: 10000 INJECTION, SOLUTION INTRAVENOUS at 08:33

## 2021-06-30 RX ADMIN — TORSEMIDE 40 MG: 20 TABLET ORAL at 21:44

## 2021-06-30 RX ADMIN — FLUTICASONE FUROATE AND VILANTEROL TRIFENATATE 1 PUFF: 100; 25 POWDER RESPIRATORY (INHALATION) at 08:28

## 2021-06-30 NOTE — PROGRESS NOTES
Progress Note - Palliative & Supportive Care  Ton Ovalle  70 y o   female  S /S -01   MRN: [id number]  Encounter: [id number]     Assessment  · Lung mass  · COPD  · Debility  · CKD  · DMII  · Obesity  · Chronic a-fib  · SOB  · Anxiety  · Pain  · Difficulty sleeping  · Palliative care encounter  · Goals of care discussion     Plan:  1  Symptom management  Pain   Patient denies pain today  · Acetaminophen 975 mg PO Q8H scheduled  · voltaren topical gel QID  · lidoderm patch  · oxycodone 2 5mg PO Q4H PRN for moderate to severe pain         Anxiety related to health  · Denies depression but does endorse anxiety  · Lorazepam 0 25mg Q6H PRN  · Refuses to see Palliative Medicine , has refused spiritual care consult and offers to assist with facetiming family      Constipation  · Senokot QD PRN      2  Goals - level 1 full code   · Disease focused care  Will continue discussions regarding [address] as patient's clinical presentation evolves  · Encouraged follow up with Palliative Medicine on an outpatient basis after discharge for continued symptom management  Our office will contact patient to schedule a hospital follow up  · Palliative Medicine will sign off today as goals are clear and symptoms are well managed  Please re consult as needed  24 History  Chart reviewed before visit  Ramila Davis is seen sitting up in bed today  Tolerated biopsy well  Reports good pain control with Tylenol  Anxiety is well-managed and at this time she is just hoping for a good biopsy results  She is anxious to get discharged and begin rehab to eventually move home  She does wish to remain disease focused with full care and no limits at this time  Review of Systems: Pertinent items are noted in HPI      Medications    Current Facility-Administered Medications:     acetaminophen (TYLENOL) tablet 975 mg, 975 mg, Oral, Q8H [address], Jennifer P Bloch, CRNP, 975 mg at 06/29/21 1901    albuterol (PROVENTIL HFA,VENTOLIN HFA) inhaler 2 puff, 2 puff, Inhalation, Q6H PRN, Manolo Navarrete PA-C    bisacodyl (DULCOLAX) EC tablet 5 mg, 5 mg, Oral, Daily PRN, Kelsy Sanchez MD, 5 mg at 06/29/21 2225    Diclofenac Sodium (VOLTAREN) 1 % topical gel 2 g, 2 g, Topical, 4x Daily, DAVID Bland, 2 g at 06/23/21 1241    digoxin (LANOXIN) tablet 125 mcg, 125 mcg, Oral, Daily, Manolo Navarrete PA-C, 125 mcg at 06/29/21 0907    ferrous sulfate tablet 325 mg, 325 mg, Oral, Daily With Breakfast, Manolo Navarrete PA-C, 325 mg at 06/29/21 0916    fluticasone-vilanterol (BREO ELLIPTA) 100-25 mcg/inh inhaler 1 puff, 1 puff, Inhalation, Daily, Cecilia Causey PA-C, 1 puff at 06/29/21 0908    heparin (porcine) 25,000 units in 0 45% NaCl 250 mL infusion (premix), 3-20 Units/kg/hr (Order-Specific), Intravenous, Titrated, Bailey Griggs MD, Held at 06/30/21 0730    heparin (porcine) injection 2,000 Units, 2,000 Units, Intravenous, Q1H PRN, Bailey Griggs MD, 2,000 Units at 06/29/21 2325    heparin (porcine) injection 4,000 Units, 4,000 Units, Intravenous, Q1H PRN, Bailey Griggs MD    insulin lispro (HumaLOG) 100 units/mL subcutaneous injection 1-5 Units, 1-5 Units, Subcutaneous, TID AC, 1 Units at 06/19/21 1832 **AND** Fingerstick Glucose (POCT), , , TID AC, Manolo Navarrete PA-C    insulin lispro (HumaLOG) 100 units/mL subcutaneous injection 1-5 Units, 1-5 Units, Subcutaneous, HS, Manolo Navarrete PA-C, 1 Units at 06/21/21 2102    ipratropium-albuterol (DUO-NEB) 0 5-2 5 mg/3 mL inhalation solution 3 mL, 3 mL, Nebulization, 4x Daily PRN, [facility] LOPEZ Causey    lidocaine (LIDODERM) 5 % patch 1 patch, 1 patch, Topical, Daily, Esdras De Guzman PA-C, 1 patch at 06/29/21 0907    LORazepam (ATIVAN) tablet 0 25 mg, 0 25 mg, Oral, Q6H PRN, Lori Iglesias, CRNP, 0 25 mg at 06/28/21 0821    montelukast (SINGULAIR) tablet 10 mg, 10 mg, Oral, Daily, Manolo Navarrete PA-C, 10 mg at 06/29/21 0907    oxyCODONE (ROXICODONE) IR tablet 2 5 mg, 2 5 mg, Oral, Q4H PRN, DAVID Byrd, 2 5 mg at 06/28/21 1225    pantoprazole (PROTONIX) EC tablet 40 mg, 40 mg, Oral, Early Morning, Manolo Navarrete PA-C, 40 mg at 06/30/21 0602    polyethylene glycol (MIRALAX) packet 17 g, 17 g, Oral, Daily, Craig Ferrell MD    potassium chloride (K-DUR,KLOR-CON) CR tablet 20 mEq, 20 mEq, Oral, BID, Manolo Navarrete PA-C, 20 mEq at 06/29/21 1901    senna-docusate sodium (SENOKOT S) 8 6-50 mg per tablet 1 tablet, 1 tablet, Oral, BID PRN, Craig Ferrell MD, 1 tablet at 06/29/21 2225    sodium chloride (OCEAN) 0 65 % nasal spray 1 spray, 1 spray, Each Nare, Q1H PRN, DAVID Tavarez, 1 spray at 06/19/21 2149    torsemide (DEMADEX) tablet 40 mg, 40 mg, Oral, BID, Js Greenwood PA-C, 40 mg at 06/29/21 1902    warfarin (COUMADIN) tablet 4 mg, 4 mg, Oral, Daily (warfarin), Magdalene Stout MD, 4 mg at 06/29/21 1902    Objective  BP 99/53 (BP Location: Left arm)   Pulse 67   Temp 97 8 °F (36 6 °C) (Oral)   Resp (!) 24   Ht 5' 3" (1 6 m)   Wt 102 kg (224 lb 13 9 oz)   SpO2 95%   BMI 39 83 kg/m²   Physical Exam:   Constitutional: Obese  In no acute distress  Head: Normocephalic and atraumatic  Eyes: EOM are normal  No ocular discharge  No scleral icterus  Neck: no visible adenopathy or masses  Respiratory: Effort normal  No stridor  No respiratory distress  Gastrointestinal: No abdominal distension  Musculoskeletal: No edema  Neurological: Alert, oriented and appropriately conversant  Skin: Dry, no diaphoresis  Psychiatric: Displays a normal mood and affect  Behavior, judgement and thought content appear normal      Lab Results:   I have personally reviewed pertinent labs  , CBC:   Lab Results   Component Value Date    WBC 6 87 06/29/2021    HGB 7 9 (L) 06/29/2021    HCT 27 7 (L) 06/29/2021    MCV 91 06/29/2021     06/29/2021    MCH 26 0 (L) 06/29/2021    MCHC 28 5 (L) 06/29/2021    RDW 18 7 (H) 06/29/2021    MPV 10 2 06/29/2021   , CMP:   Lab Results   Component Value Date    SODIUM 141 06/29/2021    K 3 6 06/29/2021     06/29/2021    CO2 34 (H) 06/29/2021    BUN 40 (H) 06/29/2021    CREATININE 1 28 06/29/2021    CALCIUM 9 4 06/29/2021    EGFR 42 06/29/2021       Counseling / Coordination of Care  Total floor / unit time spent today 25 minutes         Bishop Bradford, [facility]

## 2021-06-30 NOTE — PROGRESS NOTES
Progress Note - Pulmonary   Ashley Bucriaga 70 y o  female MRN: [id number]  Unit/Bed#: S -01 Encounter: [id number]    Assessment/Plan:    1  Acute pulmonary insufficiency on chronic hypoxic respiratory failure likely multifaceted as listed below        -   Patient has returned to her home O2 requirement of 4 L,  98%        -   Maintain saturations greater than 89%        -    Pulmonary toileting:  Deep breathing cough, OOB  As tolerated, IS Q 1 hr    2  RUL  Lung mass       -  6/29/2021- s/p  Lung biopsy       -   Pathology pending       -   Concerning for malignancy,  Will likely need close oncology follow-up       -   If  Biopsies positive will need PET scan       -   Would recommend goals of care discussion    3  Suspected COPD of unknown severity without acute exacerbation likely secondary to smoking history       -   Inpatient:  Breo daily,  Albuterol nebulizer p r n        -   Home regimen:  Symbicort 80-4 5 mcg 2 puffs b i d , Proventil 2 puffs q 6h p r n , singular daily       -   Would benefit from close pulmonary follow-up and PFTs    4  Acute on chronic diastolic CHF with Permanent AFib        -   Cardiology following        -   He currently on torsemide  20 mg b i d         -   Continue I&Os and daily weights        -   Home regimen 4 mg Coumadin daily,  Digoxin daily        -   Continue to bridge from heparin to Coumadin      -  Outpatient follow-up per discharge instructions  -   Pulmonary will sign off at this time    Chief Complaint:    " I am feeling pretty weak"    Subjective:    Nadya Deleon  Was comfortably sitting in her bed  She reports she overall feels fatigued and generalized  Weakness  No significant overnight events reported  Patient currently denies any fever, chills, hemoptysis, headaches, night sweats, pleuritic chest pain, or palpitations  Objective:    Vitals: Blood pressure 117/58, pulse 70, temperature 98 3 °F (36 8 °C), temperature source Oral, resp   rate 20, height 5' 3" (1 6 m), weight 102 kg (224 lb 13 9 oz), SpO2 98 %  ,Body mass index is 39 83 kg/m²  Intake/Output Summary (Last 24 hours) at 6/30/2021 1106  Last data filed at 6/30/2021 7630  Gross per 24 hour   Intake 480 ml   Output 1966 ml   Net -1486 ml       Invasive Devices     Peripheral Intravenous Line            Peripheral IV 06/25/21 Right;Ventral (anterior) Forearm 5 days                Physical Exam:   Physical Exam  Constitutional:       General: She is not in acute distress  Appearance: Normal appearance  She is obese  She is not ill-appearing  HENT:      Head: Normocephalic and atraumatic  Nose: Nose normal  No congestion or rhinorrhea  Mouth/Throat:      Mouth: Mucous membranes are dry  Pharynx: No oropharyngeal exudate or posterior oropharyngeal erythema  Cardiovascular:      Rate and Rhythm: Normal rate and regular rhythm  Pulses: Normal pulses  Heart sounds: Normal heart sounds  No murmur heard  No friction rub  No gallop  Pulmonary:      Effort: Pulmonary effort is normal  No respiratory distress  Breath sounds: No stridor  No wheezing, rhonchi or rales  Comments: Significantly distant breath sounds with lower lobe crackles  Chest:      Chest wall: No tenderness  Abdominal:      General: Bowel sounds are normal  There is no distension  Palpations: Abdomen is soft  There is no mass  Musculoskeletal:         General: No swelling or tenderness  Normal range of motion  Cervical back: Normal range of motion and neck supple  No rigidity or tenderness  Skin:     General: Skin is warm and dry  Coloration: Skin is not jaundiced or pale  Neurological:      General: No focal deficit present  Mental Status: She is alert and oriented to person, place, and time  Mental status is at baseline  Psychiatric:         Mood and Affect: Mood normal          Behavior: Behavior normal          Labs:    I have personally reviewed pertinent lab results CBC:   Lab Results   Component Value Date    HGB 8 4 (L) 06/30/2021    HCT 28 7 (L) 06/30/2021   , CMP: No results found for: SODIUM, K, CL, CO2, ANIONGAP, BUN, CREATININE, GLUCOSE, CALCIUM, AST, ALT, ALKPHOS, PROT, BILITOT, EGFR, PT/INR:   Lab Results   Component Value Date    INR 1 23 (H) 06/30/2021       Imaging and other studies: I have personally reviewed pertinent films in PACS      chest x-ray 6/28/2021-  No pneumothorax after right lung biopsy

## 2021-06-30 NOTE — ASSESSMENT & PLAN NOTE
Lab Results   Component Value Date    HGBA1C 4 5 03/15/2021       Recent Labs     06/29/21  2241 06/30/21  0729 06/30/21  1116 06/30/21  1527   POCGLU 131 105 103 108       Blood Sugar Average: Last 72 hrs:  (P) 185 3720   · Hold oral antihyperglycemics  · Start sliding scale insulin  · accuchecks     Blood sugars are acceptable

## 2021-06-30 NOTE — SOCIAL WORK
Palliative LSW saw patient at the bedside today  LSW appreciates the opportunity to provider patient/family with inpatient emotional support and guidance while patient continues to receive medical attention from the medical team     Topics discussed: Pt advised she is aware she is pending therapeutic INR prior to d/c  Pt is s/p biopsy and is hopeful for good results  Pt expressed frustration with her nose-bleed today and that it needed to be cauterized  Supportive listening provided to pt      Areas that need follow-up: None  Resources given: None  Others present: DAVID Kimble PSC and Lakisha Miller LSW     LSW will continue to follow as requested by the medical team, patient, or family

## 2021-06-30 NOTE — ASSESSMENT & PLAN NOTE
Lab Results   Component Value Date    EGFR 42 06/29/2021    EGFR 47 06/27/2021    EGFR 47 06/25/2021    CREATININE 1 28 06/29/2021    CREATININE 1 15 06/27/2021    CREATININE 1 15 06/25/2021   Creatinine rising  Will check BMP tomorrow

## 2021-06-30 NOTE — ASSESSMENT & PLAN NOTE
Does not appear to be in acute exacerbation at this time  Continue home inhalers  This could be contributing to patient's shortness of breath  Chronic hypoxemic respiratory failure  Patient is at baseline oxygen requirement  Lung mass  Will likely require biopsy with Interventional Radiology     Patient with chronic hypoxemic respiratory failure as well  Patient is okay from the pulmonology standpoint for the procedure however INR is slightly elevated at 1 59  It should be below 1 5 ideally  Patient was able to get the biopsy today on 06/28  Will restart Coumadin at 4 mg tonight  Continue monitor INR  Patient needs to be bridged  Patient was on heparin through the weekend but did have couple of episodes of epistaxis  Today pretty substantital epistaxis  Not stable for transition to lovenox  ENT consulted

## 2021-06-30 NOTE — CONSULTS
Consultation - ENT   Stuart Hoang 70 y o  female MRN: [id number]  Unit/Bed#: S -01 Encounter: [id number]      Assessment/Plan     Assessment: Right acute anterior epistaxis  Anticoagulated  Plan: Nasal packing to remain within Right nasal cavity for a minimum of 3 days  The patient requires prophylactic antibiotics while packing is in  Switch nasal canula to mask (nurse states she will notify Respiratory), continue to humidify oxygen  Continue liberal use of nasal saline spray Left nare  If break-through nasal bleeding occurs, place patient in upright position and apply manual pressure to external nose for 15 minutes  If bleeding persists, contact our ENT service  History of Present Illness   Physician Requesting Consult: Pritesh Mitchell MD  Reason for Consult / Principal Problem: Epistaxis    HPI: Stuart Hoang is a 70y o  year old female who was transferred from a local long-term care facility to [address] [place] [address] on 6/17 for c/o SOB, with suspected volume overload, as well as history of COPD, Chronic A-Fib, type II DM, CKD, and morbid obesity  The patient underwent CT of the chest, is noted to have a 3cm RUL lung mass  She is anticoagulated on warfarin  She is on supplemental oxygen, which is humidified  She recently developed intermittent nasal bleeding from the Right, last episode was about one hour ago  She admits to past history of epistaxis "about fifteen years ago", which required nasal packing        Inpatient consult to ENT  Consult performed by: Caro Alcazar PA-C  Consult ordered by: Pritesh Mitchell MD          Review of Systems    Historical Information   Past Medical History:   Diagnosis Date    Atrial fibrillation [facility])     [facility]ch[facility] )     Diabetes mellitus ([facility] )     Hypertension     Respiratory failure ([facility] )      Past Surgical History:   Procedure Laterality Date    COLONOSCOPY      IR BIOPSY LUNG  6/28/2021    MITRAL VALVE REPLACEMENT  2012    Bovine     Social History   Social History     Substance and Sexual Activity   Alcohol Use Not Currently     Social History     Substance and Sexual Activity   Drug Use Not Currently     Social History     Tobacco Use   Smoking Status Former Smoker    Quit date: 2010    Years since quittin 4   Smokeless Tobacco Never Used     Family History: non-contributory    Meds/Allergies   all current active meds have been reviewed    Allergies   Allergen Reactions    Augmentin [Amoxicillin-Pot Clavulanate] Anaphylaxis    Levaquin [Levofloxacin] Anaphylaxis       Objective       Intake/Output Summary (Last 24 hours) at 2021 1313  Last data filed at 2021 0924  Gross per 24 hour   Intake 480 ml   Output 1516 ml   Net -1036 ml       Invasive Devices     Peripheral Intravenous Line            Peripheral IV 21 Right;Ventral (anterior) Forearm 5 days                Physical Exam  The patient is alert, semirecumbent in bed, in NAD  Supplemental humidified oxygen via nasal canula  Right EAC with moderate cerumen, Left EAC patent  TM's grossly intact, without apparent hemotympanum  The patient's oxygen was briefly removed for the purpose of nasal examination and control of epistaxis  A small blood clot was suctioned from the patient's Right nasal cavity, revealing brisk bleeding from a prominent vessel along the Right anterior nasal septum  See PROCEDURE NOTE below  Tongue mobile  Dried blood was also noted along the posterior pharyngeal wall, which the patient was eventually able to clear on her own  No palpable cervical lymphadenopathy  Trachea grossly midline  PROCEDURE NOTE:  Control of Anterior Epistaxis:  Verbal consent was obtained from the patient/parent  The patient was placed in the upright seated position  Cotton soaked in a 50/50 blend of oxymetazoline and lidocaine 4% was placed within the patient's Right nasal cavity    The cotton was then removed after 5 minutes  The nasal bleeding site was identified, then cauterized with AgNO3  However, despite three attempts to control epistaxis with chemical cauterization, bleeding persisted  Therefore a Merocel sponge wrapped in Surgicel was placed within the patient's Right nasal cavity  She was observed for 10 minutes, and bleeding appeared to be well-controlled  The patient tolerated this well  No complications  Lab Results: I have personally reviewed pertinent lab results      Imaging Studies:   EKG, Pathology, and Other Studies:

## 2021-06-30 NOTE — PROGRESS NOTES
[facility]  Progress Note - Jordyn Price 1945, 70 y o  female MRN: [id number]  Unit/Bed#: S -01 Encounter: [id number]  Primary Care Provider: Marlon Nissen, MD   Date and time admitted to hospital: 6/17/2021  5:52 PM    Lung mass  Assessment & Plan  Outpatient biopsy  CKD (chronic kidney disease)  Assessment & Plan  Lab Results   Component Value Date    EGFR 42 06/29/2021    EGFR 47 06/27/2021    EGFR 47 06/25/2021    CREATININE 1 28 06/29/2021    CREATININE 1 15 06/27/2021    CREATININE 1 15 06/25/2021   Creatinine rising  Will check BMP tomorrow  Type 2 diabetes mellitus [facility])  Assessment & Plan  Lab Results   Component Value Date    HGBA1C 4 5 03/15/2021       Recent Labs     06/29/21  2241 06/30/21  0729 06/30/21  1116 06/30/21  1527   POCGLU 131 105 103 108       Blood Sugar Average: Last 72 hrs:  (P) 955 0610   · Hold oral antihyperglycemics  · Start sliding scale insulin  · accuchecks   Blood sugars are acceptable    Chronic atrial fibrillation (HCC)  Assessment & Plan  INR subtherapeutic  Will increase warfarin   INR 1 21  Continue heparin gtt  COPD (chronic obstructive pulmonary disease) ([facility] )  Assessment & Plan  Does not appear to be in acute exacerbation at this time  Continue home inhalers  This could be contributing to patient's shortness of breath  Chronic hypoxemic respiratory failure  Patient is at baseline oxygen requirement  Lung mass  Will likely require biopsy with Interventional Radiology     Patient with chronic hypoxemic respiratory failure as well  Patient is okay from the pulmonology standpoint for the procedure however INR is slightly elevated at 1 59  It should be below 1 5 ideally  Patient was able to get the biopsy today on 06/28  Will restart Coumadin at 4 mg tonight  Continue monitor INR  Patient needs to be bridged  Patient was on heparin through the weekend but did have couple of episodes of epistaxis    Today pretty substantital epistaxis  Not stable for transition to lovenox  ENT consulted  * Shortness of breath  Assessment & Plan  Presents from nursing facility with subjective worsening of shortness of breath  CT scans finding significant for potential mass verses volume overload versus infection  Chronically wears 4L   Echo in  - EF 50-55%  Diastolic dysfunction with inability to calculate filling pressures due to bioprosthetic MVR  Severe tricuspid regurgitation  At least moderate pulmonary hypertension assuming an RA pressure of 10mmHg  Radiology evaluation appreciated diuretics were decreased to couple days ago  Patient states from the shortness of breath standpoint she is stable     Patient is now on p o  Diuretics per Cardiology recommendations  At baseline               VTE Pharmacologic Prophylaxis:   Pharmacologic: Heparin Drip  Mechanical VTE Prophylaxis in Place: Yes    Patient Centered Rounds: I have performed bedside rounds with nursing staff today  Discussions with Specialists or Other Care Team Provider:  Discussed with ENT    Education and Discussions with Family / Patient:  Discussed with the patient, try to call her friend Emily Hubbard no answer  Time Spent for Care: 30 minutes  More than 50% of total time spent on counseling and coordination of care as described above      Current Length of Stay: 12 day(s)    Current Patient Status: Inpatient   Certification Statement: The patient will continue to require additional inpatient hospital stay due to Epistaxis bridging to Coumadin    Discharge Plan:  Unclear    Code Status: Level 1 - Full Code      Subjective:   Patient seen examined  No new complaints  Hemostasis now achieved    Objective:     Vitals:   Temp (24hrs), Av °F (36 7 °C), Min:97 8 °F (36 6 °C), Max:98 3 °F (36 8 °C)    Temp:  [97 8 °F (36 6 °C)-98 3 °F (36 8 °C)] 97 8 °F (36 6 °C)  HR:  [62-70] 62  Resp:  [16-24] 16  BP: ()/(53-58) 101/58  SpO2:  [94 %-98 %] 94 %  Body mass index is 39 83 kg/m²  Input and Output Summary (last 24 hours): Intake/Output Summary (Last 24 hours) at 6/30/2021 1726  Last data filed at 6/30/2021 1230  Gross per 24 hour   Intake 480 ml   Output 2066 ml   Net -1586 ml       Physical Exam:     Physical Exam  Constitutional:       General: She is not in acute distress  Appearance: She is ill-appearing  She is not toxic-appearing  HENT:      Head: Normocephalic  Mouth/Throat:      Mouth: Mucous membranes are moist    Eyes:      General: No scleral icterus  Right eye: No discharge  Left eye: No discharge  Pupils: Pupils are equal, round, and reactive to light  Cardiovascular:      Heart sounds: Murmur heard  No friction rub  No gallop  Pulmonary:      Effort: Pulmonary effort is normal  No respiratory distress  Breath sounds: No wheezing, rhonchi or rales  Chest:      Chest wall: No tenderness  Abdominal:      General: Abdomen is flat  There is no distension  Palpations: There is no mass  Tenderness: There is no abdominal tenderness  There is no right CVA tenderness, left CVA tenderness or rebound  Skin:     Capillary Refill: Capillary refill takes less than 2 seconds  Coloration: Skin is pale  Skin is not jaundiced  Findings: No erythema or lesion  Neurological:      General: No focal deficit present  Cranial Nerves: No cranial nerve deficit  Sensory: No sensory deficit  Motor: No weakness        Coordination: Coordination normal       Gait: Gait normal       Deep Tendon Reflexes: Reflexes normal    Psychiatric:         Mood and Affect: Mood normal            Additional Data:     Labs:    Results from last 7 days   Lab Units 06/30/21  1703 06/30/21  1442   WBC Thousand/uL  --  8 30   HEMOGLOBIN g/dL 8 2* 8 3*   HEMATOCRIT % 27 6* 28 3*   PLATELETS Thousands/uL  --  365     Results from last 7 days   Lab Units 06/29/21  0818   SODIUM mmol/L 141   POTASSIUM mmol/L 3 6 CHLORIDE mmol/L 101   CO2 mmol/L 34*   BUN mg/dL 40*   CREATININE mg/dL 1 28   ANION GAP mmol/L 6   CALCIUM mg/dL 9 4   GLUCOSE RANDOM mg/dL 99     Results from last 7 days   Lab Units 06/30/21  1442   INR  1 21*     Results from last 7 days   Lab Units 06/30/21  1527 06/30/21  1116 06/30/21  0729 06/29/21  2241 06/29/21  1652 06/29/21  1125 06/29/21  0817 06/28/21  2037 06/28/21  1630 06/28/21  1112 06/28/21  0747 06/27/21  2218   POC GLUCOSE mg/dl 108 103 105 131 106 101 96 105 99 107 98 131                   * I Have Reviewed All Lab Data Listed Above  * Additional Pertinent Lab Tests Reviewed:  All Labs Within Last 24 Hours Reviewed    Imaging:    Imaging Reports Reviewed Today Include:  None  Imaging Personally Reviewed by Myself Includes:  None    Recent Cultures (last 7 days):           Last 24 Hours Medication List:   Current Facility-Administered Medications   Medication Dose Route Frequency Provider Last Rate    acetaminophen  975 mg Oral Q8H [address] DAVID Nguyen      albuterol  2 puff Inhalation Q6H PRN Dulce Hernandez PA-C      bisacodyl  5 mg Oral Daily PRN Alexandr Alcala MD      Diclofenac Sodium  2 g Topical 4x Daily DAVID Ferreira      digoxin  125 mcg Oral Daily Manolo Navarrete PA-C      ferrous sulfate  325 mg Oral Daily With Breakfast Manolo Navarrete PA-C      fluticasone-vilanterol  1 puff Inhalation Daily LOPEZ Jo      heparin (porcine)  3-20 Units/kg/hr (Order-Specific) Intravenous Titrated Monica Zeng MD 16 1 Units/kg/hr (06/30/21 1541)    insulin lispro  1-5 Units Subcutaneous TID AC Manolo Navarrete PA-C      insulin lispro  1-5 Units Subcutaneous HS Manolo Navarrete PA-C      ipratropium-albuterol  3 mL Nebulization 4x Daily PRN LOPEZ Jo      lidocaine  1 patch Topical Daily Esdrasalli Richardson PA-C      LORazepam  0 25 mg Oral Q6H PRN DAVID Nguyen      montelukast  10 mg Oral Daily Manolo Navarrete PA-C      oxyCODONE  2 5 mg Oral Q4H PRN DAVID Rodriguez      pantoprazole  40 mg Oral Early Morning Manolo Navarrete PA-C      polyethylene glycol  17 g Oral Daily Guerline Bustos MD      potassium chloride  20 mEq Oral BID Poly Boyer PA-C      senna-docusate sodium  1 tablet Oral BID PRN Guerline Bustos MD      sodium chloride  1 spray Each Nare Q1H PRN DAVID Tillman      torsemide  40 mg Oral BID Js Greenwood PA-C      warfarin  8 mg Oral Daily (warfarin) Pritesh Mitchell MD          Today, Patient Was Seen By: Pritesh Mitchell MD    ** Please Note: Dictation voice to text software may have been used in the creation of this document   **

## 2021-06-30 NOTE — RESPIRATORY THERAPY NOTE
1000- was called to pt room to add humidification due to epitaxies  Pt with large nose bleed and took off oxygen  Room air spo2 76%  Placed N/C in pt's nose spo2 96% on 4 lpm n/c  Pt refused [facility] mask at this time  1800- Called to set up aerosol face mask  Pt wore N/C in mouth t/o day with no issues per RN  Pt also seen by ENT  Placed pt on 35% aerosol face mask  Pt immediatly became anxious and pulling of mask and stated she can't wear this  Placed pt back on N/C in pt's mouth  Tried N/C in left nare (the unpacked nare) Pt spo2 95%  Updated RN  Reccommended cont  Pox at night to monitor Spo2

## 2021-07-01 LAB
APTT PPP: 68 SECONDS (ref 23–37)
ERYTHROCYTE [DISTWIDTH] IN BLOOD BY AUTOMATED COUNT: 18.8 % (ref 11.6–15.1)
GLUCOSE SERPL-MCNC: 103 MG/DL (ref 65–140)
GLUCOSE SERPL-MCNC: 104 MG/DL (ref 65–140)
GLUCOSE SERPL-MCNC: 137 MG/DL (ref 65–140)
GLUCOSE SERPL-MCNC: 140 MG/DL (ref 65–140)
HCT VFR BLD AUTO: 29.3 % (ref 34.8–46.1)
HGB BLD-MCNC: 8.4 G/DL (ref 11.5–15.4)
INR PPP: 1.32 (ref 0.84–1.19)
MCH RBC QN AUTO: 26.1 PG (ref 26.8–34.3)
MCHC RBC AUTO-ENTMCNC: 28.7 G/DL (ref 31.4–37.4)
MCV RBC AUTO: 91 FL (ref 82–98)
PLATELET # BLD AUTO: 350 THOUSANDS/UL (ref 149–390)
PMV BLD AUTO: 10.2 FL (ref 8.9–12.7)
PROCALCITONIN SERPL-MCNC: <0.05 NG/ML
PROTHROMBIN TIME: 16.5 SECONDS (ref 11.6–14.5)
RBC # BLD AUTO: 3.22 MILLION/UL (ref 3.81–5.12)
WBC # BLD AUTO: 9.2 THOUSAND/UL (ref 4.31–10.16)

## 2021-07-01 PROCEDURE — 84145 PROCALCITONIN (PCT): CPT | Performed by: INTERNAL MEDICINE

## 2021-07-01 PROCEDURE — 99232 SBSQ HOSP IP/OBS MODERATE 35: CPT | Performed by: INTERNAL MEDICINE

## 2021-07-01 PROCEDURE — 85730 THROMBOPLASTIN TIME PARTIAL: CPT | Performed by: INTERNAL MEDICINE

## 2021-07-01 PROCEDURE — 82948 REAGENT STRIP/BLOOD GLUCOSE: CPT

## 2021-07-01 PROCEDURE — 85610 PROTHROMBIN TIME: CPT | Performed by: FAMILY MEDICINE

## 2021-07-01 PROCEDURE — 85027 COMPLETE CBC AUTOMATED: CPT | Performed by: INTERNAL MEDICINE

## 2021-07-01 RX ORDER — WARFARIN SODIUM 5 MG/1
5 TABLET ORAL
Status: DISCONTINUED | OUTPATIENT
Start: 2021-07-01 | End: 2021-07-04

## 2021-07-01 RX ADMIN — LIDOCAINE 5% 1 PATCH: 700 PATCH TOPICAL at 09:01

## 2021-07-01 RX ADMIN — POTASSIUM CHLORIDE 20 MEQ: 1500 TABLET, EXTENDED RELEASE ORAL at 09:01

## 2021-07-01 RX ADMIN — DIGOXIN 125 MCG: 125 TABLET ORAL at 09:01

## 2021-07-01 RX ADMIN — TORSEMIDE 40 MG: 20 TABLET ORAL at 09:01

## 2021-07-01 RX ADMIN — POLYETHYLENE GLYCOL 3350 17 G: 17 POWDER, FOR SOLUTION ORAL at 09:02

## 2021-07-01 RX ADMIN — HEPARIN SODIUM 16.1 UNITS/KG/HR: 10000 INJECTION, SOLUTION INTRAVENOUS at 01:08

## 2021-07-01 RX ADMIN — ACETAMINOPHEN 975 MG: 325 TABLET, FILM COATED ORAL at 20:39

## 2021-07-01 RX ADMIN — HEPARIN SODIUM 16.11 UNITS/KG/HR: 10000 INJECTION, SOLUTION INTRAVENOUS at 18:09

## 2021-07-01 RX ADMIN — WARFARIN SODIUM 5 MG: 2.5 TABLET ORAL at 18:10

## 2021-07-01 RX ADMIN — FERROUS SULFATE TAB 325 MG (65 MG ELEMENTAL FE) 325 MG: 325 (65 FE) TAB at 09:01

## 2021-07-01 RX ADMIN — MONTELUKAST 10 MG: 10 TABLET, FILM COATED ORAL at 09:01

## 2021-07-01 RX ADMIN — TORSEMIDE 40 MG: 20 TABLET ORAL at 20:39

## 2021-07-01 RX ADMIN — POTASSIUM CHLORIDE 20 MEQ: 1500 TABLET, EXTENDED RELEASE ORAL at 18:10

## 2021-07-01 RX ADMIN — FLUTICASONE FUROATE AND VILANTEROL TRIFENATATE 1 PUFF: 100; 25 POWDER RESPIRATORY (INHALATION) at 09:02

## 2021-07-01 RX ADMIN — DICLOFENAC SODIUM 2 G: 10 GEL TOPICAL at 09:02

## 2021-07-01 RX ADMIN — PANTOPRAZOLE SODIUM 40 MG: 40 TABLET, DELAYED RELEASE ORAL at 04:24

## 2021-07-01 NOTE — ASSESSMENT & PLAN NOTE
Lab Results   Component Value Date    HGBA1C 4 5 03/15/2021       Recent Labs     06/30/21  2138 07/01/21  0622 07/01/21  1037 07/01/21  1506   POCGLU 121 104 103 137       Blood Sugar Average: Last 72 hrs:  (P) 558 [id number]   · Hold oral antihyperglycemics  · Start sliding scale insulin  · accuchecks     Blood sugars are acceptable

## 2021-07-01 NOTE — PROGRESS NOTES
[facility]  Progress Note - Jordyn Price 1945, 70 y o  female MRN: [id number]  Unit/Bed#: S -01 Encounter: [id number]  Primary Care Provider: Marlon Nissen, MD   Date and time admitted to hospital: 6/17/2021  5:52 PM    Lung mass  Assessment & Plan  Outpatient biopsy  CKD (chronic kidney disease)  Assessment & Plan  Lab Results   Component Value Date    EGFR 42 06/29/2021    EGFR 47 06/27/2021    EGFR 47 06/25/2021    CREATININE 1 28 06/29/2021    CREATININE 1 15 06/27/2021    CREATININE 1 15 06/25/2021   Creatinine rising  Will check BMP tomorrow  Morbid obesity ([facility] )  Assessment & Plan  Diet lifestyle modifications  Counseled on weight loss  Noted pulmonary hypertension on prior echo  This is likely contributing to patient's symptomatology    Type 2 diabetes mellitus [facility])  Assessment & Plan  Lab Results   Component Value Date    HGBA1C 4 5 03/15/2021       Recent Labs     06/30/21  2138 07/01/21  0622 07/01/21  1037 07/01/21  1506   POCGLU 121 104 103 137       Blood Sugar Average: Last 72 hrs:  (P) 666 [id number]   · Hold oral antihyperglycemics  · Start sliding scale insulin  · accuchecks   Blood sugars are acceptable    Chronic atrial fibrillation (HCC)  Assessment & Plan  INR subtherapeutic  Will increase warfarin   INR 1 32  Continue heparin gtt  COPD (chronic obstructive pulmonary disease) ([facility] )  Assessment & Plan  Does not appear to be in acute exacerbation at this time  Continue home inhalers  This could be contributing to patient's shortness of breath  Chronic hypoxemic respiratory failure  Patient is at baseline oxygen requirement  Lung mass  Will likely require biopsy with Interventional Radiology     Patient with chronic hypoxemic respiratory failure as well  Patient is okay from the pulmonology standpoint for the procedure however INR is slightly elevated at 1 59  It should be below 1 5 ideally    Patient was able to get the biopsy today on   Will restart Coumadin at 4 mg tonight  Continue monitor INR  Patient needs to be bridged  Patient was on heparin through the weekend but did have couple of episodes of epistaxis  Today pretty substantital epistaxis  Not stable for transition to lovenox  ENT consulted  * Shortness of breath  Assessment & Plan  Presents from nursing facility with subjective worsening of shortness of breath  CT scans finding significant for potential mass verses volume overload versus infection  Chronically wears 4L   Echo in  - EF 50-55%  Diastolic dysfunction with inability to calculate filling pressures due to bioprosthetic MVR  Severe tricuspid regurgitation  At least moderate pulmonary hypertension assuming an RA pressure of 10mmHg  Radiology evaluation appreciated diuretics were decreased to couple days ago  Patient states from the shortness of breath standpoint she is stable     Patient is now on p o  Diuretics per Cardiology recommendations  At baseline               VTE Pharmacologic Prophylaxis:   Pharmacologic: Heparin  Mechanical VTE Prophylaxis in Place: Yes    Patient Centered Rounds: I have performed bedside rounds with nursing staff today  Discussions with Specialists or Other Care Team Provider:  Discussed with nursing case management    Education and Discussions with Family / Patient:  Discussed with the patient    Time Spent for Care: 30 minutes  More than 50% of total time spent on counseling and coordination of care as described above      Current Length of Stay: 13 day(s)    Current Patient Status: Inpatient   Certification Statement: The patient will continue to require additional inpatient hospital stay due to Awaiting therapeutic Coumadin    Discharge Plan:  Unclear    Code Status: Level 1 - Full Code      Subjective:   Patient seen and examined  No new symptoms  No further nose bleeds    Objective:     Vitals:   Temp (24hrs), Av 1 °F (36 7 °C), Min:97 7 °F (36 5 °C), Max:98 4 °F (36 9 °C)    Temp:  [97 7 °F (36 5 °C)-98 4 °F (36 9 °C)] 97 7 °F (36 5 °C)  HR:  [65-68] 65  Resp:  [16-18] 18  BP: ()/(55-61) 97/56  SpO2:  [92 %-99 %] 99 %  Body mass index is 39 33 kg/m²  Input and Output Summary (last 24 hours): Intake/Output Summary (Last 24 hours) at 7/1/2021 1712  Last data filed at 7/1/2021 1500  Gross per 24 hour   Intake 1070 ml   Output 2900 ml   Net -1830 ml       Physical Exam:     Physical Exam  Constitutional:       General: She is not in acute distress  Appearance: She is not ill-appearing or diaphoretic  HENT:      Head: Normocephalic  Nose: Nose normal       Mouth/Throat:      Mouth: Mucous membranes are moist    Eyes:      General: No scleral icterus  Right eye: No discharge  Left eye: No discharge  Pupils: Pupils are equal, round, and reactive to light  Cardiovascular:      Rate and Rhythm: Normal rate  Heart sounds: No murmur heard  No friction rub  No gallop  Pulmonary:      Effort: Pulmonary effort is normal  No respiratory distress  Breath sounds: No stridor  No wheezing, rhonchi or rales  Chest:      Chest wall: No tenderness  Abdominal:      Palpations: There is no mass  Tenderness: There is no abdominal tenderness  There is no right CVA tenderness, left CVA tenderness, guarding or rebound  Hernia: No hernia is present  Musculoskeletal:         General: No swelling, tenderness, deformity or signs of injury  Right lower leg: No edema  Left lower leg: No edema  Skin:     Capillary Refill: Capillary refill takes less than 2 seconds  Coloration: Skin is pale  Skin is not jaundiced  Findings: No bruising, erythema, lesion or rash  Neurological:      General: No focal deficit present  Mental Status: She is alert  Cranial Nerves: No cranial nerve deficit  Motor: No weakness        Gait: Gait normal       Deep Tendon Reflexes: Reflexes normal    Psychiatric:         Mood and Affect: Mood normal            Additional Data:     Labs:    Results from last 7 days   Lab Units 07/01/21  0420   WBC Thousand/uL 9 20   HEMOGLOBIN g/dL 8 4*   HEMATOCRIT % 29 3*   PLATELETS Thousands/uL 350     Results from last 7 days   Lab Units 06/29/21  0818   SODIUM mmol/L 141   POTASSIUM mmol/L 3 6   CHLORIDE mmol/L 101   CO2 mmol/L 34*   BUN mg/dL 40*   CREATININE mg/dL 1 28   ANION GAP mmol/L 6   CALCIUM mg/dL 9 4   GLUCOSE RANDOM mg/dL 99     Results from last 7 days   Lab Units 07/01/21  0417   INR  1 32*     Results from last 7 days   Lab Units 07/01/21  1506 07/01/21  1037 07/01/21  0622 06/30/21  2138 06/30/21  1527 06/30/21  1116 06/30/21  0729 06/29/21  2241 06/29/21  1652 06/29/21  1125 06/29/21  0817 06/28/21  2037   POC GLUCOSE mg/dl 137 103 104 121 108 103 105 131 106 101 96 105         Results from last 7 days   Lab Units 07/01/21  0417   PROCALCITONIN ng/ml <0 05           * I Have Reviewed All Lab Data Listed Above  * Additional Pertinent Lab Tests Reviewed:  [facility] Admission Reviewed    Imaging:    Imaging Reports Reviewed Today Include:   None pertinent to this encounter  Imaging Personally Reviewed by Myself Includes:  As above    Recent Cultures (last 7 days):           Last 24 Hours Medication List:   Current Facility-Administered Medications   Medication Dose Route Frequency Provider Last Rate    acetaminophen  975 mg Oral Q8H [facility] Susanna Fothergill, CRNP      albuterol  2 puff Inhalation Q6H PRN Ladarius Rudd PA-C      bisacodyl  5 mg Oral Daily PRN Zaid Cordon MD      Diclofenac Sodium  2 g Topical 4x Daily DAVID Urbina      digoxin  125 mcg Oral Daily Manolo Navarrete PA-C      ferrous sulfate  325 mg Oral Daily With Breakfast Manolo Navarrete PA-C      fluticasone-vilanterol  1 puff Inhalation Daily LOPEZ Jo      heparin (porcine)  3-20 Units/kg/hr (Order-Specific) Intravenous Titrated Reyna Oates MD 16 1 Units/kg/hr (07/01/21 0520)    insulin lispro  1-5 Units Subcutaneous TID AC Manolo Navarrete PA-C      insulin lispro  1-5 Units Subcutaneous HS Manolo Navarrete PA-C      ipratropium-albuterol  3 mL Nebulization 4x Daily PRN Indy Baig PA-C      lidocaine  1 patch Topical Daily Esdras Jimenes PA-C      LORazepam  0 25 mg Oral Q6H PRN Charlanne Ora, CRNP      montelukast  10 mg Oral Daily Roberto Win PA-C      oxyCODONE  2 5 mg Oral Q4H PRN Nicolasa Sosaa, CRNP      pantoprazole  40 mg Oral Early Morning Manolo Navarrete PA-C      polyethylene glycol  17 g Oral Daily Ed Herzog MD      potassium chloride  20 mEq Oral BID Roberto Win PA-C      senna-docusate sodium  1 tablet Oral BID PRN Ed Herzog MD      sodium chloride  1 spray Each Nare Q1H PRN [employer], CRNP      torsemide  40 mg Oral BID Js Greenwood PA-C      warfarin  5 mg Oral Daily (warfarin) Reyna Oates MD          Today, Patient Was Seen By: Reyna Oates MD    ** Please Note: Dictation voice to text software may have been used in the creation of this document   **

## 2021-07-02 PROBLEM — R04.0 EPISTAXIS: Status: ACTIVE | Noted: 2021-07-02

## 2021-07-02 LAB
ANION GAP SERPL CALCULATED.3IONS-SCNC: 10 MMOL/L (ref 4–13)
APTT PPP: 100 SECONDS (ref 23–37)
APTT PPP: 66 SECONDS (ref 23–37)
APTT PPP: 98 SECONDS (ref 23–37)
BASOPHILS # BLD AUTO: 0.05 THOUSANDS/ΜL (ref 0–0.1)
BASOPHILS NFR BLD AUTO: 1 % (ref 0–1)
BUN SERPL-MCNC: 39 MG/DL (ref 5–25)
CALCIUM SERPL-MCNC: 9.8 MG/DL (ref 8.3–10.1)
CHLORIDE SERPL-SCNC: 98 MMOL/L (ref 100–108)
CO2 SERPL-SCNC: 31 MMOL/L (ref 21–32)
CREAT SERPL-MCNC: 1.22 MG/DL (ref 0.6–1.3)
EOSINOPHIL # BLD AUTO: 0.58 THOUSAND/ΜL (ref 0–0.61)
EOSINOPHIL NFR BLD AUTO: 7 % (ref 0–6)
ERYTHROCYTE [DISTWIDTH] IN BLOOD BY AUTOMATED COUNT: 18.7 % (ref 11.6–15.1)
GFR SERPL CREATININE-BSD FRML MDRD: 44 ML/MIN/1.73SQ M
GLUCOSE SERPL-MCNC: 108 MG/DL (ref 65–140)
GLUCOSE SERPL-MCNC: 109 MG/DL (ref 65–140)
GLUCOSE SERPL-MCNC: 120 MG/DL (ref 65–140)
HCT VFR BLD AUTO: 27.6 % (ref 34.8–46.1)
HCT VFR BLD AUTO: 29.4 % (ref 34.8–46.1)
HGB BLD-MCNC: 8.2 G/DL (ref 11.5–15.4)
HGB BLD-MCNC: 8.5 G/DL (ref 11.5–15.4)
IMM GRANULOCYTES # BLD AUTO: 0.06 THOUSAND/UL (ref 0–0.2)
IMM GRANULOCYTES NFR BLD AUTO: 1 % (ref 0–2)
INR PPP: 1.54 (ref 0.84–1.19)
LYMPHOCYTES # BLD AUTO: 0.99 THOUSANDS/ΜL (ref 0.6–4.47)
LYMPHOCYTES NFR BLD AUTO: 12 % (ref 14–44)
MCH RBC QN AUTO: 26.5 PG (ref 26.8–34.3)
MCHC RBC AUTO-ENTMCNC: 29.7 G/DL (ref 31.4–37.4)
MCV RBC AUTO: 89 FL (ref 82–98)
MONOCYTES # BLD AUTO: 0.62 THOUSAND/ΜL (ref 0.17–1.22)
MONOCYTES NFR BLD AUTO: 7 % (ref 4–12)
NEUTROPHILS # BLD AUTO: 6.21 THOUSANDS/ΜL (ref 1.85–7.62)
NEUTS SEG NFR BLD AUTO: 72 % (ref 43–75)
NRBC BLD AUTO-RTO: 0 /100 WBCS
PLATELET # BLD AUTO: 333 THOUSANDS/UL (ref 149–390)
PMV BLD AUTO: 10.1 FL (ref 8.9–12.7)
POTASSIUM SERPL-SCNC: 3.8 MMOL/L (ref 3.5–5.3)
PROCALCITONIN SERPL-MCNC: 0.05 NG/ML
PROTHROMBIN TIME: 18.6 SECONDS (ref 11.6–14.5)
RBC # BLD AUTO: 3.09 MILLION/UL (ref 3.81–5.12)
SODIUM SERPL-SCNC: 139 MMOL/L (ref 136–145)
WBC # BLD AUTO: 8.51 THOUSAND/UL (ref 4.31–10.16)

## 2021-07-02 PROCEDURE — 85610 PROTHROMBIN TIME: CPT | Performed by: INTERNAL MEDICINE

## 2021-07-02 PROCEDURE — 84145 PROCALCITONIN (PCT): CPT | Performed by: INTERNAL MEDICINE

## 2021-07-02 PROCEDURE — 82948 REAGENT STRIP/BLOOD GLUCOSE: CPT

## 2021-07-02 PROCEDURE — 99232 SBSQ HOSP IP/OBS MODERATE 35: CPT | Performed by: INTERNAL MEDICINE

## 2021-07-02 PROCEDURE — 85025 COMPLETE CBC W/AUTO DIFF WBC: CPT | Performed by: INTERNAL MEDICINE

## 2021-07-02 PROCEDURE — 80048 BASIC METABOLIC PNL TOTAL CA: CPT | Performed by: INTERNAL MEDICINE

## 2021-07-02 PROCEDURE — 85014 HEMATOCRIT: CPT | Performed by: PHYSICIAN ASSISTANT

## 2021-07-02 PROCEDURE — 85018 HEMOGLOBIN: CPT | Performed by: PHYSICIAN ASSISTANT

## 2021-07-02 PROCEDURE — 85730 THROMBOPLASTIN TIME PARTIAL: CPT | Performed by: INTERNAL MEDICINE

## 2021-07-02 RX ORDER — WARFARIN SODIUM 2.5 MG/1
2.5 TABLET ORAL
Status: COMPLETED | OUTPATIENT
Start: 2021-07-02 | End: 2021-07-02

## 2021-07-02 RX ADMIN — TORSEMIDE 40 MG: 20 TABLET ORAL at 21:52

## 2021-07-02 RX ADMIN — POLYETHYLENE GLYCOL 3350 17 G: 17 POWDER, FOR SOLUTION ORAL at 08:32

## 2021-07-02 RX ADMIN — LIDOCAINE 5% 1 PATCH: 700 PATCH TOPICAL at 08:32

## 2021-07-02 RX ADMIN — ACETAMINOPHEN 975 MG: 325 TABLET, FILM COATED ORAL at 12:00

## 2021-07-02 RX ADMIN — FERROUS SULFATE TAB 325 MG (65 MG ELEMENTAL FE) 325 MG: 325 (65 FE) TAB at 08:30

## 2021-07-02 RX ADMIN — PANTOPRAZOLE SODIUM 40 MG: 40 TABLET, DELAYED RELEASE ORAL at 05:01

## 2021-07-02 RX ADMIN — ACETAMINOPHEN 975 MG: 325 TABLET, FILM COATED ORAL at 04:01

## 2021-07-02 RX ADMIN — HEPARIN SODIUM 12.1 UNITS/KG/HR: 10000 INJECTION, SOLUTION INTRAVENOUS at 13:38

## 2021-07-02 RX ADMIN — DIGOXIN 125 MCG: 125 TABLET ORAL at 08:30

## 2021-07-02 RX ADMIN — WARFARIN SODIUM 5 MG: 2.5 TABLET ORAL at 18:49

## 2021-07-02 RX ADMIN — FLUTICASONE FUROATE AND VILANTEROL TRIFENATATE 1 PUFF: 100; 25 POWDER RESPIRATORY (INHALATION) at 08:33

## 2021-07-02 RX ADMIN — ACETAMINOPHEN 975 MG: 325 TABLET, FILM COATED ORAL at 18:49

## 2021-07-02 RX ADMIN — POTASSIUM CHLORIDE 20 MEQ: 1500 TABLET, EXTENDED RELEASE ORAL at 08:30

## 2021-07-02 RX ADMIN — WARFARIN SODIUM 2.5 MG: 2.5 TABLET ORAL at 18:49

## 2021-07-02 RX ADMIN — DICLOFENAC SODIUM 2 G: 10 GEL TOPICAL at 09:54

## 2021-07-02 RX ADMIN — POTASSIUM CHLORIDE 20 MEQ: 1500 TABLET, EXTENDED RELEASE ORAL at 18:50

## 2021-07-02 RX ADMIN — MONTELUKAST 10 MG: 10 TABLET, FILM COATED ORAL at 08:30

## 2021-07-02 RX ADMIN — TORSEMIDE 40 MG: 20 TABLET ORAL at 08:30

## 2021-07-02 NOTE — PLAN OF CARE
Problem: PAIN - ADULT  Goal: Verbalizes/displays adequate comfort level or baseline comfort level  Description: Interventions:  - Encourage patient to monitor pain and request assistance  - Assess pain using appropriate pain scale  - Administer analgesics based on type and severity of pain and evaluate response  - Implement non-pharmacological measures as appropriate and evaluate response  - Consider cultural and social influences on pain and pain management  - Notify physician/advanced practitioner if interventions unsuccessful or patient reports new pain  Outcome: Progressing     Problem: INFECTION - ADULT  Goal: Absence or prevention of progression during hospitalization  Description: INTERVENTIONS:  - Assess and monitor for signs and symptoms of infection  - Monitor lab/diagnostic results  - Monitor all insertion sites, i e  indwelling lines, tubes, and drains  - Monitor endotracheal if appropriate and nasal secretions for changes in amount and color  - [place] appropriate cooling/warming therapies per order  - Administer medications as ordered  - Instruct and encourage patient and family to use good hand hygiene technique  - Identify and instruct in appropriate isolation precautions for identified infection/condition  Outcome: Progressing     Problem: SAFETY ADULT  Goal: Patient will remain free of falls  Description: INTERVENTIONS:  - Educate patient/family on patient safety including physical limitations  - Instruct patient to call for assistance with activity   - Consult OT/PT to assist with strengthening/mobility   - Keep Call bell within reach  - Keep bed low and locked with side rails adjusted as appropriate  - Keep care items and personal belongings within reach  - Initiate and maintain comfort rounds  - Make Fall Risk Sign visible to staff  - Offer Toileting every  Hours, in advance of need  - Initiate/Maintain alarm  - Obtain necessary fall risk management equipment:   - Apply yellow socks and bracelet for high fall risk patients  - Consider moving patient to room near nurses station  Outcome: Progressing  Goal: Maintain or return to baseline ADL function  Description: INTERVENTIONS:  -  Assess patient's ability to carry out ADLs; assess patient's baseline for ADL function and identify physical deficits which impact ability to perform ADLs (bathing, care of mouth/teeth, toileting, grooming, dressing, etc )  - Assess/evaluate cause of self-care deficits   - Assess range of motion  - Assess patient's mobility; develop plan if impaired  - Assess patient's need for assistive devices and provide as appropriate  - Encourage maximum independence but intervene and supervise when necessary  - Involve family in performance of ADLs  - Assess for home care needs following discharge   - Consider OT consult to assist with ADL evaluation and planning for discharge  - Provide patient education as appropriate  Outcome: Progressing  Goal: Maintains/Returns to pre admission functional level  Description: INTERVENTIONS:  - Perform BMAT or MOVE assessment daily    - Set and communicate daily mobility goal to care team and patient/family/caregiver  - Collaborate with rehabilitation services on mobility goals if consulted  - Perform Range of Motion  times a day  - Reposition patient every  hours    - Dangle patient times a day  - Stand patient  times a day  - Ambulate patient  times a day  - Out of bed to chair  times a day   - Out of bed for meals  times a day  - Out of bed for toileting  - Record patient progress and toleration of activity level   Outcome: Progressing     Problem: DISCHARGE PLANNING  Goal: Discharge to home or other facility with appropriate resources  Description: INTERVENTIONS:  - Identify barriers to discharge w/patient and caregiver  - Arrange for needed discharge resources and transportation as appropriate  - Identify discharge learning needs (meds, wound care, etc )  - Arrange for interpretive services to assist at discharge as needed  - Refer to Case Management Department for coordinating discharge planning if the patient needs post-hospital services based on physician/advanced practitioner order or complex needs related to functional status, cognitive ability, or social support system  Outcome: Progressing     Problem: Knowledge Deficit  Goal: Patient/family/caregiver demonstrates understanding of disease process, treatment plan, medications, and discharge instructions  Description: Complete learning assessment and assess knowledge base    Interventions:  - Provide teaching at level of understanding  - Provide teaching via preferred learning methods  Outcome: Progressing     Problem: RESPIRATORY - ADULT  Goal: Achieves optimal ventilation and oxygenation  Description: INTERVENTIONS:  - Assess for changes in respiratory status  - Assess for changes in mentation and behavior  - Position to facilitate oxygenation and minimize respiratory effort  - Oxygen administered by appropriate delivery if ordered  - Initiate smoking cessation education as indicated  - Encourage broncho-pulmonary hygiene including cough, deep breathe, Incentive Spirometry  - Assess the need for suctioning and aspirate as needed  - Assess and instruct to report SOB or any respiratory difficulty  - Respiratory Therapy support as indicated  Outcome: Progressing     Problem: METABOLIC, FLUID AND ELECTROLYTES - ADULT  Goal: Fluid balance maintained  Description: INTERVENTIONS:  - Monitor labs   - Monitor I/O and WT  - Instruct patient on fluid and nutrition as appropriate  - Assess for signs & symptoms of volume excess or deficit  Outcome: Progressing  Goal: Glucose maintained within target range  Description: INTERVENTIONS:  - Monitor Blood Glucose as ordered  - Assess for signs and symptoms of hyperglycemia and hypoglycemia  - Administer ordered medications to maintain glucose within target range  - Assess nutritional intake and initiate nutrition service referral as needed  Outcome: Progressing     Problem: SKIN/TISSUE INTEGRITY - ADULT  Goal: Skin Integrity remains intact(Skin Breakdown Prevention)  Description: Assess:  -Perform Jevon assessment every   -Clean and moisturize skin every   -Inspect skin when repositioning, toileting, and assisting with ADLS  -Assess under medical devices such as  every   -Assess extremities for adequate circulation and sensation     Bed Management:  -Have minimal linens on bed & keep smooth, unwrinkled  -Change linens as needed when moist or perspiring  -Avoid sitting or lying in one position for more than  hours while in bed  -Keep HOB at degrees     Toileting:  -Offer bedside commode  -Assess for incontinence every   -Use incontinent care products after each incontinent episode such as     Activity:  -Mobilize patient  times a day  -Encourage activity and walks on unit  -Encourage or provide ROM exercises   -Turn and reposition patient every  Hours  -Use appropriate equipment to lift or move patient in bed  -Instruct/ Assist with weight shifting every  when out of bed in chair  -Consider limitation of chair time  hour intervals    Skin Care:  -Avoid use of baby powder, tape, friction and shearing, hot water or constrictive clothing  -Relieve pressure over bony prominences using   -Do not massage red bony areas    Next Steps:  -Teach patient strategies to minimize risks such as    -Consider consults to  interdisciplinary teams such as   Outcome: Progressing  Goal: Incision(s), wounds(s) or drain site(s) healing without S/S of infection  Description: INTERVENTIONS  - Assess and document dressing, incision, wound bed, drain sites and surrounding tissue  - Provide patient and family education  - Perform skin care/dressing changes every   Outcome: Progressing  Goal: Pressure injury heals and does not worsen  Description: Interventions:  - Implement low air loss mattress or specialty surface (Criteria met)  - Apply silicone foam dressing  - Instruct/assist with weight shifting every  minutes when in chair   - Limit chair time to  hour intervals  - Use special pressure reducing interventions such as  when in chair   - Apply fecal or urinary incontinence containment device   - Perform passive or active ROM every   - Turn and reposition patient & offload bony prominences every  hours   - Utilize friction reducing device or surface for transfers   - Consider consults to  interdisciplinary teams such as   - Use incontinent care products after each incontinent episode such as   - Consider nutrition services referral as needed  Outcome: Progressing     Problem: MOBILITY - ADULT  Goal: Maintain or return to baseline ADL function  Description: INTERVENTIONS:  -  Assess patient's ability to carry out ADLs; assess patient's baseline for ADL function and identify physical deficits which impact ability to perform ADLs (bathing, care of mouth/teeth, toileting, grooming, dressing, etc )  - Assess/evaluate cause of self-care deficits   - Assess range of motion  - Assess patient's mobility; develop plan if impaired  - Assess patient's need for assistive devices and provide as appropriate  - Encourage maximum independence but intervene and supervise when necessary  - Involve family in performance of ADLs  - Assess for home care needs following discharge   - Consider OT consult to assist with ADL evaluation and planning for discharge  - Provide patient education as appropriate  Outcome: Progressing  Goal: Maintains/Returns to pre admission functional level  Description: INTERVENTIONS:  - Perform BMAT or MOVE assessment daily    - Set and communicate daily mobility goal to care team and patient/family/caregiver  - Collaborate with rehabilitation services on mobility goals if consulted  - Perform Range of Motion  times a day  - Reposition patient every  hours    - Dangle patient  times a day  - Stand patient  times a day  - Ambulate patient times a day  - Out of bed to chair  times a day   - Out of bed for meals mes a day  - Out of bed for toileting  - Record patient progress and toleration of activity level   Outcome: Progressing     Problem: Nutrition/Hydration-ADULT  Goal: Nutrient/Hydration intake appropriate for improving, restoring or maintaining nutritional needs  Description: Monitor and assess patient's nutrition/hydration status for malnutrition  Collaborate with interdisciplinary team and initiate plan and interventions as ordered  Monitor patient's weight and dietary intake as ordered or per policy  Utilize nutrition screening tool and intervene as necessary  Determine patient's food preferences and provide high-protein, high-caloric foods as appropriate       INTERVENTIONS:  - Monitor oral intake, urinary output, labs, and treatment plans  - Assess nutrition and hydration status and recommend course of action  - Evaluate amount of meals eaten  - Assist patient with eating if necessary   - Allow adequate time for meals  - Recommend/ encourage appropriate diets, oral nutritional supplements, and vitamin/mineral supplements  - Order, calculate, and assess calorie counts as needed  - Recommend, monitor, and adjust tube feedings and TPN/PPN based on assessed needs  - Assess need for intravenous fluids  - Provide specific nutrition/hydration education as appropriate  - Include patient/family/caregiver in decisions related to nutrition  Outcome: Progressing     Problem: Prexisting or High Potential for Compromised Skin Integrity  Goal: Skin integrity is maintained or improved  Description: INTERVENTIONS:  - Identify patients at risk for skin breakdown  - Assess and monitor skin integrity  - Assess and monitor nutrition and hydration status  - Monitor labs   - Assess for incontinence   - Turn and reposition patient  - Assist with mobility/ambulation  - Relieve pressure over bony prominences  - Avoid friction and shearing  - Provide appropriate hygiene as needed including keeping skin clean and dry  - Evaluate need for skin moisturizer/barrier cream  - Collaborate with interdisciplinary team   - Patient/family teaching  - Consider wound care consult   Outcome: Progressing     Problem: Potential for Falls  Goal: Patient will remain free of falls  Description: INTERVENTIONS:  - Educate patient/family on patient safety including physical limitations  - Instruct patient to call for assistance with activity   - Consult OT/PT to assist with strengthening/mobility   - Keep Call bell within reach  - Keep bed low and locked with side rails adjusted as appropriate  - Keep care items and personal belongings within reach  - Initiate and maintain comfort rounds  - Make Fall Risk Sign visible to staff  - Offer Toileting every  Hours, in advance of need  - Initiate/Maintain alarm  - Obtain necessary fall risk management equipment:   - Apply yellow socks and bracelet for high fall risk patients  - Consider moving patient to room near nurses station  Outcome: Progressing

## 2021-07-02 NOTE — ASSESSMENT & PLAN NOTE
Lab Results   Component Value Date    HGBA1C 4 5 03/15/2021       Recent Labs     07/01/21  1037 07/01/21  1506 07/01/21  2102 07/02/21  0616   POCGLU 103 137 140 120       Blood Sugar Average: Last 72 hrs:  (P) 058 [id number]   · Hold oral antihyperglycemics  · Start sliding scale insulin  · accuchecks     Blood sugars are acceptable

## 2021-07-02 NOTE — ASSESSMENT & PLAN NOTE
· Biopsy negative for malignancy  · Pulmonology follow-up in the outpatient setting -- serial imaging; PET scan in about 4-6 weeks

## 2021-07-02 NOTE — PROGRESS NOTES
[facility]  Progress Note - Neftaly Ji 1945, 70 y o  female MRN: [id number]  Unit/Bed#: S -01 Encounter: [id number]  Primary Care Provider: Iram Morales MD   Date and time admitted to hospital: 6/17/2021  5:52 PM    * Shortness of breath  Assessment & Plan  Presents from nursing facility with subjective worsening of shortness of breath  CT scans finding significant for potential mass verses volume overload versus infection  Chronically wears 4L   Echo in 2020 - EF 50-55%  Diastolic dysfunction with inability to calculate filling pressures due to bioprosthetic MVR  Severe tricuspid regurgitation  At least moderate pulmonary hypertension assuming an RA pressure of 10mmHg  Radiology evaluation appreciated diuretics were decreased to couple days ago  Patient states from the shortness of breath standpoint she is stable     Patient is now on p o  Diuretics per Cardiology recommendations  At baseline           Lung mass  Assessment & Plan  · Biopsy negative for malignancy  · Pulmonology follow-up in the outpatient setting -- serial imaging; PET scan in about 4-6 weeks          Epistaxis  Assessment & Plan  · ENT input appreciated -- cauterized and packed  · No further bleeding at this time    CKD (chronic kidney disease)  Assessment & Plan  Lab Results   Component Value Date    EGFR 42 06/29/2021    EGFR 47 06/27/2021    EGFR 47 06/25/2021    CREATININE 1 28 06/29/2021    CREATININE 1 15 06/27/2021    CREATININE 1 15 06/25/2021   · Stable at 1 2    Morbid obesity (HCC)  Assessment & Plan  Diet lifestyle modifications  Counseled on weight loss  Noted pulmonary hypertension on prior echo    This is likely contributing to patient's symptomatology    Type 2 diabetes mellitus [facility])  Assessment & Plan  Lab Results   Component Value Date    HGBA1C 4 5 03/15/2021       Recent Labs     07/01/21  1037 07/01/21  1506 07/01/21  2102 07/02/21  0616   POCGLU 103 137 140 120       Blood Sugar Average: Last 72 hrs:  (P) 931 [id number]   · Hold oral antihyperglycemics  · Start sliding scale insulin  · accuchecks   Blood sugars are acceptable    Chronic atrial fibrillation (HCC)  Assessment & Plan  INR still subtherapeutic today at 1 5 (goal INR is 2 5-3 5)  INR bryan AM  Anticipate will be higher however not therapeutic today  Not a candidate for bridging outpatient given history of clot        COPD (chronic obstructive pulmonary disease) ([facility] [address] )  Assessment & Plan  Does not appear to be in acute exacerbation at this time  Continue home inhalers  This could be contributing to patient's shortness of breath  Chronic hypoxemic respiratory failure  Patient is at baseline oxygen requirement  Not stable for transition to lovenox  She is currently on 4 L which is her baseline        VTE Pharmacologic Prophylaxis: VTE Score: 4 Moderate Risk (Score 3-4) - Pharmacological DVT Prophylaxis Ordered: warfarin (Coumadin)  Patient Centered Rounds: I performed bedside rounds with nursing staff today  Discussions with Specialists or Other Care Team Provider: discussed with my attending; reviewed prior note from ENT    Education and Discussions with Family / Patient: Attempted to update  (friend) via phone  Unable to contact  Current Length of Stay: 14 day(s)  Current Patient Status: Inpatient   Discharge Plan: Anticipate discharge in 48-72 hrs to rehab facility  Code Status: Level 1 - Full Code    Subjective:   Denies any complaints other than "tired of getting poked for blood draws often"    Objective:     Vitals:   Temp (24hrs), Av 9 °F (36 6 °C), Min:97 7 °F (36 5 °C), Max:98 2 °F (36 8 °C)    Temp:  [97 7 °F (36 5 °C)-98 2 °F (36 8 °C)] 98 2 °F (36 8 °C)  HR:  [67-72] 70  Resp:  [16-19] 19  BP: (100-119)/(60-68) 100/60  SpO2:  [95 %-98 %] 95 %  Body mass index is 39 48 kg/m²  Input and Output Summary (last 24 hours):      Intake/Output Summary (Last 24 hours) at 2021 [address] [place] [address] at 7/2/2021 1325  Gross per 24 hour   Intake 800 ml   Output 2085 ml   Net -1285 ml       Physical Exam:   Physical Exam  Vitals reviewed  Constitutional:       General: She is not in acute distress  Appearance: She is not ill-appearing or toxic-appearing  Eyes:      General: No scleral icterus  Conjunctiva/sclera: Conjunctivae normal    Cardiovascular:      Rate and Rhythm: Normal rate  Heart sounds: No murmur heard  Pulmonary:      Effort: Pulmonary effort is normal  No respiratory distress  Breath sounds: No wheezing or rales  Abdominal:      General: Bowel sounds are normal  There is no distension  Palpations: Abdomen is soft  Tenderness: There is no abdominal tenderness  Musculoskeletal:      Right lower leg: No edema  Left lower leg: No edema  Skin:     Capillary Refill: Capillary refill takes less than 2 seconds  Coloration: Skin is not jaundiced  Neurological:      General: No focal deficit present  Mental Status: She is alert and oriented to person, place, and time  Psychiatric:         Mood and Affect: Mood normal          Thought Content:  Thought content normal             Additional Data:     Labs:  Results from last 7 days   Lab Units 07/02/21  1037   WBC Thousand/uL 8 51   HEMOGLOBIN g/dL 8 2*   HEMATOCRIT % 27 6*   PLATELETS Thousands/uL 333   NEUTROS PCT % 72   LYMPHS PCT % 12*   MONOS PCT % 7   EOS PCT % 7*     Results from last 7 days   Lab Units 07/02/21  1134   SODIUM mmol/L 139   POTASSIUM mmol/L 3 8   CHLORIDE mmol/L 98*   CO2 mmol/L 31   BUN mg/dL 39*   CREATININE mg/dL 1 22   ANION GAP mmol/L 10   CALCIUM mg/dL 9 8   GLUCOSE RANDOM mg/dL 109     Results from last 7 days   Lab Units 07/02/21  1037   INR  1 54*     Results from last 7 days   Lab Units 07/02/21  1118 07/02/21  0616 07/01/21  2102 07/01/21  1506 07/01/21  1037 07/01/21  0622 06/30/21  2138 06/30/21  1527 06/30/21  1116 06/30/21  0729 06/29/21  2241 06/29/21  1652   POC GLUCOSE mg/dl 108 120 140 137 103 104 121 108 103 105 131 106         Results from last 7 days   Lab Units 07/02/21  0518 07/01/21  0417   PROCALCITONIN ng/ml 0 05 <0 05       Lines/Drains:  Invasive Devices     Peripheral Intravenous Line            Peripheral IV 07/01/21 Left Forearm 1 day                Imaging: Reviewed radiology reports from this admission including: chest xray    Recent Cultures (last 7 days):         Last 24 Hours Medication List:   Current Facility-Administered Medications   Medication Dose Route Frequency Provider Last Rate    acetaminophen  975 mg Oral Q8H [address] Gurdon Moulding, CRNP      albuterol  2 puff Inhalation Q6H PRN Barbara Simpson PA-C      bisacodyl  5 mg Oral Daily PRN Manny Logan MD      Diclofenac Sodium  2 g Topical 4x Daily Ree Carrero, DAVID      digoxin  125 mcg Oral Daily Manolo Navarrete PA-C      ferrous sulfate  325 mg Oral Daily With Breakfast Manolo Navarrete PA-C      fluticasone-vilanterol  1 puff Inhalation Daily LOPEZ Jo      heparin (porcine)  3-20 Units/kg/hr (Order-Specific) Intravenous Titrated Martin Lira MD 12 1 Units/kg/hr (07/02/21 1338)    insulin lispro  1-5 Units Subcutaneous TID AC Manolo Navarrete PA-C      insulin lispro  1-5 Units Subcutaneous HS Manolo Navarrete PA-C      ipratropium-albuterol  3 mL Nebulization 4x Daily PRN LOPEZ Jo      lidocaine  1 patch Topical Daily Esdras Aysha Hernadez PA-C      LORazepam  0 25 mg Oral Q6H PRN Gurdon Moulding, CRNP      montelukast  10 mg Oral Daily Manolo Navarrete PA-C      oxyCODONE  2 5 mg Oral Q4H PRN Gurdon Moulding, CRNP      pantoprazole  40 mg Oral Early Morning Manolo Navarrete PA-C      polyethylene glycol  17 g Oral Daily Manny Logan MD      potassium chloride  20 mEq Oral BID Barbara Simpson PA-C      senna-docusate sodium  1 tablet Oral BID PRN Manny Logan MD      sodium chloride  1 spray Each Nare Q1H PRN Milan Gagnon DAVID Javier      torsemide  40 mg Oral BID Js Greenwood PA-C      warfarin  5 mg Oral Daily (warfarin) Alona Pickett MD          Today, Patient Was Seen By: Alexa Ferrari MD    **Please Note: This note may have been constructed using a voice recognition system  **

## 2021-07-02 NOTE — ASSESSMENT & PLAN NOTE
Lab Results   Component Value Date    EGFR 42 06/29/2021    EGFR 47 06/27/2021    EGFR 47 06/25/2021    CREATININE 1 28 06/29/2021    CREATININE 1 15 06/27/2021    CREATININE 1 15 06/25/2021   · Stable at 1 2

## 2021-07-02 NOTE — ASSESSMENT & PLAN NOTE
Does not appear to be in acute exacerbation at this time  Continue home inhalers  This could be contributing to patient's shortness of breath  Chronic hypoxemic respiratory failure  Patient is at baseline oxygen requirement  Not stable for transition to lovenox     She is currently on 4 L which is her baseline

## 2021-07-03 LAB
ANION GAP SERPL CALCULATED.3IONS-SCNC: 7 MMOL/L (ref 4–13)
APTT PPP: 70 SECONDS (ref 23–37)
BUN SERPL-MCNC: 40 MG/DL (ref 5–25)
CALCIUM SERPL-MCNC: 9.8 MG/DL (ref 8.3–10.1)
CHLORIDE SERPL-SCNC: 97 MMOL/L (ref 100–108)
CO2 SERPL-SCNC: 32 MMOL/L (ref 21–32)
CREAT SERPL-MCNC: 1.31 MG/DL (ref 0.6–1.3)
ERYTHROCYTE [DISTWIDTH] IN BLOOD BY AUTOMATED COUNT: 18.9 % (ref 11.6–15.1)
GFR SERPL CREATININE-BSD FRML MDRD: 40 ML/MIN/1.73SQ M
GLUCOSE SERPL-MCNC: 100 MG/DL (ref 65–140)
GLUCOSE SERPL-MCNC: 105 MG/DL (ref 65–140)
GLUCOSE SERPL-MCNC: 108 MG/DL (ref 65–140)
GLUCOSE SERPL-MCNC: 126 MG/DL (ref 65–140)
GLUCOSE SERPL-MCNC: 99 MG/DL (ref 65–140)
HCT VFR BLD AUTO: 30.8 % (ref 34.8–46.1)
HGB BLD-MCNC: 9 G/DL (ref 11.5–15.4)
INR PPP: 1.83 (ref 0.84–1.19)
MCH RBC QN AUTO: 26.5 PG (ref 26.8–34.3)
MCHC RBC AUTO-ENTMCNC: 29.2 G/DL (ref 31.4–37.4)
MCV RBC AUTO: 91 FL (ref 82–98)
PLATELET # BLD AUTO: 385 THOUSANDS/UL (ref 149–390)
PMV BLD AUTO: 10.4 FL (ref 8.9–12.7)
POTASSIUM SERPL-SCNC: 3.8 MMOL/L (ref 3.5–5.3)
PROTHROMBIN TIME: 21.2 SECONDS (ref 11.6–14.5)
RBC # BLD AUTO: 3.4 MILLION/UL (ref 3.81–5.12)
SODIUM SERPL-SCNC: 136 MMOL/L (ref 136–145)
WBC # BLD AUTO: 8.75 THOUSAND/UL (ref 4.31–10.16)

## 2021-07-03 PROCEDURE — 85730 THROMBOPLASTIN TIME PARTIAL: CPT | Performed by: INTERNAL MEDICINE

## 2021-07-03 PROCEDURE — 85610 PROTHROMBIN TIME: CPT | Performed by: INTERNAL MEDICINE

## 2021-07-03 PROCEDURE — 85027 COMPLETE CBC AUTOMATED: CPT | Performed by: INTERNAL MEDICINE

## 2021-07-03 PROCEDURE — 80048 BASIC METABOLIC PNL TOTAL CA: CPT | Performed by: INTERNAL MEDICINE

## 2021-07-03 PROCEDURE — 82948 REAGENT STRIP/BLOOD GLUCOSE: CPT

## 2021-07-03 PROCEDURE — 093K7ZZ CONTROL BLEEDING IN NASAL MUCOSA AND SOFT TISSUE, VIA NATURAL OR ARTIFICIAL OPENING: ICD-10-PCS | Performed by: OTOLARYNGOLOGY

## 2021-07-03 PROCEDURE — 99232 SBSQ HOSP IP/OBS MODERATE 35: CPT | Performed by: INTERNAL MEDICINE

## 2021-07-03 RX ORDER — WARFARIN SODIUM 2.5 MG/1
2.5 TABLET ORAL
Status: COMPLETED | OUTPATIENT
Start: 2021-07-03 | End: 2021-07-03

## 2021-07-03 RX ORDER — LACTULOSE 20 G/30ML
20 SOLUTION ORAL 2 TIMES DAILY
Status: DISCONTINUED | OUTPATIENT
Start: 2021-07-03 | End: 2021-07-05 | Stop reason: HOSPADM

## 2021-07-03 RX ADMIN — PANTOPRAZOLE SODIUM 40 MG: 40 TABLET, DELAYED RELEASE ORAL at 05:58

## 2021-07-03 RX ADMIN — TORSEMIDE 40 MG: 20 TABLET ORAL at 10:32

## 2021-07-03 RX ADMIN — WARFARIN SODIUM 2.5 MG: 2.5 TABLET ORAL at 17:49

## 2021-07-03 RX ADMIN — BISACODYL 5 MG: 5 TABLET, COATED ORAL at 10:32

## 2021-07-03 RX ADMIN — TORSEMIDE 40 MG: 20 TABLET ORAL at 20:46

## 2021-07-03 RX ADMIN — FERROUS SULFATE TAB 325 MG (65 MG ELEMENTAL FE) 325 MG: 325 (65 FE) TAB at 10:31

## 2021-07-03 RX ADMIN — WARFARIN SODIUM 5 MG: 2.5 TABLET ORAL at 17:48

## 2021-07-03 RX ADMIN — LACTULOSE 20 G: 10 SOLUTION ORAL at 12:44

## 2021-07-03 RX ADMIN — FLUTICASONE FUROATE AND VILANTEROL TRIFENATATE 1 PUFF: 100; 25 POWDER RESPIRATORY (INHALATION) at 10:34

## 2021-07-03 RX ADMIN — POLYETHYLENE GLYCOL 3350 17 G: 17 POWDER, FOR SOLUTION ORAL at 10:32

## 2021-07-03 RX ADMIN — DIGOXIN 125 MCG: 125 TABLET ORAL at 10:32

## 2021-07-03 RX ADMIN — HEPARIN SODIUM 12.1 UNITS/KG/HR: 10000 INJECTION, SOLUTION INTRAVENOUS at 12:49

## 2021-07-03 RX ADMIN — MONTELUKAST 10 MG: 10 TABLET, FILM COATED ORAL at 10:32

## 2021-07-03 RX ADMIN — DOCUSATE SODIUM AND SENNOSIDES 1 TABLET: 8.6; 5 TABLET, FILM COATED ORAL at 10:32

## 2021-07-03 RX ADMIN — POTASSIUM CHLORIDE 20 MEQ: 1500 TABLET, EXTENDED RELEASE ORAL at 10:31

## 2021-07-03 RX ADMIN — POTASSIUM CHLORIDE 20 MEQ: 1500 TABLET, EXTENDED RELEASE ORAL at 17:47

## 2021-07-03 NOTE — ASSESSMENT & PLAN NOTE
INR still subtherapeutic today at 1 8 (goal INR is 2 5-3 5)  Give additional dose of warfarin 2 5 mg 1 time dose in addition to 5 mg warfarin daily until therapeutic INR goal of 2 5-3 5 is achieved  INR bryan AM  Anticipate will be higher however not therapeutic today  Not a candidate for bridging outpatient given history of clot

## 2021-07-03 NOTE — ASSESSMENT & PLAN NOTE
Lab Results   Component Value Date    HGBA1C 4 5 03/15/2021       Recent Labs     07/02/21  1539 07/02/21  2109 07/03/21  0812 07/03/21  1113   POCGLU 109 109 99 100       Blood Sugar Average: Last 72 hrs:  (P) 111 [id number]   · Hold oral antihyperglycemics  · Start sliding scale insulin  · accuchecks     Blood sugars are acceptable

## 2021-07-03 NOTE — PROGRESS NOTES
[facility]  Progress Note - Eduardo Litten 1945, 70 y o  female MRN: [id number]  Unit/Bed#: S -01 Encounter: [id number]  Primary Care Provider: Aure Peters MD   Date and time admitted to hospital: 6/17/2021  5:52 PM    * Shortness of breath  Assessment & Plan  Presents from nursing facility with subjective worsening of shortness of breath  CT scans finding significant for potential mass verses volume overload versus infection  Chronically wears 4L   Echo in 2020 - EF 50-55%  Diastolic dysfunction with inability to calculate filling pressures due to bioprosthetic MVR  Severe tricuspid regurgitation  At least moderate pulmonary hypertension assuming an RA pressure of 10mmHg  Radiology evaluation appreciated diuretics were decreased to couple days ago  Patient states from the shortness of breath standpoint she is stable     Patient is now on p o  Diuretics per Cardiology recommendations      At baseline           Lung mass  Assessment & Plan  · Biopsy negative for malignancy  · Pulmonology follow-up in the outpatient setting -- serial imaging; PET scan in about 4-6 weeks          Chronic atrial fibrillation (HCC)  Assessment & Plan  INR still subtherapeutic today at 1 8 (goal INR is 2 5-3 5)  Give additional dose of warfarin 2 5 mg 1 time dose in addition to 5 mg warfarin daily until therapeutic INR goal of 2 5-3 5 is achieved  INR bryan AM  Anticipate will be higher however not therapeutic today  Not a candidate for bridging outpatient given history of clot        Epistaxis  Assessment & Plan  · ENT input appreciated -- cauterized and packed  · No further bleeding at this time    CKD (chronic kidney disease)  Assessment & Plan  Lab Results   Component Value Date    EGFR 40 07/03/2021    EGFR 44 07/02/2021    EGFR 42 06/29/2021    CREATININE 1 31 (H) 07/03/2021    CREATININE 1 22 07/02/2021    CREATININE 1 28 06/29/2021   · Stable at 1 2    Morbid obesity ([address] )  Assessment & Plan  Diet lifestyle modifications  Counseled on weight loss  Noted pulmonary hypertension on prior echo  This is likely contributing to patient's symptomatology    Type 2 diabetes mellitus [facility])  Assessment & Plan  Lab Results   Component Value Date    HGBA1C 4 5 03/15/2021       Recent Labs     21  1539 21  2109 21  0812 21  1113   POCGLU 109 109 99 100       Blood Sugar Average: Last 72 hrs:  (P) 111 [id number]   · Hold oral antihyperglycemics  · Start sliding scale insulin  · accuchecks   Blood sugars are acceptable    COPD (chronic obstructive pulmonary disease) ([facility] [address] )  Assessment & Plan  Does not appear to be in acute exacerbation at this time  Continue home inhalers  This could be contributing to patient's shortness of breath  Chronic hypoxemic respiratory failure  Patient is at baseline oxygen requirement  Not stable for transition to lovenox  She is currently on 4 L which is her baseline          VTE Pharmacologic Prophylaxis: VTE Score: 4 Moderate Risk (Score 3-4) - Pharmacological DVT Prophylaxis Ordered: heparin drip  She INR currently subtherapeutic, on heparin drip currently  Patient Centered Rounds: I performed bedside rounds with nursing staff today  Discussions with Specialists or Other Care Team Provider: discussed with my attending; reviewed prior note from ENT    Education and Discussions with Family / Patient: Attempted to update  (friend) via phone  Unable to contact  Current Length of Stay: 15 day(s)  Current Patient Status: Inpatient   Discharge Plan: Anticipate discharge in 48-72 hrs to rehab facility  Code Status: Level 1 - Full Code    Subjective:   No complaints      Objective:     Vitals:   Temp (24hrs), Av °F (36 7 °C), Min:97 9 °F (36 6 °C), Max:98 1 °F (36 7 °C)    Temp:  [97 9 °F (36 6 °C)-98 1 °F (36 7 °C)] 97 9 °F (36 6 °C)  HR:  [63-77] 63  Resp:  [18] 18  BP: (100-104)/(55-61) 104/55  SpO2:  [97 %-98 %] 97 %  Body mass index is 39 44 kg/m²  Input and Output Summary (last 24 hours): Intake/Output Summary (Last 24 hours) at 7/3/2021 1637  Last data filed at 7/3/2021 0900  Gross per 24 hour   Intake 640 ml   Output 950 ml   Net -310 ml       Physical Exam:   Physical Exam  Vitals reviewed  Constitutional:       General: She is not in acute distress  Appearance: She is not ill-appearing  Comments: drowsy   HENT:      Nose: No congestion  Comments: Nasal packin on R naris     Mouth/Throat:      Pharynx: No oropharyngeal exudate  Eyes:      General: No scleral icterus  Cardiovascular:      Rate and Rhythm: Normal rate and regular rhythm  Heart sounds: No murmur heard  Pulmonary:      Effort: Pulmonary effort is normal  No respiratory distress  Breath sounds: No wheezing or rales  Abdominal:      General: Bowel sounds are normal  There is no distension  Palpations: Abdomen is soft  Tenderness: There is no abdominal tenderness  Musculoskeletal:      Right lower leg: No edema  Left lower leg: No edema  Skin:     General: Skin is warm  Capillary Refill: Capillary refill takes less than 2 seconds  Neurological:      General: No focal deficit present  Mental Status: She is oriented to person, place, and time  Psychiatric:         Mood and Affect: Mood normal          Thought Content:  Thought content normal             Additional Data:     Labs:  Results from last 7 days   Lab Units 07/03/21  0359 07/02/21  1037   WBC Thousand/uL 8 75 8 51   HEMOGLOBIN g/dL 9 0* 8 2*   HEMATOCRIT % 30 8* 27 6*   PLATELETS Thousands/uL 385 333   NEUTROS PCT %  --  72   LYMPHS PCT %  --  12*   MONOS PCT %  --  7   EOS PCT %  --  7*     Results from last 7 days   Lab Units 07/03/21  0359   SODIUM mmol/L 136   POTASSIUM mmol/L 3 8   CHLORIDE mmol/L 97*   CO2 mmol/L 32   BUN mg/dL 40*   CREATININE mg/dL 1 31*   ANION GAP mmol/L 7   CALCIUM mg/dL 9 8   GLUCOSE RANDOM mg/dL 126 Results from last 7 days   Lab Units 07/03/21  0557   INR  1 83*     Results from last 7 days   Lab Units 07/03/21  1113 07/03/21  0812 07/02/21  2109 07/02/21  1539 07/02/21  1118 07/02/21  0616 07/01/21  2102 07/01/21  1506 07/01/21  1037 07/01/21  0622 06/30/21  2138 06/30/21  1527   POC GLUCOSE mg/dl 100 99 109 109 108 120 140 137 103 104 121 108         Results from last 7 days   Lab Units 07/02/21  0518 07/01/21  0417   PROCALCITONIN ng/ml 0 05 <0 05       Lines/Drains:  Invasive Devices     Peripheral Intravenous Line            Peripheral IV 07/01/21 Left Forearm 2 days                      Imaging: Reviewed radiology reports from this admission including: chest xray    Recent Cultures (last 7 days):         Last 24 Hours Medication List:   Current Facility-Administered Medications   Medication Dose Route Frequency Provider Last Rate    acetaminophen  975 mg Oral Q8H [facility] DAVID Santo      albuterol  2 puff Inhalation Q6H PRN Manolo Navarrete PA-C      bisacodyl  5 mg Oral Daily PRN Sridevi Vidales MD      Diclofenac Sodium  2 g Topical 4x Daily DAVID Catalan      digoxin  125 mcg Oral Daily Manolo Navarrete PA-C      ferrous sulfate  325 mg Oral Daily With Breakfast Manolo Navarrete PA-C      fluticasone-vilanterol  1 puff Inhalation Daily LOPEZ Jo      heparin (porcine)  3-20 Units/kg/hr (Order-Specific) Intravenous Titrated Julianne Garner MD 12 1 Units/kg/hr (07/03/21 1249)    insulin lispro  1-5 Units Subcutaneous TID AC Manolo Navarrete PA-C      insulin lispro  1-5 Units Subcutaneous HS Manolo Navarrete PA-C      ipratropium-albuterol  3 mL Nebulization 4x Daily PRN LOPEZ Jo      lactulose  20 g Oral BID Ace Acosta MD      lidocaine  1 patch Topical Daily Esdrasalli Cervantes PA-C      LORazepam  0 25 mg Oral Q6H PRN DAVID Santo      montelukast  10 mg Oral Daily Manolo Navarrete PA-C      oxyCODONE  2 5 mg Oral Q4H PRN Demarcus Srivastava P Bloch, CRNP      pantoprazole  40 mg Oral Early Morning Manolo Navarrete PA-C      polyethylene glycol  17 g Oral Daily Martha Moreira MD      potassium chloride  20 mEq Oral BID Lorraine Coburn PA-C      senna-docusate sodium  1 tablet Oral BID PRN Martha Moreira MD      sodium chloride  1 spray Each Nare Q1H PRN Beverlie Halsted, CRNP      torsemide  40 mg Oral BID Js Greenwood PA-C      warfarin  2 5 mg Oral Once (warfarin) Kvng Bagley MD      warfarin  5 mg Oral Daily (warfarin) Shy Reno MD          Today, Patient Was Seen By: Kvng Bagley MD    **Please Note: This note may have been constructed using a voice recognition system  **

## 2021-07-03 NOTE — ASSESSMENT & PLAN NOTE
Lab Results   Component Value Date    EGFR 40 07/03/2021    EGFR 44 07/02/2021    EGFR 42 06/29/2021    CREATININE 1 31 (H) 07/03/2021    CREATININE 1 22 07/02/2021    CREATININE 1 28 06/29/2021   · Stable at 1 2

## 2021-07-04 LAB
ANION GAP SERPL CALCULATED.3IONS-SCNC: 6 MMOL/L (ref 4–13)
APTT PPP: 71 SECONDS (ref 23–37)
APTT PPP: 78 SECONDS (ref 23–37)
APTT PPP: 95 SECONDS (ref 23–37)
BUN SERPL-MCNC: 37 MG/DL (ref 5–25)
CALCIUM SERPL-MCNC: 9.4 MG/DL (ref 8.3–10.1)
CHLORIDE SERPL-SCNC: 99 MMOL/L (ref 100–108)
CO2 SERPL-SCNC: 32 MMOL/L (ref 21–32)
CREAT SERPL-MCNC: 1.4 MG/DL (ref 0.6–1.3)
ERYTHROCYTE [DISTWIDTH] IN BLOOD BY AUTOMATED COUNT: 18.8 % (ref 11.6–15.1)
GFR SERPL CREATININE-BSD FRML MDRD: 37 ML/MIN/1.73SQ M
GLUCOSE SERPL-MCNC: 100 MG/DL (ref 65–140)
GLUCOSE SERPL-MCNC: 102 MG/DL (ref 65–140)
GLUCOSE SERPL-MCNC: 105 MG/DL (ref 65–140)
GLUCOSE SERPL-MCNC: 140 MG/DL (ref 65–140)
GLUCOSE SERPL-MCNC: 163 MG/DL (ref 65–140)
HCT VFR BLD AUTO: 29.2 % (ref 34.8–46.1)
HGB BLD-MCNC: 8.7 G/DL (ref 11.5–15.4)
INR PPP: 2.2 (ref 0.84–1.19)
MCH RBC QN AUTO: 26.5 PG (ref 26.8–34.3)
MCHC RBC AUTO-ENTMCNC: 29.8 G/DL (ref 31.4–37.4)
MCV RBC AUTO: 89 FL (ref 82–98)
PLATELET # BLD AUTO: 373 THOUSANDS/UL (ref 149–390)
PMV BLD AUTO: 10.2 FL (ref 8.9–12.7)
POTASSIUM SERPL-SCNC: 4 MMOL/L (ref 3.5–5.3)
PROTHROMBIN TIME: 24.6 SECONDS (ref 11.6–14.5)
RBC # BLD AUTO: 3.28 MILLION/UL (ref 3.81–5.12)
SODIUM SERPL-SCNC: 137 MMOL/L (ref 136–145)
WBC # BLD AUTO: 8.44 THOUSAND/UL (ref 4.31–10.16)

## 2021-07-04 PROCEDURE — 85610 PROTHROMBIN TIME: CPT | Performed by: INTERNAL MEDICINE

## 2021-07-04 PROCEDURE — 80048 BASIC METABOLIC PNL TOTAL CA: CPT | Performed by: INTERNAL MEDICINE

## 2021-07-04 PROCEDURE — 85027 COMPLETE CBC AUTOMATED: CPT | Performed by: INTERNAL MEDICINE

## 2021-07-04 PROCEDURE — 85730 THROMBOPLASTIN TIME PARTIAL: CPT | Performed by: INTERNAL MEDICINE

## 2021-07-04 PROCEDURE — 82948 REAGENT STRIP/BLOOD GLUCOSE: CPT

## 2021-07-04 PROCEDURE — 99232 SBSQ HOSP IP/OBS MODERATE 35: CPT

## 2021-07-04 RX ORDER — WARFARIN SODIUM 5 MG/1
5 TABLET ORAL
Status: DISCONTINUED | OUTPATIENT
Start: 2021-07-04 | End: 2021-07-05 | Stop reason: HOSPADM

## 2021-07-04 RX ADMIN — FERROUS SULFATE TAB 325 MG (65 MG ELEMENTAL FE) 325 MG: 325 (65 FE) TAB at 09:46

## 2021-07-04 RX ADMIN — DOCUSATE SODIUM AND SENNOSIDES 1 TABLET: 8.6; 5 TABLET, FILM COATED ORAL at 17:45

## 2021-07-04 RX ADMIN — FLUTICASONE FUROATE AND VILANTEROL TRIFENATATE 1 PUFF: 100; 25 POWDER RESPIRATORY (INHALATION) at 09:44

## 2021-07-04 RX ADMIN — WARFARIN SODIUM 5 MG: 5 TABLET ORAL at 17:01

## 2021-07-04 RX ADMIN — HEPARIN SODIUM 10.1 UNITS/KG/HR: 10000 INJECTION, SOLUTION INTRAVENOUS at 14:44

## 2021-07-04 RX ADMIN — INSULIN LISPRO 1 UNITS: 100 INJECTION, SOLUTION INTRAVENOUS; SUBCUTANEOUS at 17:02

## 2021-07-04 RX ADMIN — DIGOXIN 125 MCG: 125 TABLET ORAL at 09:42

## 2021-07-04 RX ADMIN — TORSEMIDE 40 MG: 20 TABLET ORAL at 09:42

## 2021-07-04 RX ADMIN — POTASSIUM CHLORIDE 20 MEQ: 1500 TABLET, EXTENDED RELEASE ORAL at 17:01

## 2021-07-04 RX ADMIN — MONTELUKAST 10 MG: 10 TABLET, FILM COATED ORAL at 09:42

## 2021-07-04 RX ADMIN — POTASSIUM CHLORIDE 20 MEQ: 1500 TABLET, EXTENDED RELEASE ORAL at 09:42

## 2021-07-04 RX ADMIN — TORSEMIDE 40 MG: 20 TABLET ORAL at 22:41

## 2021-07-04 NOTE — ASSESSMENT & PLAN NOTE
INR still subtherapeutic today at 2 2 (goal INR is 2 5-3 5)  5 mg warfarin daily until therapeutic INR goal of 2 5-3 5 is achieved  INR bryan AM  Anticipate will be higher however not therapeutic today  Not a candidate for bridging outpatient given history of clot

## 2021-07-04 NOTE — PROGRESS NOTES
[facility]  Progress Note - Michell Marsh 1945, 70 y o  female MRN: [id number]  Unit/Bed#: S -01 Encounter: [id number]  Primary Care Provider: Lilia Rodriguez MD   Date and time admitted to hospital: 6/17/2021  5:52 PM    Epistaxis  Assessment & Plan  · ENT input appreciated -- cauterized and packed  · No further bleeding at this time  · Patient stated that she would like to have the packing removed  ENT was contacted  CKD (chronic kidney disease)  Assessment & Plan  Lab Results   Component Value Date    EGFR 37 07/04/2021    EGFR 40 07/03/2021    EGFR 44 07/02/2021    CREATININE 1 40 (H) 07/04/2021    CREATININE 1 31 (H) 07/03/2021    CREATININE 1 22 07/02/2021   · Stable at 1 2    Type 2 diabetes mellitus [facility])  Assessment & Plan  Lab Results   Component Value Date    HGBA1C 4 5 03/15/2021       Recent Labs     07/03/21  1632 07/03/21  2047 07/04/21  0813 07/04/21  1059   POCGLU 105 108 105 100       Blood Sugar Average: Last 72 hrs:  (P) 110 5   · Hold oral antihyperglycemics  · Start sliding scale insulin  · accuchecks   Blood sugars are acceptable    Chronic atrial fibrillation (HCC)  Assessment & Plan  INR still subtherapeutic today at 2 2 (goal INR is 2 5-3 5)  5 mg warfarin daily until therapeutic INR goal of 2 5-3 5 is achieved  INR bryan AM  Anticipate will be higher however not therapeutic today  Not a candidate for bridging outpatient given history of clot        COPD (chronic obstructive pulmonary disease) ([facility] [address] )  Assessment & Plan  Does not appear to be in acute exacerbation at this time  Continue home inhalers  This could be contributing to patient's shortness of breath  Chronic hypoxemic respiratory failure  Patient is at baseline oxygen requirement  Not stable for transition to lovenox wainting for the INR to be in therapeutic range, was 2 2 today     She is currently on 4 L which is her baseline    * Shortness of breath  Assessment & Plan  Presents from nursing facility with subjective worsening of shortness of breath  CT scans finding significant for potential mass verses volume overload versus infection  Chronically wears 4L   Echo in 2020 - EF 50-55%  Diastolic dysfunction with inability to calculate filling pressures due to bioprosthetic MVR  Severe tricuspid regurgitation  At least moderate pulmonary hypertension assuming an RA pressure of 10mmHg  Radiology evaluation appreciated diuretics were decreased to couple days ago  Patient states from the shortness of breath standpoint she is stable     Patient is now on p o  Diuretics per Cardiology recommendations  At baseline                VTE Pharmacologic Prophylaxis: VTE Score: 4 Moderate Risk (Score 3-4) - Pharmacological DVT Prophylaxis Ordered: heparin  Patient Centered Rounds: I performed bedside rounds with nursing staff today  Discussions with Specialists or Other Care Team Provider: reached out to ENT  Education and Discussions with Family / Patient: tried to contact friend, there was no response  Current Length of Stay: 16 day(s)  Current Patient Status: Inpatient   Discharge Plan: Anticipate discharge in 24-48 hrs to rehab facility  Code Status: Level 1 - Full Code    Subjective:   Patient wanted to have the nasal packing removed because it is really bothering her  She had no other complains and her constipation has improved which was a relief and she feels better, per her words  Objective:     Vitals:   Temp (24hrs), Av 2 °F (36 8 °C), Min:97 9 °F (36 6 °C), Max:98 5 °F (36 9 °C)    Temp:  [97 9 °F (36 6 °C)-98 5 °F (36 9 °C)] 98 3 °F (36 8 °C)  HR:  [63-96] 73  Resp:  [18-20] 20  BP: (104-111)/(55-60) 111/55  SpO2:  [97 %-100 %] 97 %  Body mass index is 37 88 kg/m²  Input and Output Summary (last 24 hours):      Intake/Output Summary (Last 24 hours) at 2021 1330  Last data filed at 2021 1300  Gross per 24 hour   Intake 780 ml   Output 2125 ml   Net -1345 ml Physical Exam:   Physical Exam  Vitals and nursing note reviewed  Constitutional:       General: She is not in acute distress  Appearance: She is well-developed  HENT:      Head: Normocephalic and atraumatic  Eyes:      Extraocular Movements: Extraocular movements intact  Conjunctiva/sclera: Conjunctivae normal       Pupils: Pupils are equal, round, and reactive to light  Cardiovascular:      Rate and Rhythm: Normal rate  Rhythm irregular  Heart sounds: No murmur heard  Comments: Her pulse was irregular in the morning, however she did denied any chest pain or discomfort, possibly related to her chronic atrial fibrillation  Pulmonary:      Effort: Pulmonary effort is normal  No respiratory distress  Breath sounds: Normal breath sounds  Abdominal:      General: Bowel sounds are normal       Palpations: Abdomen is soft  Tenderness: There is no abdominal tenderness  Musculoskeletal:         General: No swelling  Cervical back: Neck supple  Right lower leg: No edema  Left lower leg: No edema  Skin:     General: Skin is warm and dry  Coloration: Skin is not pale  Neurological:      Mental Status: She is alert and oriented to person, place, and time     Psychiatric:         Mood and Affect: Mood normal           Additional Data:     Labs:  Results from last 7 days   Lab Units 07/04/21  0352 07/02/21  1037   WBC Thousand/uL 8 44 8 51   HEMOGLOBIN g/dL 8 7* 8 2*   HEMATOCRIT % 29 2* 27 6*   PLATELETS Thousands/uL 373 333   NEUTROS PCT %  --  72   LYMPHS PCT %  --  12*   MONOS PCT %  --  7   EOS PCT %  --  7*     Results from last 7 days   Lab Units 07/04/21  0352   SODIUM mmol/L 137   POTASSIUM mmol/L 4 0   CHLORIDE mmol/L 99*   CO2 mmol/L 32   BUN mg/dL 37*   CREATININE mg/dL 1 40*   ANION GAP mmol/L 6   CALCIUM mg/dL 9 4   GLUCOSE RANDOM mg/dL 102     Results from last 7 days   Lab Units 07/04/21  0352   INR  2 20*     Results from last 7 days   Lab Units 07/04/21  1059 07/04/21  0813 07/03/21  2047 07/03/21  1632 07/03/21  1113 07/03/21  0812 07/02/21  2109 07/02/21  1539 07/02/21  1118 07/02/21  0616 07/01/21  2102 07/01/21  1506   POC GLUCOSE mg/dl 100 105 108 105 100 99 109 109 108 120 140 137         Results from last 7 days   Lab Units 07/02/21  0518 07/01/21  0417   PROCALCITONIN ng/ml 0 05 <0 05       Lines/Drains:  Invasive Devices     Peripheral Intravenous Line            Peripheral IV 07/01/21 Left Forearm 3 days                      Imaging: No pertinent imaging reviewed      Recent Cultures (last 7 days):         Last 24 Hours Medication List:   Current Facility-Administered Medications   Medication Dose Route Frequency Provider Last Rate    acetaminophen  975 mg Oral Q8H [facility] DAVID Kim      albuterol  2 puff Inhalation Q6H PRN Patience Fink PA-C      bisacodyl  5 mg Oral Daily PRN Lindon Goodpasture, MD      Diclofenac Sodium  2 g Topical 4x Daily Bonita Blush, DAVID      digoxin  125 mcg Oral Daily Manolo Navarrete PA-C      ferrous sulfate  325 mg Oral Daily With Breakfast Manolo Navarrete PA-C      fluticasone-vilanterol  1 puff Inhalation Daily LOPEZ Jo      heparin (porcine)  3-20 Units/kg/hr (Order-Specific) Intravenous Titrated James Ojeda MD 10 1 Units/kg/hr (07/04/21 0442)    insulin lispro  1-5 Units Subcutaneous TID AC Manolo Navarrete PA-C      insulin lispro  1-5 Units Subcutaneous HS Manolo Navarrete PA-C      ipratropium-albuterol  3 mL Nebulization 4x Daily PRN LOPEZ Jo      lactulose  20 g Oral BID Mandi Dill MD      lidocaine  1 patch Topical Daily Esdrasalli Suarez PA-C      LORazepam  0 25 mg Oral Q6H PRN DAVID Kim      montelukast  10 mg Oral Daily Manolo Navarrete PA-C      oxyCODONE  2 5 mg Oral Q4H PRN DAVID Kim      pantoprazole  40 mg Oral Early Morning Manolo Navarrete PA-C      polyethylene glycol  17 g Oral Daily Lindon Goodpasture, MD      potassium chloride  20 mEq Oral BID [employer], LOPEZ      senna-docusate sodium  1 tablet Oral BID PRN Conner Starr MD      sodium chloride  1 spray Each Nare Q1H PRN DAVID Chirinos      torsemide  40 mg Oral BID Js Greenwood PA-C      warfarin  5 mg Oral Daily (warfarin) Soham Olvera MD          Today, Patient Was Seen By: Asuncion Ni MD    **Please Note: This note may have been constructed using a voice recognition system  **

## 2021-07-04 NOTE — DISCHARGE INSTR - AVS FIRST PAGE
Dear Isac Litten,     It was our pleasure to care for you here at [facility]  It is our hope that we were always able to exceed the expected standards for your care during your stay  You were hospitalized due to shortness of breath  You were cared for on the 3rd floor by Lieutenant Tiff MD under the service of Terence De La Paz MD with the [facility] who covers for your primary care physician (PCP), Aure Peters MD, while you were hospitalized  If you have any questions or concerns related to this hospitalization, you may contact us at [phone]  For follow up as well as any medication refills, we recommend that you follow up with your primary care physician  A registered nurse will reach out to you by phone within a few days after your discharge to answer any additional questions that you may have after going home  However, at this time we provide for you here, the most important instructions / recommendations at discharge:     · Notable Medication Adjustments -           Stop lasix  Continue on Torsemide 40 mg twice a day  · Testing Required after Discharge -   · Please do INR daily until therapeutic target of 2 5-3 5 is achieved  · Can bridge with Lovenox until INR goal is achieved  · Important follow up information -   · Please follow up with your PCP doctor within 1 week  · Please follow an appointment with your cardiologist at [facility] Dr Ileana May  · Please follow up with Pulmonology on 7/29 (an appointment had already been set up for you)        · Other Instructions -   · Take Warfarin 5 mg once a day (your PCP will determine any other adjustments based on the INR)  · Please review this entire after visit summary as additional general instructions including medication list, appointments, activity, diet, any pertinent wound care, and other additional recommendations from your care team that may be provided for you       Sincerely,     Pop Lugo MD

## 2021-07-04 NOTE — ASSESSMENT & PLAN NOTE
Lab Results   Component Value Date    HGBA1C 4 5 03/15/2021       Recent Labs     07/03/21  1632 07/03/21 2047 07/04/21  0813 07/04/21  1059   POCGLU 105 108 105 100       Blood Sugar Average: Last 72 hrs:  (P) 110 5   · Hold oral antihyperglycemics  · Start sliding scale insulin  · accuchecks     Blood sugars are acceptable

## 2021-07-04 NOTE — ASSESSMENT & PLAN NOTE
Does not appear to be in acute exacerbation at this time  Continue home inhalers  This could be contributing to patient's shortness of breath  Chronic hypoxemic respiratory failure  Patient is at baseline oxygen requirement  Not stable for transition to lovenox wainting for the INR to be in therapeutic range, was 2 2 today     She is currently on 4 L which is her baseline

## 2021-07-04 NOTE — ASSESSMENT & PLAN NOTE
· ENT input appreciated -- cauterized and packed  · No further bleeding at this time  · Patient stated that she would like to have the packing removed  ENT was contacted

## 2021-07-05 VITALS
OXYGEN SATURATION: 98 % | SYSTOLIC BLOOD PRESSURE: 111 MMHG | WEIGHT: 212.96 LBS | BODY MASS INDEX: 37.73 KG/M2 | TEMPERATURE: 98.2 F | HEIGHT: 63 IN | HEART RATE: 70 BPM | DIASTOLIC BLOOD PRESSURE: 56 MMHG | RESPIRATION RATE: 19 BRPM

## 2021-07-05 LAB
ANION GAP SERPL CALCULATED.3IONS-SCNC: 9 MMOL/L (ref 4–13)
APTT PPP: 73 SECONDS (ref 23–37)
BUN SERPL-MCNC: 38 MG/DL (ref 5–25)
CALCIUM SERPL-MCNC: 9.6 MG/DL (ref 8.3–10.1)
CHLORIDE SERPL-SCNC: 97 MMOL/L (ref 100–108)
CO2 SERPL-SCNC: 30 MMOL/L (ref 21–32)
CREAT SERPL-MCNC: 1.36 MG/DL (ref 0.6–1.3)
ERYTHROCYTE [DISTWIDTH] IN BLOOD BY AUTOMATED COUNT: 18.7 % (ref 11.6–15.1)
GFR SERPL CREATININE-BSD FRML MDRD: 39 ML/MIN/1.73SQ M
GLUCOSE SERPL-MCNC: 111 MG/DL (ref 65–140)
GLUCOSE SERPL-MCNC: 111 MG/DL (ref 65–140)
GLUCOSE SERPL-MCNC: 129 MG/DL (ref 65–140)
HCT VFR BLD AUTO: 28.7 % (ref 34.8–46.1)
HGB BLD-MCNC: 8.6 G/DL (ref 11.5–15.4)
INR PPP: 2.48 (ref 0.84–1.19)
MCH RBC QN AUTO: 26.7 PG (ref 26.8–34.3)
MCHC RBC AUTO-ENTMCNC: 30 G/DL (ref 31.4–37.4)
MCV RBC AUTO: 89 FL (ref 82–98)
PLATELET # BLD AUTO: 364 THOUSANDS/UL (ref 149–390)
PMV BLD AUTO: 10.3 FL (ref 8.9–12.7)
POTASSIUM SERPL-SCNC: 3.9 MMOL/L (ref 3.5–5.3)
PROTHROMBIN TIME: 26.9 SECONDS (ref 11.6–14.5)
RBC # BLD AUTO: 3.22 MILLION/UL (ref 3.81–5.12)
SODIUM SERPL-SCNC: 136 MMOL/L (ref 136–145)
WBC # BLD AUTO: 9.08 THOUSAND/UL (ref 4.31–10.16)

## 2021-07-05 PROCEDURE — 82948 REAGENT STRIP/BLOOD GLUCOSE: CPT

## 2021-07-05 PROCEDURE — 85730 THROMBOPLASTIN TIME PARTIAL: CPT | Performed by: INTERNAL MEDICINE

## 2021-07-05 PROCEDURE — 80048 BASIC METABOLIC PNL TOTAL CA: CPT | Performed by: INTERNAL MEDICINE

## 2021-07-05 PROCEDURE — 85610 PROTHROMBIN TIME: CPT | Performed by: INTERNAL MEDICINE

## 2021-07-05 PROCEDURE — 85027 COMPLETE CBC AUTOMATED: CPT | Performed by: INTERNAL MEDICINE

## 2021-07-05 RX ORDER — TORSEMIDE 20 MG/1
40 TABLET ORAL 2 TIMES DAILY
Refills: 0
Start: 2021-07-05 | End: 2021-08-10 | Stop reason: SDUPTHER

## 2021-07-05 RX ORDER — WARFARIN SODIUM 3 MG/1
5 TABLET ORAL
Refills: 0
Start: 2021-07-05

## 2021-07-05 RX ADMIN — FERROUS SULFATE TAB 325 MG (65 MG ELEMENTAL FE) 325 MG: 325 (65 FE) TAB at 09:22

## 2021-07-05 RX ADMIN — TORSEMIDE 40 MG: 20 TABLET ORAL at 09:18

## 2021-07-05 RX ADMIN — DIGOXIN 125 MCG: 125 TABLET ORAL at 09:18

## 2021-07-05 RX ADMIN — PANTOPRAZOLE SODIUM 40 MG: 40 TABLET, DELAYED RELEASE ORAL at 06:02

## 2021-07-05 RX ADMIN — FLUTICASONE FUROATE AND VILANTEROL TRIFENATATE 1 PUFF: 100; 25 POWDER RESPIRATORY (INHALATION) at 09:21

## 2021-07-05 RX ADMIN — MONTELUKAST 10 MG: 10 TABLET, FILM COATED ORAL at 09:18

## 2021-07-05 RX ADMIN — POTASSIUM CHLORIDE 20 MEQ: 1500 TABLET, EXTENDED RELEASE ORAL at 09:18

## 2021-07-05 NOTE — ASSESSMENT & PLAN NOTE
· ENT input appreciated -- cauterized and packed  · No further bleeding at this time  · Packing is removed and the patient has no bleeding and no complains

## 2021-07-05 NOTE — ASSESSMENT & PLAN NOTE
Lab Results   Component Value Date    EGFR 39 07/05/2021    EGFR 37 07/04/2021    EGFR 40 07/03/2021    CREATININE 1 36 (H) 07/05/2021    CREATININE 1 40 (H) 07/04/2021    CREATININE 1 31 (H) 07/03/2021   · Stable at 1 36

## 2021-07-05 NOTE — DISCHARGE SUMMARY
[facility]  Discharge- Jordyn Price 1945, 70 y o  female MRN: [id number]  Unit/Bed#: S -01 Encounter: [id number]  Primary Care Provider: Marlon Nissen, MD   Date and time admitted to hospital: 6/17/2021  5:52 PM    Epistaxis  Assessment & Plan  · ENT input appreciated -- cauterized and packed  · No further bleeding at this time  · Packing is removed and the patient has no bleeding and no complains  Lung mass  Assessment & Plan  · Biopsy negative for malignancy  · Pulmonology follow-up in the outpatient setting -- serial imaging; PET scan in about 4-6 weeks          CKD (chronic kidney disease)  Assessment & Plan  Lab Results   Component Value Date    EGFR 39 07/05/2021    EGFR 37 07/04/2021    EGFR 40 07/03/2021    CREATININE 1 36 (H) 07/05/2021    CREATININE 1 40 (H) 07/04/2021    CREATININE 1 31 (H) 07/03/2021   · Stable at 1 36    Type 2 diabetes mellitus [facility])  Assessment & Plan  Lab Results   Component Value Date    HGBA1C 4 5 03/15/2021       Recent Labs     07/04/21  1059 07/04/21  1615 07/04/21  2117 07/05/21  0625   POCGLU 100 163* 140 129       Blood Sugar Average: Last 72 hrs:  (P) 115   · Continue the home regimen metformin    Chronic atrial fibrillation (HCC)  Assessment & Plan  INR still subtherapeutic today at 2 48 (goal INR is 2 5-3 5)  5 mg warfarin daily until therapeutic INR goal of 2 5-3 5 is achieved  INR Daily      COPD (chronic obstructive pulmonary disease) ([facility] [address] )  Assessment & Plan  Does not appear to be in acute exacerbation at this time  Continue home inhalers  This could be contributing to patient's shortness of breath  Chronic hypoxemic respiratory failure  Patient is at baseline oxygen requirement (4L)            Medical Problems     Resolved Problems  Date Reviewed: 7/2/2021    None              Discharging Resident: Meagan Donahue MD  Discharging Attending: Charity Jay MD  PCP: Marlon Nissen, MD  Admission Date:   Admission Orders (From admission, onward)     Ordered        06/18/21 1359  Inpatient Admission  Once         06/17/21 2110  Place in Observation  Once                   Discharge Date: 07/05/21    Consultations During Hospital Stay:  · ENT  · Cardiology  · Pulmonology  · Interventional Radiology  · Palliative care    Procedures Performed:      CT guided Lung biopsy for an incidental mass finding in the lung  The results came back negative for malignancy  · Significant Findings / Test Results:   · Chest CT - Masslike opacity in the right middle lobe measuring 2 6 x 4 0 cm   9 mm irregular nodular opacity in the right lower lobe abutting the major fissure  Mediastinal /subcarinal lymphadenopathy  · Chest CT with contrast - ground glass opacities throughout the lung bilaterally, mild honeycombing in the upper lobes bilaterally  · IR lung biopsy of the mass - negative for malignancy  · Patient is anemic, possibly due to anemia of chronic disease, follow up with the PCP  ·   Incidental Findings:   · Noncalcified soft tissue mass in the posterolateral aspect of the right upper lobe  · CT guided IR biopsy was performed  Negative for biopsy, Focal necrosis/infarct associated with hematoidin deposition and surrounded by fibrosis and  histiocytes  · Noncalcified pleural-based nodule left upper lobe  · Scattered groundglass infiltrates throughout the lungs bilaterally      Test Results Pending at Discharge (will require follow up):  · N/A     Outpatient Tests Requested:  PT/INR to be done daily at [facility] until therapeutic INR is achieved  Complications:     None      Reason for Admission: Shortness of breath  Hospital Course:   Edgar Blandon is a 70 y o  female patient who originally presented to the hospital on 6/17/2021 due to Shortness of Breath from a local long term care facility  She has a history Hypertension, Diebetes mellitus ([address] ), COPD (chronic obstructive pulmonary disease), Atrial Fibrillation      She has been in and out of the hospitals and rehabs over the last 6-8 months, for worsening of shortness of breath, wears O2 @4L  On admission she was noted to be volume overloaded upon admission  Patient was placed on torsemide 40 mg twice a day and responded well  Her dry weight on admission was 246 lb (06/17/2021) and  212 lb (07/05/2021) at discharge, due to diuresis  Patient was placed on torsemide 40 mg twice a day and responded well  Patient has a chronic atrial fibrillation and was on warfarin during her stay in the rehab facility  During the hospital course she was on Lovenox for anti-coagulation  INR goal was 2 5 - 3 5 and she was to switch to Warfarin once the INR goal of 2 5 is achieved  She was discharged with the INR of 2 48 on Lovenox to the rehab facility and was to continue to receive Lovenox until the INR is 2 5 in order to bridge to Warfarin for further anti coagulation  Epistaxis on 07/01/21, possibly due to irritation of the nasal canula, ENT cauterized the bleeding source with no complications (resolved)  On CT a 3 cm lung nodule/mass was found  Further CT-guided biopsy necrosis/infarct associated with hematoidin deposition and surrounded by fibrosis and  histiocytes  Follow up with PCP  Please see above list of diagnoses and related plan for additional information  Condition at Discharge: fair    Discharge Day Visit / Exam:   Subjective:  Patient had no complains upon discharge  Vitals: Blood Pressure: 111/56 (07/05/21 0626)  Pulse: 70 (07/05/21 0626)  Temperature: 98 2 °F (36 8 °C) (07/05/21 0626)  Temp Source: Oral (07/05/21 0626)  Respirations: 19 (07/05/21 0626)  Height: 5' 3" (160 cm) (06/18/21 1347)  Weight - Scale: 96 6 kg (212 lb 15 4 oz) (07/05/21 0600)  SpO2: 98 % (07/05/21 0626)  Exam:   Physical Exam  Vitals and nursing note reviewed  Constitutional:       General: She is not in acute distress  Appearance: She is well-developed     HENT:      Head: Normocephalic and atraumatic  Right Ear: Tympanic membrane normal       Left Ear: Tympanic membrane normal    Eyes:      Extraocular Movements: Extraocular movements intact  Conjunctiva/sclera: Conjunctivae normal       Pupils: Pupils are equal, round, and reactive to light  Cardiovascular:      Rate and Rhythm: Normal rate and regular rhythm  Heart sounds: No murmur heard  Pulmonary:      Effort: Pulmonary effort is normal  No respiratory distress  Breath sounds: Normal breath sounds  Abdominal:      General: Bowel sounds are normal       Palpations: Abdomen is soft  Tenderness: There is no abdominal tenderness  Musculoskeletal:      Cervical back: Normal range of motion and neck supple  Skin:     General: Skin is warm and dry  Neurological:      Mental Status: She is alert and oriented to person, place, and time  Discussion with Family: Attempted to update  (son and friend) via phone  Unable to contact  Discharge instructions/Information to patient and family:   See after visit summary for information provided to patient and family  Provisions for Follow-Up Care:  See after visit summary for information related to follow-up care and any pertinent home health orders  Disposition:   Assisted Living Facility at [facility] at [facility] Readmission:none    Discharge Medications:  See after visit summary for reconciled discharge medications provided to patient and/or family        **Please Note: This note may have been constructed using a voice recognition system**

## 2021-07-05 NOTE — PLAN OF CARE
Problem: PAIN - ADULT  Goal: Verbalizes/displays adequate comfort level or baseline comfort level  Description: Interventions:  - Encourage patient to monitor pain and request assistance  - Assess pain using appropriate pain scale  - Administer analgesics based on type and severity of pain and evaluate response  - Implement non-pharmacological measures as appropriate and evaluate response  - Consider cultural and social influences on pain and pain management  - Notify physician/advanced practitioner if interventions unsuccessful or patient reports new pain  7/5/2021 1310 by Zoë Brown RN  Outcome: Completed  7/5/2021 1041 by Zoë Brown RN  Outcome: Progressing     Problem: INFECTION - ADULT  Goal: Absence or prevention of progression during hospitalization  Description: INTERVENTIONS:  - Assess and monitor for signs and symptoms of infection  - Monitor lab/diagnostic results  - Monitor all insertion sites, i e  indwelling lines, tubes, and drains  - Monitor endotracheal if appropriate and nasal secretions for changes in amount and color  - [place] appropriate cooling/warming therapies per order  - Administer medications as ordered  - Instruct and encourage patient and family to use good hand hygiene technique  - Identify and instruct in appropriate isolation precautions for identified infection/condition  7/5/2021 1310 by Zoë Brown RN  Outcome: Completed  7/5/2021 1041 by Zoë Brown RN  Outcome: Progressing     Problem: SAFETY ADULT  Goal: Patient will remain free of falls  Description: INTERVENTIONS:  - Educate patient/family on patient safety including physical limitations  - Instruct patient to call for assistance with activity   - Consult OT/PT to assist with strengthening/mobility   - Keep Call bell within reach  - Keep bed low and locked with side rails adjusted as appropriate  - Keep care items and personal belongings within reach  - Initiate and maintain comfort rounds  - Make Fall Risk Sign visible to staff  - Offer Toileting every  Hours, in advance of need  - Initiate/Maintain alarm  - Obtain necessary fall risk management equipment:   - Apply yellow socks and bracelet for high fall risk patients  - Consider moving patient to room near nurses station  7/5/2021 1310 by Joshua Gil RN  Outcome: Completed  7/5/2021 1041 by Joshua Gil RN  Outcome: Progressing  Goal: Maintain or return to baseline ADL function  Description: INTERVENTIONS:  -  Assess patient's ability to carry out ADLs; assess patient's baseline for ADL function and identify physical deficits which impact ability to perform ADLs (bathing, care of mouth/teeth, toileting, grooming, dressing, etc )  - Assess/evaluate cause of self-care deficits   - Assess range of motion  - Assess patient's mobility; develop plan if impaired  - Assess patient's need for assistive devices and provide as appropriate  - Encourage maximum independence but intervene and supervise when necessary  - Involve family in performance of ADLs  - Assess for home care needs following discharge   - Consider OT consult to assist with ADL evaluation and planning for discharge  - Provide patient education as appropriate  7/5/2021 1310 by Joshua Gil RN  Outcome: Completed  7/5/2021 1041 by Joshua Gil RN  Outcome: Progressing  Goal: Maintains/Returns to pre admission functional level  Description: INTERVENTIONS:  - Perform BMAT or MOVE assessment daily    - Set and communicate daily mobility goal to care team and patient/family/caregiver  - Collaborate with rehabilitation services on mobility goals if consulted  - Perform Range of Motion  times a day  - Reposition patient every hours    - Dangle patient  times a day  - Stand patient times a day  - Ambulate patient  times a day  - Out of bed to chair times a day   - Out of bed for meals  times a day  - Out of bed for toileting  - Record patient progress and toleration of activity level   7/5/2021 1310 by Joshua Gil RN  Outcome: Completed  7/5/2021 1041 by Loann Severe, RN  Outcome: Progressing     Problem: DISCHARGE PLANNING  Goal: Discharge to home or other facility with appropriate resources  Description: INTERVENTIONS:  - Identify barriers to discharge w/patient and caregiver  - Arrange for needed discharge resources and transportation as appropriate  - Identify discharge learning needs (meds, wound care, etc )  - Arrange for interpretive services to assist at discharge as needed  - Refer to Case Management Department for coordinating discharge planning if the patient needs post-hospital services based on physician/advanced practitioner order or complex needs related to functional status, cognitive ability, or social support system  7/5/2021 1310 by Loann Severe, RN  Outcome: Completed  7/5/2021 1041 by Loann Severe, RN  Outcome: Progressing     Problem: Knowledge Deficit  Goal: Patient/family/caregiver demonstrates understanding of disease process, treatment plan, medications, and discharge instructions  Description: Complete learning assessment and assess knowledge base    Interventions:  - Provide teaching at level of understanding  - Provide teaching via preferred learning methods  7/5/2021 1310 by Loann Severe, RN  Outcome: Completed  7/5/2021 1041 by Loann Severe, RN  Outcome: Progressing     Problem: RESPIRATORY - ADULT  Goal: Achieves optimal ventilation and oxygenation  Description: INTERVENTIONS:  - Assess for changes in respiratory status  - Assess for changes in mentation and behavior  - Position to facilitate oxygenation and minimize respiratory effort  - Oxygen administered by appropriate delivery if ordered  - Initiate smoking cessation education as indicated  - Encourage broncho-pulmonary hygiene including cough, deep breathe, Incentive Spirometry  - Assess the need for suctioning and aspirate as needed  - Assess and instruct to report SOB or any respiratory difficulty  - Respiratory Therapy support as indicated  7/5/2021 1310 by Denisha Mauricio RN  Outcome: Completed  7/5/2021 1041 by Denisha Maruicio RN  Outcome: Progressing     Problem: METABOLIC, FLUID AND ELECTROLYTES - ADULT  Goal: Fluid balance maintained  Description: INTERVENTIONS:  - Monitor labs   - Monitor I/O and WT  - Instruct patient on fluid and nutrition as appropriate  - Assess for signs & symptoms of volume excess or deficit  7/5/2021 1310 by Denisha Mauricio RN  Outcome: Completed  7/5/2021 1041 by Denisha Mauricio RN  Outcome: Progressing  Goal: Glucose maintained within target range  Description: INTERVENTIONS:  - Monitor Blood Glucose as ordered  - Assess for signs and symptoms of hyperglycemia and hypoglycemia  - Administer ordered medications to maintain glucose within target range  - Assess nutritional intake and initiate nutrition service referral as needed  7/5/2021 1310 by Denisha Mauricio RN  Outcome: Completed  7/5/2021 1041 by Denisha Mauricio RN  Outcome: Progressing     Problem: SKIN/TISSUE INTEGRITY - ADULT  Goal: Skin Integrity remains intact(Skin Breakdown Prevention)  Description: Assess:  -Perform Jevon assessment every   -Clean and moisturize skin every   -Inspect skin when repositioning, toileting, and assisting with ADLS  -Assess under medical devices such as every   -Assess extremities for adequate circulation and sensation     Bed Management:  -Have minimal linens on bed & keep smooth, unwrinkled  -Change linens as needed when moist or perspiring  -Avoid sitting or lying in one position for more than  hours while in bed  -Keep HOB at degrees     Toileting:  -Offer bedside commode  -Assess for incontinence every   -Use incontinent care products after each incontinent episode such as     Activity:  -Mobilize patient  times a day  -Encourage activity and walks on unit  -Encourage or provide ROM exercises   -Turn and reposition patient every  Hours  -Use appropriate equipment to lift or move patient in bed  -Instruct/ Assist with weight shifting every  when out of bed in chair  -Consider limitation of chair time  hour intervals    Skin Care:  -Avoid use of baby powder, tape, friction and shearing, hot water or constrictive clothing  -Relieve pressure over bony prominences using   -Do not massage red bony areas    Next Steps:  -Teach patient strategies to minimize risks such as    -Consider consults to  interdisciplinary teams such as   7/5/2021 1310 by Roseann Morris RN  Outcome: Completed  7/5/2021 1041 by Roseann Morris RN  Outcome: Progressing  Goal: Incision(s), wounds(s) or drain site(s) healing without S/S of infection  Description: INTERVENTIONS  - Assess and document dressing, incision, wound bed, drain sites and surrounding tissue  - Provide patient and family education  - Perform skin care/dressing changes every   7/5/2021 1310 by Roseann Morris RN  Outcome: Completed  7/5/2021 1041 by Roseann Morris RN  Outcome: Progressing  Goal: Pressure injury heals and does not worsen  Description: Interventions:  - Implement low air loss mattress or specialty surface (Criteria met)  - Apply silicone foam dressing  - Instruct/assist with weight shifting every minutes when in chair   - Limit chair time to  hour intervals  - Use special pressure reducing interventions such as when in chair   - Apply fecal or urinary incontinence containment device   - Perform passive or active ROM every   - Turn and reposition patient & offload bony prominences every hours   - Utilize friction reducing device or surface for transfers   - Consider consults to  interdisciplinary teams such as   - Use incontinent care products after each incontinent episode such as   - Consider nutrition services referral as needed  7/5/2021 1310 by Roseann Morris RN  Outcome: Completed  7/5/2021 1041 by Roseann Morris RN  Outcome: Progressing     Problem: MOBILITY - ADULT  Goal: Maintain or return to baseline ADL function  Description: INTERVENTIONS:  -  Assess patient's ability to carry out ADLs; assess patient's baseline for ADL function and identify physical deficits which impact ability to perform ADLs (bathing, care of mouth/teeth, toileting, grooming, dressing, etc )  - Assess/evaluate cause of self-care deficits   - Assess range of motion  - Assess patient's mobility; develop plan if impaired  - Assess patient's need for assistive devices and provide as appropriate  - Encourage maximum independence but intervene and supervise when necessary  - Involve family in performance of ADLs  - Assess for home care needs following discharge   - Consider OT consult to assist with ADL evaluation and planning for discharge  - Provide patient education as appropriate  7/5/2021 1310 by Madison Santiago RN  Outcome: Completed  7/5/2021 1041 by Madison Santiago RN  Outcome: Progressing  Goal: Maintains/Returns to pre admission functional level  Description: INTERVENTIONS:  - Perform BMAT or MOVE assessment daily    - Set and communicate daily mobility goal to care team and patient/family/caregiver  - Collaborate with rehabilitation services on mobility goals if consulted  - Perform Range of Motion  times a day  - Reposition patient every  hours  - Dangle patient times a day  - Stand patient  times a day  - Ambulate patient  times a day  - Out of bed to chair  times a day   - Out of bed for meals  times a day  - Out of bed for toileting  - Record patient progress and toleration of activity level   7/5/2021 1310 by Madison Santiago RN  Outcome: Completed  7/5/2021 1041 by Madison Santiago RN  Outcome: Progressing     Problem: Nutrition/Hydration-ADULT  Goal: Nutrient/Hydration intake appropriate for improving, restoring or maintaining nutritional needs  Description: Monitor and assess patient's nutrition/hydration status for malnutrition  Collaborate with interdisciplinary team and initiate plan and interventions as ordered  Monitor patient's weight and dietary intake as ordered or per policy   Utilize nutrition screening tool and intervene as necessary  Determine patient's food preferences and provide high-protein, high-caloric foods as appropriate       INTERVENTIONS:  - Monitor oral intake, urinary output, labs, and treatment plans  - Assess nutrition and hydration status and recommend course of action  - Evaluate amount of meals eaten  - Assist patient with eating if necessary   - Allow adequate time for meals  - Recommend/ encourage appropriate diets, oral nutritional supplements, and vitamin/mineral supplements  - Order, calculate, and assess calorie counts as needed  - Recommend, monitor, and adjust tube feedings and TPN/PPN based on assessed needs  - Assess need for intravenous fluids  - Provide specific nutrition/hydration education as appropriate  - Include patient/family/caregiver in decisions related to nutrition  7/5/2021 1310 by Chaya Grossman RN  Outcome: Completed  7/5/2021 1041 by Chaya Grossman RN  Outcome: Progressing     Problem: Prexisting or High Potential for Compromised Skin Integrity  Goal: Skin integrity is maintained or improved  Description: INTERVENTIONS:  - Identify patients at risk for skin breakdown  - Assess and monitor skin integrity  - Assess and monitor nutrition and hydration status  - Monitor labs   - Assess for incontinence   - Turn and reposition patient  - Assist with mobility/ambulation  - Relieve pressure over bony prominences  - Avoid friction and shearing  - Provide appropriate hygiene as needed including keeping skin clean and dry  - Evaluate need for skin moisturizer/barrier cream  - Collaborate with interdisciplinary team   - Patient/family teaching  - Consider wound care consult   7/5/2021 1310 by Chaya Grossman RN  Outcome: Completed  7/5/2021 1041 by Chaya Grossman RN  Outcome: Progressing     Problem: Potential for Falls  Goal: Patient will remain free of falls  Description: INTERVENTIONS:  - Educate patient/family on patient safety including physical limitations  - Instruct patient to call for assistance with activity   - Consult OT/PT to assist with strengthening/mobility   - Keep Call bell within reach  - Keep bed low and locked with side rails adjusted as appropriate  - Keep care items and personal belongings within reach  - Initiate and maintain comfort rounds  - Make Fall Risk Sign visible to staff  - Offer Toileting every  Hours, in advance of need  - Initiate/Maintain alarm  - Obtain necessary fall risk management equipment:   - Apply yellow socks and bracelet for high fall risk patients  - Consider moving patient to room near nurses station  7/5/2021 1310 by Joseph Anderson, RN  Outcome: Completed  7/5/2021 1041 by Joseph Anderson, RN  Outcome: Progressing

## 2021-07-05 NOTE — QUICK NOTE
Nasal packing removed  No active bleeding   Nasal saline TID   Follow up this week in office     Ashlie Henry MD

## 2021-07-05 NOTE — ASSESSMENT & PLAN NOTE
Lab Results   Component Value Date    HGBA1C 4 5 03/15/2021       Recent Labs     07/04/21  1059 07/04/21  1615 07/04/21  2117 07/05/21  0625   POCGLU 100 163* 140 129       Blood Sugar Average: Last 72 hrs:  (P) 115   · Continue the home regimen metformin  The patient wasn't able to remember the dose of metformin  Called patients friend Spring Bryant to discuss the dose of metformin who states that the patient hasn't been on metformin since leaving to the [facility] living facility  · Will follow up with the [address] [place] [address] Drive to confirm the metformin dosage

## 2021-07-05 NOTE — INCIDENTAL FINDINGS
The following findings require follow up:  Radiographic finding   Finding:   No significant change in 3 2 x 2 1 cm noncalcified soft tissue mass in the posterolateral aspect of the right upper lobe  CT guided IR biopsy was performed  Negative for biopsy, Focal necrosis/infarct associated with hematoidin deposition and surrounded by fibrosis and  histiocytes  No significant change in 6 mm noncalcified pleural-based nodule left upper lobe  No significant change in 5 mm pleural-based nodule along the posterior aspect of the right upper lobe  Compressive atelectasis in the lower lobes bilaterally, right side greater than left  Scattered groundglass infiltrates throughout the lungs bilaterally  Follow up required: Pulmonology, PCP   Follow up should be done within 4 week(s)    Please notify the following clinician to assist with the follow up: With the Pulmonology office  Your appointment is 7/29

## 2021-07-05 NOTE — PLAN OF CARE
Problem: PAIN - ADULT  Goal: Verbalizes/displays adequate comfort level or baseline comfort level  Description: Interventions:  - Encourage patient to monitor pain and request assistance  - Assess pain using appropriate pain scale  - Administer analgesics based on type and severity of pain and evaluate response  - Implement non-pharmacological measures as appropriate and evaluate response  - Consider cultural and social influences on pain and pain management  - Notify physician/advanced practitioner if interventions unsuccessful or patient reports new pain  Outcome: Progressing     Problem: INFECTION - ADULT  Goal: Absence or prevention of progression during hospitalization  Description: INTERVENTIONS:  - Assess and monitor for signs and symptoms of infection  - Monitor lab/diagnostic results  - Monitor all insertion sites, i e  indwelling lines, tubes, and drains  - Monitor endotracheal if appropriate and nasal secretions for changes in amount and color  - [place] appropriate cooling/warming therapies per order  - Administer medications as ordered  - Instruct and encourage patient and family to use good hand hygiene technique  - Identify and instruct in appropriate isolation precautions for identified infection/condition  Outcome: Progressing     Problem: SAFETY ADULT  Goal: Patient will remain free of falls  Description: INTERVENTIONS:  - Educate patient/family on patient safety including physical limitations  - Instruct patient to call for assistance with activity   - Consult OT/PT to assist with strengthening/mobility   - Keep Call bell within reach  - Keep bed low and locked with side rails adjusted as appropriate  - Keep care items and personal belongings within reach  - Initiate and maintain comfort rounds  - Make Fall Risk Sign visible to staff  - Offer Toileting every  Hours, in advance of need  - Initiate/Maintain alarm  - Obtain necessary fall risk management equipment:   - Apply yellow socks and bracelet for high fall risk patients  - Consider moving patient to room near nurses station  Outcome: Progressing  Goal: Maintain or return to baseline ADL function  Description: INTERVENTIONS:  -  Assess patient's ability to carry out ADLs; assess patient's baseline for ADL function and identify physical deficits which impact ability to perform ADLs (bathing, care of mouth/teeth, toileting, grooming, dressing, etc )  - Assess/evaluate cause of self-care deficits   - Assess range of motion  - Assess patient's mobility; develop plan if impaired  - Assess patient's need for assistive devices and provide as appropriate  - Encourage maximum independence but intervene and supervise when necessary  - Involve family in performance of ADLs  - Assess for home care needs following discharge   - Consider OT consult to assist with ADL evaluation and planning for discharge  - Provide patient education as appropriate  Outcome: Progressing  Goal: Maintains/Returns to pre admission functional level  Description: INTERVENTIONS:  - Perform BMAT or MOVE assessment daily    - Set and communicate daily mobility goal to care team and patient/family/caregiver  - Collaborate with rehabilitation services on mobility goals if consulted  - Perform Range of Motion times a day  - Reposition patient every  hours    - Dangle patient  times a day  - Stand patient  times a day  - Ambulate patient  times a day  - Out of bed to chair  times a day   - Out of bed for meals  times a day  - Out of bed for toileting  - Record patient progress and toleration of activity level   Outcome: Progressing     Problem: DISCHARGE PLANNING  Goal: Discharge to home or other facility with appropriate resources  Description: INTERVENTIONS:  - Identify barriers to discharge w/patient and caregiver  - Arrange for needed discharge resources and transportation as appropriate  - Identify discharge learning needs (meds, wound care, etc )  - Arrange for interpretive services to assist at discharge as needed  - Refer to Case Management Department for coordinating discharge planning if the patient needs post-hospital services based on physician/advanced practitioner order or complex needs related to functional status, cognitive ability, or social support system  Outcome: Progressing     Problem: Knowledge Deficit  Goal: Patient/family/caregiver demonstrates understanding of disease process, treatment plan, medications, and discharge instructions  Description: Complete learning assessment and assess knowledge base    Interventions:  - Provide teaching at level of understanding  - Provide teaching via preferred learning methods  Outcome: Progressing     Problem: RESPIRATORY - ADULT  Goal: Achieves optimal ventilation and oxygenation  Description: INTERVENTIONS:  - Assess for changes in respiratory status  - Assess for changes in mentation and behavior  - Position to facilitate oxygenation and minimize respiratory effort  - Oxygen administered by appropriate delivery if ordered  - Initiate smoking cessation education as indicated  - Encourage broncho-pulmonary hygiene including cough, deep breathe, Incentive Spirometry  - Assess the need for suctioning and aspirate as needed  - Assess and instruct to report SOB or any respiratory difficulty  - Respiratory Therapy support as indicated  Outcome: Progressing     Problem: METABOLIC, FLUID AND ELECTROLYTES - ADULT  Goal: Fluid balance maintained  Description: INTERVENTIONS:  - Monitor labs   - Monitor I/O and WT  - Instruct patient on fluid and nutrition as appropriate  - Assess for signs & symptoms of volume excess or deficit  Outcome: Progressing  Goal: Glucose maintained within target range  Description: INTERVENTIONS:  - Monitor Blood Glucose as ordered  - Assess for signs and symptoms of hyperglycemia and hypoglycemia  - Administer ordered medications to maintain glucose within target range  - Assess nutritional intake and initiate nutrition service referral as needed  Outcome: Progressing     Problem: SKIN/TISSUE INTEGRITY - ADULT  Goal: Skin Integrity remains intact(Skin Breakdown Prevention)  Description: Assess:  -Perform Jevon assessment every   -Clean and moisturize skin every   -Inspect skin when repositioning, toileting, and assisting with ADLS  -Assess under medical devices such as  every   -Assess extremities for adequate circulation and sensation     Bed Management:  -Have minimal linens on bed & keep smooth, unwrinkled  -Change linens as needed when moist or perspiring  -Avoid sitting or lying in one position for more than hours while in bed  -Keep HOB at degrees     Toileting:  -Offer bedside commode  -Assess for incontinence every   -Use incontinent care products after each incontinent episode such as     Activity:  -Mobilize patient  times a day  -Encourage activity and walks on unit  -Encourage or provide ROM exercises   -Turn and reposition patient every  Hours  -Use appropriate equipment to lift or move patient in bed  -Instruct/ Assist with weight shifting every  when out of bed in chair  -Consider limitation of chair time  hour intervals    Skin Care:  -Avoid use of baby powder, tape, friction and shearing, hot water or constrictive clothing  -Relieve pressure over bony prominences using   -Do not massage red bony areas    Next Steps:  -Teach patient strategies to minimize risks such as   -Consider consults to  interdisciplinary teams such as   Outcome: Progressing  Goal: Incision(s), wounds(s) or drain site(s) healing without S/S of infection  Description: INTERVENTIONS  - Assess and document dressing, incision, wound bed, drain sites and surrounding tissue  - Provide patient and family education  - Perform skin care/dressing changes every   Outcome: Progressing  Goal: Pressure injury heals and does not worsen  Description: Interventions:  - Implement low air loss mattress or specialty surface (Criteria met)  - Apply silicone foam dressing  - Instruct/assist with weight shifting every  minutes when in chair   - Limit chair time to  hour intervals  - Use special pressure reducing interventions such as  when in chair   - Apply fecal or urinary incontinence containment device   - Perform passive or active ROM every   - Turn and reposition patient & offload bony prominences every hours   - Utilize friction reducing device or surface for transfers   - Consider consults to  interdisciplinary teams such as   - Use incontinent care products after each incontinent episode such as   - Consider nutrition services referral as needed  Outcome: Progressing     Problem: MOBILITY - ADULT  Goal: Maintain or return to baseline ADL function  Description: INTERVENTIONS:  -  Assess patient's ability to carry out ADLs; assess patient's baseline for ADL function and identify physical deficits which impact ability to perform ADLs (bathing, care of mouth/teeth, toileting, grooming, dressing, etc )  - Assess/evaluate cause of self-care deficits   - Assess range of motion  - Assess patient's mobility; develop plan if impaired  - Assess patient's need for assistive devices and provide as appropriate  - Encourage maximum independence but intervene and supervise when necessary  - Involve family in performance of ADLs  - Assess for home care needs following discharge   - Consider OT consult to assist with ADL evaluation and planning for discharge  - Provide patient education as appropriate  Outcome: Progressing  Goal: Maintains/Returns to pre admission functional level  Description: INTERVENTIONS:  - Perform BMAT or MOVE assessment daily    - Set and communicate daily mobility goal to care team and patient/family/caregiver  - Collaborate with rehabilitation services on mobility goals if consulted  - Perform Range of Motion times a day  - Reposition patient every hours    - Dangle patient  times a day  - Stand patient  times a day  - Ambulate patient  times a day  - Out of bed to chair times a day   - Out of bed for meals  times a day  - Out of bed for toileting  - Record patient progress and toleration of activity level   Outcome: Progressing     Problem: Nutrition/Hydration-ADULT  Goal: Nutrient/Hydration intake appropriate for improving, restoring or maintaining nutritional needs  Description: Monitor and assess patient's nutrition/hydration status for malnutrition  Collaborate with interdisciplinary team and initiate plan and interventions as ordered  Monitor patient's weight and dietary intake as ordered or per policy  Utilize nutrition screening tool and intervene as necessary  Determine patient's food preferences and provide high-protein, high-caloric foods as appropriate       INTERVENTIONS:  - Monitor oral intake, urinary output, labs, and treatment plans  - Assess nutrition and hydration status and recommend course of action  - Evaluate amount of meals eaten  - Assist patient with eating if necessary   - Allow adequate time for meals  - Recommend/ encourage appropriate diets, oral nutritional supplements, and vitamin/mineral supplements  - Order, calculate, and assess calorie counts as needed  - Recommend, monitor, and adjust tube feedings and TPN/PPN based on assessed needs  - Assess need for intravenous fluids  - Provide specific nutrition/hydration education as appropriate  - Include patient/family/caregiver in decisions related to nutrition  Outcome: Progressing     Problem: Prexisting or High Potential for Compromised Skin Integrity  Goal: Skin integrity is maintained or improved  Description: INTERVENTIONS:  - Identify patients at risk for skin breakdown  - Assess and monitor skin integrity  - Assess and monitor nutrition and hydration status  - Monitor labs   - Assess for incontinence   - Turn and reposition patient  - Assist with mobility/ambulation  - Relieve pressure over bony prominences  - Avoid friction and shearing  - Provide appropriate hygiene as needed including keeping skin clean and dry  - Evaluate need for skin moisturizer/barrier cream  - Collaborate with interdisciplinary team   - Patient/family teaching  - Consider wound care consult   Outcome: Progressing     Problem: Potential for Falls  Goal: Patient will remain free of falls  Description: INTERVENTIONS:  - Educate patient/family on patient safety including physical limitations  - Instruct patient to call for assistance with activity   - Consult OT/PT to assist with strengthening/mobility   - Keep Call bell within reach  - Keep bed low and locked with side rails adjusted as appropriate  - Keep care items and personal belongings within reach  - Initiate and maintain comfort rounds  - Make Fall Risk Sign visible to staff  - Offer Toileting every Hours, in advance of need  - Initiate/Maintain alarm  - Obtain necessary fall risk management equipment:   - Apply yellow socks and bracelet for high fall risk patients  - Consider moving patient to room near nurses station  Outcome: Progressing

## 2021-07-05 NOTE — ASSESSMENT & PLAN NOTE
INR still subtherapeutic today at 2 48 (goal INR is 2 5-3 5)  5 mg warfarin daily until therapeutic INR goal of 2 5-3 5 is achieved  INR Daily

## 2021-07-05 NOTE — ASSESSMENT & PLAN NOTE
Does not appear to be in acute exacerbation at this time  Continue home inhalers  This could be contributing to patient's shortness of breath  Chronic hypoxemic respiratory failure  Patient is at baseline oxygen requirement (4L)

## 2021-07-05 NOTE — CASE MANAGEMENT
Pt for d/c to [facility], to be transported by [facility] at 1:30pm via bls  CM completed facility transfer and cmn form  CM notified Pt, Pt's friend Soraya Garza and facility [facility] of d/c arrangements

## 2021-07-06 ENCOUNTER — PATIENT OUTREACH (OUTPATIENT)
Dept: CASE MANAGEMENT | Facility: OTHER | Age: 76
End: 2021-07-06

## 2021-07-07 ENCOUNTER — PATIENT OUTREACH (OUTPATIENT)
Dept: CASE MANAGEMENT | Facility: OTHER | Age: 76
End: 2021-07-07

## 2021-07-07 ENCOUNTER — EPISODE CHANGES (OUTPATIENT)
Dept: CASE MANAGEMENT | Facility: OTHER | Age: 76
End: 2021-07-07

## 2021-07-26 ENCOUNTER — APPOINTMENT (EMERGENCY)
Dept: CT IMAGING | Facility: HOSPITAL | Age: 76
DRG: 871 | End: 2021-07-26
Payer: MEDICARE

## 2021-07-26 ENCOUNTER — APPOINTMENT (EMERGENCY)
Dept: RADIOLOGY | Facility: HOSPITAL | Age: 76
DRG: 871 | End: 2021-07-26
Payer: MEDICARE

## 2021-07-26 ENCOUNTER — HOSPITAL ENCOUNTER (INPATIENT)
Facility: HOSPITAL | Age: 76
LOS: 2 days | Discharge: NURSING, REHAB OR LONG-TERM CARE | DRG: 871 | End: 2021-07-29
Attending: EMERGENCY MEDICINE | Admitting: INTERNAL MEDICINE
Payer: MEDICARE

## 2021-07-26 DIAGNOSIS — R41.82 ALTERED MENTAL STATUS: Primary | ICD-10-CM

## 2021-07-26 DIAGNOSIS — N39.0 ACUTE UTI: ICD-10-CM

## 2021-07-26 DIAGNOSIS — R79.89 ELEVATED LACTIC ACID LEVEL: ICD-10-CM

## 2021-07-26 DIAGNOSIS — R45.1 AGITATION: ICD-10-CM

## 2021-07-26 DIAGNOSIS — E86.0 DEHYDRATION: ICD-10-CM

## 2021-07-26 LAB
ALBUMIN SERPL BCP-MCNC: 3.9 G/DL (ref 3.4–4.8)
ALP SERPL-CCNC: 58.3 U/L (ref 35–140)
ALT SERPL W P-5'-P-CCNC: 13 U/L (ref 5–54)
ANION GAP SERPL CALCULATED.3IONS-SCNC: 22 MMOL/L (ref 4–13)
ANISOCYTOSIS BLD QL SMEAR: PRESENT
APTT PPP: 34 SECONDS (ref 23–31)
AST SERPL W P-5'-P-CCNC: 21 U/L (ref 15–41)
BACTERIA UR QL AUTO: ABNORMAL /HPF
BASOPHILS # BLD MANUAL: 0.14 THOUSAND/UL (ref 0–0.1)
BASOPHILS NFR MAR MANUAL: 1 % (ref 0–1)
BILIRUB SERPL-MCNC: 0.65 MG/DL (ref 0.3–1.2)
BILIRUB UR QL STRIP: NEGATIVE
BNP SERPL-MCNC: 182.5 PG/ML (ref 1–100)
BUN SERPL-MCNC: 29 MG/DL (ref 6–20)
CALCIUM SERPL-MCNC: 9.7 MG/DL (ref 8.4–10.2)
CHLORIDE SERPL-SCNC: 95 MMOL/L (ref 96–108)
CLARITY UR: ABNORMAL
CO2 SERPL-SCNC: 22 MMOL/L (ref 22–33)
COLOR UR: YELLOW
CREAT SERPL-MCNC: 1.76 MG/DL (ref 0.4–1.1)
DIGOXIN SERPL-MCNC: 1.5 NG/ML (ref 0.8–2)
EOSINOPHIL # BLD MANUAL: 0 THOUSAND/UL (ref 0–0.4)
EOSINOPHIL NFR BLD MANUAL: 0 % (ref 0–6)
ERYTHROCYTE [DISTWIDTH] IN BLOOD BY AUTOMATED COUNT: 17.9 % (ref 11.6–15.1)
FLUAV RNA NPH QL NAA+PROBE: NORMAL
FLUBV RNA NPH QL NAA+PROBE: NORMAL
GFR SERPL CREATININE-BSD FRML MDRD: 28 ML/MIN/1.73SQ M
GLUCOSE SERPL-MCNC: 124 MG/DL (ref 65–140)
GLUCOSE SERPL-MCNC: 145 MG/DL (ref 65–140)
GLUCOSE UR STRIP-MCNC: NEGATIVE MG/DL
HCT VFR BLD AUTO: 30.5 % (ref 34.8–46.1)
HGB BLD-MCNC: 9.1 G/DL (ref 11.5–15.4)
HGB UR QL STRIP.AUTO: ABNORMAL
HYPERCHROMIA BLD QL SMEAR: PRESENT
INR PPP: 2.38 (ref 0.9–1.1)
KETONES UR STRIP-MCNC: NEGATIVE MG/DL
LACTATE SERPL-SCNC: 2.5 MMOL/L (ref 0–2)
LACTATE SERPL-SCNC: 7 MMOL/L (ref 0–2)
LEUKOCYTE ESTERASE UR QL STRIP: ABNORMAL
LIPASE SERPL-CCNC: 33 U/L (ref 13–60)
LYMPHOCYTES # BLD AUTO: 0.86 THOUSAND/UL (ref 0.6–4.47)
LYMPHOCYTES # BLD AUTO: 6 % (ref 14–44)
MAGNESIUM SERPL-MCNC: 1.6 MG/DL (ref 1.6–2.6)
MCH RBC QN AUTO: 26.6 PG (ref 26.8–34.3)
MCHC RBC AUTO-ENTMCNC: 29.8 G/DL (ref 31.4–37.4)
MCV RBC AUTO: 89 FL (ref 82–98)
MICROCYTES BLD QL AUTO: PRESENT
MONOCYTES # BLD AUTO: 1.71 THOUSAND/UL (ref 0–1.22)
MONOCYTES NFR BLD: 12 % (ref 4–12)
NEUTROPHILS # BLD MANUAL: 11.54 THOUSAND/UL (ref 1.85–7.62)
NEUTS BAND NFR BLD MANUAL: 1 % (ref 0–8)
NEUTS SEG NFR BLD AUTO: 80 % (ref 43–75)
NITRITE UR QL STRIP: NEGATIVE
NON-SQ EPI CELLS URNS QL MICRO: ABNORMAL /HPF
PH UR STRIP.AUTO: 8 [PH]
PLATELET # BLD AUTO: 601 THOUSANDS/UL (ref 149–390)
PLATELET BLD QL SMEAR: ABNORMAL
PMV BLD AUTO: 10.5 FL (ref 8.9–12.7)
POIKILOCYTOSIS BLD QL SMEAR: PRESENT
POTASSIUM SERPL-SCNC: 3.9 MMOL/L (ref 3.5–5)
PROT SERPL-MCNC: 7.2 G/DL (ref 6.4–8.3)
PROT UR STRIP-MCNC: ABNORMAL MG/DL
PROTHROMBIN TIME: 25.8 SECONDS (ref 9.5–12.1)
RBC # BLD AUTO: 3.42 MILLION/UL (ref 3.81–5.12)
RBC #/AREA URNS AUTO: ABNORMAL /HPF
RBC MORPH BLD: PRESENT
RSV RNA NPH QL NAA+PROBE: NORMAL
SARS-COV-2 RNA RESP QL NAA+PROBE: NEGATIVE
SODIUM SERPL-SCNC: 139 MMOL/L (ref 133–145)
SP GR UR STRIP.AUTO: 1.02 (ref 1–1.03)
TOTAL CELLS COUNTED SPEC: 100
TROPONIN I SERPL-MCNC: 0.04 NG/ML (ref 0–0.07)
UROBILINOGEN UR QL STRIP.AUTO: 0.2 E.U./DL
WBC # BLD AUTO: 14.25 THOUSAND/UL (ref 4.31–10.16)
WBC #/AREA URNS AUTO: ABNORMAL /HPF

## 2021-07-26 PROCEDURE — 87631 RESP VIRUS 3-5 TARGETS: CPT | Performed by: EMERGENCY MEDICINE

## 2021-07-26 PROCEDURE — 83690 ASSAY OF LIPASE: CPT | Performed by: EMERGENCY MEDICINE

## 2021-07-26 PROCEDURE — 87635 SARS-COV-2 COVID-19 AMP PRB: CPT | Performed by: EMERGENCY MEDICINE

## 2021-07-26 PROCEDURE — 81001 URINALYSIS AUTO W/SCOPE: CPT | Performed by: EMERGENCY MEDICINE

## 2021-07-26 PROCEDURE — 96360 HYDRATION IV INFUSION INIT: CPT

## 2021-07-26 PROCEDURE — 83605 ASSAY OF LACTIC ACID: CPT | Performed by: EMERGENCY MEDICINE

## 2021-07-26 PROCEDURE — 85027 COMPLETE CBC AUTOMATED: CPT | Performed by: EMERGENCY MEDICINE

## 2021-07-26 PROCEDURE — 87077 CULTURE AEROBIC IDENTIFY: CPT | Performed by: EMERGENCY MEDICINE

## 2021-07-26 PROCEDURE — 80162 ASSAY OF DIGOXIN TOTAL: CPT | Performed by: EMERGENCY MEDICINE

## 2021-07-26 PROCEDURE — 96372 THER/PROPH/DIAG INJ SC/IM: CPT

## 2021-07-26 PROCEDURE — 93005 ELECTROCARDIOGRAM TRACING: CPT

## 2021-07-26 PROCEDURE — 70450 CT HEAD/BRAIN W/O DYE: CPT

## 2021-07-26 PROCEDURE — 96361 HYDRATE IV INFUSION ADD-ON: CPT

## 2021-07-26 PROCEDURE — 96365 THER/PROPH/DIAG IV INF INIT: CPT

## 2021-07-26 PROCEDURE — 85730 THROMBOPLASTIN TIME PARTIAL: CPT | Performed by: EMERGENCY MEDICINE

## 2021-07-26 PROCEDURE — 85007 BL SMEAR W/DIFF WBC COUNT: CPT | Performed by: EMERGENCY MEDICINE

## 2021-07-26 PROCEDURE — 87186 SC STD MICRODIL/AGAR DIL: CPT | Performed by: EMERGENCY MEDICINE

## 2021-07-26 PROCEDURE — 99291 CRITICAL CARE FIRST HOUR: CPT | Performed by: EMERGENCY MEDICINE

## 2021-07-26 PROCEDURE — 82948 REAGENT STRIP/BLOOD GLUCOSE: CPT

## 2021-07-26 PROCEDURE — 80053 COMPREHEN METABOLIC PANEL: CPT | Performed by: EMERGENCY MEDICINE

## 2021-07-26 PROCEDURE — 83880 ASSAY OF NATRIURETIC PEPTIDE: CPT | Performed by: EMERGENCY MEDICINE

## 2021-07-26 PROCEDURE — 71045 X-RAY EXAM CHEST 1 VIEW: CPT

## 2021-07-26 PROCEDURE — 87040 BLOOD CULTURE FOR BACTERIA: CPT | Performed by: EMERGENCY MEDICINE

## 2021-07-26 PROCEDURE — 1123F ACP DISCUSS/DSCN MKR DOCD: CPT | Performed by: EMERGENCY MEDICINE

## 2021-07-26 PROCEDURE — 84484 ASSAY OF TROPONIN QUANT: CPT | Performed by: EMERGENCY MEDICINE

## 2021-07-26 PROCEDURE — 85610 PROTHROMBIN TIME: CPT | Performed by: EMERGENCY MEDICINE

## 2021-07-26 PROCEDURE — 87086 URINE CULTURE/COLONY COUNT: CPT | Performed by: EMERGENCY MEDICINE

## 2021-07-26 PROCEDURE — 36415 COLL VENOUS BLD VENIPUNCTURE: CPT | Performed by: EMERGENCY MEDICINE

## 2021-07-26 PROCEDURE — 99285 EMERGENCY DEPT VISIT HI MDM: CPT

## 2021-07-26 PROCEDURE — 96375 TX/PRO/DX INJ NEW DRUG ADDON: CPT

## 2021-07-26 PROCEDURE — 74174 CTA ABD&PLVS W/CONTRAST: CPT

## 2021-07-26 PROCEDURE — 83735 ASSAY OF MAGNESIUM: CPT | Performed by: EMERGENCY MEDICINE

## 2021-07-26 RX ORDER — LORAZEPAM 2 MG/ML
0.5 INJECTION INTRAMUSCULAR ONCE
Status: COMPLETED | OUTPATIENT
Start: 2021-07-26 | End: 2021-07-26

## 2021-07-26 RX ORDER — HALOPERIDOL 5 MG/ML
5 INJECTION INTRAMUSCULAR ONCE
Status: COMPLETED | OUTPATIENT
Start: 2021-07-26 | End: 2021-07-26

## 2021-07-26 RX ORDER — CEFTRIAXONE 1 G/50ML
1000 INJECTION, SOLUTION INTRAVENOUS ONCE
Status: COMPLETED | OUTPATIENT
Start: 2021-07-26 | End: 2021-07-26

## 2021-07-26 RX ADMIN — SODIUM CHLORIDE 500 ML: 0.9 INJECTION, SOLUTION INTRAVENOUS at 19:56

## 2021-07-26 RX ADMIN — IOHEXOL 85 ML: 350 INJECTION, SOLUTION INTRAVENOUS at 22:33

## 2021-07-26 RX ADMIN — LORAZEPAM 0.5 MG: 2 INJECTION INTRAMUSCULAR; INTRAVENOUS at 21:11

## 2021-07-26 RX ADMIN — HALOPERIDOL LACTATE 5 MG: 5 INJECTION, SOLUTION INTRAMUSCULAR at 20:03

## 2021-07-26 RX ADMIN — SODIUM CHLORIDE 500 ML: 0.9 INJECTION, SOLUTION INTRAVENOUS at 21:45

## 2021-07-26 RX ADMIN — SODIUM CHLORIDE 1000 ML: 0.9 INJECTION, SOLUTION INTRAVENOUS at 20:22

## 2021-07-26 RX ADMIN — CEFTRIAXONE 1000 MG: 1 INJECTION, SOLUTION INTRAVENOUS at 20:30

## 2021-07-26 NOTE — ED PROVIDER NOTES
History  Chief Complaint   Patient presents with    Altered Mental Status     patient is confused and this is not her baseline the nursing home said, patient has been confused for 3 hours the nurses said  Patient is a 32-year-old female seen in the emergency department brought by EMS from facility with concern for altered mental status over approximately the past 3 hours  Apparently, patient became acutely confused approximately 3 hours prior to evaluation, not at her baseline  Patient is confused in the emergency department, and is unable to provide any additional coherent history  Review of records shows that the patient was admitted to [facility] this month and treated for for shortness of breath, epistaxis, chronic kidney disease  Prior to Admission Medications   Prescriptions Last Dose Informant Patient Reported? Taking? albuterol (PROVENTIL HFA,VENTOLIN HFA) 90 mcg/act inhaler Unknown at Unknown time  Yes No   Sig: Inhale 2 puffs every 6 (six) hours as needed   budesonide-formoterol (SYMBICORT) 80-4 5 MCG/ACT inhaler Unknown at Unknown time  Yes No   Sig: Inhale 2 puffs 2 (two) times a day Rinse mouth after use     digoxin (LANOXIN) 0 125 mg tablet Unknown at Unknown time  Yes No   Sig: Take 0 125 mg by mouth daily   ferrous sulfate (EQL Iron Supplement Therapy) 325 (65 Fe) mg tablet Unknown at Unknown time  No No   Sig: Take 1 tablet (325 mg total) by mouth daily with breakfast   melatonin 3 mg Unknown at Unknown time  No No   Sig: Take 1 tablet (3 mg total) by mouth daily at bedtime   montelukast (SINGULAIR) 10 mg tablet Unknown at Unknown time  Yes No   Sig: Take 1 tablet by mouth daily   nystatin (MYCOSTATIN) powder Unknown at Unknown time  No No   Sig: Apply topically 2 (two) times a day Apply to skin folds   omeprazole (PriLOSEC) 20 mg delayed release capsule Unknown at Unknown time  Yes No   Sig: Take 20 mg by mouth daily   potassium chloride (K-DUR,KLOR-CON) 10 mEq tablet Unknown at Unknown time  Yes No   Sig: Take 20 mEq by mouth 2 (two) times a day   senna-docusate sodium (Senokot S) 8 6-50 mg per tablet Unknown at Unknown time  Yes No   Sig: Take 1 tablet by mouth daily as needed   torsemide (DEMADEX) 20 mg tablet Unknown at Unknown time  No No   Sig: Take 2 tablets (40 mg total) by mouth 2 (two) times a day   warfarin (COUMADIN) 3 mg tablet Unknown at Unknown time  No No   Sig: Take 1 5 tablets (4 5 mg total) by mouth daily      Facility-Administered Medications: None       Past Medical History:   Diagnosis Date    Atrial fibrillation (HCC)     [facility]ch[facility]     [facility]     Hypertension     Respiratory failure ([facility] )        Past Surgical History:   Procedure Laterality Date    COLONOSCOPY      IR BIOPSY LUNG  2021    MITRAL VALVE REPLACEMENT      Bovine       History reviewed  No pertinent family history  I have reviewed and agree with the history as documented  E-Cigarette/Vaping    E-Cigarette Use Former User      E-Cigarette/Vaping Substances    Nicotine No     THC No     CBD No     Flavoring No     Other No     Unknown No      Social History     Tobacco Use    Smoking status: Former Smoker     Quit date: 2010     Years since quittin 5    Smokeless tobacco: Never Used   Vaping Use    Vaping Use: Former   Substance Use Topics    Alcohol use: Not Currently    Drug use: Not Currently       Review of Systems   Unable to perform ROS: Mental status change       Physical Exam  Physical Exam  Vitals and nursing note reviewed  Constitutional:       Appearance: She is well-developed  HENT:      Head: Normocephalic and atraumatic  Right Ear: External ear normal       Left Ear: External ear normal       Nose: Nose normal       Mouth/Throat:      Pharynx: Oropharynx is clear  Eyes:      General: No scleral icterus       Conjunctiva/sclera: Conjunctivae normal    Cardiovascular:      Rate and Rhythm: Tachycardia present  Rhythm irregular  Heart sounds: No murmur heard  Pulmonary:      Effort: Pulmonary effort is normal  No respiratory distress  Breath sounds: Normal breath sounds  Abdominal:      Palpations: Abdomen is soft  Tenderness: There is abdominal tenderness  Comments: moderate tenderness to palpation diffusely   Genitourinary:     Comments: Apparent rectal impaction  Musculoskeletal:         General: No deformity or signs of injury  Cervical back: Normal range of motion and neck supple  Skin:     General: Skin is warm and dry  Neurological:      Mental Status: She is alert        Comments: confused   Psychiatric:      Comments: Intermittently agitated         Vital Signs  ED Triage Vitals   Temperature Pulse Respirations Blood Pressure SpO2   07/26/21 1934 07/26/21 1910 07/26/21 1910 07/26/21 1910 07/26/21 1910   99 1 °F (37 3 °C) (!) 130 22 119/96 100 %      Temp Source Heart Rate Source Patient Position - Orthostatic VS BP Location FiO2 (%)   07/26/21 1934 07/26/21 1910 07/26/21 1910 07/26/21 1910 --   Rectal Monitor Lying Left arm       Pain Score       07/28/21 0918       No Pain           Vitals:    07/28/21 0020 07/28/21 0755 07/28/21 2000 07/29/21 0738   BP: 103/51 100/55 103/55 121/69   Pulse: 61 70 83 64   Patient Position - Orthostatic VS: Lying Lying Lying Lying         Visual Acuity  Visual Acuity      Most Recent Value   L Pupil Size (mm)  3   R Pupil Size (mm)  3          ED Medications  Medications   sodium chloride 0 9 % bolus 500 mL (0 mL Intravenous Stopped 7/26/21 2146)   haloperidol lactate (HALDOL) injection 5 mg (5 mg Intramuscular Given 7/26/21 2003)   sodium chloride 0 9 % bolus 1,000 mL (0 mL Intravenous Stopped 7/26/21 2132)   sodium chloride 0 9 % bolus 500 mL (0 mL Intravenous Stopped 7/26/21 2146)   cefTRIAXone (ROCEPHIN) IVPB (premix in dextrose) 1,000 mg 50 mL (0 mg Intravenous Stopped 7/26/21 2132)   LORazepam (ATIVAN) injection 0 5 mg (0 5 mg Intravenous Given 7/26/21 2111)   iohexol (OMNIPAQUE) 350 MG/ML injection (SINGLE-DOSE) 100 mL (85 mL Intravenous Given 7/26/21 2233)       Diagnostic Studies  Results Reviewed     Procedure Component Value Units Date/Time    Blood culture #2 [[id number]] Collected: 07/26/21 2000    Lab Status: Final result Specimen: Blood from Hand, Right Updated: 08/01/21 0001     Blood Culture No Growth After 5 Days  Blood culture #1 [[id number]] Collected: 07/26/21 1918    Lab Status: Final result Specimen: Blood from Arm, Right Updated: 08/01/21 0001     Blood Culture No Growth After 5 Days      Urine culture [[id number]]  (Abnormal)  (Susceptibility) Collected: 07/26/21 1943    Lab Status: Final result Specimen: Urine, Straight Cath Updated: 07/28/21 1710     Urine Culture >100,000 cfu/ml Klebsiella pneumoniae    Susceptibility     Klebsiella pneumoniae (1)     Antibiotic Interpretation Microscan Method Status    ZID Performed  Yes  VERONICA Final    Amoxicillin + Clavulanate Susceptible <=4/2 ug/ml VERONICA Final    Ampicillin ($$) Resistant >16 00 ug/ml VERONICA Final    Ampicillin + Sulbactam ($) Susceptible 4/2 ug/ml VERONICA Final    Aztreonam ($$$)  Susceptible <=4 ug/ml VERONICA Final    Cefazolin ($) Susceptible <=2 00 ug/ml VERONICA Final    Ciprofloxacin ($)  Susceptible <=1 00 ug/ml VERONICA Final    Gentamicin ($$) Susceptible <=1 ug/ml VERONICA Final    Levofloxacin ($) Susceptible <=0 25 ug/ml VERONICA Final    Nitrofurantoin Susceptible <=32 ug/ml VERONICA Final    Tetracycline Susceptible <=4 ug/ml VERONICA Final    Tobramycin ($) Susceptible <=1 ug/ml VERONICA Final    Trimethoprim + Sulfamethoxazole ($$$) Susceptible <=2/38 ug/ml VERONICA Final                   Troponin I [[id number]]  (Normal) Collected: 07/26/21 2318    Lab Status: Final result Specimen: Blood from Arm, Left Updated: 07/27/21 0006     Troponin I 0 05 ng/mL     Lactic acid 2 Hours [[id number]]  (Abnormal) Collected: 07/26/21 2153    Lab Status: Final result Specimen: Blood from Arm, Left Updated: 07/26/21 2217     LACTIC ACID 2 5 mmol/L     Narrative:      Result may be elevated if tourniquet was used during collection  Lipase [[id number]]  (Normal) Collected: 07/26/21 1918    Lab Status: Final result Specimen: Blood from Arm, Right Updated: 07/26/21 2142     Lipase 33 u/L     CBC and differential [[id number]]  (Abnormal) Collected: 07/26/21 1918    Lab Status: Final result Specimen: Blood from Arm, Right Updated: 07/26/21 2054     WBC 14 25 Thousand/uL      RBC 3 42 Million/uL      Hemoglobin 9 1 g/dL      Hematocrit 30 5 %      MCV 89 fL      MCH 26 6 pg      MCHC 29 8 g/dL      RDW 17 9 %      MPV 10 5 fL      Platelets 981 Thousands/uL     Manual Differential(PHLEBS Do Not Order) [[id number]]  (Abnormal) Collected: 07/26/21 1918    Lab Status: Final result Specimen: Blood from Arm, Right Updated: 07/26/21 2054     Segmented % 80 %      Bands % 1 %      Lymphocytes % 6 %      Monocytes % 12 %      Eosinophils, % 0 %      Basophils % 1 %      Absolute Neutrophils 11 54 Thousand/uL      Lymphocytes Absolute 0 86 Thousand/uL      Monocytes Absolute 1 71 Thousand/uL      Eosinophils Absolute 0 00 Thousand/uL      Basophils Absolute 0 14 Thousand/uL      Total Counted 100     RBC Morphology Present     Anisocytosis Present     Hypochromia Present     Microcytes Present     Poikilocytes Present     Platelet Estimate Increased    Narrative:      WBC has been corrected due to the presence of NRBC, please see adjusted WBC     Novel Coronavirus (Covid-19),PCR [facility] - 2 Hour Stat [[id number]]  (Normal) Collected: 07/26/21 1950    Lab Status: Final result Specimen: Nares from Nose Updated: 07/26/21 2049     SARS-CoV-2 Negative    Narrative:       The specimen collection materials, transport medium, and/or testing methodology utilized in the production of these test results have been proven to be reliable in a limited validation with an abbreviated program under the Emergency Utilization Authorization provided by the FDA  Testing reported as "Presumptive positive" will be confirmed with secondary testing to ensure result accuracy  Clinical caution and judgement should be used with the interpretation of these results with consideration of the clinical impression and other laboratory testing  Testing reported as "Positive" or "Negative" has been proven to be accurate according to standard laboratory validation requirements  All testing is performed with control materials showing appropriate reactivity at standard intervals  Influenza A/B and RSV PCR - 2 Hour Stat [[id number]]  (Normal) Collected: 07/26/21 1950    Lab Status: Final result Specimen: Nares from Nasopharyngeal Swab Updated: 07/26/21 2038     INFLUENZA A PCR None Detected     INFLUENZA B PCR None Detected     RSV PCR None Detected    B-Type Natriuretic Peptide [facility] ONLY) [[id number]]  (Abnormal) Collected: 07/26/21 1918    Lab Status: Final result Specimen: Blood from Arm, Right Updated: 07/26/21 2023      5 pg/mL     UA w Reflex to Microscopic w Reflex to Culture [[id number]]  (Abnormal) Collected: 07/26/21 1943    Lab Status: Final result Specimen: Urine, Straight Cath Updated: 07/26/21 2023     Color, UA Yellow     Clarity, UA Cloudy     Specific Gravity, UA 1 025     pH, UA 8 0     Leukocytes, UA 3+     Nitrite, UA Negative     Protein, UA       Interference- unable to analyze     mg/dl     Glucose, UA Negative mg/dl      Ketones, UA Negative mg/dl      Urobilinogen, UA 0 2 E U /dl      Bilirubin, UA Negative     Blood, UA Trace    Narrative:      Manually read dipstick  Microscopic indicated on an unspun urine Innummberable bacteria, white cells and red blood cells in large clumps  Culture indicated  Please add        Urine Microscopic [[id number]]  (Abnormal) Collected: 07/26/21 1943    Lab Status: Final result Specimen: Urine, Straight Cath Updated: 07/26/21 2022     RBC, UA Innumerable /hpf      WBC, UA Innumerable /hpf      Epithelial Cells None Seen /hpf      Bacteria, UA Innumerable /hpf     Narrative:         Very small Volume performed on an unspun urine  Red blood cells in clumps    Fingerstick Glucose (POCT) [[id number]]  (Abnormal) Collected: 07/26/21 2001    Lab Status: Final result Updated: 07/26/21 2006     POC Glucose 145 mg/dl     Troponin I [[id number]]  (Normal) Collected: 07/26/21 1918    Lab Status: Final result Specimen: Blood from Arm, Right Updated: 07/26/21 2003     Troponin I 0 04 ng/mL     Digoxin level [[id number]]  (Normal) Collected: 07/26/21 1918    Lab Status: Final result Specimen: Blood from Arm, Right Updated: 07/26/21 2002     Digoxin Lvl 1 5 ng/mL     Lactic acid, plasma [[id number]]  (Abnormal) Collected: 07/26/21 1918    Lab Status: Final result Specimen: Blood from Arm, Right Updated: 07/26/21 2002     LACTIC ACID 7 0 mmol/L     Narrative:      Result may be elevated if tourniquet was used during collection      Comprehensive metabolic panel [[id number]]  (Abnormal) Collected: 07/26/21 1918    Lab Status: Final result Specimen: Blood from Arm, Right Updated: 07/26/21 2001     Sodium 139 mmol/L      Potassium 3 9 mmol/L      Chloride 95 mmol/L      CO2 22 mmol/L      ANION GAP 22 mmol/L      BUN 29 mg/dL      Creatinine 1 76 mg/dL      Glucose 124 mg/dL      Calcium 9 7 mg/dL      AST 21 U/L      ALT 13 U/L      Alkaline Phosphatase 58 3 U/L      Total Protein 7 2 g/dL      Albumin 3 9 g/dL      Total Bilirubin 0 65 mg/dL      eGFR 28 ml/min/1 73sq m     Narrative:      [facility] guidelines for Chronic Kidney Disease (CKD):     Stage 1 with normal or high GFR (GFR > 90 mL/min/1 73 square meters)    Stage 2 Mild CKD (GFR = 60-89 mL/min/1 73 square meters)    Stage 3A Moderate CKD (GFR = 45-59 mL/min/1 73 square meters)    Stage 3B Moderate CKD (GFR = 30-44 mL/min/1 73 square meters)    Stage 4 Severe CKD (GFR = 15-29 mL/min/1 73 square meters)    Stage 5 End Stage CKD (GFR <15 mL/min/1 73 square meters)  Note: GFR calculation is accurate only with a steady state creatinine    Magnesium [[id number]]  (Normal) Collected: 07/26/21 1918    Lab Status: Final result Specimen: Blood from Arm, Right Updated: 07/26/21 2001     Magnesium 1 6 mg/dL     APTT [[id number]]  (Abnormal) Collected: 07/26/21 1918    Lab Status: Final result Specimen: Blood from Arm, Right Updated: 07/26/21 1954     PTT 34 seconds     Protime-INR [[id number]]  (Abnormal) Collected: 07/26/21 1918    Lab Status: Final result Specimen: Blood from Arm, Right Updated: 07/26/21 1954     Protime 25 8 seconds      INR 2 38    Narrative:      INR Reference Ranges:  No Anticoagulant, Normal:           0 9-1 1  Standard Dose, Oral Anticoagulant:  2 0-3 0  High Dose, Oral Anticoagulant:      2 5-3 5                 CTA abdomen pelvis w wo contrast   Final Result by Uzair Logan MD (07/26 3160)      1  Limited evaluation due to motion artifact  2   Atherosclerotic changes in the abdominal aorta and branches which appear patent  3   Large amount of stool in the rectum  Correlate for fecal impaction  4   Nodular hepatic surface contour suspicious for cirrhosis  5   Cholelithiasis without evidence of cholecystitis  6   Stable bilateral adrenal adenomas  7   Marked cardiomegaly  Workstation performed: [id number]         CT head without contrast   Final Result by Uzair Logan MD (07/26 2253)      No definite acute intracranial abnormality, noting limited evaluation due to motion artifact  Workstation performed: [id number]         XR chest 1 view portable   ED Interpretation by Vita Navarro MD (07/26 2115)   pulmonary vascular congestion      Final Result by Mary Lou Choudhary MD (07/27 4850)      Previous right middle lobe mass poorly demonstrated  Pulmonary venous congestion                    Workstation performed: [id number]                    Procedures  ECG 12 Lead Documentation Only    Date/Time: 7/26/2021 7:34 PM  Performed by: London Walsh MD  Authorized by: London Walsh MD     Indications / Diagnosis:  Altered mental status  ECG reviewed by me, the ED Provider: yes    Patient location:  ED  Rate:     ECG rate:  134    ECG rate assessment: tachycardic    Rhythm:     Rhythm: atrial fibrillation    QRS:     QRS axis:  Normal  ST segments:     ST segments:  Non-specific  T waves:     T waves: non-specific    Comments:      Atrial fibrillation at 134, normal axis, QRS 94, QTc 512, nonspecific ST-T wave abnormality, no definite evidence of acute ischemia  General Procedure    Date/Time: 7/26/2021 7:36 PM  Performed by: London Walsh MD  Authorized by: London Walsh MD     Procedure Detail:     Procedure note (site, laterality, method, findings):  Patient noted some rectal discomfort in the emergency department, and was found to have a fecal impaction present  Digital fecal impaction was performed in the emergency department  Patient tolerated the procedure well  Post-procedure details:     Patient tolerance of procedure:   Tolerated well, no immediate complications  CriticalCare Time  Performed by: London Walsh MD  Authorized by: London Walsh MD     Critical care provider statement:     Critical care time (minutes):  60    Critical care start time:  7/26/2021 10:30 PM    Critical care end time:  7/26/2021 11:30 PM    Critical care time was exclusive of:  Separately billable procedures and treating other patients    Critical care was necessary to treat or prevent imminent or life-threatening deterioration of the following conditions:  Circulatory failure and sepsis    Critical care was time spent personally by me on the following activities:  Ordering and performing treatments and interventions, ordering and review of laboratory studies, ordering and review of radiographic studies, re-evaluation of patient's condition, review of old charts, evaluation of patient's response to treatment and examination of patient    I assumed direction of critical care for this patient from another provider in my specialty: no               ED Course  ED Course as of Aug 09 2013   Mon Jul 26, 2021   1067 CT BRAIN - WITHOUT CONTRAST     INDICATION:   altered mental status      COMPARISON:  None      TECHNIQUE:  CT examination of the brain was performed  In addition to axial images, sagittal and coronal 2D reformatted images were created and submitted for interpretation      Radiation dose length product (DLP) for this visit:  911 3 mGy-cm   This examination, like all CT scans performed in the [facility], was performed utilizing techniques to minimize radiation dose exposure, including the use of iterative   reconstruction and automated exposure control        IMAGE QUALITY:  Limited evaluation due to motion artifact      FINDINGS:     PARENCHYMA:  No intracranial mass, mass effect or midline shift  No CT signs of acute infarction  No acute parenchymal hemorrhage      VENTRICLES AND EXTRA-AXIAL SPACES:  Normal for the patient's age      VISUALIZED ORBITS AND PARANASAL SINUSES:  Unremarkable      CALVARIUM AND EXTRACRANIAL SOFT TISSUES:  Normal      IMPRESSION:     No definite acute intracranial abnormality, noting limited evaluation due to motion artifact          2356 IMPRESSION:     1   Limited evaluation due to motion artifact  2   Atherosclerotic changes in the abdominal aorta and branches which appear patent  3   Large amount of stool in the rectum  Correlate for fecal impaction  4   Nodular hepatic surface contour suspicious for cirrhosis  5   Cholelithiasis without evidence of cholecystitis  6   Stable bilateral adrenal adenomas    7   Marked cardiomegaly             2356 eGFR: 28                             SBIRT 20yo+      Most Recent Value   SBIRT (25 yo +)   In order to provide better care to our patients, we are screening all of our patients for alcohol and drug use  Would it be okay to ask you these screening questions? Unable to answer at this time Filed at: 07/26/2021 2211                    MDM  Number of Diagnoses or Management Options  Acute UTI  Agitation  Altered mental status  Dehydration  Elevated lactic acid level  Diagnosis management comments: Patient is a 19-year-old female seen in the emergency department with concern for altered mental status  EKG was obtained and noted  Patient was medicated for agitation control/patient safety  CT head showed no acute intracranial abnormality  Chest x-ray showed pulmonary vascular congestion, no infiltrate or pneumothorax  COVID-19 swab was ordered during global pandemic  Laboratory evaluation remarkable for elevated lactic acid of 7 0(down to 2 5 following fluid resuscitation), elevated anion gap of 22, elevated BUN of 29, elevated creatinine of 1 76, elevated PTT of 34, elevated PT of 25 8, elevated INR of 2 38, elevated BNP of 182 5, urinalysis positive for 3+ leukocytes, trace blood, innumerable red blood cells, innumerable white blood cells, innumerable bacteria, elevated white blood cell count of 14 25, 80% segmented neutrophils, 1% bands, low hemoglobin of 9 1, low hematocrit of 30 5, elevated platelets of 041  CTA abdomen/pelvis was obtained to evaluate for mesenteric ischemia  CTA showed atherosclerotic changes, large amount of stool in the rectum, nodular hepatic surface contour suspicious for cirrhosis, cholelithiasis, stable bilateral adrenal adenomas, marked cardiomegaly  Patient was fluid resuscitated and treated with antibiotics for urinary tract infection  Plan to admit patient for further evaluation and treatment  Case was reviewed with inpatient team  IBW used to calculate fluids(based on obesity), patient's IBW is 52 4 kg         Amount and/or Complexity of Data Reviewed  Clinical lab tests: ordered and reviewed  Tests in the radiology section of CPT®: ordered and reviewed  Tests in the medicine section of CPT®: ordered and reviewed        Disposition  Final diagnoses: Altered mental status   Elevated lactic acid level   Dehydration   Acute UTI   Agitation     Time reflects when diagnosis was documented in both MDM as applicable and the Disposition within this note     Time User Action Codes Description Comment    7/26/2021  8:04 PM Tiffanie Ambriz Add [R41 82] Altered mental status     7/26/2021  8:04 PM Tiffanie Ambriz Add [R79 89] Elevated lactic acid level     7/26/2021  8:22 PM Tiffanie Ambriz Add [E86 0] Dehydration     7/26/2021  8:27 PM Tiffaniejaylin Ambriz Add [N39 0] Acute UTI     7/27/2021 12:38 AM Tiffanie Ambriz Add [R45 1] Agitation       ED Disposition     ED Disposition Condition Date/Time Comment    Admit Stable Tue Jul 27, 2021 12:35 AM Case was discussed with inpatient team and the patient's admission status was agreed to be Admission Status: inpatient status to the service of Dr Mel Zelaya MD Documentation      Most Recent Value   Accepting Facility Name, [address] at [address] [facility] Name, [address] at [facility] life support   Copies of Medical Records Sent  History and Physical   Patient Belongings Disposition  Sent with patient   Transfer Date  07/29/21   Transfer Time  1500      Follow-up Information    None         Discharge Medication List as of 7/29/2021  2:09 PM      START taking these medications    Details   cephalexin (KEFLEX) 500 mg capsule Take 1 capsule (500 mg total) by mouth every 6 (six) hours for 4 days, Starting Thu 7/29/2021, Until Mon 8/2/2021, Normal      polyethylene glycol (MIRALAX) 17 g packet Take 17 g by mouth daily for 7 days, Starting Fri 7/30/2021, Until Fri 8/6/2021, Normal      !! senna-docusate sodium (SENOKOT-S) 8 6-50 mg per tablet Take 1 tablet by mouth daily, Starting Thu 7/29/2021, Normal       !! - Potential duplicate medications found  Please discuss with provider  CONTINUE these medications which have NOT CHANGED    Details   albuterol (PROVENTIL HFA,VENTOLIN HFA) 90 mcg/act inhaler Inhale 2 puffs every 6 (six) hours as needed, Starting Thu 10/1/2020, Until Fri 10/1/2021, Historical Med      budesonide-formoterol (SYMBICORT) 80-4 5 MCG/ACT inhaler Inhale 2 puffs 2 (two) times a day Rinse mouth after use , Historical Med      digoxin (LANOXIN) 0 125 mg tablet Take 0 125 mg by mouth daily, Starting Wed 2/24/2021, Until Thu 2/24/2022, Historical Med      ferrous sulfate (EQL Iron Supplement Therapy) 325 (65 Fe) mg tablet Take 1 tablet (325 mg total) by mouth daily with breakfast, Starting Fri 3/12/2021, No Print      melatonin 3 mg Take 1 tablet (3 mg total) by mouth daily at bedtime, Starting Fri 3/12/2021, No Print      montelukast (SINGULAIR) 10 mg tablet Take 1 tablet by mouth daily, Starting Wed 6/27/2012, Historical Med      nystatin (MYCOSTATIN) powder Apply topically 2 (two) times a day Apply to skin folds, Starting Fri 3/12/2021, No Print      omeprazole (PriLOSEC) 20 mg delayed release capsule Take 20 mg by mouth daily, Historical Med      potassium chloride (K-DUR,KLOR-CON) 10 mEq tablet Take 20 mEq by mouth 2 (two) times a day, Historical Med      !! senna-docusate sodium (Senokot S) 8 6-50 mg per tablet Take 1 tablet by mouth daily as needed, Starting Tue 2/23/2021, Until Wed 2/23/2022, Historical Med      torsemide (DEMADEX) 20 mg tablet Take 2 tablets (40 mg total) by mouth 2 (two) times a day, Starting Mon 7/5/2021, No Print      warfarin (COUMADIN) 3 mg tablet Take 1 5 tablets (4 5 mg total) by mouth daily, Starting Mon 7/5/2021, No Print       !! - Potential duplicate medications found  Please discuss with provider  No discharge procedures on file      PDMP Review     None          ED Provider  Electronically Signed by           Lon Pugh MD  07/27/21 0038       Lon Pugh MD  07/27/21 2000 Osman Stewart MD  08/09/21 2013       Sofie Lassiter MD  08/09/21 2017

## 2021-07-27 PROBLEM — G93.40 ACUTE ENCEPHALOPATHY: Status: ACTIVE | Noted: 2021-07-27

## 2021-07-27 PROBLEM — A41.9 SEPTIC SHOCK (HCC): Status: ACTIVE | Noted: 2021-07-27

## 2021-07-27 PROBLEM — R65.21 SEPTIC SHOCK (HCC): Status: ACTIVE | Noted: 2021-07-27

## 2021-07-27 PROBLEM — N17.9 ACUTE KIDNEY INJURY SUPERIMPOSED ON CHRONIC KIDNEY DISEASE (HCC): Status: ACTIVE | Noted: 2021-07-27

## 2021-07-27 PROBLEM — N39.0 UTI (URINARY TRACT INFECTION): Status: ACTIVE | Noted: 2021-07-27

## 2021-07-27 PROBLEM — Z98.890 S/P MVR (MITRAL VALVE REPAIR): Status: ACTIVE | Noted: 2021-07-27

## 2021-07-27 PROBLEM — N18.9 ACUTE KIDNEY INJURY SUPERIMPOSED ON CHRONIC KIDNEY DISEASE (HCC): Status: ACTIVE | Noted: 2021-07-27

## 2021-07-27 PROBLEM — R93.5 ABNORMAL CT OF THE ABDOMEN: Status: ACTIVE | Noted: 2021-07-27

## 2021-07-27 LAB
ANION GAP SERPL CALCULATED.3IONS-SCNC: 11 MMOL/L (ref 4–13)
ATRIAL RATE: 142 BPM
BUN SERPL-MCNC: 31 MG/DL (ref 6–20)
CALCIUM SERPL-MCNC: 9.1 MG/DL (ref 8.4–10.2)
CHLORIDE SERPL-SCNC: 101 MMOL/L (ref 96–108)
CO2 SERPL-SCNC: 27 MMOL/L (ref 22–33)
CREAT SERPL-MCNC: 1.73 MG/DL (ref 0.4–1.1)
ERYTHROCYTE [DISTWIDTH] IN BLOOD BY AUTOMATED COUNT: 18 % (ref 11.6–15.1)
GFR SERPL CREATININE-BSD FRML MDRD: 29 ML/MIN/1.73SQ M
GLUCOSE SERPL-MCNC: 134 MG/DL (ref 65–140)
HCT VFR BLD AUTO: 27 % (ref 34.8–46.1)
HGB BLD-MCNC: 8.1 G/DL (ref 11.5–15.4)
LACTATE SERPL-SCNC: 1.3 MMOL/L (ref 0–2)
MCH RBC QN AUTO: 26.8 PG (ref 26.8–34.3)
MCHC RBC AUTO-ENTMCNC: 30 G/DL (ref 31.4–37.4)
MCV RBC AUTO: 89 FL (ref 82–98)
PLATELET # BLD AUTO: 438 THOUSANDS/UL (ref 149–390)
PMV BLD AUTO: 10.2 FL (ref 8.9–12.7)
POTASSIUM SERPL-SCNC: 3.7 MMOL/L (ref 3.5–5)
PR INTERVAL: 129 MS
PROCALCITONIN SERPL-MCNC: 0.34 NG/ML
QRS AXIS: 61 DEGREES
QRSD INTERVAL: 94 MS
QT INTERVAL: 343 MS
QTC INTERVAL: 512 MS
RBC # BLD AUTO: 3.02 MILLION/UL (ref 3.81–5.12)
SODIUM SERPL-SCNC: 139 MMOL/L (ref 133–145)
T WAVE AXIS: 105 DEGREES
TROPONIN I SERPL-MCNC: 0.05 NG/ML (ref 0–0.07)
VENTRICULAR RATE: 134 BPM
WBC # BLD AUTO: 10.65 THOUSAND/UL (ref 4.31–10.16)

## 2021-07-27 PROCEDURE — 85027 COMPLETE CBC AUTOMATED: CPT | Performed by: NURSE PRACTITIONER

## 2021-07-27 PROCEDURE — 80048 BASIC METABOLIC PNL TOTAL CA: CPT | Performed by: NURSE PRACTITIONER

## 2021-07-27 PROCEDURE — 94664 DEMO&/EVAL PT USE INHALER: CPT

## 2021-07-27 PROCEDURE — 93010 ELECTROCARDIOGRAM REPORT: CPT | Performed by: INTERNAL MEDICINE

## 2021-07-27 PROCEDURE — 87081 CULTURE SCREEN ONLY: CPT | Performed by: INTERNAL MEDICINE

## 2021-07-27 PROCEDURE — 99223 1ST HOSP IP/OBS HIGH 75: CPT | Performed by: INTERNAL MEDICINE

## 2021-07-27 PROCEDURE — 84145 PROCALCITONIN (PCT): CPT | Performed by: NURSE PRACTITIONER

## 2021-07-27 PROCEDURE — 94640 AIRWAY INHALATION TREATMENT: CPT

## 2021-07-27 PROCEDURE — 83605 ASSAY OF LACTIC ACID: CPT | Performed by: NURSE PRACTITIONER

## 2021-07-27 PROCEDURE — 94760 N-INVAS EAR/PLS OXIMETRY 1: CPT

## 2021-07-27 PROCEDURE — 97163 PT EVAL HIGH COMPLEX 45 MIN: CPT

## 2021-07-27 RX ORDER — PANTOPRAZOLE SODIUM 40 MG/1
40 TABLET, DELAYED RELEASE ORAL
Status: DISCONTINUED | OUTPATIENT
Start: 2021-07-27 | End: 2021-07-29 | Stop reason: HOSPADM

## 2021-07-27 RX ORDER — WARFARIN SODIUM 5 MG/1
5 TABLET ORAL
Status: DISCONTINUED | OUTPATIENT
Start: 2021-07-27 | End: 2021-07-29 | Stop reason: HOSPADM

## 2021-07-27 RX ORDER — CEFTRIAXONE 1 G/50ML
1000 INJECTION, SOLUTION INTRAVENOUS EVERY 24 HOURS
Status: DISCONTINUED | OUTPATIENT
Start: 2021-07-27 | End: 2021-07-29

## 2021-07-27 RX ORDER — DIGOXIN 125 MCG
62.5 TABLET ORAL DAILY
Status: DISCONTINUED | OUTPATIENT
Start: 2021-07-27 | End: 2021-07-29 | Stop reason: HOSPADM

## 2021-07-27 RX ORDER — MONTELUKAST SODIUM 10 MG/1
10 TABLET ORAL DAILY
Status: DISCONTINUED | OUTPATIENT
Start: 2021-07-27 | End: 2021-07-29 | Stop reason: HOSPADM

## 2021-07-27 RX ORDER — BUDESONIDE AND FORMOTEROL FUMARATE DIHYDRATE 80; 4.5 UG/1; UG/1
2 AEROSOL RESPIRATORY (INHALATION) 2 TIMES DAILY
Status: DISCONTINUED | OUTPATIENT
Start: 2021-07-27 | End: 2021-07-29 | Stop reason: HOSPADM

## 2021-07-27 RX ORDER — NYSTATIN 100000 [USP'U]/G
POWDER TOPICAL 2 TIMES DAILY
Status: DISCONTINUED | OUTPATIENT
Start: 2021-07-27 | End: 2021-07-29 | Stop reason: HOSPADM

## 2021-07-27 RX ORDER — FERROUS SULFATE 325(65) MG
325 TABLET ORAL
Status: DISCONTINUED | OUTPATIENT
Start: 2021-07-27 | End: 2021-07-29 | Stop reason: HOSPADM

## 2021-07-27 RX ORDER — ALBUTEROL SULFATE 90 UG/1
2 AEROSOL, METERED RESPIRATORY (INHALATION) EVERY 6 HOURS PRN
Status: DISCONTINUED | OUTPATIENT
Start: 2021-07-27 | End: 2021-07-29 | Stop reason: HOSPADM

## 2021-07-27 RX ORDER — AMOXICILLIN 250 MG
2 CAPSULE ORAL 2 TIMES DAILY
Status: DISCONTINUED | OUTPATIENT
Start: 2021-07-27 | End: 2021-07-29 | Stop reason: HOSPADM

## 2021-07-27 RX ORDER — ACETAMINOPHEN 325 MG/1
650 TABLET ORAL EVERY 6 HOURS PRN
Status: DISCONTINUED | OUTPATIENT
Start: 2021-07-27 | End: 2021-07-29 | Stop reason: HOSPADM

## 2021-07-27 RX ORDER — POLYETHYLENE GLYCOL 3350 17 G/17G
17 POWDER, FOR SOLUTION ORAL DAILY
Status: DISCONTINUED | OUTPATIENT
Start: 2021-07-27 | End: 2021-07-29 | Stop reason: HOSPADM

## 2021-07-27 RX ORDER — AMOXICILLIN 250 MG
1 CAPSULE ORAL 2 TIMES DAILY
Status: DISCONTINUED | OUTPATIENT
Start: 2021-07-27 | End: 2021-07-27

## 2021-07-27 RX ADMIN — BUDESONIDE AND FORMOTEROL FUMARATE DIHYDRATE 2 PUFF: 80; 4.5 AEROSOL RESPIRATORY (INHALATION) at 17:36

## 2021-07-27 RX ADMIN — POLYETHYLENE GLYCOL 3350 17 G: 17 POWDER, FOR SOLUTION ORAL at 08:18

## 2021-07-27 RX ADMIN — NYSTATIN: 100000 POWDER TOPICAL at 11:10

## 2021-07-27 RX ADMIN — BUDESONIDE AND FORMOTEROL FUMARATE DIHYDRATE 2 PUFF: 80; 4.5 AEROSOL RESPIRATORY (INHALATION) at 08:39

## 2021-07-27 RX ADMIN — DOCUSATE SODIUM AND SENNOSIDES 2 TABLET: 8.6; 5 TABLET, FILM COATED ORAL at 08:19

## 2021-07-27 RX ADMIN — NYSTATIN: 100000 POWDER TOPICAL at 16:56

## 2021-07-27 RX ADMIN — FERROUS SULFATE TAB 325 MG (65 MG ELEMENTAL FE) 325 MG: 325 (65 FE) TAB at 08:18

## 2021-07-27 RX ADMIN — DIGOXIN 62.5 MCG: 125 TABLET ORAL at 08:19

## 2021-07-27 RX ADMIN — MONTELUKAST SODIUM 10 MG: 10 TABLET, FILM COATED ORAL at 08:18

## 2021-07-27 RX ADMIN — PANTOPRAZOLE SODIUM 40 MG: 40 TABLET, DELAYED RELEASE ORAL at 06:17

## 2021-07-27 RX ADMIN — DOCUSATE SODIUM AND SENNOSIDES 2 TABLET: 8.6; 5 TABLET, FILM COATED ORAL at 16:56

## 2021-07-27 RX ADMIN — CEFTRIAXONE 1000 MG: 1 INJECTION, SOLUTION INTRAVENOUS at 20:08

## 2021-07-27 NOTE — ASSESSMENT & PLAN NOTE
· Chronic obstructive pulmonary disease with no acute exacerbation  No PFTs on file     · Former Smoker   · History of chronic respiratory failure with baseline O2 requirement of 4 liters nasal cannula  · Currently saturating at 92% on 2 L of oxygen     Plan  · Continue home regimen : Symbicort 80-4 5 mcg 2 puffs b i d , Proventil 2 puffs q 6h p r n , singular daily

## 2021-07-27 NOTE — PHYSICAL THERAPY NOTE
PHYSICAL THERAPY EVALUATION    NAME:  Lorri Cooley  DATE: 07/27/21    AGE:   70 y o  Mrn:   [id number]  Length Of Stay: 0    ADMIT DX:  Dehydration [E86 0]  Agitation [R45 1]  Altered mental status [R41 82]  Acute UTI [N39 0]  Elevated lactic acid level [R79 89]    Past Medical History:   Diagnosis Date    [facility]rial fibrillation ([facility] )     COPD (chronic obstructive pulmonary disease) ([facility] )     Diabetes mellitus ([facility] )     Hypertension     Respiratory failure ([facility] )      Past Surgical History:   Procedure Laterality Date    COLONOSCOPY      IR BIOPSY LUNG  6/28/2021    MITRAL VALVE REPLACEMENT  2012    [place]       Performed at least 2 patient identifiers during session: Name, Eric Griffin and ID bracelet        07/27/21 1416   PT Last Visit   PT Visit Date 07/27/21   Note Type   Note type Evaluation   Pain Assessment   Pain Assessment Tool Pascual-Baker FACES   Pascual-Baker FACES Pain Rating 6   Pain Location/Orientation Orientation: Lower; Location: Abdomen   Pain Onset/Description Frequency: Intermittent   Effect of Pain on Daily Activities denies pain however +grimmacing and moaning with pressure/palpation on abdomen  Hospital Pain Intervention(s) Ambulation/increased activity;Repositioned   Multiple Pain Sites No   Home Living   Type of Home SNF  ([facility])   Prior Function   Level of [place] Needs assistance with ADLs and functional mobility; Needs assistance with IADLs   Lives With Facility staff   Receives Help From Personal care attendant   ADL Assistance Needs assistance   IADLs Needs assistance   Falls in the last 6 months 0  (pt denies, questionable)   Vocational Retired   Comments Pt is a poor/unreliable historian  Per EMR, pt initially went to [facility] at [facility] however has been there since the beginning of the year (~6 months)  Pt reports she needs assistance with all ADLs and mobility   States she gets PT services off and on during her stay at [facility], has been bouncing to/from rehab and hospitals  Pt states that with PT services she was working on standing and walking "a few steps" with the RW  Restrictions/Precautions   Weight Bearing Precautions Per Order No   Other Precautions Bed Alarm;Cognitive; Fall Risk;Pain;Multiple lines   General   Additional Pertinent History Pt admitted 7/26/21 with AMS/confusion, dx septic shock  Family/Caregiver Present No   Cognition   Overall Cognitive Status Impaired   Arousal/Participation Alert   Orientation Level Oriented to person;Disoriented to place; Disoriented to time;Disoriented to situation   Memory Decreased recall of precautions;Decreased recall of recent events;Decreased short term memory   Following Commands Follows one step commands with increased time or repetition   RUE Assessment   RUE Assessment WFL   LUE Assessment   LUE Assessment WFL   RLE Assessment   RLE Assessment X   Strength RLE   RLE Overall Strength 2/5   LLE Assessment   LLE Assessment X   Strength LLE   LLE Overall Strength 2/5   Coordination   Sensation WFL   Light Touch   RLE Light Touch Grossly intact   LLE Light Touch Grossly intact   Bed Mobility   Rolling R 2  Maximal assistance   Additional items Assist x 1;Bedrails;Verbal cues;LE management  (trunk management)   Rolling L 2  Maximal assistance   Additional items Assist x 1;Bedrails; Increased time required;Verbal cues;LE management  (trunk management)   Additional Comments Semi-supine to/from Long sit in bed with MAXA and use of B bedrails  Unable to sustain long sit any measurable amount of time  Pt refuses to attempt EOB sitting or transfer training on this date, but is agreeable to participate next day     Transfers   Sit to Stand Unable to assess   Stand to Sit Unable to assess   Stand pivot Unable to assess   Ambulation/Elevation   Gait pattern Not tested   Endurance Deficit   Endurance Deficit Yes   Activity Tolerance   Activity Tolerance Patient limited by fatigue;Patient limited by pain   Medical Staff Made Aware Spoke with SLIM and CM   Nurse Made Aware Spoke with CHASE Sky   Assessment   Prognosis Fair   Problem List Decreased strength;Decreased range of motion;Decreased endurance; Impaired balance;Decreased mobility; Decreased cognition; Impaired judgement;Decreased safety awareness; Obesity; Decreased skin integrity;Pain   Assessment Pt seen for PT evaluation, cleared by CHASE Sky  Pt admitted 7/26/2021 with AMS, confusion, dx Septic shock ([address] )  Comorbidities affecting pt's fnxl performance include: Afib, COPD, diabetes, hypertension, mitral valve replacement  Personal factors affecting pt at time of PT IE include: communication issues, decreased cognition, inability to perform physical activity and limited insight into impairments  PTA, pt was requiring assist for functional mobility with RW, requiring assist for ADLS and in short term rehab  Pt demonstrates fnxl impairments identified in objective findings from Elderly Mobility Scale assessment, scoring 0/20, indicating full dependency on others for completion of ADLs & mobility  Pt scores 12/24 on Low Function AM-PAC objective tool w/ a standardized score of 18 33, indicating need for extensive rehab services upon d/c from the acute care setting  The Barthel Index outcome tool was used & pt scores 20 indicating total limitations of fnxl mobility/ADLS  Pt is at risk of falls d/t multiple comorbidities, impaired balance, impaired cognition, impaired insight/safety awareness, use of assistive device, varying levels of pain , advanced age, acuity of medical illness, ongoing medical treatment of primary diagnosis and abnormal lab values  Pt's clinical presentation is currently unstable/unpredictable seen in pt's presentation of changing level of pain, varying levels of cognitive performance, increased fall risk, new onset of impairment of functional mobility, decreased endurance and new onset of weakness   This PT IE requires high complexity clinical decision making, based on multiple medical/physiological/fnxl/social factors which affect pt's performance w/ evaluation & therapist judgement for disposition recommendations  Pt will benefit from continued PT treatment in order to address impairments, decrease risk of falls, maximize independence w/ fnxl mobility, & ensure safety w/ mobility for transition to next level of care  Based on pt presentation & impairments, pt would most appropriately benefit from return to facility with PT services upon d/c  Goals   Patient Goals no rehab goals stated on this date   STG Expiration Date 08/06/21   Short Term Goal #1 Patient PT goals established in order to increase patient independence and decrease burden of care  1  Pt will complete all bed mobility in hospital bed with [facility], in order to promote increased OOB functional mobility to improve overall activity tolerance  2  Pt will complete all transfers with RW and MODA 1 person, in order to increase safety with functional mobility  3  Pt will ambulate >15ft with RW and MODA in order to increase safety with in room distance functional mobility  4  Pt will tolerate >3hrs OOB in upright position, in order to improve muscular endurance and respiratory status  5  Pt will improve B LE strength to >/= 3/5 MMT throughout, in order to increase safety with functional mobility and decrease risk of falls  6  Pt will improve unsupported static sitting balance to >/= FAIR+ grade in order to promote safety and increased independence with mobility  7  Pt will improve Low Function AM-PAC score to >/= 17/24 in order to increase independence with mobility and decrease burden of care  8  Pt will improve Barthel Index score to >/= 30/100 in order to increase independence and decrease risk of falls  9  Pt will improve Elderly Mobility Scale score to >/= 4/20 in order to decrease burden of care and increase independence with functional mobility and ADLs     PT Treatment Day 0   Plan   Treatment/Interventions Functional transfer training;LE strengthening/ROM; Therapeutic exercise; Endurance training;Cognitive reorientation;Patient/family training;Equipment eval/education; Bed mobility;Gait training;Continued evaluation;Spoke to MD;Spoke to nursing;Spoke to case management;OT   PT Frequency Other (Comment)  (3-5x/wk)   Recommendation   PT Discharge Recommendation Return to facility with rehabilitation services   AM-PAC Basic Mobility Inpatient   Turning in Bed Without Bedrails 1   Lying on Back to Sitting on Edge of Flat Bed 1   Moving Bed to Chair 1   Standing Up From Chair 1   Walk in Room 1   Climb 3-5 Stairs 1   Basic Mobility Inpatient Raw Score 6   Turning Head Towards Sound 3   Follow Simple Instructions 3   Low Function Basic Mobility Raw Score 12   Low Function Basic Mobility Standardized Score 18 33   Modified Aria Scale   Modified Aria Scale 5   Barthel Index   Feeding 5   Bathing 0   Grooming Score 0   Dressing Score 0   Bladder Score 0   Bowels Score 5   Toilet Use Score 5   Transfers (Bed/Chair) Score 5   Mobility (Level Surface) Score 0   Stairs Score 0   Barthel Index Score 20       The patient's AM-PAC Basic Mobility Inpatient Short Form Low Function Raw Score 12 , Standardized Score is 18 33  A standardized score less 42 9 suggests the patient may benefit from discharge to post-acute rehab services  Please also refer to the recommendation of the Physical Therapist for safe discharge planning      ELDERLY MOBILITY SCALE OUTCOME MEASURE:   [facility]: ([address] [facility]; n=36; age= 70-93)  Level of independence and EMS scores:   Score > 14 = independent in basic ADLs and safe for independent mobility maneuvers (with or without device)   Score 10-13 = borderline in terms of safe mobility and independence in ADLs (require some help with ADLs and mobility maneuvers)    Score < 10 = dependent or high degree of dependency for basic ADLs and limited mobility maneuvers (such as transfers, toileting, dressing)  Discharge recommendations and EMS scores:   Score 0-6 = post acute STR / SNF / IRF   Score 7-13 = home with high levels of care - ie: skilled caretaker, community resources, family asssistance   Score 14-20 = home   Please refer to therapist full assessment & documentation for most appropriate recommendation and details for discharge         Narciso Maradiaga PT, DPT   Available via [employer]  [facility] # [id number]  [place] License - [id number]  3/80/5831

## 2021-07-27 NOTE — ASSESSMENT & PLAN NOTE
Voicemail from Reinier received stating that he has returned from [place] early and will not be having his INR tested tomorrow as planned. He is in quarantine for 14 days and would like someone to call him with further instructions at     · On ED admission, qualified for sepsis criteria due to tachycardia, tachypnea, leukocytosis, and UTI  Lactic acid decreased from  7 to 2 5 after NS infusion in the ER   Urine culture revealed  >100,000 cfu/ml Gram Negative Enteric Joshua Enteric  Blood Cultures and MRSA are negative   Leukocytosis, tachycardia, and tachypnea have resolved   Procalc increased from   34 to   38 overnight

## 2021-07-27 NOTE — ASSESSMENT & PLAN NOTE
· Atherosclerotic changes in abdominal aorta and branches which appear patent  · Nodular hepatic surface contour, suspicious for cirrhosis  · Cholelithiasis without evidnece of cholecystitis   · Stable bilateral adrenal adenomas

## 2021-07-27 NOTE — ASSESSMENT & PLAN NOTE
· On ED admission, UA revealed innumerable WBC, RBC, bacteria  Nitrite negative     · Currently, she is on day 3 of Rocephin  · Urine culture grew Klebsiella, sensitive to keflex    Plan:   · Discharge on cephalexin 500mg q6 for 7 total days therapy

## 2021-07-27 NOTE — RESPIRATORY THERAPY NOTE
RT Protocol Note  Charly Edmondson 70 y o  female MRN: [id number]  Unit/Bed#: -01 Encounter: [id number]    Assessment    Principal Problem:    Septic shock ([facility] )  Active Problems:    COPD (chronic obstructive pulmonary disease) (HCC)    Chronic atrial fibrillation (HCC)    Type 2 diabetes mellitus (HCC)    Chronic diastolic congestive heart failure (HCC)    Lung mass    Constipation    Acute kidney injury superimposed on chronic kidney disease (HCC)    Acute encephalopathy    UTI (urinary tract infection)    S/P MVR (mitral valve repair)    Abnormal CT of the abdomen      Home Pulmonary Medications:  Symbicort 80/4 5 mcg BID  Albuterol HFA Q6 prn       Past Medical History:   Diagnosis Date    Atrial fibrillation (HCC)     [facility]     Diabetes mellitus ([facility] )     Hypertension     Respiratory failure ([facility] )      Social History     Socioeconomic History    Marital status: Single     Spouse name: None    Number of children: None    Years of education: None    Highest education level: None   Occupational History    None   Tobacco Use    Smoking status: Former Smoker     Quit date: 2010     Years since quittin 5    Smokeless tobacco: Never Used   Vaping Use    Vaping Use: Former   Substance and Sexual Activity    Alcohol use: Not Currently    Drug use: Not Currently    Sexual activity: Not Currently   Other Topics Concern    None   Social History Narrative    None     Social Determinants of Health     Financial Resource Strain:     Difficulty of Paying Living Expenses:    Food Insecurity:     Worried About Running Out of Food in the Last Year:     Ran Out of Food in the Last Year:    Transportation Needs:     Lack of Transportation (Medical):      Lack of Transportation (Non-Medical):    Physical Activity:     Days of Exercise per Week:     Minutes of Exercise per Session:    Stress:     Feeling of Stress :    Social Connections:     Frequency of Communication with Friends and Family:     Frequency of Social Gatherings with Friends and Family:     Attends Taoism Services:     Active Member of Clubs or Organizations:     Attends Club or Organization Meetings:     Marital Status:    Intimate Partner Violence:     Fear of Current or Ex-Partner:     Emotionally Abused:     Physically Abused:     Sexually Abused:        Subjective         Objective    Physical Exam:   Assessment Type: Pre-treatment  General Appearance: Alert, Awake  Respiratory Pattern: Normal, Spontaneous  Chest Assessment: Chest expansion symmetrical  Bilateral Breath Sounds: Clear, Diminished  Cough: None    Vitals:  Blood pressure 114/57, pulse 79, temperature (!) 96 5 °F (35 8 °C), temperature source Tympanic, resp  rate 20, height 5' 3" (1 6 m), weight 95 2 kg (209 lb 14 1 oz), SpO2 93 %  Imaging and other studies: I have personally reviewed pertinent reports  Plan    Respiratory Plan: Home Bronchodilator Patient pathway        Resp Comments: (P) Patient was assessed per Protocol  Patient has pulmonary history of COPD  Currently patient is on 1 liter of supplemental oxygen via nasal cannula, saturating at 93%  Breath sounds are diminished but clear  Medication reviewed and Home Bronchodilator Pathway ordered and Respiratory to follow

## 2021-07-27 NOTE — CASE MANAGEMENT
Per rounding, patient presents with UTI and confusion from [facility]  CM s/w patient's friend, Ayanna Montanez reports that patient admitted to [facility] earlier this year for STR however has been at facility since then due to needing continued care  Ayanna Montanez stating plan is for patient to return to facility at discharge  Referral opened to [facility] via [place]  CM will continue to follow at this time

## 2021-07-27 NOTE — ASSESSMENT & PLAN NOTE
Likely secondary to acute infection  No acute intracranial abnormality on CT scan but notes motion artifact  S/p Ativan and Haldol in ED to complete CT scan  Remains confused  Unable to orient    · Neuro exams  · Re orientation   · Requiring bilateral soft limb restraints

## 2021-07-27 NOTE — ASSESSMENT & PLAN NOTE
Wt Readings from Last 3 Encounters:   07/27/21 95 2 kg (209 lb 14 1 oz)   07/05/21 96 6 kg (212 lb 15 4 oz)   04/16/21 116 kg (255 lb)        NYHA IV, AHA class C   Appears compensated at this time   6/2021 Echo EF 60%  Severe TR   Monitor intake and output, daily weights   Low sodium diet and fluid restriction   Recently hospitalized with acute CHF exacerbation  Discharged on torsemide 40 mg BID      Hold torsemide for now for a few days to allow MARI to resolve, then can be restarted after being assessed by SNF team

## 2021-07-27 NOTE — ASSESSMENT & PLAN NOTE
Without evidence of exacerbation  Chronic respiratory failure with baseline O2 requirement of 4 liters nasal cannula    · Continue home regimen

## 2021-07-27 NOTE — ASSESSMENT & PLAN NOTE
With AAMIR thrombus  · Continue Coumadin and digoxin  · Daily INR  Per recent cardiology note, goal INR 2 5 to 3 5 due to severe MS

## 2021-07-27 NOTE — ASSESSMENT & PLAN NOTE
Underwent recent biopsy  Negative for malignancy  · Follow with pulm as outpatient as indicated  Recommended for serial imaging, PETCT

## 2021-07-27 NOTE — ASSESSMENT & PLAN NOTE
· MARI on ED admission  Creatinine was 1 76 on admission   Baseline creatinine 1 1 to 1 3     · Today, creatine is trending down to 1 45   · Torsemide was held  and digoxin was decreased from 125 mcg to 62 5 mcg due to MARI    Plan:   · Resume home regimen of torsemide in a few days based on volume status of the patient and resume digoxin at previous doses

## 2021-07-27 NOTE — ASSESSMENT & PLAN NOTE
Wt Readings from Last 3 Encounters:   07/05/21 96 6 kg (212 lb 15 4 oz)   04/16/21 116 kg (255 lb)   03/08/21 113 kg (250 lb)        NYHA IV, AHA class C   Appears compensated at this time   6/2021 Echo EF 60%  Severe TR   Monitor intake and output, daily weights   Low sodium diet and fluid restriction   Recently hospitalized with acute CHF exacerbation  Discharged on torsemide 40 mg BID      Hold torsemide for now, hold further fluids until can be evaluated by attending in am  Marked cardiomegaly noted on CT scan

## 2021-07-27 NOTE — ASSESSMENT & PLAN NOTE
 SIRS criteria: tachycardia, tachypnea, leukocytosis   Suspected source: UTI   Lactic acid: 7 --> 2 5   End organ damage: n/a    IV Fluids: 2 liter bolus in ED   IV antibiotics: rocephin    Follow up on culture results   Monitor vital signs, laboratory studies     Step down 2

## 2021-07-27 NOTE — ASSESSMENT & PLAN NOTE
·  Patient required ativan and haldol in ED due to agitation  Soft restraints were applied  · Most likely etiology for new onset of encephalopathy is agitation  According to her nursing home, the patient has no history of agitation  · CT scan revealed  no acute intracranial abnormality but notes motion artifact  · Today, patient is alert and oriented x3 and demonstrates no signs of agitation or restlessness

## 2021-07-27 NOTE — ASSESSMENT & PLAN NOTE
Lab Results   Component Value Date    HGBA1C 4 5 03/15/2021     Appears to be diet controlled  Recent A1c reviewed  No need for SSI

## 2021-07-27 NOTE — ASSESSMENT & PLAN NOTE
Lab Results   Component Value Date    HGBA1C 4 5 03/15/2021     · Appears to be diet controlled  · Recent A1c reviewed  · No need for SSI

## 2021-07-27 NOTE — H&P
Justin U  68   H&P- Austin Zaragoza 1945, 70 y o  female MRN: [id number]  Unit/Bed#: -01 Encounter: [id number]  Primary Care Provider: Lissette Wyatt MD   Date and time admitted to hospital: 7/26/2021  6:59 PM      REQUIRED DOCUMENTATION:     1  This service was provided via Telemedicine  2  Provider located at [facility]  3  TeleMed provider: DAVID Bray  4  Identify all parties in room with patient during tele consult: 5  Patient was then informed that this was a Telemedicine visit and that the exam was being conducted confidentially over secure lines  My office door was closed  No one else was in the room  Patient acknowledged consent and understanding of privacy and security of the Telemedicine visit, and gave us permission to have the assistant stay in the room in order to assist with the history and to conduct the exam   I informed the patient that I have reviewed their record in Epic and presented the opportunity for them to ask any questions regarding the visit today  The patient agreed to participate  * Septic shock (HCC)  Assessment & Plan   SIRS criteria: tachycardia, tachypnea, leukocytosis   Suspected source: UTI   Lactic acid: 7 --> 2 5   End organ damage: n/a    IV Fluids: 2 liter bolus in ED   IV antibiotics: rocephin    Follow up on culture results   Monitor vital signs, laboratory studies   Step down 2     UTI (urinary tract infection)  Assessment & Plan  UA innumerable WBC, RBC, bacteria  Nitrite negative  · Abx: Rocephin  · Follow culture     Acute encephalopathy  Assessment & Plan  Likely secondary to acute infection  No acute intracranial abnormality on CT scan but notes motion artifact  S/p Ativan and Haldol in ED to complete CT scan  Remains confused  Unable to orient    · Neuro exams  · Re orientation   · Requiring bilateral soft limb restraints    Acute kidney injury superimposed on chronic kidney disease ([facility] [address] )  Assessment & Plan  Baseline creatinine 1 1 to 1 3  Creatinine on admission 1 76  S/p 2 liter fluid bolus  · BMP in AM   · Bladder scan, retention protocol  · Avoid hypotension  · Hold torsemide for now, evaluate fluid status in AM  · Decrease dig from 125 mcg to 62 5 mcg due to kidney function    Constipation  Assessment & Plan  Noted on CT imaging  S/p disimpaction in ED  · Bowel regimen     S/P MVR (mitral valve repair)  Assessment & Plan  S/p 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 stenosis  · Goal INR 2 5-3 5    Chronic atrial fibrillation (HCC)  Assessment & Plan  With AAMIR thrombus  · Continue Coumadin and digoxin  · Daily INR  Per recent cardiology note, goal INR 2 5 to 3 5 due to severe MS  Chronic diastolic congestive heart failure (HCC)  Assessment & Plan  Wt Readings from Last 3 Encounters:   07/05/21 96 6 kg (212 lb 15 4 oz)   04/16/21 116 kg (255 lb)   03/08/21 113 kg (250 lb)        NYHA IV, AHA class C   Appears compensated at this time   6/2021 Echo EF 60%  Severe TR   Monitor intake and output, daily weights   Low sodium diet and fluid restriction   Recently hospitalized with acute CHF exacerbation  Discharged on torsemide 40 mg BID   Hold torsemide for now, hold further fluids until can be evaluated by attending in am  Marked cardiomegaly noted on CT scan    Abnormal CT of the abdomen  Assessment & Plan  · Atherosclerotic changes in abdominal aorta and branches which appear patent  · Nodular hepatic surface contour, suspicious for cirrhosis  · Cholelithiasis without evidnece of cholecystitis   · Stable bilateral adrenal adenomas       Lung mass  Assessment & Plan  Underwent recent biopsy  Negative for malignancy  · Follow with pulm as outpatient as indicated  Recommended for serial imaging, PETCT      Type 2 diabetes mellitus [facility])  Assessment & Plan  Lab Results   Component Value Date    HGBA1C 4 5 03/15/2021     Appears to be diet controlled  Recent A1c reviewed  No need for SSI     COPD (chronic obstructive pulmonary disease) ([facility] [address] )  Assessment & Plan  Without evidence of exacerbation  Chronic respiratory failure with baseline O2 requirement of 4 liters nasal cannula  · Continue home regimen      VTE Pharmacologic Prophylaxis: VTE Score: 6 High Risk (Score >/= 5) - Pharmacological DVT Prophylaxis Ordered: warfarin (Coumadin)  Sequential Compression Devices Ordered  Code Status: Level 1 - Full Code   Discussion with family: only friend documented in demographics        Anticipated Length of Stay: Patient will be admitted on an inpatient basis with an anticipated length of stay of greater than 2 midnights secondary to septic shock, requires iv antibiotics  Total Time for Visit, including Counseling / Coordination of Care: 70 minutes Greater than 50% of this total time spent on direct patient counseling and coordination of care  Chief Complaint: altered mental status     History of Present Illness:  Zachary Ramirez is a 70 y o  female with a PMH of COPD, chronic respiratory failure on 4 liters nasal cannula, HTN, chronic atrial fibrillation on digoxin and Coumadin, AAMIR thrombus, CHF, s/p bioMVR, severe TR, pulmonary HTN  who presents with altered mental status from nursing facility  Unable to obtain any information from patient  According to notes from prior hospitalization, patient was alert and oriented on discharge on 7/5  Patient met septic shock criteria due to lactic acid of 7  There was improvement to 2 5 after receiving 2 liter fluid boluses  Her blood pressure has remained >90 and remains normotensive at this time  Patient was last hospitalized from 6/17-7/5 [facility] due to shortness of breath  She was evaluated by cardiology and received IV diuresis  She was discharged on Torsemide 40 mg BID  She also underwent lung biopsy which was negative for malignancy  She had been evaluated by ENT for epistaxis and underwent cauterization        Patient is admitted as inpatient for IV antibiotics  Review of Systems:  Review of Systems   Unable to perform ROS: Acuity of condition       Past Medical and Surgical History:   Past Medical History:   Diagnosis Date    Atrial fibrillation ([facility] )     COPD (chronic obstructive pulmonary disease) ([facility] )     Diabetes mellitus ([facility] )     Hypertension     Respiratory failure ([facility] )        Past Surgical History:   Procedure Laterality Date    COLONOSCOPY      IR BIOPSY LUNG  6/28/2021    MITRAL VALVE REPLACEMENT  2012    Bovine       Meds/Allergies:  Prior to Admission medications    Medication Sig Start Date End Date Taking? Authorizing Provider   albuterol (PROVENTIL HFA,VENTOLIN HFA) 90 mcg/act inhaler Inhale 2 puffs every 6 (six) hours as needed 10/1/20 10/1/21  Historical Provider, MD   budesonide-formoterol (SYMBICORT) 80-4 5 MCG/ACT inhaler Inhale 2 puffs 2 (two) times a day Rinse mouth after use      Historical Provider, MD   digoxin (LANOXIN) 0 125 mg tablet Take 0 125 mg by mouth daily 2/24/21 2/24/22  Historical Provider, MD   ferrous sulfate (EQL Iron Supplement Therapy) 325 (65 Fe) mg tablet Take 1 tablet (325 mg total) by mouth daily with breakfast 3/12/21   Shreyas Goncalvesel, DO   melatonin 3 mg Take 1 tablet (3 mg total) by mouth daily at bedtime 3/12/21   Shreyas Garcia, DO   montelukast (SINGULAIR) 10 mg tablet Take 1 tablet by mouth daily 6/27/12   Historical Provider, MD   nystatin (MYCOSTATIN) powder Apply topically 2 (two) times a day Apply to skin folds 3/12/21   Shreyas Garcia, DO   omeprazole (PriLOSEC) 20 mg delayed release capsule Take 20 mg by mouth daily    Historical Provider, MD   potassium chloride (K-DUR,KLOR-CON) 10 mEq tablet Take 20 mEq by mouth 2 (two) times a day    Historical Provider, MD   senna-docusate sodium (Senokot S) 8 6-50 mg per tablet Take 1 tablet by mouth daily as needed 2/23/21 2/23/22  Historical Provider, MD   torsemide (DEMADEX) 20 mg tablet Take 2 tablets (40 mg total) by mouth 2 (two) times a day 21   Ace Acosta MD   warfarin (COUMADIN) 3 mg tablet Take 1 5 tablets (4 5 mg total) by mouth daily 21   Ace Acosta MD     I have reviewed home medications with a medical source (PCP, Pharmacy, other)  Allergies: Allergies   Allergen Reactions    Augmentin [Amoxicillin-Pot Clavulanate] Anaphylaxis    Levaquin [Levofloxacin] Anaphylaxis       Social History:  Marital Status: Single   Occupation:   Patient Pre-hospital Living Situation: [facility]  Patient Pre-hospital Level of Mobility: unable to be assessed at time of evaluation  Patient Pre-hospital Diet Restrictions:   Substance Use History:   Social History     Substance and Sexual Activity   Alcohol Use Not Currently     Social History     Tobacco Use   Smoking Status Former Smoker    Quit date: 2010    Years since quittin 5   Smokeless Tobacco Never Used     Social History     Substance and Sexual Activity   Drug Use Not Currently       Family History:  History reviewed  No pertinent family history  Physical Exam:     Vitals:   Blood Pressure: 121/83 (21)  Pulse: 85 (21)  Temperature: 99 1 °F (37 3 °C) (21)  Temp Source: Rectal (21)  Respirations: 18 (21)  SpO2: 95 % (21)    Physical Exam  Constitutional:       Comments: Confused   HENT:      Head: Normocephalic  Right Ear: External ear normal       Left Ear: External ear normal       Nose: Nose normal       Mouth/Throat:      Pharynx: Oropharynx is clear  Eyes:      General: No scleral icterus  Extraocular Movements: Extraocular movements intact  Conjunctiva/sclera: Conjunctivae normal       Pupils: Pupils are equal, round, and reactive to light  Cardiovascular:      Rate and Rhythm: Normal rate  Rhythm irregular  Pulses: Normal pulses  Heart sounds: Normal heart sounds     Pulmonary:      Effort: Pulmonary effort is normal       Breath sounds: Normal breath sounds  Abdominal:      General: Bowel sounds are normal  There is no distension  Palpations: Abdomen is soft  Tenderness: There is abdominal tenderness (generalized to palpation)  There is no right CVA tenderness, left CVA tenderness or guarding  Musculoskeletal:         General: Normal range of motion  Cervical back: Normal range of motion  No rigidity  Right lower leg: No edema  Left lower leg: No edema  Skin:     General: Skin is warm and dry  Capillary Refill: Capillary refill takes less than 2 seconds  Neurological:      General: No focal deficit present  Mental Status: She is alert  She is disoriented  Psychiatric:      Comments: Confused           Additional Data:     Lab Results:  Results from last 7 days   Lab Units 07/26/21 1918   WBC Thousand/uL 14 25*   HEMOGLOBIN g/dL 9 1*   HEMATOCRIT % 30 5*   PLATELETS Thousands/uL 601*   BANDS PCT % 1   LYMPHO PCT % 6*   MONO PCT % 12   EOS PCT % 0     Results from last 7 days   Lab Units 07/26/21 1918   SODIUM mmol/L 139   POTASSIUM mmol/L 3 9   CHLORIDE mmol/L 95*   CO2 mmol/L 22   BUN mg/dL 29*   CREATININE mg/dL 1 76*   ANION GAP mmol/L 22*   CALCIUM mg/dL 9 7   ALBUMIN g/dL 3 9   TOTAL BILIRUBIN mg/dL 0 65   ALK PHOS U/L 58 3   ALT U/L 13   AST U/L 21   GLUCOSE RANDOM mg/dL 124     Results from last 7 days   Lab Units 07/26/21 1918   INR  2 38*     Results from last 7 days   Lab Units 07/26/21 2001   POC GLUCOSE mg/dl 145*         Results from last 7 days   Lab Units 07/26/21  2153 07/26/21 1918   LACTIC ACID mmol/L 2 5* 7 0*       Imaging: Reviewed radiology reports from this admission including: abdominal/pelvic CT  CTA abdomen pelvis w wo contrast   Final Result by Laura Tran MD (07/26 9523)      1  Limited evaluation due to motion artifact  2   Atherosclerotic changes in the abdominal aorta and branches which appear patent     3   Large amount of stool in the rectum  Correlate for fecal impaction  4   Nodular hepatic surface contour suspicious for cirrhosis  5   Cholelithiasis without evidence of cholecystitis  6   Stable bilateral adrenal adenomas  7   Marked cardiomegaly  Workstation performed: [id number]         CT head without contrast   Final Result by Ken Kaur MD (07/26 2253)      No definite acute intracranial abnormality, noting limited evaluation due to motion artifact  Workstation performed: [id number]         XR chest 1 view portable   ED Interpretation by Margaret Lawton MD (07/26 2115)   pulmonary vascular congestion          EKG and Other Studies Reviewed on Admission:   · EKG: Atrial fibrillation    ** Please Note: This note has been constructed using a voice recognition system   **

## 2021-07-27 NOTE — QUICK NOTE
Spoke to caretaker at [employer] [place] ([phone])  The nurse reports the patient is usually alert and oriented x3 at baseline  She does not have a history of agitation, restless, or altered mental status  Updated nurse on her current status

## 2021-07-27 NOTE — ASSESSMENT & PLAN NOTE
Baseline creatinine 1 1 to 1 3  Creatinine on admission 1 76  S/p 2 liter fluid bolus     · BMP in AM   · Bladder scan, retention protocol  · Avoid hypotension  · Hold torsemide for now, evaluate fluid status in AM  · Decrease dig from 125 mcg to 62 5 mcg due to kidney function

## 2021-07-28 PROBLEM — E43 SEVERE PROTEIN-CALORIE MALNUTRITION (HCC): Status: ACTIVE | Noted: 2021-07-28

## 2021-07-28 PROBLEM — G93.41 ACUTE METABOLIC ENCEPHALOPATHY: Status: ACTIVE | Noted: 2021-07-27

## 2021-07-28 PROBLEM — R65.20 SEVERE SEPSIS (HCC): Status: ACTIVE | Noted: 2021-07-27

## 2021-07-28 LAB
ALBUMIN SERPL BCP-MCNC: 3.3 G/DL (ref 3.4–4.8)
ALP SERPL-CCNC: 50.6 U/L (ref 35–140)
ALT SERPL W P-5'-P-CCNC: 10 U/L (ref 5–54)
ANION GAP SERPL CALCULATED.3IONS-SCNC: 10 MMOL/L (ref 4–13)
AST SERPL W P-5'-P-CCNC: 17 U/L (ref 15–41)
BACTERIA UR CULT: ABNORMAL
BILIRUB SERPL-MCNC: 0.41 MG/DL (ref 0.3–1.2)
BUN SERPL-MCNC: 34 MG/DL (ref 6–20)
CALCIUM ALBUM COR SERPL-MCNC: 9.5 MG/DL (ref 8.3–10.1)
CALCIUM SERPL-MCNC: 8.9 MG/DL (ref 8.4–10.2)
CHLORIDE SERPL-SCNC: 99 MMOL/L (ref 96–108)
CO2 SERPL-SCNC: 28 MMOL/L (ref 22–33)
CREAT SERPL-MCNC: 1.71 MG/DL (ref 0.4–1.1)
ERYTHROCYTE [DISTWIDTH] IN BLOOD BY AUTOMATED COUNT: 18.1 % (ref 11.6–15.1)
GFR SERPL CREATININE-BSD FRML MDRD: 29 ML/MIN/1.73SQ M
GLUCOSE SERPL-MCNC: 80 MG/DL (ref 65–140)
HCT VFR BLD AUTO: 25.2 % (ref 34.8–46.1)
HGB BLD-MCNC: 7.5 G/DL (ref 11.5–15.4)
INR PPP: 1.95 (ref 0.9–1.1)
MAGNESIUM SERPL-MCNC: 1.7 MG/DL (ref 1.6–2.6)
MCH RBC QN AUTO: 26.5 PG (ref 26.8–34.3)
MCHC RBC AUTO-ENTMCNC: 29.8 G/DL (ref 31.4–37.4)
MCV RBC AUTO: 89 FL (ref 82–98)
MRSA NOSE QL CULT: NORMAL
PLATELET # BLD AUTO: 380 THOUSANDS/UL (ref 149–390)
PMV BLD AUTO: 10.3 FL (ref 8.9–12.7)
POTASSIUM SERPL-SCNC: 3.2 MMOL/L (ref 3.5–5)
PROCALCITONIN SERPL-MCNC: 0.38 NG/ML
PROT SERPL-MCNC: 6.3 G/DL (ref 6.4–8.3)
PROTHROMBIN TIME: 21.3 SECONDS (ref 9.5–12.1)
RBC # BLD AUTO: 2.83 MILLION/UL (ref 3.81–5.12)
SODIUM SERPL-SCNC: 137 MMOL/L (ref 133–145)
WBC # BLD AUTO: 6.34 THOUSAND/UL (ref 4.31–10.16)

## 2021-07-28 PROCEDURE — 83735 ASSAY OF MAGNESIUM: CPT | Performed by: INTERNAL MEDICINE

## 2021-07-28 PROCEDURE — 84145 PROCALCITONIN (PCT): CPT | Performed by: NURSE PRACTITIONER

## 2021-07-28 PROCEDURE — 85027 COMPLETE CBC AUTOMATED: CPT | Performed by: INTERNAL MEDICINE

## 2021-07-28 PROCEDURE — 94640 AIRWAY INHALATION TREATMENT: CPT

## 2021-07-28 PROCEDURE — 80053 COMPREHEN METABOLIC PANEL: CPT | Performed by: INTERNAL MEDICINE

## 2021-07-28 PROCEDURE — 94760 N-INVAS EAR/PLS OXIMETRY 1: CPT

## 2021-07-28 PROCEDURE — 99233 SBSQ HOSP IP/OBS HIGH 50: CPT | Performed by: INTERNAL MEDICINE

## 2021-07-28 PROCEDURE — 85610 PROTHROMBIN TIME: CPT | Performed by: INTERNAL MEDICINE

## 2021-07-28 RX ORDER — POTASSIUM CHLORIDE 20MEQ/15ML
20 LIQUID (ML) ORAL 3 TIMES DAILY
Status: DISCONTINUED | OUTPATIENT
Start: 2021-07-28 | End: 2021-07-28

## 2021-07-28 RX ORDER — SODIUM CHLORIDE 9 MG/ML
100 INJECTION, SOLUTION INTRAVENOUS CONTINUOUS
Status: DISCONTINUED | OUTPATIENT
Start: 2021-07-28 | End: 2021-07-29 | Stop reason: HOSPADM

## 2021-07-28 RX ORDER — POTASSIUM CHLORIDE 20 MEQ/1
20 TABLET, EXTENDED RELEASE ORAL 2 TIMES DAILY
Status: DISCONTINUED | OUTPATIENT
Start: 2021-07-28 | End: 2021-07-29 | Stop reason: HOSPADM

## 2021-07-28 RX ADMIN — SODIUM CHLORIDE 100 ML/HR: 0.9 INJECTION, SOLUTION INTRAVENOUS at 18:48

## 2021-07-28 RX ADMIN — NYSTATIN: 100000 POWDER TOPICAL at 17:38

## 2021-07-28 RX ADMIN — FERROUS SULFATE TAB 325 MG (65 MG ELEMENTAL FE) 325 MG: 325 (65 FE) TAB at 08:54

## 2021-07-28 RX ADMIN — POTASSIUM CHLORIDE 20 MEQ: 1500 TABLET, EXTENDED RELEASE ORAL at 21:09

## 2021-07-28 RX ADMIN — DOCUSATE SODIUM AND SENNOSIDES 2 TABLET: 8.6; 5 TABLET, FILM COATED ORAL at 17:36

## 2021-07-28 RX ADMIN — CEFTRIAXONE 1000 MG: 1 INJECTION, SOLUTION INTRAVENOUS at 20:00

## 2021-07-28 RX ADMIN — BUDESONIDE AND FORMOTEROL FUMARATE DIHYDRATE 2 PUFF: 80; 4.5 AEROSOL RESPIRATORY (INHALATION) at 08:07

## 2021-07-28 RX ADMIN — DOCUSATE SODIUM AND SENNOSIDES 2 TABLET: 8.6; 5 TABLET, FILM COATED ORAL at 09:30

## 2021-07-28 RX ADMIN — POTASSIUM CHLORIDE 20 MEQ: 20 SOLUTION ORAL at 17:36

## 2021-07-28 RX ADMIN — DIGOXIN 62.5 MCG: 125 TABLET ORAL at 08:57

## 2021-07-28 RX ADMIN — BUDESONIDE AND FORMOTEROL FUMARATE DIHYDRATE 2 PUFF: 80; 4.5 AEROSOL RESPIRATORY (INHALATION) at 18:05

## 2021-07-28 RX ADMIN — SODIUM CHLORIDE 100 ML/HR: 0.9 INJECTION, SOLUTION INTRAVENOUS at 09:31

## 2021-07-28 RX ADMIN — WARFARIN SODIUM 5 MG: 5 TABLET ORAL at 17:36

## 2021-07-28 RX ADMIN — POTASSIUM CHLORIDE 20 MEQ: 20 SOLUTION ORAL at 09:30

## 2021-07-28 RX ADMIN — MONTELUKAST SODIUM 10 MG: 10 TABLET, FILM COATED ORAL at 08:54

## 2021-07-28 RX ADMIN — PANTOPRAZOLE SODIUM 40 MG: 40 TABLET, DELAYED RELEASE ORAL at 05:19

## 2021-07-28 NOTE — PROGRESS NOTES
VTE Pharmacologic Prophylaxis:   VTE Score: 6 Moderate Risk (Score 3-4) - Pharmacological DVT Prophylaxis Ordered: Warfarin (Coumadin)  Mechanical VTE Prophylaxis in Place: Yes    Patient Centered Rounds: I have performed bedside rounds with nursing staff today  Discussions with Specialists or Other Care Team Provider: N/A    Education and Discussions with Family / Patient: Updated  ([place]) at bedside  Current Length of Stay: 1 day(s)    Current Patient Status: Inpatient     Discharge Plan / Estimated Discharge Date: TBD    Code Status: Level 1 - Full Code      * Severe sepsis ([facility] [address] )  Assessment & Plan  · On ED admission, qualified for sepsis criteria due to tachycardia, tachypnea, leukocytosis, and UTI  Lactic acid decreased from  7 to 2 5 after NS infusion in the ER   Urine culture revealed  >100,000 cfu/ml Gram Negative Enteric Joshua Enteric  Blood Cultures are negative   Leukocytosis, tachycardia, and tachypnea have resolved  Procalc increased from   34 to   38 overnight  Plan:   · Pending MRSA culture  · Continue to trend procalc     Acute metabolic encephalopathy  Assessment & Plan  ·  Patient required ativan and haldol in ED due to agitation  Soft restraints were applied  · Most likely etiology for new onset of encephalopathy is agitation  According to her nursing home, the patient has no history of agitation  · CT scan revealed  no acute intracranial abnormality but notes motion artifact  · Today, patient is alert and oriented x3 and demonstrates no signs of agitation or restlessness  Acute kidney injury superimposed on chronic kidney disease ([facility] [address] )  Assessment & Plan  · MARI on ED admission  Creatinine was 1 76 on admission  Baseline creatinine 1 1 to 1 3     · Today, creatine remains elevated at 1 71 despite being on IV NS 100cc/hr  Patient appears to by dry on examination     · Torsemide was held on admission  · Digoxin was decreased from 125 mcg to 62 5 mcg due to MARI  · 7/27 Bladder scan showed urinary retention (400cc) so she urine was removed via straight catheter  However, this morning, bladder scan was normal and urine is producing good urine output  Plan:   · BMP in AM   · Continue IV NS hydration at 75cc/hr  · Continue Bladder scan, retention protocol  · Continue to hold torsemide and avoid hypotension medication     UTI (urinary tract infection)  Assessment & Plan  UA innumerable WBC, RBC, bacteria  Nitrite negative  · Abx: Rocephin  · Follow culture     Constipation  Assessment & Plan  Noted on CT imaging  S/p disimpaction in ED  · Bowel regimen     Lung mass  Assessment & Plan  Underwent recent biopsy  Negative for malignancy  · Follow with pulm as outpatient as indicated  Recommended for serial imaging, PETCT  Chronic diastolic congestive heart failure (HCC)  Assessment & Plan  Wt Readings from Last 3 Encounters:   07/27/21 95 2 kg (209 lb 14 1 oz)   07/05/21 96 6 kg (212 lb 15 4 oz)   04/16/21 116 kg (255 lb)        NYHA IV, AHA class C   Appears compensated at this time   6/2021 Echo EF 60%  Severe TR   Monitor intake and output, daily weights   Low sodium diet and fluid restriction   Recently hospitalized with acute CHF exacerbation  Discharged on torsemide 40 mg BID   Hold torsemide for now, hold further fluids until can be evaluated by attending in am  Marked cardiomegaly noted on CT scan    Type 2 diabetes mellitus [facility])  Assessment & Plan  Lab Results   Component Value Date    HGBA1C 4 5 03/15/2021     · Appears to be diet controlled  · Recent A1c reviewed  · No need for SSI     Chronic atrial fibrillation (HCC)  Assessment & Plan  · Rate controlled chronic Afib w/ bioprosthetic MV replacement and AAMIR thrombus   · Anticoagulation with warfarin with INR goal of 2 5 to 3 5 due to severe mitral valve stenosis (per Cardiology note)   · Today's INR  1 95    Plan:   · Continue Coumadin   · Decreased digoxin to 62 5 mcg due to MARI    COPD (chronic obstructive pulmonary disease) ([facility])  Assessment & Plan  · Chronic obstructive pulmonary disease with no acute exacerbation  No PFTs on file  · Former Smoker   · History of chronic respiratory failure with baseline O2 requirement of 4 liters nasal cannula  · Currently saturating at 92% on 2 L of oxygen     Plan  · Continue home regimen : Symbicort 80-4 5 mcg 2 puffs b i d , Proventil 2 puffs q 6h p r n , singular daily      Subjective:    No acute events occurred overnight  Patient denies dysuria, frequency, urgency, and suprapubic pain  Also denies fevers and chills  She is tolerating antibiotics with nausea, vomiting, and diarrhea  Objective:     Vitals:   Temp (24hrs), Av 1 °F (36 7 °C), Min:97 9 °F (36 6 °C), Max:98 2 °F (36 8 °C)    Temp:  [97 9 °F (36 6 °C)-98 2 °F (36 8 °C)] 97 9 °F (36 6 °C)  HR:  [61-70] 70  Resp:  [16-18] 16  BP: (100-103)/(51-55) 100/55  SpO2:  [91 %-95 %] 92 %  Body mass index is 37 02 kg/m²  Input and Output Summary (last 24 hours): Intake/Output Summary (Last 24 hours) at 2021 1424  Last data filed at 2021 0500  Gross per 24 hour   Intake 200 ml   Output 560 ml   Net -360 ml       Physical Exam:     Physical Exam  HENT:      Head: Normocephalic and atraumatic  Eyes:      Conjunctiva/sclera: Conjunctivae normal       Pupils: Pupils are equal, round, and reactive to light  Cardiovascular:      Rate and Rhythm: Rhythm irregular  Pulmonary:      Effort: Pulmonary effort is normal       Breath sounds: Decreased breath sounds present  Comments: Decreased breath sounds on the upper, mid, and lower left lung  Abdominal:      General: Abdomen is flat  Bowel sounds are normal  There is no distension  Palpations: Abdomen is soft  Tenderness: There is no abdominal tenderness  Musculoskeletal:      Right lower leg: No edema  Left lower leg: No edema  Skin:     General: Skin is warm and dry     Neurological:      General: No focal deficit present  Mental Status: She is alert and oriented to person, place, and time  Cranial Nerves: Cranial nerve deficit present  Additional Data:     Labs:  Results from last 7 days   Lab Units 07/28/21 0514 07/26/21 1918   WBC Thousand/uL 6 34 14 25*   HEMOGLOBIN g/dL 7 5* 9 1*   HEMATOCRIT % 25 2* 30 5*   PLATELETS Thousands/uL 380 601*   BANDS PCT %  --  1   LYMPHO PCT %  --  6*   MONO PCT %  --  12   EOS PCT %  --  0     Results from last 7 days   Lab Units 07/28/21 0514   SODIUM mmol/L 137   POTASSIUM mmol/L 3 2*   CHLORIDE mmol/L 99   CO2 mmol/L 28   BUN mg/dL 34*   CREATININE mg/dL 1 71*   ANION GAP mmol/L 10   CALCIUM mg/dL 8 9   ALBUMIN g/dL 3 3*   TOTAL BILIRUBIN mg/dL 0 41   ALK PHOS U/L 50 6   ALT U/L 10   AST U/L 17   GLUCOSE RANDOM mg/dL 80     Results from last 7 days   Lab Units 07/28/21 0514   INR  1 95*     Results from last 7 days   Lab Units 07/26/21 2001   POC GLUCOSE mg/dl 145*         Results from last 7 days   Lab Units 07/28/21  0514 07/27/21  0626 07/27/21  0440 07/26/21 2153 07/26/21 1918   LACTIC ACID mmol/L  --   --  1 3 2 5* 7 0*   PROCALCITONIN ng/ml 0 38* 0 34*  --   --   --        Imaging: Reviewed radiology reports from this admission including: None    Recent Cultures (last 7 days):     Results from last 7 days   Lab Units 07/26/21 2000 07/26/21 1943 07/26/21 1918   BLOOD CULTURE  No Growth at 24 hrs   --  No Growth at 24 hrs     URINE CULTURE   --  >100,000 cfu/ml Gram Negative Joshua Enteric Like*  --        Lines/Drains:  Invasive Devices     Peripheral Intravenous Line            Peripheral IV 07/28/21 Left;Proximal;Ventral (anterior) Forearm <1 day                Telemetry:        Last 24 Hours Medication List:   Current Facility-Administered Medications   Medication Dose Route Frequency Provider Last Rate    acetaminophen  650 mg Oral Q6H PRN DAVID Theodore      albuterol  2 puff Inhalation Q6H PRN DAVID Theodore      budesonide-formoterol  2 puff Inhalation BID Bela Gallop, CRNP      cefTRIAXone  1,000 mg Intravenous Q24H Bela Gallop, CRNP 1,000 mg (07/27/21 2008)    digoxin  62 5 mcg Oral Daily Bela Gallop, CRNP      ferrous sulfate  325 mg Oral Daily With Breakfast Bela Gallop, CRNP      montelukast  10 mg Oral Daily Bela Gallop, CRNP      nystatin   Topical BID Bela Gallop, CRNP      pantoprazole  40 mg Oral Early Morning Bela Gallop, CRNP      polyethylene glycol  17 g Oral Daily Deep Krishna MD      potassium chloride  20 mEq Oral TID Deep Krishna MD      senna-docusate sodium  2 tablet Oral BID Deep Krishna MD      sodium chloride  100 mL/hr Intravenous Continuous Deep Krishna  mL/hr (07/28/21 0931)    warfarin  5 mg Oral Daily (warfarin) DAVID Angulo          Today, Patient Was Seen By: Abdon Reddy MD    ** Please Note: This note has been constructed using a voice recognition system   **

## 2021-07-28 NOTE — NUTRITION
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 which she doesn;'t indicate as a problem  Pt remains above IBW - previously morbidly obese but with significant wt decrease  - 25% since Dec 2020  Pt attributes to being in rehab and lack of access to snack but also says she admits to a poor appetite most of the times  RD suspects moderate malnutrition and will add ensure TID which she has had in the past for wound healing  Request daily wt, monitoring of food and supplement intakes  Malnutrition/BMI Present Yes   Adult Malnutrition type Social/enviromental   Adult Degree of Malnutrition Other severe protein calorie malnutrition   Malnutrition Characteristics Inadequate energy;Weight loss;Muscle loss   Interventions Diet: continued as ordered; Initiate Daily Weight;Supplement initiate

## 2021-07-28 NOTE — ASSESSMENT & PLAN NOTE
· Rate controlled chronic Afib w/ bioprosthetic MV replacement and AAMIR thrombus   · Anticoagulation with warfarin with INR goal of 2 5 to 3 5 due to severe mitral valve stenosis (per Cardiology note)   · Reduced digoxin to 62  5mcg  during hospitalization due to MARI  · INR ranged between 1 95 -2 38 during hospitalization     Plan:   · Continue Coumadin, please obtian INR in next few days  · Resume   125mg Digoxin daily

## 2021-07-28 NOTE — MALNUTRITION/BMI
This medical record reflects one or more clinical indicators suggestive of malnutrition and/or morbid obesity  Malnutrition Findings:   Adult Malnutrition type: Social/enviromental  Adult Degree of Malnutrition: Other severe protein calorie malnutrition  Malnutrition Characteristics: Inadequate energy, Weight loss, Muscle loss    Treated with diet and supplements  BMI Findings: Body mass index is 37 02 kg/m²  See Nutrition note dated 7/28/2021 for additional details  Completed nutrition assessment is viewable in the nutrition documentation

## 2021-07-29 VITALS
HEIGHT: 63 IN | SYSTOLIC BLOOD PRESSURE: 121 MMHG | HEART RATE: 64 BPM | TEMPERATURE: 96.6 F | WEIGHT: 208.56 LBS | OXYGEN SATURATION: 96 % | BODY MASS INDEX: 36.95 KG/M2 | DIASTOLIC BLOOD PRESSURE: 69 MMHG | RESPIRATION RATE: 16 BRPM

## 2021-07-29 PROBLEM — A41.9 SEVERE SEPSIS (HCC): Status: RESOLVED | Noted: 2021-07-27 | Resolved: 2021-07-29

## 2021-07-29 PROBLEM — R65.20 SEVERE SEPSIS (HCC): Status: RESOLVED | Noted: 2021-07-27 | Resolved: 2021-07-29

## 2021-07-29 PROBLEM — G93.41 ACUTE METABOLIC ENCEPHALOPATHY: Status: RESOLVED | Noted: 2021-07-27 | Resolved: 2021-07-29

## 2021-07-29 LAB
ALBUMIN SERPL BCP-MCNC: 3.1 G/DL (ref 3.4–4.8)
ALP SERPL-CCNC: 47.6 U/L (ref 35–140)
ALT SERPL W P-5'-P-CCNC: 9 U/L (ref 5–54)
ANION GAP SERPL CALCULATED.3IONS-SCNC: 10 MMOL/L (ref 4–13)
AST SERPL W P-5'-P-CCNC: 13 U/L (ref 15–41)
BILIRUB SERPL-MCNC: 0.35 MG/DL (ref 0.3–1.2)
BUN SERPL-MCNC: 32 MG/DL (ref 6–20)
CALCIUM ALBUM COR SERPL-MCNC: 9.5 MG/DL (ref 8.3–10.1)
CALCIUM SERPL-MCNC: 8.8 MG/DL (ref 8.4–10.2)
CHLORIDE SERPL-SCNC: 102 MMOL/L (ref 96–108)
CO2 SERPL-SCNC: 26 MMOL/L (ref 22–33)
CREAT SERPL-MCNC: 1.45 MG/DL (ref 0.4–1.1)
ERYTHROCYTE [DISTWIDTH] IN BLOOD BY AUTOMATED COUNT: 18.2 % (ref 11.6–15.1)
GFR SERPL CREATININE-BSD FRML MDRD: 36 ML/MIN/1.73SQ M
GLUCOSE SERPL-MCNC: 92 MG/DL (ref 65–140)
HCT VFR BLD AUTO: 26 % (ref 34.8–46.1)
HGB BLD-MCNC: 7.7 G/DL (ref 11.5–15.4)
MCH RBC QN AUTO: 26.6 PG (ref 26.8–34.3)
MCHC RBC AUTO-ENTMCNC: 29.6 G/DL (ref 31.4–37.4)
MCV RBC AUTO: 90 FL (ref 82–98)
PLATELET # BLD AUTO: 385 THOUSANDS/UL (ref 149–390)
PMV BLD AUTO: 10.4 FL (ref 8.9–12.7)
POTASSIUM SERPL-SCNC: 3.8 MMOL/L (ref 3.5–5)
PROT SERPL-MCNC: 6.2 G/DL (ref 6.4–8.3)
RBC # BLD AUTO: 2.9 MILLION/UL (ref 3.81–5.12)
SODIUM SERPL-SCNC: 138 MMOL/L (ref 133–145)
WBC # BLD AUTO: 8.05 THOUSAND/UL (ref 4.31–10.16)

## 2021-07-29 PROCEDURE — 80053 COMPREHEN METABOLIC PANEL: CPT | Performed by: INTERNAL MEDICINE

## 2021-07-29 PROCEDURE — 94640 AIRWAY INHALATION TREATMENT: CPT

## 2021-07-29 PROCEDURE — 94760 N-INVAS EAR/PLS OXIMETRY 1: CPT

## 2021-07-29 PROCEDURE — 99239 HOSP IP/OBS DSCHRG MGMT >30: CPT | Performed by: INTERNAL MEDICINE

## 2021-07-29 PROCEDURE — 85027 COMPLETE CBC AUTOMATED: CPT | Performed by: INTERNAL MEDICINE

## 2021-07-29 RX ORDER — SENNA AND DOCUSATE SODIUM 50; 8.6 MG/1; MG/1
1 TABLET, FILM COATED ORAL DAILY
Qty: 7 TABLET | Refills: 0 | Status: SHIPPED | OUTPATIENT
Start: 2021-07-29

## 2021-07-29 RX ORDER — CEPHALEXIN 500 MG/1
500 CAPSULE ORAL EVERY 6 HOURS SCHEDULED
Qty: 16 CAPSULE | Refills: 0 | Status: SHIPPED | OUTPATIENT
Start: 2021-07-29 | End: 2021-08-02

## 2021-07-29 RX ORDER — POLYETHYLENE GLYCOL 3350 17 G/17G
17 POWDER, FOR SOLUTION ORAL DAILY
Qty: 119 G | Refills: 0 | Status: SHIPPED | OUTPATIENT
Start: 2021-07-30 | End: 2021-08-06

## 2021-07-29 RX ORDER — CEPHALEXIN 500 MG/1
500 CAPSULE ORAL EVERY 6 HOURS SCHEDULED
Status: DISCONTINUED | OUTPATIENT
Start: 2021-07-29 | End: 2021-07-29 | Stop reason: HOSPADM

## 2021-07-29 RX ADMIN — NYSTATIN: 100000 POWDER TOPICAL at 09:12

## 2021-07-29 RX ADMIN — PANTOPRAZOLE SODIUM 40 MG: 40 TABLET, DELAYED RELEASE ORAL at 05:35

## 2021-07-29 RX ADMIN — FERROUS SULFATE TAB 325 MG (65 MG ELEMENTAL FE) 325 MG: 325 (65 FE) TAB at 09:13

## 2021-07-29 RX ADMIN — BUDESONIDE AND FORMOTEROL FUMARATE DIHYDRATE 2 PUFF: 80; 4.5 AEROSOL RESPIRATORY (INHALATION) at 08:04

## 2021-07-29 RX ADMIN — DIGOXIN 62.5 MCG: 125 TABLET ORAL at 09:13

## 2021-07-29 RX ADMIN — DOCUSATE SODIUM AND SENNOSIDES 2 TABLET: 8.6; 5 TABLET, FILM COATED ORAL at 09:12

## 2021-07-29 RX ADMIN — POTASSIUM CHLORIDE 20 MEQ: 1500 TABLET, EXTENDED RELEASE ORAL at 09:12

## 2021-07-29 RX ADMIN — MONTELUKAST SODIUM 10 MG: 10 TABLET, FILM COATED ORAL at 09:12

## 2021-07-29 NOTE — DISCHARGE INSTR - AVS FIRST PAGE
Please follow-up with your primary care physician in about 1 week and discuss the events of the hospitalization  New medication is antibiotic Keflex, 500 mg, take 1 tablet 4 times a day for 3 more days  Please hold water pill torsemide for 2 more days  Restart after discussing with the primary care physician  It is recommended to hold given slightly high kidney numbers

## 2021-07-29 NOTE — ASSESSMENT & PLAN NOTE
Malnutrition Findings:   Adult Malnutrition type: Social/enviromental  Adult Degree of Malnutrition: Other severe protein calorie malnutrition    BMI Findings: Body mass index is 36 94 kg/m²

## 2021-07-29 NOTE — DISCHARGE INSTRUCTIONS
Acute Kidney Injury, Ambulatory Care   GENERAL INFORMATION:   Acute kidney injury  happens when your kidneys suddenly stop working correctly  Normally, the kidneys turn fluid, chemicals, and waste from your blood into urine  In acute kidney injury, your kidneys can no longer do this  In most cases, it is temporary, but it may become a chronic kidney condition  Common symptoms include the following:   · Decreased urination or dark-colored urine    · Swelling in your arms, legs, or feet     · Abdominal or low back pain    · Vomiting, diarrhea, or loss of appetite    · Fatigue     · Skin rash  Seek immediate care for the following symptoms:   · Heart beating faster than normal for you    · Sudden chest pain or trouble breathing    · Seizure  Treatment for acute kidney injury:  Treatment depends upon the cause of your acute kidney injury and how severe it is  Medicines may be given to increase blood flow to your kidneys and protect your kidneys  You may also need medicine to decrease inflammation in your kidneys  You may be given IV fluids to replenish fluids and help your heart pump blood  Dialysis may be needed to remove chemicals and waste from your blood when your kidneys cannot  Manage acute kidney injury:   · Manage other health conditions  Care for your diabetes, high blood pressure, or heart disease  These conditions increase your risk for acute kidney injury  · Talk to your healthcare provider before you take over-the-counter-medicine  NSAIDs, stomach medicine, or laxatives may harm your kidneys and increase your risk for acute kidney injury  Follow up with your healthcare provider as directed:  Write down your questions so you remember to ask them during your visits  CARE AGREEMENT:   You have the right to help plan your care  Learn about your health condition and how it may be treated  Discuss treatment options with your caregivers to decide what care you want to receive   You always have the right to refuse treatment  The above information is an  only  It is not intended as medical advice for individual conditions or treatments  Talk to your doctor, nurse or pharmacist before following any medical regimen to see if it is safe and effective for you  © 2014 [address] is for End User's use only and may not be sold, redistributed or otherwise used for commercial purposes  All illustrations and images included in CareNotes® are the copyrighted property of A D A M [employer]  or Deej[employer] Lewis

## 2021-07-29 NOTE — CASE MANAGEMENT
Panchito from [facility] updated CM via [employer] of patient 1500 BLS   CM notified Frances Naidu from [facility] via [place] and Nurse Ladonna Light via [employer]  DCP: Return to facility with rehab services

## 2021-07-29 NOTE — ASSESSMENT & PLAN NOTE
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

## 2021-07-29 NOTE — DISCHARGE SUMMARY
INTERNAL MEDICINE RESIDENCY DISCHARGE SUMMARY     Lannie Mortimer   70 y o  female  MRN: [id number]  Room/Bed: /-01     [facility]  [facility]  EA 3RD FLOOR MED SURG   Encounter: [id number]    DISCHARGE INFORMATION     PCP at Discharge: Dayron Quevedo MD    Admitting Provider: Felipe Dorado MD  Admission Date: 7/26/2021    Discharge Provider: Felipe Dorado MD  Discharge Date: 7/29/21    Discharge Disposition: Non [facility] SNF/TCU/SNU  Discharge Condition: good  Discharge with Lines: no    Discharge Diet: regular diet and cardiac diet  Activity Restrictions: none  Test Results Pending at Discharge: N/A    Discharge Diagnoses:  Principal Problem (Resolved):    Severe sepsis ([facility] )  Active Problems:    COPD (chronic obstructive pulmonary disease) (HCC)    Chronic atrial fibrillation (HCC)    Type 2 diabetes mellitus ([facility] [address] )    Chronic diastolic congestive heart failure (HCC)    Lung mass    Constipation    Acute kidney injury superimposed on chronic kidney disease ([facility] [address] )    UTI (urinary tract infection)    S/P MVR (mitral valve repair)    Abnormal CT of the abdomen    Severe protein-calorie malnutrition ([facility] [address] )  Resolved Problems:    Acute metabolic encephalopathy    Severe protein-calorie malnutrition ([facility] [address] )  Assessment & Plan  Malnutrition Findings:   Adult Malnutrition type: Social/enviromental  Adult Degree of Malnutrition: Other severe protein calorie malnutrition    BMI Findings: Body mass index is 36 94 kg/m²         Abnormal CT of the abdomen  Assessment & Plan  · Atherosclerotic changes in abdominal aorta and branches which appear patent  · Nodular hepatic surface contour, suspicious for cirrhosis  · Cholelithiasis without evidnece of cholecystitis   · Stable bilateral adrenal adenomas       S/P MVR (mitral valve repair)  Assessment & Plan  S/p 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 stenosis  · Goal INR 2 5-3 5  · Monitoring of INR at SNF is recommended    UTI (urinary tract infection)  Assessment & Plan  · On ED admission, UA revealed innumerable WBC, RBC, bacteria  Nitrite negative  · Currently, she is on day 3 of Rocephin  · Urine culture grew Klebsiella, sensitive to keflex    Plan:   · Discharge on cephalexin 500mg q6 for 7 total days therapy    Acute kidney injury superimposed on chronic kidney disease ([address] )  Assessment & Plan  · MARI on ED admission  Creatinine was 1 76 on admission  Baseline creatinine 1 1 to 1 3     · Today, creatine is trending down to 1 45   · Torsemide was held  and digoxin was decreased from 125 mcg to 62 5 mcg due to MARI    Plan:   · Resume home regimen of torsemide in a few days based on volume status of the patient and resume digoxin at previous doses          Constipation  Assessment & Plan  Noted on CT imaging  S/p disimpaction in ED  · Bowel regimen     Lung mass  Assessment & Plan  Underwent recent biopsy  Negative for malignancy  · Follow with pulm as outpatient as indicated  Recommended for serial imaging, PETCT  Chronic diastolic congestive heart failure (HCC)  Assessment & Plan  Wt Readings from Last 3 Encounters:   07/27/21 95 2 kg (209 lb 14 1 oz)   07/05/21 96 6 kg (212 lb 15 4 oz)   04/16/21 116 kg (255 lb)        NYHA IV, AHA class C   Appears compensated at this time   6/2021 Echo EF 60%  Severe TR   Monitor intake and output, daily weights   Low sodium diet and fluid restriction   Recently hospitalized with acute CHF exacerbation  Discharged on torsemide 40 mg BID      Hold torsemide for now for a few days to allow MARI to resolve, then can be restarted after being assessed by SNF team    Type 2 diabetes mellitus [facility])  Assessment & Plan  Lab Results   Component Value Date    HGBA1C 4 5 03/15/2021     · Appears to be diet controlled  · Recent A1c reviewed  · No need for SSI     Chronic atrial fibrillation (HCC)  Assessment & Plan  · Rate controlled chronic Afib w/ bioprosthetic MV replacement and AAMIR thrombus   · Anticoagulation with warfarin with INR goal of 2 5 to 3 5 due to severe mitral valve stenosis (per Cardiology note)   · Reduced digoxin to 62  5mcg  during hospitalization due to MARI  · INR ranged between 1 95 -2 38 during hospitalization     Plan:   · Continue Coumadin, please obtian INR in next few days  · Resume   125mg Digoxin daily    COPD (chronic obstructive pulmonary disease) ([facility])  Assessment & Plan  · Chronic obstructive pulmonary disease with no acute exacerbation  No PFTs on file  · Former Smoker   · History of chronic respiratory failure with baseline O2 requirement of 4 liters nasal cannula  · Currently saturating at 92% on 2 L of oxygen     Plan  · Continue home regimen : Symbicort 80-4 5 mcg 2 puffs b i d , Proventil 2 puffs q 6h p r n , singular daily    Acute metabolic encephalopathy-resolved as of 7/29/2021  Assessment & Plan  ·  Patient required ativan and haldol in ED due to agitation  Soft restraints were applied  · Most likely etiology for new onset of encephalopathy is agitation  According to her nursing home, the patient has no history of agitation  · CT scan revealed  no acute intracranial abnormality but notes motion artifact  · Today, patient is alert and oriented x3 and demonstrates no signs of agitation or restlessness  * Severe sepsis ([facility])-resolved as of 7/29/2021  Assessment & Plan  · On ED admission, qualified for sepsis criteria due to tachycardia, tachypnea, leukocytosis, and UTI  Lactic acid decreased from  7 to 2 5 after NS infusion in the ER   Urine culture revealed  >100,000 cfu/ml Gram Negative Enteric Joshua Enteric  Blood Cultures and MRSA are negative   Leukocytosis, tachycardia, and tachypnea have resolved   Procalc increased from   34 to   38 overnight  Consulting Providers:      Diagnostic & Therapeutic Procedures Performed:  XR chest 1 view portable    Result Date: 7/27/2021  Impression: Previous right middle lobe mass poorly demonstrated  Pulmonary venous congestion  Workstation performed: [id number]     CT head without contrast    Result Date: 7/26/2021  Impression: No definite acute intracranial abnormality, noting limited evaluation due to motion artifact  Workstation performed: [id number]     CTA abdomen pelvis w wo contrast    Result Date: 7/26/2021  Impression: 1  Limited evaluation due to motion artifact  2   Atherosclerotic changes in the abdominal aorta and branches which appear patent  3   Large amount of stool in the rectum  Correlate for fecal impaction  4   Nodular hepatic surface contour suspicious for cirrhosis  5   Cholelithiasis without evidence of cholecystitis  6   Stable bilateral adrenal adenomas  7   Marked cardiomegaly  Workstation performed: [id number]       Code Status: Level 1 - Full Code  Advance Directive & Living Will: <no information>  Power of :    POLST:      Medications:  Current Discharge Medication List        Current Discharge Medication List      START taking these medications    Details   cephalexin (KEFLEX) 500 mg capsule Take 1 capsule (500 mg total) by mouth every 6 (six) hours for 4 days  Qty: 16 capsule, Refills: 0    Associated Diagnoses: Altered mental status      polyethylene glycol (MIRALAX) 17 g packet Take 17 g by mouth daily for 7 days  Qty: 119 g, Refills: 0    Associated Diagnoses: Altered mental status      !! senna-docusate sodium (SENOKOT-S) 8 6-50 mg per tablet Take 1 tablet by mouth daily  Qty: 7 tablet, Refills: 0    Associated Diagnoses: Altered mental status       ! ! - Potential duplicate medications found  Please discuss with provider  Current Discharge Medication List      CONTINUE these medications which have NOT CHANGED    Details   albuterol (PROVENTIL HFA,VENTOLIN HFA) 90 mcg/act inhaler Inhale 2 puffs every 6 (six) hours as needed    Comments: Substitution to a formulary equivalent within the same pharmaceutical class is authorized        budesonide-formoterol (SYMBICORT) 80-4 5 MCG/ACT inhaler Inhale 2 puffs 2 (two) times a day Rinse mouth after use  digoxin (LANOXIN) 0 125 mg tablet Take 0 125 mg by mouth daily      ferrous sulfate (EQL Iron Supplement Therapy) 325 (65 Fe) mg tablet Take 1 tablet (325 mg total) by mouth daily with breakfast  Qty: 30 tablet, Refills: 0    Associated Diagnoses: GI bleed      melatonin 3 mg Take 1 tablet (3 mg total) by mouth daily at bedtime  Qty: 30 tablet, Refills: 0    Associated Diagnoses: GI bleed      montelukast (SINGULAIR) 10 mg tablet Take 1 tablet by mouth daily      nystatin (MYCOSTATIN) powder Apply topically 2 (two) times a day Apply to skin folds  Qty: 15 g, Refills: 0    Associated Diagnoses: GI bleed      omeprazole (PriLOSEC) 20 mg delayed release capsule Take 20 mg by mouth daily      potassium chloride (K-DUR,KLOR-CON) 10 mEq tablet Take 20 mEq by mouth 2 (two) times a day      !! senna-docusate sodium (Senokot S) 8 6-50 mg per tablet Take 1 tablet by mouth daily as needed      torsemide (DEMADEX) 20 mg tablet Take 2 tablets (40 mg total) by mouth 2 (two) times a day  Refills: 0    Associated Diagnoses: Morbid obesity (HCC)      warfarin (COUMADIN) 3 mg tablet Take 1 5 tablets (4 5 mg total) by mouth daily  Refills: 0    Associated Diagnoses: Shortness of breath; Chronic atrial fibrillation ([address] )       ! ! - Potential duplicate medications found  Please discuss with provider  Allergies: Allergies   Allergen Reactions    Augmentin [Amoxicillin-Pot Clavulanate] Anaphylaxis     Tolerated IV ceftriaxone    Levaquin [Levofloxacin] Anaphylaxis     FOLLOW-UP     PCP Outpatient Follow-up:   In 1 week    Consulting Providers Follow-up:  none required     [address] COURSE      72-year-old female with past medical history of significant chronic atrial fibrillation with stenosed bioprosthetic MV and on warfarin, COPD (4L O2 at baseline), and chronic diastolic heart failure (EF: 60%) presented due to acute onset of altered mental status  The patient was at her baseline until 3 hours prior to presentation when she became confused and agitated  In the ED, the patient qualified for sepsis due to tachypnea, tachycardia, leukocytosis, and a UA positive for UTI so she was started on Rocephin  The patient also had an MARI so torsemide was held and digoxin was decreased from 125 mcg to 62 5 mcg  Mental status improved over time  Return to baseline  Blood and MRSA cultures were negative  Throughout the hospitalization, acute metabolic encephalopathy secondary to the UTI and the sepsis had resolved  Urine culture was positive for Klebsiella  Upon discharge, the patient should begin oral cephalexin 500mg q6 for the UTI and resume the home regimen of torsemide upon reassessment of volume status at the nursing home in a few days and prior dose of digoxin  Patient was recommended to get a BMP as an outpatient along with INR in a couple of days to be followed with nursing home staff  Discharge Statement:   I spent 30 minutes minutes discharging the patient  This time was spent on the day of discharge  I had direct contact with the patient on the day of discharge   Additional documentation is required if more than 30 minutes were spent on discharge     ==  Tracie Tavares MD  Chief Resident, PGY-3  [facility] Internal Medicine Residency

## 2021-07-29 NOTE — CASE MANAGEMENT
Per rounds with Dr Alaina Rivera patient is ready for DC today back to [facility] [address]  CM called Magy from [address] [place] [address] at [phone] to notify her of patient being ready for DC to ask if facility is able to take her back  Ignacio Ion called CM back at 12:48pm to confirm patient is able to return to facility and stated CM can  begin arranging transport  CM called [facility] at 1:10pm to request BLS transport for patient back to [facility] [address]  Manfred Kessler from [facility] stated she would call back with a  time

## 2021-08-01 LAB
BACTERIA BLD CULT: NORMAL
BACTERIA BLD CULT: NORMAL

## 2021-08-10 ENCOUNTER — OFFICE VISIT (OUTPATIENT)
Dept: CARDIOLOGY CLINIC | Facility: CLINIC | Age: 76
End: 2021-08-10
Payer: MEDICARE

## 2021-08-10 VITALS
HEART RATE: 71 BPM | BODY MASS INDEX: 36.94 KG/M2 | SYSTOLIC BLOOD PRESSURE: 80 MMHG | DIASTOLIC BLOOD PRESSURE: 52 MMHG | HEIGHT: 63 IN | OXYGEN SATURATION: 98 %

## 2021-08-10 DIAGNOSIS — I50.32 CHRONIC DIASTOLIC HEART FAILURE (HCC): Primary | ICD-10-CM

## 2021-08-10 DIAGNOSIS — Z98.890 S/P MVR (MITRAL VALVE REPAIR): ICD-10-CM

## 2021-08-10 DIAGNOSIS — I48.19 PERSISTENT ATRIAL FIBRILLATION (HCC): ICD-10-CM

## 2021-08-10 DIAGNOSIS — N18.31 STAGE 3A CHRONIC KIDNEY DISEASE (HCC): ICD-10-CM

## 2021-08-10 DIAGNOSIS — J96.11 CHRONIC RESPIRATORY FAILURE WITH HYPOXIA (HCC): ICD-10-CM

## 2021-08-10 DIAGNOSIS — D64.9 ANEMIA, UNSPECIFIED TYPE: ICD-10-CM

## 2021-08-10 PROCEDURE — 99215 OFFICE O/P EST HI 40 MIN: CPT | Performed by: NURSE PRACTITIONER

## 2021-08-10 RX ORDER — TORSEMIDE 20 MG/1
40 TABLET ORAL DAILY
Refills: 0
Start: 2021-08-10

## 2021-08-10 RX ORDER — POTASSIUM CHLORIDE 750 MG/1
20 TABLET, EXTENDED RELEASE ORAL DAILY
Qty: 30 TABLET | Refills: 0
Start: 2021-08-10 | End: 2021-11-12 | Stop reason: DRUGHIGH

## 2021-08-10 RX ORDER — BUSPIRONE HYDROCHLORIDE 5 MG/1
5 TABLET ORAL 2 TIMES DAILY
COMMUNITY

## 2021-08-10 RX ORDER — ESCITALOPRAM OXALATE 10 MG/1
10 TABLET ORAL DAILY
COMMUNITY

## 2021-08-10 NOTE — PATIENT INSTRUCTIONS
Maintain a 2 gram daily sodium diet and 1500 ml daily fluid restriction  Check daily weights  If you gained 3 pounds in one day, 5 pounds in one week, or experience worsening shortness of breath or increasing lower leg swelling  Please call the heart failure office at [phone]    Please bring a  list of your current medications and daily weights to the office visit'      Hold Potassium chloride and Torsemide for 3 days  Restart after 3 days, Torsemide 40mg daily and K dur 20meq daily

## 2021-08-10 NOTE — PROGRESS NOTES
Cardiology Follow Up    Ocie Files  1945  [id number]  [facility] [place]  [facility]  [id number] [place] [address] Road  [phone]    1  Chronic diastolic heart failure (HCC)  potassium chloride (K-DUR,KLOR-CON) 10 mEq tablet   2  Persistent atrial fibrillation ([address] )     3  S/P MVR (mitral valve repair)     4  Morbid obesity ([address] )  torsemide (DEMADEX) 20 mg tablet       Interval History:  Ms Robbie Vela was admitted to [place] [address] on 7/26 - 7/29/21 with severe Sepsis  She presented with acute onset of altered mental status  She became confused and agitated  In ED she was found to be   Tachypneic tachycardia with leukocytosis  UA was positive for UTI and she was started on Rocephin  She experienced AKA torsemide and digoxin were held  Digoxin decreased to 62 5 mcg  Mental status improved over time and she read turned to her baseline  Blood cultures were negative  Urine culture was positive for Klebsiella  She was started on cephalexin 500 mg q 6 hours she would restart torsemide after discharge  He was recommended follow-up with a BMP and an INR at discharge  Charly Almeida was discharged to [address] [place] [address]  She resides in an apartment in [facility] with a room mate  Ms Robbie Vela presents to our office for a recent hospitalization follow up visit  She is currently undergoing rehabilitation at [address] [place] [address]  Ms Pete Cuellar is sitting in a wheelchair  She is not able to stand or walk  Charly Almeida is using oxygen at [facility]  She denies worsening shortness of breath, CP, lightheadedness or dizziness  I do not have a current weight  According to Charly Almeida she is lifted out of bed with a nery lift and the scale was broken    8/03/21 INR 2 2 Hgb 8 3, Hct 25 4, Plt 322     HPI:  COVID -19+  12/2020   Chronic diastolic heart failure 871 pounds  Chronic atrial fibrillation AAMIR brianombus on FARIDEH 2020 on Coumadin for stroke prevention    Bio MVR in 2012 on Coumadin goal INR 2 5 - 3 5   CKD III baseline creat 1 1- 1 3   COPD on oxygen at [facility]   Severe Sepsis  3 cm Lung mass Bx negative for malignancy  21 TTE LVEF 60%, NRWMA, LA massively dilated,  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  There was possible sewing ring dehiscence with mitral inflow being directed towards the LVOT  Transmitral velocity and gradient were increased due to stenosis as well as concomitant increased transaortic flow with moderate stenosis and trace regurgitation  Mild aortic valve stenosis mild aortic valve regurgitation  Moderate to severe tricuspid valve regurgitation   Plan to see Dr Vance White as OP [facility]  Patient Active Problem List   Diagnosis    COPD (chronic obstructive pulmonary disease) ([facility] )    GI bleed    Chronic atrial fibrillation ([facility])    Type 2 diabetes mellitus ([facility]    Chronic diastolic congestive heart failure ([facility] )    Morbid obesity ([facility])    Stasis edema of both lower extremities    Shortness of breath    CKD (chronic kidney disease)    Lung mass    Constipation    Epistaxis    Acute kidney injury superimposed on chronic kidney disease ([facility])    UTI (urinary tract infection)    S/P MVR (mitral valve repair)    Abnormal CT of the abdomen    Severe protein-calorie malnutrition ([facility] )     Past Medical History:   Diagnosis Date    Atrial fibrillation ([facility] )     COPD (chronic obstructive pulmonary disease) ([facility] )     Diabetes mellitus ([facility] )     Hypertension     Respiratory failure ([facility] )      Social History     Socioeconomic History    Marital status: Single     Spouse name: Not on file    Number of children: Not on file    Years of education: Not on file    Highest education level: Not on file   Occupational History    Not on file   Tobacco Use    Smoking status: Former Smoker     Quit date: 2010     Years since quittin 5    Smokeless tobacco: Never Used   Vaping Use    Vaping Use: Former   Substance and Sexual Activity    Alcohol use: Not Currently    Drug use: Not Currently    Sexual activity: Not Currently   Other Topics Concern    Not on file   Social History Narrative    Not on file     Social Determinants of Health     Financial Resource Strain:     Difficulty of Paying Living Expenses:    Food Insecurity:     Worried About Running Out of Food in the Last Year:     [address] in the Last Year:    Transportation Needs:     Lack of Transportation (Medical):  Lack of Transportation (Non-Medical):    Physical Activity:     Days of Exercise per Week:     Minutes of Exercise per Session:    Stress:     Feeling of Stress :    Social Connections:     Frequency of Communication with Friends and Family:     Frequency of Social Gatherings with Friends and Family:     Attends [employer] Services:     Active Member of Clubs or Organizations:     Attends Club or Organization Meetings:     Marital Status:    Intimate Partner Violence:     Fear of Current or Ex-Partner:     Emotionally Abused:     Physically Abused:     Sexually Abused:       No family history on file    Past Surgical History:   Procedure Laterality Date    COLONOSCOPY      IR BIOPSY LUNG  6/28/2021    MITRAL VALVE REPLACEMENT  2012    Bovine       Current Outpatient Medications:     albuterol (PROVENTIL HFA,VENTOLIN HFA) 90 mcg/act inhaler, Inhale 2 puffs every 6 (six) hours as needed, Disp: , Rfl:     budesonide-formoterol (SYMBICORT) 80-4 5 MCG/ACT inhaler, Inhale 2 puffs 2 (two) times a day Rinse mouth after use , Disp: , Rfl:     busPIRone (BUSPAR) 5 mg tablet, Take 5 mg by mouth 2 (two) times a day, Disp: , Rfl:     digoxin (LANOXIN) 0 125 mg tablet, Take 0 125 mg by mouth daily, Disp: , Rfl:     escitalopram (LEXAPRO) 10 mg tablet, Take 10 mg by mouth daily, Disp: , Rfl:     ferrous sulfate (EQL Iron Supplement Therapy) 325 (65 Fe) mg tablet, Take 1 tablet (325 mg total) by mouth daily with breakfast (Patient taking differently: Take 325 mg by mouth 2 (two) times a day with meals ), Disp: 30 tablet, Rfl: 0    melatonin 3 mg, Take 1 tablet (3 mg total) by mouth daily at bedtime, Disp: 30 tablet, Rfl: 0    montelukast (SINGULAIR) 10 mg tablet, Take 1 tablet by mouth daily, Disp: , Rfl:     nystatin (MYCOSTATIN) powder, Apply topically 2 (two) times a day Apply to skin folds, Disp: 15 g, Rfl: 0    omeprazole (PriLOSEC) 20 mg delayed release capsule, Take 20 mg by mouth daily, Disp: , Rfl:     potassium chloride (K-DUR,KLOR-CON) 10 mEq tablet, Take 20 mEq by mouth 2 (two) times a day, Disp: , Rfl:     senna-docusate sodium (Senokot S) 8 6-50 mg per tablet, Take 1 tablet by mouth daily as needed, Disp: , Rfl:     torsemide (DEMADEX) 20 mg tablet, Take 2 tablets (40 mg total) by mouth 2 (two) times a day, Disp: , Rfl: 0    warfarin (COUMADIN) 3 mg tablet, Take 1 5 tablets (4 5 mg total) by mouth daily, Disp: , Rfl: 0    polyethylene glycol (MIRALAX) 17 g packet, Take 17 g by mouth daily for 7 days, Disp: 119 g, Rfl: 0    senna-docusate sodium (SENOKOT-S) 8 6-50 mg per tablet, Take 1 tablet by mouth daily, Disp: 7 tablet, Rfl: 0  Allergies   Allergen Reactions    Augmentin [Amoxicillin-Pot Clavulanate] Anaphylaxis     Tolerated IV ceftriaxone    Levaquin [Levofloxacin] Anaphylaxis       Labs:  Admission on 07/26/2021, Discharged on 07/29/2021   Component Date Value    WBC 07/26/2021 14 25*    RBC 07/26/2021 3 42*    Hemoglobin 07/26/2021 9 1*    Hematocrit 07/26/2021 30 5*    MCV 07/26/2021 89     MCH 07/26/2021 26 6*    MCHC 07/26/2021 29 8*    RDW 07/26/2021 17 9*    MPV 07/26/2021 10 5     Platelets 86/70/7846 601*    Sodium 07/26/2021 139     Potassium 07/26/2021 3 9     Chloride 07/26/2021 95*    CO2 07/26/2021 22     ANION GAP 07/26/2021 22*    BUN 07/26/2021 29*    Creatinine 07/26/2021 1 76*    Glucose 07/26/2021 124     Calcium 07/26/2021 9 7     AST 07/26/2021 21     ALT 07/26/2021 13     Alkaline Phosphatase 07/26/2021 58 3     Total Protein 07/26/2021 7 2     Albumin 07/26/2021 3 9     Total Bilirubin 07/26/2021 0 65     eGFR 07/26/2021 28     Magnesium 07/26/2021 1 6     Troponin I 07/26/2021 0 04     BNP 07/26/2021 182 5*    PTT 07/26/2021 34*    Protime 07/26/2021 25 8*    INR 07/26/2021 2 38*    LACTIC ACID 07/26/2021 7 0*    Blood Culture 07/26/2021 No Growth After 5 Days   Blood Culture 07/26/2021 No Growth After 5 Days       Color, UA 07/26/2021 Yellow     Clarity, UA 07/26/2021 Cloudy*    Specific Gravity, UA 07/26/2021 1 025     pH, UA 07/26/2021 8 0     Leukocytes, UA 07/26/2021 3+*    Nitrite, UA 07/26/2021 Negative     Protein, UA 07/26/2021 Interference- unable to analyze     Glucose, UA 07/26/2021 Negative     Ketones, UA 07/26/2021 Negative     Urobilinogen, UA 07/26/2021 0 2     Bilirubin, UA 07/26/2021 Negative     Blood, UA 07/26/2021 Trace*    SARS-CoV-2 07/26/2021 Negative     INFLUENZA A PCR 07/26/2021 None Detected     INFLUENZA B PCR 07/26/2021 None Detected     RSV PCR 07/26/2021 None Detected     Digoxin Lvl 07/26/2021 1 5     LACTIC ACID 07/26/2021 2 5*    POC Glucose 07/26/2021 145*    RBC, UA 07/26/2021 Innumerable*    WBC, UA 07/26/2021 Innumerable*    Epithelial Cells 07/26/2021 None Seen     Bacteria, UA 07/26/2021 Innumerable*    Urine Culture 07/26/2021 >100,000 cfu/ml Klebsiella pneumoniae*    Segmented % 07/26/2021 80*    Bands % 07/26/2021 1     Lymphocytes % 07/26/2021 6*    Monocytes % 07/26/2021 12     Eosinophils, % 07/26/2021 0     Basophils % 07/26/2021 1     Absolute Neutrophils 07/26/2021 11 54*    Lymphocytes Absolute 07/26/2021 0 86     Monocytes Absolute 07/26/2021 1 71*    Eosinophils Absolute 07/26/2021 0 00     Basophils Absolute 07/26/2021 0 14*    Total Counted 07/26/2021 100     RBC Morphology 07/26/2021 Present     Anisocytosis 07/26/2021 Present  Hypochromia 07/26/2021 Present     Microcytes 07/26/2021 Present     Poikilocytes 07/26/2021 Present     Platelet Estimate 22/70/6862 Increased*    Lipase 07/26/2021 33     Troponin I 07/26/2021 0 05     LACTIC ACID 07/27/2021 1 3     WBC 07/27/2021 10 65*    RBC 07/27/2021 3 02*    Hemoglobin 07/27/2021 8 1*    Hematocrit 07/27/2021 27 0*    MCV 07/27/2021 89     MCH 07/27/2021 26 8     MCHC 07/27/2021 30 0*    RDW 07/27/2021 18 0*    MPV 07/27/2021 10 2     Platelets 17/25/0599 438*    Procalcitonin 07/27/2021 0 34*    Sodium 07/27/2021 139     Potassium 07/27/2021 3 7     Chloride 07/27/2021 101     CO2 07/27/2021 27     ANION GAP 07/27/2021 11     BUN 07/27/2021 31*    Creatinine 07/27/2021 1 73*    Glucose 07/27/2021 134     Calcium 07/27/2021 9 1     eGFR 07/27/2021 29     Ventricular Rate 07/26/2021 134     Atrial Rate 07/26/2021 142     SD Interval 07/26/2021 129     QRSD Interval 07/26/2021 94     QT Interval 07/26/2021 343     QTC Interval 07/26/2021 512     QRS Axis 07/26/2021 61     T Wave Axis 07/26/2021 105     MRSA Culture Only 07/27/2021 No Methicillin Resistant Staphlyococcus aureus (MRSA) isolated     Procalcitonin 07/28/2021 0 38*    Protime 07/28/2021 21 3*    INR 07/28/2021 1 95*    WBC 07/28/2021 6 34     RBC 07/28/2021 2 83*    Hemoglobin 07/28/2021 7 5*    Hematocrit 07/28/2021 25 2*    MCV 07/28/2021 89     MCH 07/28/2021 26 5*    MCHC 07/28/2021 29 8*    RDW 07/28/2021 18 1*    Platelets 54/80/0770 380     MPV 07/28/2021 10 3     Sodium 07/28/2021 137     Potassium 07/28/2021 3 2*    Chloride 07/28/2021 99     CO2 07/28/2021 28     ANION GAP 07/28/2021 10     BUN 07/28/2021 34*    Creatinine 07/28/2021 1 71*    Glucose 07/28/2021 80     Calcium 07/28/2021 8 9     Corrected Calcium 07/28/2021 9 5     AST 07/28/2021 17     ALT 07/28/2021 10     Alkaline Phosphatase 07/28/2021 50 6     Total Protein 07/28/2021 6 3*    Albumin 07/28/2021 3 3*    Total Bilirubin 07/28/2021 0 41     eGFR 07/28/2021 29     Magnesium 07/28/2021 1 7     WBC 07/29/2021 8 05     RBC 07/29/2021 2 90*    Hemoglobin 07/29/2021 7 7*    Hematocrit 07/29/2021 26 0*    MCV 07/29/2021 90     MCH 07/29/2021 26 6*    MCHC 07/29/2021 29 6*    RDW 07/29/2021 18 2*    Platelets 18/25/8516 385     MPV 07/29/2021 10 4     Sodium 07/29/2021 138     Potassium 07/29/2021 3 8     Chloride 07/29/2021 102     CO2 07/29/2021 26     ANION GAP 07/29/2021 10     BUN 07/29/2021 32*    Creatinine 07/29/2021 1 45*    Glucose 07/29/2021 92     Calcium 07/29/2021 8 8     Corrected Calcium 07/29/2021 9 5     AST 07/29/2021 13*    ALT 07/29/2021 9     Alkaline Phosphatase 07/29/2021 47 6     Total Protein 07/29/2021 6 2*    Albumin 07/29/2021 3 1*    Total Bilirubin 07/29/2021 0 35     eGFR 07/29/2021 36    No results displayed because visit has over 200 results        Admission on 03/08/2021, Discharged on 03/12/2021   Component Date Value    Protime 03/09/2021 21 4*    INR 03/09/2021 1 90*    Hemoglobin 03/09/2021 6 6*    ABO Grouping 03/09/2021 A     Rh Factor 03/09/2021 Negative     Antibody Screen 03/09/2021 Negative     Specimen Expiration Date 03/09/2021 [id number]     Iron Saturation 03/09/2021 24     TIBC 03/09/2021 230*    Iron 03/09/2021 55     Ferritin 03/09/2021 144     POC Glucose 03/09/2021 78     WBC 03/09/2021 6 60     RBC 03/09/2021 2 36*    Hemoglobin 03/09/2021 6 4*    Hematocrit 03/09/2021 21 5*    MCV 03/09/2021 91     MCH 03/09/2021 27 1     MCHC 03/09/2021 29 8*    RDW 03/09/2021 19 1*    MPV 03/09/2021 9 6     Platelets 57/20/4553 353     nRBC 03/09/2021 1     Neutrophils Relative 03/09/2021 69     Immat GRANS % 03/09/2021 1     Lymphocytes Relative 03/09/2021 18     Monocytes Relative 03/09/2021 9     Eosinophils Relative 03/09/2021 2     Basophils Relative 03/09/2021 1     Neutrophils Absolute 03/09/2021 4 56  Immature Grans Absolute 03/09/2021 0 04     Lymphocytes Absolute 03/09/2021 1 21     Monocytes Absolute 03/09/2021 0 59     Eosinophils Absolute 03/09/2021 0 16     Basophils Absolute 03/09/2021 0 04     Sodium 03/09/2021 142     Potassium 03/09/2021 3 1*    Chloride 03/09/2021 103     CO2 03/09/2021 30     ANION GAP 03/09/2021 9     BUN 03/09/2021 40*    Creatinine 03/09/2021 1 02     Glucose 03/09/2021 82     Calcium 03/09/2021 8 5     eGFR 03/09/2021 55     Hemoglobin 03/09/2021 6 6*    POC Glucose 03/09/2021 80     Unit Product Code 03/10/2021 C2067G94     Unit Number 03/10/2021 [id number]     Unit ABO 03/10/2021 A     Unit [facility] 03/10/2021 NEG     Crossmatch 03/10/2021 Compatible     Unit Dispense Status 03/10/2021 Presumed Trans     Unit Product Code 03/10/2021 M7230P05     Unit Number 03/10/2021 [id number]     Unit ABO 03/10/2021 A     Unit RH 03/10/2021 NEG     Crossmatch 03/10/2021 Compatible     Unit Dispense Status 03/10/2021 Presumed Trans     POC Glucose 03/09/2021 75     Protime 03/10/2021 16 4*    INR 03/10/2021 1 35*    Hemoglobin 03/10/2021 7 9*    POC Glucose 03/09/2021 84     POC Glucose 03/10/2021 78     WBC 03/10/2021 5 09     RBC 03/10/2021 2 80*    Hemoglobin 03/10/2021 7 8*    Hematocrit 03/10/2021 25 4*    MCV 03/10/2021 91     MCH 03/10/2021 27 9     MCHC 03/10/2021 30 7*    RDW 03/10/2021 18 0*    Platelets 95/32/1410 340     MPV 03/10/2021 9 6     Sodium 03/10/2021 141     Potassium 03/10/2021 2 8*    Chloride 03/10/2021 103     CO2 03/10/2021 30     ANION GAP 03/10/2021 8     BUN 03/10/2021 28*    Creatinine 03/10/2021 0 93     Glucose 03/10/2021 76     Calcium 03/10/2021 8 5     eGFR 03/10/2021 61     Protime 03/10/2021 16 1*    INR 03/10/2021 1 32*    POC Glucose 03/10/2021 80     Hemoglobin 03/10/2021 8 2*    POC Glucose 03/10/2021 94     Case Report 03/10/2021                      Value:Surgical Pathology Report Case: [id number]                                   Authorizing Provider:  Naresh Saenz MD             Collected:           03/10/2021 1311              Ordering Location:     [facility]        Received:            03/10/2021 Chandrika 86 5                                                             Pathologist:           Rai Bull MD                                                                 Specimen:    Stomach, gastric bx, r/o h  pylori                                                         Addendum 03/10/2021                      Value: This result contains rich text formatting which cannot be displayed here   Final Diagnosis 03/10/2021                      Value: This result contains rich text formatting which cannot be displayed here   Additional Information 03/10/2021                      Value: This result contains rich text formatting which cannot be displayed here  Drake Belcherwell Gross Description 03/10/2021                      Value: This result contains rich text formatting which cannot be displayed here      Ventricular Rate 03/10/2021 57     Atrial Rate 03/10/2021 56     QRSD Interval 03/10/2021 94     QT Interval 03/10/2021 358     QTC Interval 03/10/2021 348     QRS Axis 03/10/2021 63     T Wave Axis 03/10/2021 164     Hemoglobin 03/10/2021 8 6*    POC Glucose 03/10/2021 98     Hemoglobin 03/11/2021 8 3*    POC Glucose 03/10/2021 114     POC Glucose 03/11/2021 105     WBC 03/11/2021 6 48     RBC 03/11/2021 2 93*    Hemoglobin 03/11/2021 8 1*    Hematocrit 03/11/2021 26 9*    MCV 03/11/2021 92     MCH 03/11/2021 27 6     MCHC 03/11/2021 30 1*    RDW 03/11/2021 18 8*    Platelets 09/71/6634 370     MPV 03/11/2021 9 2     Sodium 03/11/2021 140     Potassium 03/11/2021 3 6     Chloride 03/11/2021 104     CO2 03/11/2021 28     ANION GAP 03/11/2021 8     BUN 03/11/2021 24     Creatinine 03/11/2021 0 88     Glucose 03/11/2021 92     Calcium 03/11/2021 8 7     eGFR 03/11/2021 65     Magnesium 03/11/2021 1 4*    POC Glucose 03/11/2021 85     POC Glucose 03/09/2021 77     Hemoglobin 03/11/2021 8 3*    POC Glucose 03/11/2021 100     Hemoglobin 03/11/2021 8 4*    POC Glucose 03/11/2021 111     Hemoglobin 03/12/2021 8 3*    POC Glucose 03/11/2021 111     WBC 03/12/2021 6 12     RBC 03/12/2021 2 93*    Hemoglobin 03/12/2021 8 3*    Hematocrit 03/12/2021 27 5*    MCV 03/12/2021 94     MCH 03/12/2021 28 3     MCHC 03/12/2021 30 2*    RDW 03/12/2021 19 2*    Platelets 52/99/0308 407*    MPV 03/12/2021 9 6     POC Glucose 03/12/2021 89     POC Glucose 03/12/2021 90     POC Glucose 03/12/2021 104    Admission on 03/08/2021, Discharged on 03/08/2021   Component Date Value    WBC 03/08/2021 6 25     RBC 03/08/2021 2 49*    Hemoglobin 03/08/2021 6 5*    Hematocrit 03/08/2021 22 5*    MCV 03/08/2021 90     MCH 03/08/2021 26 1*    MCHC 03/08/2021 28 9*    RDW 03/08/2021 18 9*    MPV 03/08/2021 9 8     Platelets 46/01/4383 405*    Neutrophils Relative 03/08/2021 75     Immat GRANS % 03/08/2021 0     Lymphocytes Relative 03/08/2021 16     Monocytes Relative 03/08/2021 7     Eosinophils Relative 03/08/2021 2     Basophils Relative 03/08/2021 0     Neutrophils Absolute 03/08/2021 4 68     Immature Grans Absolute 03/08/2021 0 02     Lymphocytes Absolute 03/08/2021 1 00     Monocytes Absolute 03/08/2021 0 42     Eosinophils Absolute 03/08/2021 0 11     Basophils Absolute 03/08/2021 0 02     Sodium 03/08/2021 136     Potassium 03/08/2021 3 4*    Chloride 03/08/2021 95*    CO2 03/08/2021 34*    ANION GAP 03/08/2021 7     BUN 03/08/2021 49*    Creatinine 03/08/2021 1 09     Glucose 03/08/2021 95     Calcium 03/08/2021 8 7     Corrected Calcium 03/08/2021 9 6     AST 03/08/2021 13*    ALT 03/08/2021 6     Alkaline Phosphatase 03/08/2021 58 1     Total Protein 03/08/2021 6 0*    Albumin 03/08/2021 2 9*    Total Bilirubin 03/08/2021 0 40     eGFR 03/08/2021 50     Lipase 03/08/2021 17     ABO Grouping 03/08/2021 A     Rh Factor 03/08/2021 Negative     Antibody Screen 03/08/2021 Negative     Specimen Expiration Date 03/08/2021 [id number]     Protime 03/08/2021 73 4*    INR 03/08/2021 7 13*    PTT 03/08/2021 66*    Digoxin Lvl 03/08/2021 2 0     BNP 03/08/2021 428 9*    Troponin I 03/08/2021 0 03     OCCULT BLD, FECAL IMMUNO* 03/08/2021 Positive*    Unit Product Code 03/10/2021 C6671T85     Unit Number 03/10/2021 [id number]     Unit ABO 03/10/2021 O     Unit RH 03/10/2021 NEG     Crossmatch 03/10/2021 Compatible     Unit Dispense Status 03/10/2021 Return to [address] [place] [address] Unit Product Code 03/10/2021 T1160Y39     Unit Number 03/10/2021 [id number]     Unit ABO 03/10/2021 A     Unit RH 03/10/2021 NEG     Crossmatch 03/10/2021 Compatible     Unit Dispense Status 03/10/2021 Presumed Trans     ABO Grouping 03/08/2021 A     Rh Factor 03/08/2021 Negative     Ventricular Rate 03/08/2021 65     Atrial Rate 03/08/2021 0     GA Interval 03/08/2021 158     QRSD Interval 03/08/2021 96     QT Interval 03/08/2021 403     QTC Interval 03/08/2021 419     QRS Axis 03/08/2021 34     T Wave [place] 03/08/2021 215      Imaging: XR chest 1 view portable    Result Date: 7/27/2021  Narrative: CHEST INDICATION:   altered mental status  COMPARISON:  Chest radiograph from 7/28/2021, abdomen CT from 7/26/2021, chest CT from 6/18/2021  EXAM PERFORMED/VIEWS:  XR CHEST PORTABLE FINDINGS: Moderate cardiomegaly  MVR  Persistent pulmonary venous congestion  Right middle lobe mass on CT poorly demonstrated  No effusion or pneumothorax  Osseous structures appear within normal limits for patient age  Impression: Previous right middle lobe mass poorly demonstrated  Pulmonary venous congestion   Workstation performed: [id number]     CT head without contrast    Result Date: 7/26/2021  Narrative: CT BRAIN - WITHOUT CONTRAST INDICATION:   altered mental status  COMPARISON:  None  TECHNIQUE:  CT examination of the brain was performed  In addition to axial images, sagittal and coronal 2D reformatted images were created and submitted for interpretation  Radiation dose length product (DLP) for this visit:  911 3 mGy-cm   This examination, like all CT scans performed in the [facility], was performed utilizing techniques to minimize radiation dose exposure, including the use of iterative reconstruction and automated exposure control  IMAGE QUALITY:  Limited evaluation due to motion artifact  FINDINGS: PARENCHYMA:  No intracranial mass, mass effect or midline shift  No CT signs of acute infarction  No acute parenchymal hemorrhage  VENTRICLES AND EXTRA-AXIAL SPACES:  Normal for the patient's age  VISUALIZED ORBITS AND PARANASAL SINUSES:  Unremarkable  CALVARIUM AND EXTRACRANIAL SOFT TISSUES:  Normal      Impression: No definite acute intracranial abnormality, noting limited evaluation due to motion artifact  Workstation performed: [id number]     CTA abdomen pelvis w wo contrast    Result Date: 7/26/2021  Narrative: CT ANGIOGRAM OF THE ABDOMEN AND PELVIS WITH AND WITHOUT IV CONTRAST INDICATION:  Abdominal pain, elevated lactic acid, concern for mesenteric ischemia  COMPARISON: CT chest on 6/17/2021  TECHNIQUE:  CT angiogram examination of the abdomen and pelvis was performed according to standard protocol  This examination, like all CT scans performed in the [facility], was performed utilizing techniques to minimize radiation dose exposure, including the use of iterative reconstruction and automated exposure control  Contrast as well as noncontrast images were obtained  Rad dose 1806 mGy-cm IV Contrast:  85 mL of iohexol (OMNIPAQUE) Enteric Contrast:  None  Limited evaluation due to motion artifact  FINDINGS: VASCULAR STRUCTURES:  No evidence of abdominal aortic aneurysm  Abdominal aorta demonstrates moderate atherosclerotic calcification  Atherosclerotic calcification near the origin of the celiac axis and SMA which are patent  Mild narrowing of the LING which appears grossly patent  Both renal arteries are patent with mild atherosclerotic calcification near the origin  OTHER FINDINGS ABDOMEN LOWER CHEST:  Marked cardiomegaly  Left atrium is severely dilated  Mitral valve prosthesis  Bibasilar atelectasis  LIVER/BILIARY TREE:  Liver demonstrates nodular surface contour suggestive of cirrhosis  No suspicious hepatic lesion on this limited study  GALLBLADDER:  Cholelithiasis  No pericholecystic inflammation  SPLEEN:  Unremarkable  Normal size  PANCREAS:  Unremarkable  ADRENAL GLANDS:  Stable bilateral adrenal adenomas measuring 4 3 cm on the left and 2 8 cm on the right  KIDNEYS/URETERS:  Bilateral renal cysts and subcentimeter hepatic hypodensities too small to accurately characterize  No hydronephrosis  No urinary tract calculi  PELVIS REPRODUCTIVE ORGANS:  Unremarkable for patient's age  URINARY BLADDER:  Unremarkable  ADDITIONAL ABDOMINAL AND PELVIC STRUCTURES STOMACH AND BOWEL:  Moderate amount of stool in the colon  Large amount of stool in the rectum  ABDOMINOPELVIC CAVITY:  No pathologically enlarged mesenteric or retroperitoneal lymph nodes  No ascites or free intraperitoneal air  ABDOMINAL WALL/INGUINAL REGIONS:  Unremarkable  OSSEOUS STRUCTURES:  No acute fracture or destructive osseous lesion  Grade 1 anterolisthesis of L5 on S1  Impression: 1  Limited evaluation due to motion artifact  2   Atherosclerotic changes in the abdominal aorta and branches which appear patent  3   Large amount of stool in the rectum  Correlate for fecal impaction  4   Nodular hepatic surface contour suspicious for cirrhosis  5   Cholelithiasis without evidence of cholecystitis  6   Stable bilateral adrenal adenomas  7   Marked cardiomegaly   Workstation performed: [id number] Review of Systems:  Review of Systems   Constitutional: Positive for fatigue  Respiratory: Positive for shortness of breath  Musculoskeletal: Positive for arthralgias and gait problem  All other systems reviewed and are negative  Physical Exam:  Physical Exam  Vitals reviewed  Constitutional:       Appearance: She is obese  HENT:      Head: Normocephalic  Eyes:      Pupils: Pupils are equal, round, and reactive to light  Cardiovascular:      Rate and Rhythm: Normal rate  Rhythm irregular  Pulses: Normal pulses  Heart sounds: Normal heart sounds  Pulmonary:      Effort: Pulmonary effort is normal       Breath sounds: Normal breath sounds  Abdominal:      General: Bowel sounds are normal       Palpations: Abdomen is soft  Musculoskeletal:         General: Normal range of motion  Cervical back: Normal range of motion and neck supple  Right lower leg: No edema  Left lower leg: No edema  Skin:     General: Skin is warm and dry  Capillary Refill: Capillary refill takes less than 2 seconds  Neurological:      General: No focal deficit present  Mental Status: She is alert and oriented to person, place, and time  Psychiatric:         Mood and Affect: Mood normal          Behavior: Behavior normal          Discussion/Summary:  1  Chronic diastolic heart failure LVEF 60% NYHA class III stage C - I do not have a list of current weights  On PE [place] is eu volemic and compensated, HR controlled, BP low 80/50, asymptomatic  Currently torsemide 40 mg b i d , K-Dur 20 mEq b i d  Hold torsemide and potassium for 3 days restart torsemide 40 mg daily and K-Dur 20 mEq daily  Continue on Lanoxin 0 125mg daily  2  Chronic atrial fibrillation AAMIR trhombus on FARIDEH 2020 on Coumadin for stroke prevention goal INR 2 0-3 0, INR on 803/21 2 2, continue on Laxoxin 0 125mg daily    3   Bio MVR in 2012 on Coumadin goal INR 2 5 - 3 5 TTE on 6/18 /21  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  There was 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

## 2021-08-26 ENCOUNTER — OFFICE VISIT (OUTPATIENT)
Dept: PULMONOLOGY | Facility: CLINIC | Age: 76
End: 2021-08-26
Payer: MEDICARE

## 2021-08-26 VITALS
WEIGHT: 212 LBS | SYSTOLIC BLOOD PRESSURE: 126 MMHG | HEART RATE: 52 BPM | RESPIRATION RATE: 18 BRPM | TEMPERATURE: 96.8 F | DIASTOLIC BLOOD PRESSURE: 70 MMHG | HEIGHT: 63 IN | BODY MASS INDEX: 37.56 KG/M2 | OXYGEN SATURATION: 100 %

## 2021-08-26 DIAGNOSIS — G47.33 OSA (OBSTRUCTIVE SLEEP APNEA): ICD-10-CM

## 2021-08-26 DIAGNOSIS — E66.01 MORBID OBESITY (HCC): ICD-10-CM

## 2021-08-26 DIAGNOSIS — J44.9 CHRONIC OBSTRUCTIVE PULMONARY DISEASE, UNSPECIFIED COPD TYPE (HCC): ICD-10-CM

## 2021-08-26 DIAGNOSIS — R91.8 LUNG MASS: Primary | ICD-10-CM

## 2021-08-26 PROCEDURE — 99214 OFFICE O/P EST MOD 30 MIN: CPT | Performed by: NURSE PRACTITIONER

## 2021-08-26 NOTE — ASSESSMENT & PLAN NOTE
She will continue with supplemental oxygen at [facility]  Will need to consider repeat PSG versus reinstitution of her CPAP

## 2021-08-26 NOTE — PROGRESS NOTES
Pulmonary Follow-Up Note   Zachary Ramirez 78 y o  female MRN: [id number]  8/26/2021      Assessment/Plan:    Problem List Items Addressed This Visit        Respiratory    COPD (chronic obstructive pulmonary disease) (HCC)     Continue on Symbicort and albuterol as needed for now  She is not having any breakthrough symptoms  I have ordered complete PFTs  Relevant Orders    Complete PFT with post bronchodilator    CECI (obstructive sleep apnea)     She will continue with supplemental oxygen at HS  Will need to consider repeat PSG versus reinstitution of her CPAP  Other    Morbid obesity ([address] )     Lifestyle modifications and weight loss as able  Lung mass - Primary     Lung biopsy was negative for malignancy  Her last chest x-ray did not clearly show her lung mass  She does have cough productive of occasional yellow mucus, but no constitutional symptoms  She is very claustrophobic and does not wish to have a PET scan  We did discuss a serial CT scan to evaluate her mass  We discussed that if things are improved, we can continue with serial imaging; however, if it is the same or worsening, she will need a PET scan  She was agreeable pending course  Relevant Orders    CT chest wo contrast        Education provided at this visit:   Need for Vaccination: Up-to-date on flu/pneumonia/COVID vaccines   Pulmonary Rehab: At rehab at present  Smoking Cessation: Quit 2012  Lung Cancer Screening: Imaging as above  Inhaler Use: Reiterated proper use and technique    Return in about 4 weeks (around 9/23/2021), or if symptoms worsen or fail to improve  All of Lauren's questions were answered prior to leaving the office today  She will follow-up with Dr Gato Avelar in 1-2 months or sooner should the need arise  She is aware to call our office with any further questions or concerns        History of Present Illness   Reason for Visit: Hospital follow-up  Chief Complaint: "I feel better "  HPI: Tin Adame is a 78 y o  female who presents to the office today she has after hospitalization in June for shortness of breath and finding of lung mass  She had a CT chest at that time which revealed a lung mass that was present since January 2021, but not in December of 2020  She ultimately underwent lung biopsy which was negative for malignancy  She presents in follow-up today  She reports that her shortness of breath is largely improved and she is currently at rehab  Her goal is to go back home  She reports that she does have a cough productive of occasional yellow mucus, but no hemoptysis  She has not had any fevers, chills, or night sweats  She reports that her weight has been stable and she has a good appetite  She has mild lower extremity edema, but overall improved  Aside from the above, she has no other complaints  She reports that she has a given diagnosis of COPD  She previously maintained on Breo and albuterol, but now uses Symbicort and albuterol  As of last September, she was using CPAP at  and 4 liters of oxygen during the day  She reports she has not used her CPAP since that time due to caring issues with it she has been wearing her oxygen, but is only requiring 2 liters at her appointment today  Review of Systems   All other systems reviewed and are negative  A full 12-point review of systems was completed and is negative except for those outlined in the HPI  Historical Information   Past Medical History:   Diagnosis Date    At[facility]     COPD [facility]chronic obstructive pulmonary disease) [facility]     Diabetes mellitus ([facility]     Hypertension     [facility]      Past Surgical History:   Procedure Laterality Date    COLONOSCOPY      IR BIOPSY LUNG  6/28/2021    MITRAL VALVE REPLACEMENT  2012    Bovine     History reviewed  No pertinent family history    Social History   Social History     Substance and Sexual Activity   Alcohol Use Not Currently     Social History     Substance and Sexual Activity   Drug Use Not Currently     Social History     Tobacco Use   Smoking Status Former Smoker    Quit date: 2010    Years since quittin 6   Smokeless Tobacco Never Used     E-Cigarette/Vaping    E-Cigarette Use Former User      E-Cigarette/Vaping Substances    Nicotine No     THC No     CBD No     Flavoring No     Other No     Unknown No        Meds/Allergies     Current Outpatient Medications:     albuterol (PROVENTIL HFA,VENTOLIN HFA) 90 mcg/act inhaler, Inhale 2 puffs every 6 (six) hours as needed, Disp: , Rfl:     budesonide-formoterol (SYMBICORT) 80-4 5 MCG/ACT inhaler, Inhale 2 puffs 2 (two) times a day Rinse mouth after use , Disp: , Rfl:     busPIRone (BUSPAR) 5 mg tablet, Take 5 mg by mouth 2 (two) times a day, Disp: , Rfl:     digoxin (LANOXIN) 0 125 mg tablet, Take 0 125 mg by mouth daily, Disp: , Rfl:     escitalopram (LEXAPRO) 10 mg tablet, Take 10 mg by mouth daily, Disp: , Rfl:     ferrous sulfate (EQL Iron Supplement Therapy) 325 (65 Fe) mg tablet, Take 1 tablet (325 mg total) by mouth daily with breakfast (Patient taking differently: Take 325 mg by mouth 2 (two) times a day with meals ), Disp: 30 tablet, Rfl: 0    montelukast (SINGULAIR) 10 mg tablet, Take 1 tablet by mouth daily, Disp: , Rfl:     nystatin (MYCOSTATIN) powder, Apply topically 2 (two) times a day Apply to skin folds, Disp: 15 g, Rfl: 0    omeprazole (PriLOSEC) 20 mg delayed release capsule, Take 20 mg by mouth daily, Disp: , Rfl:     potassium chloride (K-DUR,KLOR-CON) 10 mEq tablet, Take 2 tablets (20 mEq total) by mouth daily, Disp: 30 tablet, Rfl: 0    senna-docusate sodium (Senokot S) 8 6-50 mg per tablet, Take 1 tablet by mouth daily as needed, Disp: , Rfl:     senna-docusate sodium (SENOKOT-S) 8 6-50 mg per tablet, Take 1 tablet by mouth daily, Disp: 7 tablet, Rfl: 0    torsemide (DEMADEX) 20 mg tablet, Take 2 tablets (40 mg total) by mouth daily, Disp: , Rfl: 0    warfarin (COUMADIN) 3 mg tablet, Take 1 5 tablets (4 5 mg total) by mouth daily, Disp: , Rfl: 0    melatonin 3 mg, Take 1 tablet (3 mg total) by mouth daily at bedtime, Disp: 30 tablet, Rfl: 0    polyethylene glycol (MIRALAX) 17 g packet, Take 17 g by mouth daily for 7 days, Disp: 119 g, Rfl: 0  Allergies   Allergen Reactions    Augmentin [Amoxicillin-Pot Clavulanate] Anaphylaxis     Tolerated IV ceftriaxone    Levaquin [Levofloxacin] Anaphylaxis       Vitals: Blood pressure 126/70, pulse (!) 52, temperature (!) 96 8 °F (36 °C), temperature source Tympanic, resp  rate 18, height 5' 3" (1 6 m), weight 96 2 kg (212 lb), SpO2 100 %  Body mass index is 37 55 kg/m²  Oxygen Therapy  SpO2: 100 %  Oxygen Therapy: Supplemental oxygen  O2 Delivery Method: Nasal cannula  O2 Flow Rate (L/min): 2 L/min    Physical Exam:  Physical Exam  Vitals reviewed  Constitutional:       General: She is not in acute distress  Appearance: She is well-developed  She is not toxic-appearing or diaphoretic  HENT:      Head: Normocephalic and atraumatic  Eyes:      General: No scleral icterus  Neck:      Trachea: No tracheal deviation  Cardiovascular:      Rate and Rhythm: Normal rate and regular rhythm  Heart sounds: S1 normal and S2 normal  No murmur heard  No friction rub  No gallop  Pulmonary:      Effort: Pulmonary effort is normal  No tachypnea, accessory muscle usage or respiratory distress  Breath sounds: Normal breath sounds  No stridor  No decreased breath sounds, wheezing, rhonchi or rales  Chest:      Chest wall: No tenderness  Abdominal:      General: Bowel sounds are normal  There is no distension  Palpations: Abdomen is soft  Tenderness: There is no abdominal tenderness  There is no guarding or rebound  Musculoskeletal:      Cervical back: Neck supple  Comments: Trace BLE edema  Skin:     General: Skin is warm and dry        Findings: No rash    Neurological:      Mental Status: She is alert and oriented to person, place, and time  GCS: GCS eye subscore is 4  GCS verbal subscore is 5  GCS motor subscore is 6  Psychiatric:         Speech: Speech normal          Behavior: Behavior normal  Behavior is cooperative  Imaging and other studies: I have personally reviewed pertinent reports   , I have personally reviewed pertinent films in PACS and CXR 07/26/2021 did not show lung mass well  CT chest from 06/18/2021 shows lung mass  DAVID Abdul  [facility]        Portions of the record may have been created with voice recognition software  Occasional wrong word or "sound a like" substitutions may have occurred due to the inherent limitations of voice recognition software  Read the chart carefully and recognize, using context, where substitutions have occurred or contact the dictating provider

## 2021-08-26 NOTE — ASSESSMENT & PLAN NOTE
Continue on Symbicort and albuterol as needed for now  She is not having any breakthrough symptoms  I have ordered complete PFTs

## 2021-08-26 NOTE — ASSESSMENT & PLAN NOTE
Lung biopsy was negative for malignancy  Her last chest x-ray did not clearly show her lung mass  She does have cough productive of occasional yellow mucus, but no constitutional symptoms  She is very claustrophobic and does not wish to have a PET scan  We did discuss a serial CT scan to evaluate her mass  We discussed that if things are improved, we can continue with serial imaging; however, if it is the same or worsening, she will need a PET scan  She was agreeable pending course

## 2021-09-10 ENCOUNTER — HOSPITAL ENCOUNTER (OUTPATIENT)
Dept: CT IMAGING | Facility: HOSPITAL | Age: 76
Discharge: HOME | End: 2021-09-10
Payer: MEDICARE

## 2021-09-10 DIAGNOSIS — R91.8 LUNG MASS: ICD-10-CM

## 2021-09-10 PROCEDURE — G1004 CDSM NDSC: HCPCS

## 2021-09-10 PROCEDURE — 71250 CT THORAX DX C-: CPT

## 2021-09-13 ENCOUNTER — HOSPITAL ENCOUNTER (OUTPATIENT)
Dept: PULMONOLOGY | Facility: HOSPITAL | Age: 76
Discharge: HOME | End: 2021-09-13
Payer: MEDICARE

## 2021-09-13 DIAGNOSIS — J44.9 CHRONIC OBSTRUCTIVE PULMONARY DISEASE, UNSPECIFIED COPD TYPE (HCC): ICD-10-CM

## 2021-09-13 PROCEDURE — 94729 DIFFUSING CAPACITY: CPT

## 2021-09-13 PROCEDURE — 94729 DIFFUSING CAPACITY: CPT | Performed by: INTERNAL MEDICINE

## 2021-09-13 PROCEDURE — 94060 EVALUATION OF WHEEZING: CPT | Performed by: INTERNAL MEDICINE

## 2021-09-13 PROCEDURE — 94060 EVALUATION OF WHEEZING: CPT

## 2021-09-13 PROCEDURE — 94760 N-INVAS EAR/PLS OXIMETRY 1: CPT

## 2021-09-13 RX ORDER — ALBUTEROL SULFATE 2.5 MG/3ML
2.5 SOLUTION RESPIRATORY (INHALATION) ONCE
Status: COMPLETED | OUTPATIENT
Start: 2021-09-13 | End: 2021-09-13

## 2021-09-13 RX ADMIN — ALBUTEROL SULFATE 2.5 MG: 2.5 SOLUTION RESPIRATORY (INHALATION) at 08:02

## 2021-10-05 ENCOUNTER — OFFICE VISIT (OUTPATIENT)
Dept: PULMONOLOGY | Facility: CLINIC | Age: 76
End: 2021-10-05
Payer: MEDICARE

## 2021-10-05 VITALS
BODY MASS INDEX: 36.5 KG/M2 | TEMPERATURE: 97.9 F | SYSTOLIC BLOOD PRESSURE: 116 MMHG | HEIGHT: 63 IN | OXYGEN SATURATION: 99 % | HEART RATE: 48 BPM | RESPIRATION RATE: 16 BRPM | DIASTOLIC BLOOD PRESSURE: 70 MMHG | WEIGHT: 206 LBS

## 2021-10-05 DIAGNOSIS — J96.11 CHRONIC RESPIRATORY FAILURE WITH HYPOXIA (HCC): ICD-10-CM

## 2021-10-05 DIAGNOSIS — R91.8 LUNG MASS: Primary | ICD-10-CM

## 2021-10-05 DIAGNOSIS — G47.33 OSA (OBSTRUCTIVE SLEEP APNEA): ICD-10-CM

## 2021-10-05 PROCEDURE — 99215 OFFICE O/P EST HI 40 MIN: CPT | Performed by: INTERNAL MEDICINE

## 2021-10-05 RX ORDER — POTASSIUM CHLORIDE 750 MG/1
TABLET, FILM COATED, EXTENDED RELEASE ORAL
COMMUNITY
Start: 2021-09-23 | End: 2021-11-12 | Stop reason: SDUPTHER

## 2021-10-05 RX ORDER — POTASSIUM CHLORIDE 20 MEQ/1
TABLET, EXTENDED RELEASE ORAL
COMMUNITY
Start: 2021-07-27 | End: 2021-11-12 | Stop reason: SDUPTHER

## 2021-10-05 RX ORDER — WARFARIN SODIUM 2 MG/1
TABLET ORAL
COMMUNITY
Start: 2021-10-03

## 2021-10-19 ENCOUNTER — HOSPITAL ENCOUNTER (OUTPATIENT)
Dept: RADIOLOGY | Age: 76
Discharge: HOME | End: 2021-10-19

## 2021-10-19 DIAGNOSIS — D38.1 NEOPLASM OF UNCERTAIN BEHAVIOR OF RIGHT UPPER LOBE OF LUNG: ICD-10-CM

## 2021-11-12 ENCOUNTER — OFFICE VISIT (OUTPATIENT)
Dept: CARDIOLOGY CLINIC | Facility: CLINIC | Age: 76
End: 2021-11-12
Payer: MEDICARE

## 2021-11-12 VITALS
HEIGHT: 63 IN | OXYGEN SATURATION: 100 % | SYSTOLIC BLOOD PRESSURE: 108 MMHG | BODY MASS INDEX: 36.49 KG/M2 | HEART RATE: 65 BPM | DIASTOLIC BLOOD PRESSURE: 68 MMHG

## 2021-11-12 DIAGNOSIS — D64.9 ANEMIA, UNSPECIFIED TYPE: Primary | ICD-10-CM

## 2021-11-12 DIAGNOSIS — I50.32 CHRONIC DIASTOLIC HEART FAILURE (HCC): ICD-10-CM

## 2021-11-12 PROCEDURE — 99214 OFFICE O/P EST MOD 30 MIN: CPT | Performed by: INTERNAL MEDICINE

## 2021-11-12 RX ORDER — ALBUTEROL SULFATE 90 UG/1
AEROSOL, METERED RESPIRATORY (INHALATION)
COMMUNITY
Start: 2021-11-10

## 2021-11-12 RX ORDER — POTASSIUM CHLORIDE 750 MG/1
30 TABLET, EXTENDED RELEASE ORAL 2 TIMES DAILY
Start: 2021-11-12

## 2021-11-19 ENCOUNTER — HOSPITAL ENCOUNTER (EMERGENCY)
Facility: HOSPITAL | Age: 76
Discharge: HOME | End: 2021-11-19
Attending: EMERGENCY MEDICINE
Payer: MEDICARE

## 2021-11-19 VITALS
OXYGEN SATURATION: 100 % | HEART RATE: 69 BPM | DIASTOLIC BLOOD PRESSURE: 56 MMHG | TEMPERATURE: 97.7 F | WEIGHT: 215.83 LBS | SYSTOLIC BLOOD PRESSURE: 105 MMHG | RESPIRATION RATE: 16 BRPM | BODY MASS INDEX: 38.23 KG/M2

## 2021-11-19 DIAGNOSIS — D64.9 ANEMIA: Primary | ICD-10-CM

## 2021-11-19 LAB
ABO GROUP BLD: NORMAL
BLD GP AB SCN SERPL QL: NEGATIVE
HCT VFR BLD AUTO: 27.2 % (ref 34.8–46.1)
HGB BLD-MCNC: 7.6 G/DL (ref 11.5–15.4)
RH BLD: NEGATIVE
SPECIMEN EXPIRATION DATE: NORMAL

## 2021-11-19 PROCEDURE — 99284 EMERGENCY DEPT VISIT MOD MDM: CPT

## 2021-11-19 PROCEDURE — 86901 BLOOD TYPING SEROLOGIC RH(D): CPT | Performed by: EMERGENCY MEDICINE

## 2021-11-19 PROCEDURE — 36415 COLL VENOUS BLD VENIPUNCTURE: CPT | Performed by: EMERGENCY MEDICINE

## 2021-11-19 PROCEDURE — 86850 RBC ANTIBODY SCREEN: CPT | Performed by: EMERGENCY MEDICINE

## 2021-11-19 PROCEDURE — 85014 HEMATOCRIT: CPT | Performed by: EMERGENCY MEDICINE

## 2021-11-19 PROCEDURE — 85018 HEMOGLOBIN: CPT | Performed by: EMERGENCY MEDICINE

## 2021-11-19 PROCEDURE — 86900 BLOOD TYPING SEROLOGIC ABO: CPT | Performed by: EMERGENCY MEDICINE

## 2021-11-19 PROCEDURE — 99284 EMERGENCY DEPT VISIT MOD MDM: CPT | Performed by: EMERGENCY MEDICINE

## 2021-11-23 ENCOUNTER — TELEPHONE (OUTPATIENT)
Dept: HEMATOLOGY ONCOLOGY | Facility: CLINIC | Age: 76
End: 2021-11-23

## 2021-12-02 ENCOUNTER — HOSPITAL ENCOUNTER (OUTPATIENT)
Dept: RADIOLOGY | Age: 76
Discharge: HOME | End: 2021-12-02
Payer: MEDICARE

## 2021-12-02 LAB
GLUCOSE SERPL-MCNC: 106 MG/DL (ref 65–140)
GLUCOSE SERPL-MCNC: 109 MG/DL (ref 65–140)

## 2021-12-02 PROCEDURE — 82948 REAGENT STRIP/BLOOD GLUCOSE: CPT

## 2021-12-02 PROCEDURE — G1004 CDSM NDSC: HCPCS

## 2021-12-02 PROCEDURE — A9552 F18 FDG: HCPCS

## 2021-12-02 PROCEDURE — 78815 PET IMAGE W/CT SKULL-THIGH: CPT

## 2021-12-21 ENCOUNTER — CONSULT (OUTPATIENT)
Dept: HEMATOLOGY ONCOLOGY | Facility: CLINIC | Age: 76
End: 2021-12-21
Payer: MEDICARE

## 2021-12-21 VITALS — TEMPERATURE: 97.3 F | RESPIRATION RATE: 18 BRPM

## 2021-12-21 DIAGNOSIS — D75.839 THROMBOCYTOSIS: ICD-10-CM

## 2021-12-21 DIAGNOSIS — D64.9 ACUTE ON CHRONIC ANEMIA: Primary | ICD-10-CM

## 2021-12-21 DIAGNOSIS — E43 UNSPECIFIED SEVERE PROTEIN-CALORIE MALNUTRITION (HCC): ICD-10-CM

## 2021-12-21 PROCEDURE — 99204 OFFICE O/P NEW MOD 45 MIN: CPT | Performed by: INTERNAL MEDICINE

## 2022-02-13 NOTE — PROGRESS NOTES
HEMATOLOGY CLINIC NOTE    Primary Care Provider: Amalia Mirza MD  Referring Provider:    MRN: [id number]  : 1945    Assessment / Plan:   1  Acute on chronic anemia  This is a 68-year-old female with chronic anemia since at least   However, in the last year her hemoglobin has worsened to a value ranging from 6 5-8 5  I ordered a full workup including the following: Only iron drawn without full panel --> 25, B12 354, EPO 29, Reticulocytes abs 0 0625 mill/cmm, Haptoglobin was ordered but not drawn, SPEP no M spike, FOBT negative, U/A was positive for nitrite, leukocytes esterase  However, per nurse at [facility] in [place], was treated for this per in house physician  Repeat labs showed 2022 WBC 6 8, Hgb 8 5 MCV 80, PLT 375K, diff normal  She did have an EGD 3/2021 --> gastritis, mucosa friable  Likely in setting of supratherapeutic level of Coumadin, could be cause of melena  Patient currently on once daily oral iron  Likely etiology of anemia is multifactorial with iron deficiency due to possible bleeding, Coumadin use as well as chronic disease anemia secondary to comorbidities  Hemoglobin is stable  For now, I will obtain a full iron panel since only a iron serum was drawn  She will have haptoglobin drawn  We will watch patient's CBC once monthly  If iron panel is low in other indices, we will provide patient IV iron  She will follow-up with us in 4 months  - Iron Panel (Includes Ferritin, Iron Sat%, Iron, and TIBC); Future  - Haptoglobin; Future  - CBC and differential; Standing    2  Thrombocytosis  Patient has history of thrombocytosis since at least 10/2021  Likely reactive from MARK  Will monitor for now  JAK2 with MPL negative  No CALR ordered  Will order CALR  If MARK not cause of platelet elevation, will order BCR-ABL  - Iron Panel (Includes Ferritin, Iron Sat%, Iron, and TIBC);  Future  - MISCELLANEOUS LAB TEST; Future    Patient voiced understanding and agreement to the above   The patient knows to call the office with any questions or concerns regarding the above  I have spent 30 minutes with Patient  today in which greater than 50% of this time was spent in counseling/coordination of care regarding Diagnostic results, Risks and benefits of tx options, Intructions for management, Patient and family education, Importance of tx compliance, Risk factor reductions and Impressions  Reason for visit:       Chief Complaint   Patient presents with    Follow-up       History of Hematology Illness:     Roque Anderson is a 78 y o  female who came in for follow up  1  Acute on Chronic Anemia - likely related to diet + GI bleeding   - presence of anemia since at least 2012  - Hgb ranging from 6 5 to 8 5 in last year  S/P several pRBC transfusions  - 12/15/2021: WBC 6 5, Hgb 8 1, MCV 81, PLT 408K  Abs lymph 0 9, otherwise normal    - 3/2021: last EGD --> gastritis, mucosa friable  Likely in setting of supratherapeutic level of Coumadin, could be cause of melena  - initial c/s workup: iron panel ordered  Only iron drawn without full panel --> 25, B12 354, EPO 29, Reticulocytes abs 0 0625 mill/cmm, Haptoglobin (NOT drawn), SPEP no M spike, FOBT negative, U/A was positive for nitrite, leukocytes esterase - per nurse at [facility] --> treated already  No bleeding       - 1/2022: WBC 6 8, Hgb 8 5 MCV 80, PLT 375K, diff normal      2  Thrombocytosis   - presence since 10/2021  - likely reactive from anemia; in 400s  - JAK2 with reflex to additional exons & MPL negative  No CALR drawn       3  History of Coumadin use for Afib  Recently D/C due to anemia     Interval History:   12/21/21: This is a 78year old female with PMH of GI bleed, DM2, COPD, CECI, respiratory failure, Afib, CHF, CKD, S/P MVR presenting for consultation      Patient denies any worsening chest pain, shortness of breath, lightheadedness, dizziness, fatigue, bleeding into urine or stools    No vaginal bleeding  No PICA  Patient states that she eats about 2 meals per day which can vary in how much she eats  She typically consumes about 50% of the plate at the nursing home  Does not consume iron rich foods often  No malabsorption of conditions  Currently on oral iron  History of blood transfusions as above  She has never had IV iron  She used to be on Coumadin for AFib  However, she was recently taken off of this due to anemia      Patient is a former smoker and quit almost 10 years ago  She does not smoke  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  No history of cancer personally  However, sister had metastatic breast cancer  No other first-degree family history of cancer  She was always up-to-date on her screenings during recommended time  Last mammogram was normal per pt  Last colonoscopy was within 10 years which per patient was normal      2/15/2022:  Patient came in for follow-up  She denies any severe fatigue, chest pain, shortness of breath, bleeding into urine or stools  Denies any urinary frequency, urgency, odor  She states that she is unsure if she was treated for her UTI as December 12, 2021 showed UTI  She has been taking once daily oral iron without any severe constipation  She is very frustrated that she is still at the [address] as she is only there for therapy in should be leaving soon      Problem list:       Patient Active Problem List   Diagnosis    COPD [facility]chronic obstructive pulmonary disease) [facility]    GI bleed    Chronic atrial fibrillation (HCC)    Type 2 diabetes mellitus (HCC)    Chronic diastolic congestive heart failure ([facility] )    Morbid obesity (HCC)    Stasis edema of both lower extremities    Shortness of breath    CKD (chronic kidney disease)    Lung mass    Constipation    Epistaxis    Acute kidney injury superimposed on chronic kidney disease (HCC)    UTI (urinary tract infection)    S/P MVR (mitral valve repair)    Abnormal CT of the abdomen    Severe protein-calorie malnutrition (HCC)    CECI (obstructive sleep apnea)    Chronic respiratory failure with hypoxia (HCC)       REVIEW OF SYMPTOMS:   Review of Systems   Constitutional: Negative for activity change, appetite change, chills, diaphoresis, fatigue, fever and unexpected weight change  In wheelchair for visit   HENT: Negative for mouth sores and nosebleeds  Eyes: Negative for visual disturbance  Respiratory: Negative for apnea, cough, choking, chest tightness, shortness of breath, wheezing and stridor  Cardiovascular: Negative for chest pain, palpitations and leg swelling  Gastrointestinal: Negative for abdominal pain, anal bleeding, blood in stool, constipation, diarrhea, nausea and vomiting  Endocrine: Negative for cold intolerance  Genitourinary: Negative for hematuria, menstrual problem and vaginal bleeding  Musculoskeletal: Negative for arthralgias  Skin: Negative for color change, pallor and rash  Neurological: Negative for dizziness, syncope, light-headedness and headaches  Hematological: Negative for adenopathy  Does not bruise/bleed easily  Psychiatric/Behavioral: Negative for sleep disturbance  PHYSICAL EXAMINATION:     Vital Signs:   /74 (BP Location: Left arm, Patient Position: Sitting, Cuff Size: Standard)   Pulse 64   Temp (!) 97 °F (36 1 °C) (Temporal)   Resp 16   Ht 5' 3" (1 6 m)   SpO2 100%   BMI 38 23 kg/m²   Body surface area is 2 meters squared     Ht Readings from Last 8 Encounters:   02/15/22 5' 3" (1 6 m)   11/12/21 5' 3" (1 6 m)   10/05/21 5' 3" (1 6 m)   08/26/21 5' 3" (1 6 m)   08/10/21 5' 3" (1 6 m)   07/27/21 5' 3" (1 6 m)   06/18/21 5' 3" (1 6 m)   04/16/21 5' 4" (1 626 m)       Wt Readings from Last 8 Encounters:   11/19/21 97 9 kg (215 lb 13 3 oz)   10/05/21 93 4 kg (206 lb)   08/26/21 96 2 kg (212 lb)   07/29/21 94 6 kg (208 lb 8 9 oz)   07/05/21 96 6 kg (212 lb 15 4 oz)   04/16/21 116 kg (255 lb)   03/08/21 113 kg (250 lb)   07/30/13 (!) 155 kg (342 lb 2 oz)          Physical Exam  Constitutional:       General: She is not in acute distress  Appearance: Normal appearance  She is not ill-appearing, toxic-appearing or diaphoretic  HENT:      Head: Normocephalic and atraumatic  Eyes:      General: No scleral icterus  Extraocular Movements: Extraocular movements intact  Conjunctiva/sclera: Conjunctivae normal       Pupils: Pupils are equal, round, and reactive to light  Cardiovascular:      Rate and Rhythm: Normal rate and regular rhythm  Heart sounds: Normal heart sounds  No murmur heard  Pulmonary:      Effort: Pulmonary effort is normal  No respiratory distress  Breath sounds: Normal breath sounds  No stridor  No wheezing, rhonchi or rales  Abdominal:      Palpations: Abdomen is soft  Tenderness: There is no abdominal tenderness  Musculoskeletal:         General: No tenderness  Normal range of motion  Cervical back: Normal range of motion and neck supple  Right lower leg: No edema  Left lower leg: No edema  Lymphadenopathy:      Cervical: No cervical adenopathy  Skin:     General: Skin is warm and dry  Coloration: Skin is not jaundiced or pale  Findings: No bruising, erythema, lesion or rash  Neurological:      General: No focal deficit present  Mental Status: She is alert and oriented to person, place, and time  Mental status is at baseline  Cranial Nerves: No cranial nerve deficit  Motor: No weakness  Psychiatric:         Mood and Affect: Mood normal          Behavior: Behavior normal          Thought Content: Thought content normal          Judgment: Judgment normal        Reviewed historical information        PAST MEDICAL HISTORY:    Past Medical History:   Diagnosis Date    Atrial fibrillation ([facility] )     COPD (chronic obstructive pulmonary disease) ([facility] )     Diabetes mellitus ([facility] )     Hypertension     Respiratory failure ([facility]        PAST SURGICAL HISTORY:    Past Surgical History:   Procedure Laterality Date    COLONOSCOPY      IR BIOPSY LUNG  6/28/2021    MITRAL VALVE REPLACEMENT  2012    [facility]ovine         CURRENT MEDICATIONS:     Current Outpatient Medications:     budesonide-formoterol (SYMBICORT) 80-4 5 MCG/ACT inhaler, Inhale 2 puffs 2 (two) times a day Rinse mouth after use , Disp: , Rfl:     busPIRone (BUSPAR) 5 mg tablet, Take 5 mg by mouth 2 (two) times a day, Disp: , Rfl:     digoxin (LANOXIN) 0 125 mg tablet, Take 0 125 mg by mouth daily, Disp: , Rfl:     escitalopram (LEXAPRO) 10 mg tablet, Take 10 mg by mouth daily, Disp: , Rfl:     ferrous sulfate (EQL Iron Supplement Therapy) 325 (65 Fe) mg tablet, Take 1 tablet (325 mg total) by mouth daily with breakfast (Patient taking differently: Take 325 mg by mouth 2 (two) times a day with meals ), Disp: 30 tablet, Rfl: 0    montelukast (SINGULAIR) 10 mg tablet, Take 1 tablet by mouth daily, Disp: , Rfl:     nystatin (MYCOSTATIN) powder, Apply topically 2 (two) times a day Apply to skin folds, Disp: 15 g, Rfl: 0    omeprazole (PriLOSEC) 20 mg delayed release capsule, Take 20 mg by mouth daily, Disp: , Rfl:     potassium chloride (K-DUR,KLOR-CON) 10 mEq tablet, Take 3 tablets (30 mEq total) by mouth 2 (two) times a day, Disp: , Rfl:     torsemide (DEMADEX) 20 mg tablet, Take 2 tablets (40 mg total) by mouth daily, Disp: , Rfl: 0    albuterol (PROVENTIL HFA,VENTOLIN HFA) 90 mcg/act inhaler, , Disp: , Rfl:     melatonin 3 mg, Take 1 tablet (3 mg total) by mouth daily at bedtime (Patient not taking: Reported on 10/5/2021), Disp: 30 tablet, Rfl: 0    polyethylene glycol (MIRALAX) 17 g packet, Take 17 g by mouth daily for 7 days (Patient not taking: Reported on 2/15/2022 ), Disp: 119 g, Rfl: 0    senna-docusate sodium (Senokot S) 8 6-50 mg per tablet, Take 1 tablet by mouth daily as needed (Patient not taking: Reported on 2/15/2022 ), Disp: , Rfl:   senna-docusate sodium (SENOKOT-S) 8 6-50 mg per tablet, Take 1 tablet by mouth daily (Patient not taking: Reported on 2/15/2022 ), Disp: 7 tablet, Rfl: 0    warfarin (COUMADIN) 2 mg tablet, , Disp: , Rfl:     warfarin (COUMADIN) 3 mg tablet, Take 1 5 tablets (4 5 mg total) by mouth daily, Disp: , Rfl: 0    SOCIAL HISTORY:    Social History     Tobacco Use    Smoking status: Former Smoker     Quit date: 2010     Years since quittin 1    Smokeless tobacco: Never Used   Vaping Use    Vaping Use: Former   Substance Use Topics    Alcohol use: Not Currently    Drug use: Not Currently       FAMILY HISTORY:  No family history on file  ALLERGIES:    Allergies   Allergen Reactions    Augmentin [Amoxicillin-Pot Clavulanate] Anaphylaxis     Tolerated IV ceftriaxone    Levaquin [Levofloxacin] Anaphylaxis         LAB:    Lab Results   Component Value Date    WBC 8 05 2021    HGB 7 6 (L) 2021    HCT 27 2 (L) 2021    MCV 90 2021     2021       Lab Results   Component Value Date    SODIUM 138 2021    K 3 8 2021     2021    CO2 26 2021    AGAP 10 2021    BUN 32 (H) 2021    CREATININE 1 45 (H) 2021    GLUC 92 2021    GLUF 79 2021    CALCIUM 8 8 2021    AST 13 (L) 2021    ALT 9 2021    ALKPHOS 47 6 2021    TP 6 2 (L) 2021    TBILI 0 35 2021    EGFR 36 2021       IMAGING:  NM PET CT skull base to mid thigh  Narrative: PET/CT SCAN    INDICATION: Lung mass and lymphadenopathy  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, bronchus and lung    MODIFIER: PI    COMPARISON: CT chest 9/10/2021, CTA of abdomen pelvis 2021    CELL TYPE:  Right upper lobe biopsy 2021, focal necrosis/infarct    TECHNIQUE:   9 2 mCi F-18-FDG administered IV  Multiplanar attenuation corrected and non attenuation corrected PET images were acquired 60 minutes post injection   Contiguous, low dose, axial CT sections were obtained from the skull vertex through the   femurs   Intravenous contrast material was not utilized  This examination, like all CT scans performed in the [facility], was performed utilizing techniques to minimize radiation dose exposure, including the use of iterative   reconstruction and automated exposure control  Fasting serum glucose: 109 mg/dl    FINDINGS:     VISUALIZED BRAIN:     No acute abnormalities are seen  HEAD/NECK:     Asymmetric FDG uptake at the right base of the tongue, SUV max of 3 5  No obvious findings on CT     FDG avid right lower cervical chain lymph node, SUV max of 3 8  This measures 1 4 x 0 9 cm image 85 series 3  CT images: No additional significant findings  CHEST:     Right upper lobe masslike density laterally demonstrates patchy radiotracer uptake, SUV max of 3 1  Activity is mainly at the medial aspect  This measures 3 5 x 2 1 cm, does appear slightly smaller, prior measurements of 4 3 x 2 1 cm  Right lower lobe posterior consolidation adjacent to the pleural effusion demonstrates SUV max of 4 5  This is probably inflammatory given the appearance  Scattered FDG avid mediastinal lymph nodes  Pretracheal lymph node to the right demonstrates SUV max of 5 6  This measures 3 5 x 2 0 cm image 109 series 3, stable, prior measurements of 3 5 x 1 9 cm  Left para-aortic lymph node demonstrates SUV max of   4 2  This measures 3 1 x 1 5 cm image 109 series 3, stable, prior measurements of 3 0 x 1 2 cm  Focal FDG uptake in the right perihilar region demonstrates SUV max of 3 8  No suspicious left perihilar foci  Patchy radiotracer uptake in the bilateral lung fields were there are groundglass densities and interlobular septal thickening suspicious for CHF similar to prior CT  CT images: Marked cardiomegaly  Prior mitral valve replacement  Marked distention of the pulmonary arteries    Moderate-sized right pleural effusion and small left pleural effusion have increased  ABDOMEN:     No FDG avid soft tissue lesions are seen  CT images: Distention of the hepatic IVC suggests right heart failure  Mildly nodular hepatic contour  Mild cholelithiasis  Nodular thickening of the bilateral adrenal glands without focal FDG uptake  Difficult to assess if this is related to adrenal   hyperplasia versus underlying adenomas  Colonic diverticulosis  Bilateral renal cysts  PELVIS:   No FDG avid soft tissue lesions are seen  CT images: Unremarkable  OSSEOUS STRUCTURES:  No FDG avid lesions are seen  Scattered mild patchy foci of radiotracer uptake along the spine likely related to degenerative changes  Asymmetric patchy radiotracer uptake at the right manubrial clavicular joint likely degenerative  CT images: Grade 1 anterolisthesis of L5 on S1   Multilevel degenerative spurring of the spine  Impression: 1  Right upper lobe masslike density laterally demonstrates mild patchy radiotracer uptake, nonspecific  Malignancy is not excluded  This does appear slightly smaller from recent chest CT  At the minimum, continued imaging follow-up is advised  2   Scattered FDG avid lymph nodes in the right lower neck, mediastinum and right perihilar region  Malignancy and metastasis should be excluded  3   Asymmetric FDG uptake at the right base of the tongue  While this could be inflammatory, underlying lesion should be excluded  Correlate with direct visualization  4   No findings for hypermetabolic malignancy in the abdomen and pelvis  The study was marked in EPIC for significant notification       Workstation performed: [id number]

## 2022-02-15 ENCOUNTER — TELEPHONE (OUTPATIENT)
Dept: HEMATOLOGY ONCOLOGY | Facility: CLINIC | Age: 77
End: 2022-02-15

## 2022-02-15 ENCOUNTER — OFFICE VISIT (OUTPATIENT)
Dept: HEMATOLOGY ONCOLOGY | Facility: CLINIC | Age: 77
End: 2022-02-15
Payer: MEDICARE

## 2022-02-15 VITALS
HEART RATE: 64 BPM | RESPIRATION RATE: 16 BRPM | TEMPERATURE: 97 F | BODY MASS INDEX: 38.23 KG/M2 | SYSTOLIC BLOOD PRESSURE: 102 MMHG | DIASTOLIC BLOOD PRESSURE: 74 MMHG | OXYGEN SATURATION: 100 % | HEIGHT: 63 IN

## 2022-02-15 DIAGNOSIS — D75.839 THROMBOCYTOSIS: ICD-10-CM

## 2022-02-15 DIAGNOSIS — D64.9 ACUTE ON CHRONIC ANEMIA: Primary | ICD-10-CM

## 2022-02-15 DIAGNOSIS — R82.90 ABNORMAL URINALYSIS: ICD-10-CM

## 2022-02-15 DIAGNOSIS — E61.1 LOW IRON: ICD-10-CM

## 2022-02-15 PROCEDURE — 99214 OFFICE O/P EST MOD 30 MIN: CPT | Performed by: INTERNAL MEDICINE

## 2022-02-15 NOTE — TELEPHONE ENCOUNTER
Received VM from Jeovanny with [address], asking for call back in regards to patients office visit today  Call back number provided was [phone] ext [phone]  Attempted to call x2 was transferred both times without VM picking up       Will send message to working with Apoorva Barry PA-C

## 2022-02-15 NOTE — TELEPHONE ENCOUNTER
Attempted to call [facility] merino [place]  No answer  LVM at nurse station to call our office back

## 2022-02-28 ENCOUNTER — TELEPHONE (OUTPATIENT)
Dept: HEMATOLOGY ONCOLOGY | Facility: CLINIC | Age: 77
End: 2022-02-28

## 2022-02-28 NOTE — TELEPHONE ENCOUNTER
Shelby from the [facility] in [facility] is calling to see if patient will be required to receive infusion treatments in the future  Shelby can be reached back at [phone] extension [phone]

## 2022-02-28 NOTE — TELEPHONE ENCOUNTER
Call placed to Shelby she will be faxing over lab results ordered by Reina Beard PA-C  Stated I will have ordering physician review labs and I will call her back with our treatment plan

## 2022-03-01 NOTE — TELEPHONE ENCOUNTER
Spoke with Jacquie Wright PA-C who is requesting an Iron Panel be obtained prior to degerming if treatment is warranted  Call placed to [address] and spoke with Sunshine  Iron Panel order was faxed to  Manuel Cerda stated this will be obtained tomorrow and she will fax the results over to us

## 2022-03-03 NOTE — TELEPHONE ENCOUNTER
Iram, I received patients iron panel and it has been scanned under media for your review  Please let me know if any intervention is needed

## 2022-03-03 NOTE — TELEPHONE ENCOUNTER
Hello  The iron panel does not have ferritin included  This is the last test I need to see if she could benefit from IV iron  Please let them know  Thank you!

## 2022-03-04 ENCOUNTER — OFFICE VISIT (OUTPATIENT)
Dept: CARDIOLOGY CLINIC | Facility: CLINIC | Age: 77
End: 2022-03-04
Payer: MEDICARE

## 2022-03-04 VITALS
BODY MASS INDEX: 33.49 KG/M2 | DIASTOLIC BLOOD PRESSURE: 60 MMHG | WEIGHT: 189 LBS | HEIGHT: 63 IN | SYSTOLIC BLOOD PRESSURE: 106 MMHG | HEART RATE: 55 BPM | OXYGEN SATURATION: 100 %

## 2022-03-04 DIAGNOSIS — I50.32 CHRONIC DIASTOLIC HEART FAILURE (HCC): Primary | ICD-10-CM

## 2022-03-04 DIAGNOSIS — Z98.890 S/P MVR (MITRAL VALVE REPAIR): ICD-10-CM

## 2022-03-04 DIAGNOSIS — E61.1 LOW IRON: ICD-10-CM

## 2022-03-04 DIAGNOSIS — I48.20 CHRONIC ATRIAL FIBRILLATION (HCC): ICD-10-CM

## 2022-03-04 DIAGNOSIS — T82.857A PROSTHETIC MITRAL VALVE STENOSIS: ICD-10-CM

## 2022-03-04 DIAGNOSIS — D64.9 ANEMIA, UNSPECIFIED TYPE: ICD-10-CM

## 2022-03-04 DIAGNOSIS — D64.9 ACUTE ON CHRONIC ANEMIA: Primary | ICD-10-CM

## 2022-03-04 PROCEDURE — 99214 OFFICE O/P EST MOD 30 MIN: CPT | Performed by: INTERNAL MEDICINE

## 2022-03-04 PROCEDURE — 93000 ELECTROCARDIOGRAM COMPLETE: CPT | Performed by: INTERNAL MEDICINE

## 2022-03-04 NOTE — TELEPHONE ENCOUNTER
Spoke with Sunshine notified her that Ferritin level needs to be obtained  Stated this was noted on the original iron panel order that was sent over  She stated this will be draw on Monday as this isn't qualified as a STAT lab  Ferritin only lab order faxed over to [phone] per Sunshine request     Anthony Randall

## 2022-03-04 NOTE — PROGRESS NOTES
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) with bioprosthetic mitral stenosis  · Mitral inflow directed to LVOT  · TTE - LVEF normal, moderate prosthetic stenosis with possible dehiscence of bioprosthetic mitral valve with inflow being directed towards LVOT, Massively dilated LA, possibly related to same  · Compared with recent echo & FARIDEH at [facility] in 2020, and it appears to be similar at that time, but   · FARIDEH - 9/2020 - well-seated bioprosthetic MVR, however inflow directed to LVOT, no regurgitation, trace paravalvular regurgitation, prosthetic mitral valve gradient 7 5  3  Chronic atrial fibrillation  4  AAMIR thrombus   · Seen on FARIDEH in 2020  5  Tricuspid regurgitation  6  Pulmonary hypertension  7  Lung mass  · IR biopsy - 6/28/21 - negative for malignancy,   -- Focal necrosis/infarct associated with hematoidin deposition and surrounded by fibrosis and histiocytes  · Interstitial fibrosis and smooth muscle proliferation with chronic inflammation (mixed plasma cells, lymphocytes, histocytes, eosinophils), elastotic degeneration, anthracosis and reactive pneumocytes  · PET CT scan - 12/2021 - ight upper lobe masslike density laterally demonstrates mild patchy radiotracer uptake, nonspecific  Malignancy is not excluded --> continue at least imaging follow up  8  Chronic hypoxic respiratory failure  · On home O2 @ 3-4L  9  COPD  10   Chronic anemia  · Hb 7-9  · S/p PRBC transfusion in 2/2021    Plan  Chronic diastolic heart failure   · Peak wt was 370 lbs in 12/2020  · She continues to lose weight and weight has trended down another 25 lb since my last visit from 206 lb to 189 lb today  · Per records from [facility] [place], she remains on torsemide 20 mg bid + K-dur 30 mEq bid with stable cr and K in 2/2022  · Euvolemic on exam  · Continue same for now, but may need to lower diuretic dosing if she continues to loose more weight     S/p MVR, bioprosthetic mitral stenosis - moderate  · MG 7 5-9 mm Hg  · Likely not a candidate for redo-sternotomy, due to comorbidites  · Stable  · Monitor with medical therapy     Chronic atrial fibrillation  AAMIR thrombus (on FARIDEH in 2020), Anemia  · She remains rate controlled with digoxin 0 125 mcg daily  · HR at 55 today  · Dig level 10/15/21 - 1 6 previously, and weight loss her digoxin dosing may need to be adjusted  · Check repeat digoxin level  · She was previously on Coumadin, but due to anemia and hemoglobin dropping to 6-8 twice in the last year, her coumadin was discontinued by Dr Lujan at the [facility] - around November 2021, without my knowledge   · There is no current documentation available to me to clarify this, and patient her  · I contacted Keyon Lebron ([phone]) post the visit and spoke to him over the phone to try to get more details on this -   · With her hemoglobin having been low between 7-9 and it dropping at times to 6, they are not comfortable resuming anticoagulation at present  · I have explained to them that she has a prior history of a left atrial appendage thrombus, has chronic atrial fibrillation, and has a bioprosthetic mitral valve which has mild-to-moderate stenosis, which all significantly increase her risk of intracardiac thrombus  · With lack of any recent GI testing for the bleeding, he is going to refer her to GI to help evaluate her possible bleed, and consider resuming anticoagulation    · Unfortunately with concerns about ongoing bleeding, this is a very difficult situation and I have explained this to the patient as well    Results for orders placed or performed in visit on 03/04/22   POCT ECG    Impression    Atrial fibrillation with slow ventricular response (HR 55 bpm)  Left posterior fascicular block       Orders Placed This Encounter   Procedures    Digoxin level    POCT ECG     Return in about 3 months (around 6/4/2022), or if symptoms worsen or fail to improve  History of Present Illness   78 y o  female comes in for establishment of care with me in the office  She has a history of chronic hypoxic respiratory failure on home O2 @3-4L, has been in and out of hospitals and rehabs over the last 10-12 months (since 12/2020)  I originally met her at [facility] in 6/2021, when she came in with worsening shortness of breath and had a chest CT done which revealed a 3 cm lung nodule/mass  She was additionally noted to be volume overloaded, and as a result was admitted for further evaluation and treatment  No complains of chest pain  She was diuresed with IV Bumex, and transitioned to oral torsemide  She has been managed medically with diuretic therapy since then and has been doing fairly well overall from heart failure standpoint  She additionally was found to the lung mass on CT chest, and underwent IR guided biopsy, which was negative for malignancy  She has since followed up with Pulmonary, and there is still continued concern for possible malignancy and as a result she is awaiting bed CT  In July 2021, she was subsequently readmitted to the hospital with altered mental status and was diagnosed and treated for K pneumoniae UTI, and has been doing better since then  She continues to recover at [address], and is now walking very short distances (<20 ft)    Of note, even prior to all these hospitalizations, she had limited ambulation hours mostly at home, doing routine household activities  Interval history - 3/4/2022  She comes back for follow-up after about 4 months  She continues to be in the rehab at [facility], due to significant weakness and inability to do much activity  She has continued to lose more weight and is down another 20 lbs over the last 4 months  No complains of chest pain, shortness of breath, orthopnea, pedal edema or PND  Historical Information   Past Medical History:   Diagnosis Date    Atrial fibrillation ([facility] )     COPD (chronic obstructive pulmonary disease) ([facility] )     Diabetes mellitus ([facility] )     Hypertension     Respiratory failure ([place] [address] )      Past Surgical History:   Procedure Laterality Date    COLONOSCOPY      IR BIOPSY LUNG  6/28/2021    MITRAL VALVE REPLACEMENT  2012    Bovine     History reviewed  No pertinent family history  Current Outpatient Medications on File Prior to Visit   Medication Sig Dispense Refill    albuterol (PROVENTIL HFA,VENTOLIN HFA) 90 mcg/act inhaler       budesonide-formoterol (SYMBICORT) 80-4 5 MCG/ACT inhaler Inhale 2 puffs 2 (two) times a day Rinse mouth after use        busPIRone (BUSPAR) 5 mg tablet Take 5 mg by mouth 2 (two) times a day      digoxin (LANOXIN) 0 125 mg tablet Take 0 125 mg by mouth daily      escitalopram (LEXAPRO) 10 mg tablet Take 10 mg by mouth daily      ferrous sulfate (EQL Iron Supplement Therapy) 325 (65 Fe) mg tablet Take 1 tablet (325 mg total) by mouth daily with breakfast (Patient taking differently: Take 325 mg by mouth 2 (two) times a day with meals ) 30 tablet 0    omeprazole (PriLOSEC) 20 mg delayed release capsule Take 20 mg by mouth daily      potassium chloride (K-DUR,KLOR-CON) 10 mEq tablet Take 3 tablets (30 mEq total) by mouth 2 (two) times a day      torsemide (DEMADEX) 20 mg tablet Take 2 tablets (40 mg total) by mouth daily  0    melatonin 3 mg Take 1 tablet (3 mg total) by mouth daily at bedtime (Patient not taking: Reported on 10/5/2021) 30 tablet 0    montelukast (SINGULAIR) 10 mg tablet Take 1 tablet by mouth daily (Patient not taking: Reported on 3/4/2022 )      nystatin (MYCOSTATIN) powder Apply topically 2 (two) times a day Apply to skin folds 15 g 0    polyethylene glycol (MIRALAX) 17 g packet Take 17 g by mouth daily for 7 days (Patient not taking: Reported on 2/15/2022 ) 119 g 0    senna-docusate sodium (Senokot S) 8 6-50 mg per tablet Take 1 tablet by mouth daily as needed (Patient not taking: Reported on 2/15/2022 )      senna-docusate sodium (SENOKOT-S) 8 6-50 mg per tablet Take 1 tablet by mouth daily (Patient not taking: Reported on 2/15/2022 ) 7 tablet 0    warfarin (COUMADIN) 2 mg tablet  (Patient not taking: Reported on 3/4/2022 )      warfarin (COUMADIN) 3 mg tablet Take 1 5 tablets (4 5 mg total) by mouth daily (Patient not taking: Reported on 3/4/2022 )  0     No current facility-administered medications on file prior to visit       Allergies   Allergen Reactions    Augmentin [Amoxicillin-Pot Clavulanate] Anaphylaxis     Tolerated IV ceftriaxone    Levaquin [Levofloxacin] Anaphylaxis     Social History     Socioeconomic History    Marital status: Single     Spouse name: None    Number of children: None    Years of education: None    Highest education level: None   Occupational History    None   Tobacco Use    Smoking status: Former Smoker     Quit date: 2010     Years since quittin 1    Smokeless tobacco: Never Used   Vaping Use    Vaping Use: Former   Substance and Sexual Activity    Alcohol use: Not Currently    Drug use: Not Currently    Sexual activity: Not Currently   Other Topics Concern    None   Social History Narrative    None     Social Determinants of Health     Financial Resource Strain: Not on file   Food Insecurity: Not on file   Transportation Needs: Not on file   Physical Activity: Not on file   Stress: Not on file   Social Connections: Not on file   Intimate Partner Violence: Not on file   Housing Stability: Not on file        Review of Systems   All other systems reviewed and are negative  Vitals:  Vitals:    03/04/22 1316   BP: 106/60   BP Location: Left arm   Patient Position: Sitting   Cuff Size: Adult   Pulse: 55   SpO2: 100%   Weight: 85 7 kg (189 lb)   Height: 5' 3" (1 6 m)     BMI - Body mass index is 33 48 kg/m²  Wt Readings from Last 7 Encounters:   03/04/22 85 7 kg (189 lb)   11/19/21 97 9 kg (215 lb 13 3 oz)   10/05/21 93 4 kg (206 lb)   08/26/21 96 2 kg (212 lb)   07/29/21 94 6 kg (208 lb 8 9 oz)   07/05/21 96 6 kg (212 lb 15 4 oz)   04/16/21 116 kg (255 lb)       Physical Exam  Vitals and nursing note reviewed  Constitutional:       General: She is not in acute distress  Appearance: Normal appearance  She is well-developed  She is obese  She is not ill-appearing  Comments: Patient examined in a stretcher   HENT:      Head: Normocephalic and atraumatic  Nose: No congestion  Eyes:      General: No scleral icterus  Conjunctiva/sclera: Conjunctivae normal    Neck:      Vascular: No carotid bruit or JVD  Cardiovascular:      Rate and Rhythm: Normal rate and regular rhythm  Pulses: Normal pulses  Heart sounds: Normal heart sounds  No murmur heard  No friction rub  No gallop  Pulmonary:      Effort: Pulmonary effort is normal  No respiratory distress  Breath sounds: Normal breath sounds  No rales  Abdominal:      General: There is no distension  Palpations: Abdomen is soft  Tenderness: There is no abdominal tenderness  Musculoskeletal:         General: No swelling or tenderness  Cervical back: Neck supple  Right lower leg: Edema present  Left lower leg: Edema present  Skin:     General: Skin is warm  Neurological:      General: No focal deficit present  Mental Status: She is alert and oriented to person, place, and time  Mental status is at baseline     Psychiatric:         Mood and Affect: Mood normal  Behavior: Behavior normal          Thought Content: Thought content normal          Labs:  CBC:   Lab Results   Component Value Date    WBC 8 05 2021    RBC 2 90 (L) 2021    HGB 7 6 (L) 2021    HCT 27 2 (L) 2021    MCV 90 2021     2021    RDW 18 2 (H) 2021       CMP:   Lab Results   Component Value Date    K 3 8 2021     2021    CO2 26 2021    BUN 32 (H) 2021    CREATININE 1 45 (H) 2021    EGFR 36 2021    CALCIUM 8 8 2021    AST 13 (L) 2021    ALT 9 2021    ALKPHOS 47 6 2021       Magnesium:  Lab Results   Component Value Date    MG 1 7 2021       Lipid Profile:   No results found for: CHOL, HDL, TRIG, LDLCALC    Thyroid Studies: No results found for: FUL5IRSCFALL, T3FREE, FREET4, T2YKQYF, M4WRVBF    A1c:  No components found for: HGA1C    INR:  Lab Results   Component Value Date    INR 1 95 (H) 2021    INR 2 38 (H) 2021    INR 2 48 (H) 2021   5    Imaging: No results found  Cardiac testing:   Results for orders placed during the hospital encounter of 21    Echo complete with contrast if indicated    Narrative  [place] [address], [address] [facility]  [phone]    Transthoracic Echocardiogram  2D, M-mode, Doppler, and Color Doppler    Study date:  2021    Patient: Lobo Fish  MR number: [id number]  Account number: [de-identified]  : 1945  Age: 70 years  Gender: Female  Status: Inpatient  Location: Bedside  Height: 63 in  Weight: 245 5 lb  BP: 98/ 52 mmHg    Indications: Dyspnea    Diagnoses: R06 00 - Dyspnea, unspecified    Sonographer:  CRISTI Foley  Primary Physician:  Matthew Mitchell MD  Referring Physician:  Nci Pinzon MD  Interpreting Physician:  Roopa Dexter MD    SUMMARY    LEFT VENTRICLE:  The ventricle was moderately dilated    Systolic function was normal  Ejection fraction was estimated to be 60 %   There were no regional wall motion abnormalities  VENTRICULAR SEPTUM:  There was dyssynergic motion  These changes are consistent with RV volume and pressure overload  LEFT ATRIUM:  The atrium was massively dilated  MITRAL VALVE:  A 27 mm (Gordon Bovine) pericardial bioprosthesis was present  There was possible sewing ring dehiscence, with mitral inflow being directed towards the LVOT  Transmitral velocity and gradient were increased due to stenosis, as well as concomitant increased transaortic flow  There was moderate stenosis  There was trace regurgitation  AORTIC VALVE:  Transaortic velocity was increased due to valvular stenosis  There was mild stenosis  There was mild regurgitation  TRICUSPID VALVE:  There was moderate to severe regurgitation  The findings suggest severe pulmonary hypertension  IVC, HEPATIC VEINS:  The inferior vena cava was markedly dilated  Respirophasic changes were blunted (less than 50% variation)  HISTORY: PRIOR HISTORY: COPD, afib, DMII, MVR, HTN, CHF, former smoker    PROCEDURE: The procedure was performed at the bedside  This was a routine study  The transthoracic approach was used  The study included complete 2D imaging, M-mode, complete spectral Doppler, and color Doppler  The heart rate was 59 bpm,  at the start of the study  Images were obtained from the parasternal, apical, subcostal, and suprasternal notch acoustic windows  Echocardiographic views were limited due to restricted patient mobility and lung interference  This was a  technically difficult study  LEFT VENTRICLE: The ventricle was moderately dilated  Systolic function was normal  Ejection fraction was estimated to be 60 %  There were no regional wall motion abnormalities  Wall thickness was normal  DOPPLER: The study was not  technically sufficient to allow evaluation of LV diastolic function  VENTRICULAR SEPTUM: There was dyssynergic motion   These changes are consistent with RV volume and pressure overload  RIGHT VENTRICLE: The size was normal  Systolic function was normal  Wall thickness was normal     LEFT ATRIUM: The atrium was massively dilated  ATRIAL SEPTUM: The septum bows from left to right, consistent with increased left atrial pressure  RIGHT ATRIUM: The atrium was moderately dilated  MITRAL VALVE: A 27 mm (Gordon Bovine) pericardial bioprosthesis was present  There was possible sewing ring dehiscence, with mitral inflow being directed towards the LVOT  DOPPLER: Transmitral velocity and gradient were increased due to  stenosis, as well as concomitant increased transaortic flow  There was moderate stenosis  There was trace regurgitation  AORTIC VALVE: The valve was trileaflet  Leaflets exhibited mildly increased thickness and normal cuspal separation  DOPPLER: Transaortic velocity was increased due to valvular stenosis  There was mild stenosis  There was mild  regurgitation  TRICUSPID VALVE: The valve structure was normal  There was normal leaflet separation  DOPPLER: The transtricuspid velocity was within the normal range  There was no evidence for stenosis  There was moderate to severe regurgitation  Estimated peak PA pressure was 80 mmHg  The findings suggest severe pulmonary hypertension  PULMONIC VALVE: Leaflets exhibited normal thickness, no calcification, and normal cuspal separation  DOPPLER: The transpulmonic velocity was within the normal range  There was trace regurgitation  PERICARDIUM: There was no pericardial effusion  The pericardium was normal in appearance  AORTA: The root exhibited normal size  The ascending aorta was normal in size  SYSTEMIC VEINS: IVC: The inferior vena cava was markedly dilated  Respirophasic changes were blunted (less than 50% variation)      MEASUREMENT TABLES    DOPPLER MEASUREMENTS  LVOT   (Reference normals)  Peak frederick   224 cm/s   (--)  Mean frederick   144 cm/s   (--)  VTI   49 cm   (--)  Peak gradient   20 mmHg (--)  Mean gradient   10 mmHg   (--)    SYSTEM MEASUREMENT TABLES    2D  %FS: 22 32 %  Ao Diam: 3 48 cm  Ao asc: 3 57 cm  EDV(Teich): 230 32 ml  EF(Teich): 43 91 %  ESV(Teich): 129 18 ml  IVSd: 0 82 cm  LA Area: 58 44 cm2  LA Diam: 6 95 cm  LVEDV MOD A4C: 49 82 ml  LVEF MOD A4C: 39 47 %  LVESV MOD A4C: 30 15 ml  LVIDd: 6 69 cm  LVIDs: 5 19 cm  LVLd A4C: 6 79 cm  LVLs A4C: 5 57 cm  LVOT Diam: 1 93 cm  LVPWd: 0 84 cm  RA Area: 28 83 cm2  RVIDd: 5 43 cm  SV MOD A4C: 19 66 ml  SV(Teich): 101 14 ml    CW  AV Env  Ti: 307 64 ms  AV MaxP 64 mmHg  AV VTI: 43 03 cm  AV Vmax: 2 09 m/s  AV Vmean: 1 39 m/s  AV meanP 09 mmHg  MV VTI: 102 93 cm  MV Vmax: 1 9 m/s  MV Vmean: 1 43 m/s  MV maxP 45 mmHg  MV meanP 92 mmHg  PV Vmax: 1 18 m/s  PV maxP 67 mmHg  TR MaxP 87 mmHg  TR Vmax: 3 74 m/s    MM  TAPSE: 1 6 cm    PW  DEANNE (VTI): 2 cm2  DEANNE Vmax: 1 93 cm2  DEANNE Vmax, Pt: 1 89 cm2  AVAI (VTI): 0 cm2/m2  AVAI Vmax: 0 cm2/m2  E' Sept: 0 04 m/s  LVOT Env  Ti: 323 5 ms  LVOT VTI: 29 52 cm  LVOT Vmax: 1 38 m/s  LVOT Vmax: 1 35 m/s  LVOT Vmean: 0 91 m/s  LVOT maxP 35 mmHg  LVOT maxP 73 mmHg  LVOT meanP mmHg  LVSI Dopp: 40 85 ml/m2  LVSV Dopp: 86 2 ml  MVA (VTI): 0 84 cm2  RVOT Vmax: 0 53 m/s  RVOT maxP 14 mmHg    [facility]    Prepared and electronically signed by    Matilda Summers MD  Signed 2021 14:01:13    No results found for this or any previous visit  No results found for this or any previous visit  No results found for this or any previous visit  NM PET CT skull base to mid thigh  Narrative: PET/CT SCAN    INDICATION: Lung mass and lymphadenopathy  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, was performed utilizing techniques to minimize radiation dose exposure, including the use of iterative   reconstruction and automated exposure control  Fasting serum glucose: 109 mg/dl    FINDINGS:     VISUALIZED BRAIN:     No acute abnormalities are seen  HEAD/NECK:     Asymmetric FDG uptake at the right base of the tongue, SUV max of 3 5  No obvious findings on CT     FDG avid right lower cervical chain lymph node, SUV max of 3 8  This measures 1 4 x 0 9 cm image 85 series 3  CT images: No additional significant findings  CHEST:     Right upper lobe masslike density laterally demonstrates patchy radiotracer uptake, SUV max of 3 1  Activity is mainly at the medial aspect  This measures 3 5 x 2 1 cm, does appear slightly smaller, prior measurements of 4 3 x 2 1 cm  Right lower lobe posterior consolidation adjacent to the pleural effusion demonstrates SUV max of 4 5  This is probably inflammatory given the appearance  Scattered FDG avid mediastinal lymph nodes  Pretracheal lymph node to the right demonstrates SUV max of 5 6  This measures 3 5 x 2 0 cm image 109 series 3, stable, prior measurements of 3 5 x 1 9 cm  Left para-aortic lymph node demonstrates SUV max of   4 2  This measures 3 1 x 1 5 cm image 109 series 3, stable, prior measurements of 3 0 x 1 2 cm  Focal FDG uptake in the right perihilar region demonstrates SUV max of 3 8  No suspicious left perihilar foci  Patchy radiotracer uptake in the bilateral lung fields were there are groundglass densities and interlobular septal thickening suspicious for CHF similar to prior CT        CT images: Marked cardiomegaly  Prior mitral valve replacement  Marked distention of the pulmonary arteries  Moderate-sized right pleural effusion and small left pleural effusion have increased  ABDOMEN:     No FDG avid soft tissue lesions are seen  CT images: Distention of the hepatic IVC suggests right heart failure  Mildly nodular hepatic contour  Mild cholelithiasis  Nodular thickening of the bilateral adrenal glands without focal FDG uptake  Difficult to assess if this is related to adrenal   hyperplasia versus underlying adenomas  Colonic diverticulosis  Bilateral renal cysts  PELVIS:   No FDG avid soft tissue lesions are seen  CT images: Unremarkable  OSSEOUS STRUCTURES:  No FDG avid lesions are seen  Scattered mild patchy foci of radiotracer uptake along the spine likely related to degenerative changes  Asymmetric patchy radiotracer uptake at the right manubrial clavicular joint likely degenerative  CT images: Grade 1 anterolisthesis of L5 on S1   Multilevel degenerative spurring of the spine  Impression: 1  Right upper lobe masslike density laterally demonstrates mild patchy radiotracer uptake, nonspecific  Malignancy is not excluded  This does appear slightly smaller from recent chest CT  At the minimum, continued imaging follow-up is advised  2   Scattered FDG avid lymph nodes in the right lower neck, mediastinum and right perihilar region  Malignancy and metastasis should be excluded  3   Asymmetric FDG uptake at the right base of the tongue  While this could be inflammatory, underlying lesion should be excluded  Correlate with direct visualization  4   No findings for hypermetabolic malignancy in the abdomen and pelvis  The study was marked in EPIC for significant notification       Workstation performed: [id number]

## 2022-03-11 DIAGNOSIS — E61.1 LOW IRON: Primary | ICD-10-CM

## 2022-03-11 RX ORDER — SODIUM CHLORIDE 9 MG/ML
20 INJECTION, SOLUTION INTRAVENOUS ONCE
Status: CANCELLED | OUTPATIENT
Start: 2022-03-21

## 2022-03-19 ENCOUNTER — TELEPHONE (OUTPATIENT)
Dept: INFUSION CENTER | Facility: CLINIC | Age: 77
End: 2022-03-19

## 2022-03-19 NOTE — TELEPHONE ENCOUNTER
I reached out to go over new patient information and left a vm to  Confirm appointment date, time, location and current policies on visitors and masking  Pt was also advised on our attendance policy that requires a minimum of 24hrs notice prior to scheduled appnts for any cancellations  Requested return call with any questions

## 2022-03-21 ENCOUNTER — HOSPITAL ENCOUNTER (OUTPATIENT)
Dept: INFUSION CENTER | Facility: CLINIC | Age: 77
Discharge: HOME | End: 2022-03-21
Payer: MEDICARE

## 2022-03-21 VITALS
SYSTOLIC BLOOD PRESSURE: 97 MMHG | TEMPERATURE: 98.3 F | OXYGEN SATURATION: 94 % | HEART RATE: 63 BPM | DIASTOLIC BLOOD PRESSURE: 52 MMHG | RESPIRATION RATE: 18 BRPM

## 2022-03-21 DIAGNOSIS — E61.1 LOW IRON: Primary | ICD-10-CM

## 2022-03-21 PROCEDURE — 96365 THER/PROPH/DIAG IV INF INIT: CPT

## 2022-03-21 RX ORDER — SODIUM CHLORIDE 9 MG/ML
20 INJECTION, SOLUTION INTRAVENOUS ONCE
Status: COMPLETED | OUTPATIENT
Start: 2022-03-21 | End: 2022-03-21

## 2022-03-21 RX ORDER — SODIUM CHLORIDE 9 MG/ML
20 INJECTION, SOLUTION INTRAVENOUS ONCE
Status: CANCELLED | OUTPATIENT
Start: 2022-03-28

## 2022-03-21 RX ADMIN — SODIUM CHLORIDE 20 ML/HR: 9 INJECTION, SOLUTION INTRAVENOUS at 08:30

## 2022-03-21 RX ADMIN — FERUMOXYTOL 510 MG: 510 INJECTION INTRAVENOUS at 08:52

## 2022-03-21 NOTE — PROGRESS NOTES
Patient stated he is not able to come on Mondays and changed to the Tuesday group for next week. Patient stated he is down to smoking 6 per day. The patient remains on the prescribed tobacco cessation medication regimen of 1 mg Chantix BID without any negative side effects at this time.    Patient tolerated her feraheme without any adverse reactions  Next appointment confirmed and avs given  Patient transported back to facility via  [facility]

## 2022-03-22 ENCOUNTER — CONSULT (OUTPATIENT)
Dept: GASTROENTEROLOGY | Facility: CLINIC | Age: 77
End: 2022-03-22
Payer: MEDICARE

## 2022-03-22 VITALS
DIASTOLIC BLOOD PRESSURE: 66 MMHG | SYSTOLIC BLOOD PRESSURE: 120 MMHG | HEIGHT: 63 IN | WEIGHT: 191 LBS | TEMPERATURE: 98.6 F | HEART RATE: 59 BPM | OXYGEN SATURATION: 99 % | BODY MASS INDEX: 33.84 KG/M2

## 2022-03-22 DIAGNOSIS — D50.8 OTHER IRON DEFICIENCY ANEMIA: Primary | ICD-10-CM

## 2022-03-22 DIAGNOSIS — K21.9 GASTROESOPHAGEAL REFLUX DISEASE, UNSPECIFIED WHETHER ESOPHAGITIS PRESENT: ICD-10-CM

## 2022-03-22 PROCEDURE — 99214 OFFICE O/P EST MOD 30 MIN: CPT | Performed by: PHYSICIAN ASSISTANT

## 2022-03-22 NOTE — PROGRESS NOTES
[facility] - Outpatient Consultation  Layla Mclaughlin 78 y o  female MRN: [id number]  Encounter: [id number]        Assessment and Plan    1  Iron deficiency anemia  The patient has a longstanding history of iron deficiency anemia  Currently her hemoglobin is stable at 8 7 however back in November she had a drop in her hemoglobin to 6 8  Since this time she had been on both oral and IV iron  She has undergone EGD in the past, last in March of 2021 while hospitalized with anemia and melena  EGD revealed gastritis with friable mucosa and it was suspected that in the setting of supratherpeutic levels of coumadin this could have caused her melena  Her last colonoscopy was in 2010 she states polyps were removed  She is unable to provide further information on this  - trend H&H and iron  - continue iron supplementation as needed  - monitor bowels for overt GI bleeding  - the patient was here for consultation of colon cancer screening, she is currently residing in a nursing home it would like to discuss this at a later date once home    2  GERD  Well controlled on omeprazole  No dysphagia, epigastric pain, or breakthrough symptoms  - continue pantoprazole  - diet/lifestyle modification for GERD prevention    Follow-up 3 months      ______________________________________________________________________    History of Present Illness  Layla Mclaughlin is a 78 y o  female with DM2, CKD, COPD on 3L, CHF, history of bovine MVR 2012, a fibrillation on warfarin, and anemia on oral iron here for consultation of a colonoscopy  The patient states her last colonoscopy was in 2010 and she did have some polyps removed  She is unable to provide further information  The patient does have a history of iron deficiency anemia  At this time or hemoglobin is stable at 8 7 and she is on both oral and IV iron supplementation    Back in November her hemoglobin did drop to 6 8 but she denies any overt bleeding at that time  The last time the patient's all blood in her stool was witnessed melena during a hospitalization in 2021  EGD 3/10/21 with Dr Mitul Salas revealed gastritis with friable mucosa  It was suspected that in the setting of supratherpeutic levels of coumadin this could have caused her melena  Review of Systems   Constitutional: Negative for activity change, appetite change, chills, fatigue, fever and unexpected weight change  HENT: Negative for sore throat and trouble swallowing  Gastrointestinal: Negative for abdominal distention, abdominal pain, anal bleeding, blood in stool, constipation, diarrhea, nausea, rectal pain and vomiting  Musculoskeletal: Negative for back pain and gait problem  Psychiatric/Behavioral: Negative for confusion         Past Medical History  Past Medical History:   Diagnosis Date    Atrial fibrillation (HCC)     COPD (chronic obstructive pulmonary disease) ([email or web address]     Diabetes mellitus ([email or web address] )     Hypertension     Respiratory failure (HCC)        Past Social history  Past Surgical History:   Procedure Laterality Date    COLONOSCOPY      IR BIOPSY LUNG  2021    MITRAL VALVE REPLACEMENT  2012    Bovine     Social History     Socioeconomic History    Marital status: Single     Spouse name: Not on file    Number of children: Not on file    Years of education: Not on file    Highest education level: Not on file   Occupational History    Not on file   Tobacco Use    Smoking status: Former Smoker     Quit date: 2010     Years since quittin 2    Smokeless tobacco: Never Used   Vaping Use    Vaping Use: Former   Substance and Sexual Activity    Alcohol use: Not Currently    Drug use: Not Currently    Sexual activity: Not Currently   Other Topics Concern    Not on file   Social History Narrative    Not on file     Social Determinants of Health     Financial Resource Strain: Not on file   Food Insecurity: Not on file   Transportation Needs: Not on file   Physical Activity: Not on file   Stress: Not on file   Social Connections: Not on file   Intimate Partner Violence: Not on file   Housing Stability: Not on file     Social History     Substance and Sexual Activity   Alcohol Use Not Currently     Social History     Substance and Sexual Activity   Drug Use Not Currently     Social History     Tobacco Use   Smoking Status Former Smoker    Quit date: 2010    Years since quittin 2   Smokeless Tobacco Never Used       Past Family History  No family history on file  Current Medications  Current Outpatient Medications   Medication Sig Dispense Refill    albuterol (PROVENTIL HFA,VENTOLIN HFA) 90 mcg/act inhaler       budesonide-formoterol (SYMBICORT) 80-4 5 MCG/ACT inhaler Inhale 2 puffs 2 (two) times a day Rinse mouth after use        busPIRone (BUSPAR) 5 mg tablet Take 5 mg by mouth 2 (two) times a day      digoxin (LANOXIN) 0 125 mg tablet Take 0 125 mg by mouth daily      escitalopram (LEXAPRO) 10 mg tablet Take 10 mg by mouth daily      ferrous sulfate (EQL Iron Supplement Therapy) 325 (65 Fe) mg tablet Take 1 tablet (325 mg total) by mouth daily with breakfast (Patient taking differently: Take 325 mg by mouth 2 (two) times a day with meals ) 30 tablet 0    melatonin 3 mg Take 1 tablet (3 mg total) by mouth daily at bedtime (Patient not taking: Reported on 10/5/2021) 30 tablet 0    montelukast (SINGULAIR) 10 mg tablet Take 1 tablet by mouth daily (Patient not taking: Reported on 3/4/2022 )      nystatin (MYCOSTATIN) powder Apply topically 2 (two) times a day Apply to skin folds 15 g 0    omeprazole (PriLOSEC) 20 mg delayed release capsule Take 20 mg by mouth daily      polyethylene glycol (MIRALAX) 17 g packet Take 17 g by mouth daily for 7 days (Patient not taking: Reported on 2/15/2022 ) 119 g 0    potassium chloride (K-DUR,KLOR-CON) 10 mEq tablet Take 3 tablets (30 mEq total) by mouth 2 (two) times a day      senna-docusate sodium (Senokot S) 8 6-50 mg per tablet Take 1 tablet by mouth daily as needed (Patient not taking: Reported on 2/15/2022 )      senna-docusate sodium (SENOKOT-S) 8 6-50 mg per tablet Take 1 tablet by mouth daily (Patient not taking: Reported on 2/15/2022 ) 7 tablet 0    torsemide (DEMADEX) 20 mg tablet Take 2 tablets (40 mg total) by mouth daily  0    warfarin (COUMADIN) 2 mg tablet  (Patient not taking: Reported on 3/4/2022 )      warfarin (COUMADIN) 3 mg tablet Take 1 5 tablets (4 5 mg total) by mouth daily (Patient not taking: Reported on 3/4/2022 )  0     No current facility-administered medications for this visit  Allergies  Allergies   Allergen Reactions    Augmentin [Amoxicillin-Pot Clavulanate] Anaphylaxis     Tolerated IV ceftriaxone    Levaquin [Levofloxacin] Anaphylaxis         The following portions of the patient's history were reviewed and updated as appropriate: allergies, current medications, past medical history, past social history, past surgical history and problem list       Vitals  There were no vitals filed for this visit  Physical Exam  Constitutional   General appearance: Patient is seated and in no acute distress, well appearing and well nourished  Head and Face   Head and face: Normal     Eyes   Conjunctiva and lids: No erythema, swelling or discharge  Anicteric  Ears, Nose, Mouth, and Throat   Hearing: Normal     Neck: Supple, trachea midline  Nose: Nasal cannula in place  Pulmonary   Respiratory effort: No increased work of breathing or signs of respiratory distress  Lungs: Clear to ascultation, no wheezes, rhonchi, or rales  Cardiovascular   Heart: Regular rate and rhythm, no murmurs gallops or rubs   Examination of extremities for edema and/or varicosities: Normal     Abdomen   Abdomen: Soft, non-tender, no masses, no organomegaly  Normal bowel sounds  Musculoskeletal   Gait and station: Normal    Skin   Skin and subcutaneous tissue: Warm, dry, and intact   No visible jaundice, lesions or rashes  Psychiatric   Judgment and insight: Normal  Recent and remote memory:  Normal  Mood and affect: Normal      Results  No visits with results within 1 Day(s) from this visit  Latest known visit with results is:   Hospital Outpatient Visit on 12/02/2021   Component Date Value    POC Glucose 12/02/2021 109     POC Glucose 12/02/2021 106        Radiology Results  No results found  Orders  No orders of the defined types were placed in this encounter

## 2022-03-28 ENCOUNTER — HOSPITAL ENCOUNTER (OUTPATIENT)
Dept: INFUSION CENTER | Facility: CLINIC | Age: 77
Discharge: HOME | End: 2022-03-28
Payer: MEDICARE

## 2022-03-28 VITALS
TEMPERATURE: 97.6 F | RESPIRATION RATE: 18 BRPM | SYSTOLIC BLOOD PRESSURE: 117 MMHG | DIASTOLIC BLOOD PRESSURE: 60 MMHG | OXYGEN SATURATION: 97 % | HEART RATE: 68 BPM

## 2022-03-28 DIAGNOSIS — E61.1 LOW IRON: Primary | ICD-10-CM

## 2022-03-28 PROCEDURE — 96365 THER/PROPH/DIAG IV INF INIT: CPT

## 2022-03-28 RX ORDER — SODIUM CHLORIDE 9 MG/ML
20 INJECTION, SOLUTION INTRAVENOUS ONCE
Status: CANCELLED | OUTPATIENT
Start: 2022-03-28

## 2022-03-28 RX ORDER — SODIUM CHLORIDE 9 MG/ML
20 INJECTION, SOLUTION INTRAVENOUS ONCE
Status: COMPLETED | OUTPATIENT
Start: 2022-03-28 | End: 2022-03-28

## 2022-03-28 RX ADMIN — FERUMOXYTOL 510 MG: 510 INJECTION INTRAVENOUS at 15:18

## 2022-03-28 RX ADMIN — SODIUM CHLORIDE 20 ML/HR: 0.9 INJECTION, SOLUTION INTRAVENOUS at 15:00

## 2022-03-28 NOTE — PROGRESS NOTES
Patient here for feraheme infusion  Patient resting with no complaints  Vitals stable  Call bell within reach  Patient tolerated infusion without issue  Patient's transport notified for patient  with 30 minute notice

## 2022-05-11 ENCOUNTER — TELEPHONE (OUTPATIENT)
Dept: HEMATOLOGY ONCOLOGY | Facility: CLINIC | Age: 77
End: 2022-05-11

## 2022-06-29 ENCOUNTER — TELEPHONE (OUTPATIENT)
Dept: GASTROENTEROLOGY | Facility: CLINIC | Age: 77
End: 2022-06-29

## 2022-06-29 NOTE — TELEPHONE ENCOUNTER
Patient was called and message was left for her to call back at the earliest convenience to update her secondary insurance to Medicare  I advised the patient to call [phone]

## 2022-06-30 ENCOUNTER — TELEPHONE (OUTPATIENT)
Dept: GASTROENTEROLOGY | Facility: CLINIC | Age: 77
End: 2022-06-30

## 2023-09-12 NOTE — ASSESSMENT & PLAN NOTE
INR still subtherapeutic today at 1 5 (goal INR is 2 5-3 5)  INR bryan AM  Anticipate will be higher however not therapeutic today  Not a candidate for bridging outpatient given history of clot family update/Event Note

## 2023-10-19 NOTE — ASSESSMENT & PLAN NOTE
Lab Results   Component Value Date    EGFR 37 07/04/2021    EGFR 40 07/03/2021    EGFR 44 07/02/2021    CREATININE 1 40 (H) 07/04/2021    CREATININE 1 31 (H) 07/03/2021    CREATININE 1 22 07/02/2021   · Stable at 1 2 Home